# Patient Record
Sex: FEMALE | Race: WHITE | NOT HISPANIC OR LATINO | Employment: PART TIME | URBAN - METROPOLITAN AREA
[De-identification: names, ages, dates, MRNs, and addresses within clinical notes are randomized per-mention and may not be internally consistent; named-entity substitution may affect disease eponyms.]

---

## 2017-02-27 LAB
ALBUMIN SERPL BCP-MCNC: 4.4 G/DL (ref 3.5–5)
ALP SERPL-CCNC: 53 U/L (ref 39–117)
ALT SERPL W P-5'-P-CCNC: 21 U/L (ref 12–45)
AST SERPL-CCNC: 16 U/L (ref 0–37)
BILIRUB SERPL-MCNC: 0.4 MG/DL (ref 0–1)
BUN BLD-MCNC: 14 MG/DL (ref 6–19)
CALCIUM SERPL-MCNC: 9.6 MG/DL (ref 8.4–10.2)
CHLORIDE SERPL-SCNC: 104 MMOL/L (ref 96–108)
CHOLESTEROL, TOTAL: 240 MG/DL (ref 0–199)
CREAT SERPL-MCNC: 1 MG/DL (ref 0.4–1.1)
GLUCOSE SERPL-MCNC: 91 MG/DL (ref 70–105)
HCT VFR BLD AUTO: 46 % (ref 35–47)
HDLC SERPL-MCNC: 81 MG/DL (ref 40–100)
HGB BLD-MCNC: 13.6 G/DL (ref 11.7–16)
LDLC SERPL CALC-MCNC: 145 MG/DL
MCV RBC AUTO: 97 FL (ref 80–100)
PLATELET # BLD AUTO: 268 10^3/ML (ref 140–445)
POTASSIUM BLOOD: 4.2 MMOL/L (ref 3.5–5.1)
PROT SERPL-MCNC: 7.7 G/DL (ref 6.2–8.4)
RBC # BLD AUTO: 4.72 10^6/ΜL (ref 3.8–5.2)
TRIGL SERPL-MCNC: 71 MG/DL (ref 0–149)
TSH SERPL DL<=0.005 MIU/L-ACNC: 1.23 UIU/ML (ref 0.36–3.74)
WBC # BLD AUTO: 5.4 10^3/ML (ref 3.9–11)

## 2017-04-12 DIAGNOSIS — G89.29 CHRONIC LOW BACK PAIN: ICD-10-CM

## 2017-04-12 DIAGNOSIS — M54.50 CHRONIC LOW BACK PAIN: ICD-10-CM

## 2017-04-12 RX ORDER — IBUPROFEN 600 MG/1
TABLET ORAL
Qty: 90 TABLET | Refills: 0 | Status: SHIPPED | OUTPATIENT
Start: 2017-04-12 | End: 2017-09-06 | Stop reason: SDUPTHER

## 2017-07-20 ENCOUNTER — TELEPHONE (OUTPATIENT)
Dept: FAMILY MEDICINE | Facility: CLINIC | Age: 62
End: 2017-07-20

## 2017-07-20 DIAGNOSIS — Z12.31 ENCOUNTER FOR SCREENING MAMMOGRAM FOR BREAST CANCER: Primary | ICD-10-CM

## 2017-07-20 NOTE — TELEPHONE ENCOUNTER
----- Message from Rosemary Barr sent at 7/20/2017  9:02 AM CDT -----  Contact: Self   Patient need Mammogram orders. Please call at [phone].

## 2017-08-15 ENCOUNTER — HOSPITAL ENCOUNTER (OUTPATIENT)
Dept: RADIOLOGY | Facility: HOSPITAL | Age: 62
Discharge: HOME | End: 2017-08-15
Attending: INTERNAL MEDICINE
Payer: COMMERCIAL

## 2017-08-15 VITALS — HEIGHT: 64 IN | WEIGHT: 143 LBS | BODY MASS INDEX: 24.41 KG/M2

## 2017-08-15 DIAGNOSIS — Z12.31 ENCOUNTER FOR SCREENING MAMMOGRAM FOR BREAST CANCER: ICD-10-CM

## 2017-08-15 PROCEDURE — 77067 SCR MAMMO BI INCL CAD: CPT | Mod: TC

## 2017-08-15 PROCEDURE — 77067 SCR MAMMO BI INCL CAD: CPT | Mod: 26,,, | Performed by: RADIOLOGY

## 2017-08-15 PROCEDURE — 77063 BREAST TOMOSYNTHESIS BI: CPT | Mod: 26,,, | Performed by: RADIOLOGY

## 2017-09-06 ENCOUNTER — OFFICE VISIT (OUTPATIENT)
Dept: FAMILY MEDICINE | Facility: CLINIC | Age: 62
End: 2017-09-06
Payer: COMMERCIAL

## 2017-09-06 ENCOUNTER — LAB VISIT (OUTPATIENT)
Dept: LAB | Facility: HOSPITAL | Age: 62
End: 2017-09-06
Attending: INTERNAL MEDICINE
Payer: COMMERCIAL

## 2017-09-06 ENCOUNTER — PATIENT MESSAGE (OUTPATIENT)
Dept: FAMILY MEDICINE | Facility: CLINIC | Age: 62
End: 2017-09-06

## 2017-09-06 VITALS
HEART RATE: 62 BPM | WEIGHT: 149.13 LBS | HEIGHT: 64 IN | BODY MASS INDEX: 25.46 KG/M2 | DIASTOLIC BLOOD PRESSURE: 82 MMHG | SYSTOLIC BLOOD PRESSURE: 124 MMHG | TEMPERATURE: 99 F | OXYGEN SATURATION: 98 %

## 2017-09-06 DIAGNOSIS — N39.46 MIXED INCONTINENCE URGE AND STRESS (MALE)(FEMALE): ICD-10-CM

## 2017-09-06 DIAGNOSIS — M54.5 CHRONIC LOW BACK PAIN, UNSPECIFIED BACK PAIN LATERALITY, WITH SCIATICA PRESENCE UNSPECIFIED: ICD-10-CM

## 2017-09-06 DIAGNOSIS — Z23 NEED FOR SHINGLES VACCINE: ICD-10-CM

## 2017-09-06 DIAGNOSIS — R51.9 INTRACTABLE EPISODIC HEADACHE, UNSPECIFIED HEADACHE TYPE: ICD-10-CM

## 2017-09-06 DIAGNOSIS — Z23 FLU VACCINE NEED: ICD-10-CM

## 2017-09-06 DIAGNOSIS — K21.9 GASTROESOPHAGEAL REFLUX DISEASE WITHOUT ESOPHAGITIS: ICD-10-CM

## 2017-09-06 DIAGNOSIS — Z12.4 SCREENING FOR CERVICAL CANCER: ICD-10-CM

## 2017-09-06 DIAGNOSIS — G89.29 CHRONIC LOW BACK PAIN, UNSPECIFIED BACK PAIN LATERALITY, WITH SCIATICA PRESENCE UNSPECIFIED: ICD-10-CM

## 2017-09-06 DIAGNOSIS — E66.3 OVERWEIGHT (BMI 25.0-29.9): ICD-10-CM

## 2017-09-06 DIAGNOSIS — Z12.11 ENCOUNTER FOR FIT (FECAL IMMUNOCHEMICAL TEST) SCREENING: ICD-10-CM

## 2017-09-06 DIAGNOSIS — Z00.00 ROUTINE MEDICAL EXAM: Primary | ICD-10-CM

## 2017-09-06 DIAGNOSIS — N95.2 ATROPHIC VAGINITIS: ICD-10-CM

## 2017-09-06 DIAGNOSIS — Z00.00 ROUTINE MEDICAL EXAM: ICD-10-CM

## 2017-09-06 DIAGNOSIS — Z01.419 ENCOUNTER FOR GYNECOLOGICAL EXAMINATION WITHOUT ABNORMAL FINDING: ICD-10-CM

## 2017-09-06 LAB
BILIRUB UR QL STRIP: NEGATIVE
CLARITY UR REFRACT.AUTO: CLEAR
COLOR UR AUTO: YELLOW
GLUCOSE UR QL STRIP: NEGATIVE
HGB UR QL STRIP: NEGATIVE
KETONES UR QL STRIP: NEGATIVE
LEUKOCYTE ESTERASE UR QL STRIP: NEGATIVE
NITRITE UR QL STRIP: NEGATIVE
PH UR STRIP: 7 [PH] (ref 5–8)
PROT UR QL STRIP: NEGATIVE
SP GR UR STRIP: 1.01 (ref 1–1.03)
URN SPEC COLLECT METH UR: NORMAL
UROBILINOGEN UR STRIP-ACNC: NEGATIVE EU/DL

## 2017-09-06 PROCEDURE — 90471 IMMUNIZATION ADMIN: CPT | Mod: S$GLB,,, | Performed by: INTERNAL MEDICINE

## 2017-09-06 PROCEDURE — 81003 URINALYSIS AUTO W/O SCOPE: CPT

## 2017-09-06 PROCEDURE — 88175 CYTOPATH C/V AUTO FLUID REDO: CPT | Performed by: PATHOLOGY

## 2017-09-06 PROCEDURE — 88141 CYTOPATH C/V INTERPRET: CPT | Mod: ,,, | Performed by: PATHOLOGY

## 2017-09-06 PROCEDURE — 99396 PREV VISIT EST AGE 40-64: CPT | Mod: 25,S$GLB,, | Performed by: INTERNAL MEDICINE

## 2017-09-06 PROCEDURE — 87624 HPV HI-RISK TYP POOLED RSLT: CPT

## 2017-09-06 PROCEDURE — 90736 HZV VACCINE LIVE SUBQ: CPT | Mod: S$GLB,,, | Performed by: INTERNAL MEDICINE

## 2017-09-06 PROCEDURE — 99999 PR PBB SHADOW E&M-EST. PATIENT-LVL IV: CPT | Mod: PBBFAC,,, | Performed by: INTERNAL MEDICINE

## 2017-09-06 RX ORDER — OMEPRAZOLE 40 MG/1
40 CAPSULE, DELAYED RELEASE ORAL
Qty: 90 CAPSULE | Refills: 0 | Status: SHIPPED | OUTPATIENT
Start: 2017-09-06 | End: 2018-02-28

## 2017-09-06 RX ORDER — BUTALBITAL, ASPIRIN, AND CAFFEINE 325; 50; 40 MG/1; MG/1; MG/1
1 CAPSULE ORAL EVERY 4 HOURS PRN
Qty: 30 CAPSULE | Refills: 0 | Status: SHIPPED | OUTPATIENT
Start: 2017-09-06 | End: 2019-04-29 | Stop reason: SDUPTHER

## 2017-09-06 RX ORDER — IBUPROFEN 600 MG/1
TABLET ORAL
Qty: 90 TABLET | Refills: 0 | Status: SHIPPED | OUTPATIENT
Start: 2017-09-06 | End: 2017-12-13 | Stop reason: SDUPTHER

## 2017-09-06 NOTE — PROGRESS NOTES
Zoster vaccine administered, rahel well. Instructed to wait 15mins for observation, no reaction noted @ time of discharge.

## 2017-09-06 NOTE — PROGRESS NOTES
HISTORY OF PRESENT ILLNESS:  Sheree Lomeli is a 62 y.o. female who presents to the clinic today for a routine physical exam. Her last physical exam was approximately 1 years(s) ago.        PAST MEDICAL HISTORY:  Past Medical History:   Diagnosis Date    Arthritis     Back pain     Chronic pelvic pain in female     left-sided    De Quervain's tenosynovitis     left    Headache(784.0)     Reflux     Right carpal tunnel syndrome     Urinary incontinence     occ kira       PAST SURGICAL HISTORY:  Past Surgical History:   Procedure Laterality Date    OOPHORECTOMY      LSO--pain       SOCIAL HISTORY:  Social History     Social History    Marital status:      Spouse name: N/A    Number of children: 2    Years of education: N/A     Occupational History     [employer]     Social History Main Topics    Smoking status: Never Smoker    Smokeless tobacco: Never Used    Alcohol use No    Drug use: No    Sexual activity: Yes     Other Topics Concern    Not on file     Social History Narrative    No narrative on file       FAMILY HISTORY:  Family History   Problem Relation Age of Onset    Breast cancer Maternal Grandmother     Heart disease Father      s/p stenting    Coronary artery disease Father     Hypoglycemic Sister     No Known Problems Sister     Diabetes Maternal Uncle     Diabetes Cousin     Hypertension Neg Hx     Stroke Neg Hx     Colon cancer Neg Hx        ALLERGIES AND MEDICATIONS: updated and reviewed.  Review of patient's allergies indicates:   Allergen Reactions    Adhesive Rash    Iodine Rash    Shellfish containing products Rash     Patient allergic to softshell crabs specifically    Sulfa (sulfonamide antibiotics) Rash     Medication List with Changes/Refills   Changed and/or Refilled Medications    Modified Medication Previous Medication    BUTALBITAL-ASPIRIN-CAFFEINE -40 MG (FIORINAL) -40 MG CAP butalbital-aspirin-caffeine -40 mg  (FIORINAL) -40 mg Cap       Take 1 capsule by mouth every 4 (four) hours as needed.    Take 1 capsule by mouth every 4 (four) hours as needed.    IBUPROFEN (ADVIL,MOTRIN) 600 MG TABLET ibuprofen (ADVIL,MOTRIN) 600 MG tablet       TAKE 1 TABLET(600 MG) BY MOUTH EVERY 8 HOURS AS NEEDED    TAKE 1 TABLET(600 MG) BY MOUTH EVERY 8 HOURS AS NEEDED    OMEPRAZOLE (PRILOSEC) 40 MG CAPSULE omeprazole (PRILOSEC) 40 MG capsule       Take 1 capsule (40 mg total) by mouth as needed.    Take 1 capsule (40 mg total) by mouth as needed.             SCREENING HISTORY:  Health Maintenance       Date Due Completion Date    Colonoscopy 09/12/2016 9/12/2006 (Done)    Override on 9/12/2006: Done    Pap Smear with HPV Cotest 04/09/2017 4/9/2014    Override on 4/6/2011: Done    Zoster Vaccine 06/11/2017 ---    Influenza Vaccine 08/01/2017 10/27/2014 (Done)    Override on 10/27/2014: Done    Override on 10/9/2013: Done ([facility])    Mammogram 08/15/2018 8/15/2017    Lipid Panel 08/30/2021 8/30/2016    Override on 6/3/2015: Done (total 218, , , HDL 65)    Override on 10/11/2013: Done (total 222, hdl 70, ldl 129, tg 116)    Override on 8/1/2012: Done    Override on 7/31/2006: Done    TETANUS VACCINE 07/29/2025 7/29/2015            REVIEW OF SYSTEMS:   The patient reports good dietary habits.  The patient does exercise regularly.  General ROS: negative for - chills, fever, malaise or weight loss  Psychological ROS: negative for - anxiety, depression, sleep disturbances or suicidal ideation  Ophthalmic ROS: negative for - blurry vision or eye pain  ENT ROS: negative for - epistaxis, nasal congestion, oral lesions or sore throat; has rare headache  Allergy and Immunology ROS: negative for - hives  Hematological and Lymphatic ROS: negative for - swollen lymph nodes  Endocrine ROS: negative for - polydipsia/polyuria or temperature intolerance  Respiratory ROS: no cough, shortness of breath, or wheezing  Cardiovascular ROS: no  "chest pain or dyspnea on exertion  Gastrointestinal ROS: no abdominal pain, change in bowel habits, or black or bloody stools  Genito-Urinary ROS: no dysuria, trouble voiding, or hematuria  Breast ROS: negative for breast lumps  Musculoskeletal ROS: positive for - has back pain every 3 months or so when she deals with constipation  negative for - gait disturbance, joint swelling or muscle pain  Neurological ROS: no TIA or stroke symptoms  Dermatological ROS: negative for mole changes and rash    Physical Examination:   Vitals:    09/06/17 0812   BP: 124/82   Pulse: 62   Temp: 98.6 °F (37 °C)     Weight: 67.7 kg (149 lb 2.3 oz)   Height: 5' 4.02" (162.6 cm)   Body mass index is 25.59 kg/m².    General appearance - alert, well appearing, and in no distress and overweight  Mental status - alert, oriented to person, place, and time, normal mood, behavior, speech, dress, motor activity, and thought processes  Eyes - pupils equal and reactive, extraocular eye movements intact, sclera anicteric  Mouth - mucous membranes moist, pharynx normal without lesions  Neck - supple, no significant adenopathy, carotids upstroke normal bilaterally, no bruits, thyroid exam: thyroid is normal in size without nodules or tenderness  Lymphatics - no palpable lymphadenopathy  Chest - clear to auscultation, no wheezes, rales or rhonchi, symmetric air entry  Heart - normal rate and regular rhythm, no gallops noted  Abdomen - soft, nontender, nondistended, no masses or organomegaly  Breasts - breasts appear normal, no suspicious masses, no skin or nipple changes or axillary nodes  Pelvic - normal external genitalia, vulva, vagina, cervix, uterus and adnexa, VAGINA: atrophic  Back exam - full range of motion, no tenderness, palpable spasm or pain on motion  Neurological - alert, oriented, normal speech, no focal findings or movement disorder noted, cranial nerves II through XII intact  Musculoskeletal - no joint tenderness, deformity or " swelling, no muscular tenderness noted  Extremities - peripheral pulses normal, no pedal edema, no clubbing or cyanosis  Skin - normal coloration and turgor, no rashes, no suspicious skin lesions noted      ASSESSMENT AND PLAN:  1. Routine medical exam  Counseled on age appropriate medical preventative services including age appropriate cancer screenings, age appropriate eye and dental exams, over all nutritional health, need for a consistent exercise regimen, and an over all push towards maintaining a vigorous and active lifestyle.  Counseled on age appropriate vaccines and discussed upcoming health care needs based on age/gender. Discussed good sleep hygiene and stress management.   - Urinalysis; Future    2. Encounter for gynecological examination without abnormal finding  I did a PAP smear today. She is up to date on her mmg.    3. Gastroesophageal reflux disease without esophagitis  Symptoms controlled. Reflux precautions discussed (eliminate tobacco if a smoker; minimize caffeine, chocolate and red/white peppermint intake; avoid heavy and spicy meals; don't lay down within 2-3 hours after eating). Medication as needed. Patient asked to take medication breaks, if possible - discussed chronic use can limit calcium absorption, increases the risk for intestinal infections, and can cause kidney damage.    - omeprazole (PRILOSEC) 40 MG capsule; Take 1 capsule (40 mg total) by mouth as needed.  Dispense: 90 capsule; Refill: 0    4. Mixed incontinence urge and stress (male)(female)  Stable. Observe.     5. Atrophic vaginitis  Stable. Does report pain with intercourse. Will use otc replense more often. Discussed possibility of using topical hormone cream - will let me know if she wants to give it a try.    6. Chronic low back pain, unspecified back pain laterality, with sciatica presence unspecified  Stable. Medication as needed. She was given info on the healthy back program.  - ibuprofen (ADVIL,MOTRIN) 600 MG tablet;  TAKE 1 TABLET(600 MG) BY MOUTH EVERY 8 HOURS AS NEEDED  Dispense: 90 tablet; Refill: 0    7. Intractable episodic headache, unspecified headache type  Stable. Medication as needed.   - butalbital-aspirin-caffeine -40 mg (FIORINAL) -40 mg Cap; Take 1 capsule by mouth every 4 (four) hours as needed.  Dispense: 30 capsule; Refill: 0    8. Overweight (BMI 25.0-29.9)  The patient is asked to make an attempt to improve diet and exercise patterns to aid in medical management of this problem.     9. Screening for cervical cancer    - Liquid-based pap smear, screening  - HPV High Risk Genotypes, PCR    10. Flu vaccine need  Patient was advised to come back in about a month or go to the pharmacy since we do not have this available in the clinic today.     11. Need for shingles vaccine    - Zoster Vaccine - Live    12. Encounter for FIT (fecal immunochemical test) screening    - Fecal Immunochemical Test (iFOBT); Future          Return in about 1 year (around 9/6/2018), or if symptoms worsen or fail to improve, for annual exam. or sooner as needed.

## 2017-09-08 ENCOUNTER — TELEPHONE (OUTPATIENT)
Dept: ADMINISTRATIVE | Facility: HOSPITAL | Age: 62
End: 2017-09-08

## 2017-09-08 ENCOUNTER — LAB VISIT (OUTPATIENT)
Dept: LAB | Facility: HOSPITAL | Age: 62
End: 2017-09-08
Attending: INTERNAL MEDICINE
Payer: COMMERCIAL

## 2017-09-08 DIAGNOSIS — Z12.11 ENCOUNTER FOR FIT (FECAL IMMUNOCHEMICAL TEST) SCREENING: ICD-10-CM

## 2017-09-08 PROCEDURE — 82274 ASSAY TEST FOR BLOOD FECAL: CPT

## 2017-09-08 NOTE — TELEPHONE ENCOUNTER
Received lab results from [facility].  Updated health maintenance and sent to scanning.

## 2017-09-11 LAB
HEMOCCULT STL QL IA: NEGATIVE
HPV HR 12 DNA CVX QL NAA+PROBE: NEGATIVE
HPV16 DNA SPEC QL NAA+PROBE: NEGATIVE
HPV18 DNA SPEC QL NAA+PROBE: NEGATIVE

## 2017-09-19 ENCOUNTER — TELEPHONE (OUTPATIENT)
Dept: FAMILY MEDICINE | Facility: CLINIC | Age: 62
End: 2017-09-19

## 2017-09-19 DIAGNOSIS — M54.50 LOW BACK PAIN RADIATING TO BOTH LEGS: Primary | ICD-10-CM

## 2017-09-19 DIAGNOSIS — M79.605 LOW BACK PAIN RADIATING TO BOTH LEGS: Primary | ICD-10-CM

## 2017-09-19 DIAGNOSIS — M79.604 LOW BACK PAIN RADIATING TO BOTH LEGS: Primary | ICD-10-CM

## 2017-09-19 NOTE — TELEPHONE ENCOUNTER
----- Message from Linnea De Guzman sent at 9/15/2017  8:04 AM CDT -----  Contact: self  Patient called stating that she is having been in pain with back. Discussed it a little with  at [facility]. Please contact her [phone].    Thanks!

## 2017-09-19 NOTE — TELEPHONE ENCOUNTER
Spoke w/patient, requesting advice. States she continues to have severe lower back pain that radiates to her feet and upper back. States she thought it could have been from having a history of constipation but she is not constipated and still have the pain. States she does not think the healthier back program will help, states if  agrees, she think an MRI would reveal exactly what is going on. Please advise.

## 2017-09-20 NOTE — TELEPHONE ENCOUNTER
Insurance will not allow an MRI without first doing an X-ray and, if no significant findings, we may need to do PT prior to MRI. X-ray ordered - please schedule.

## 2017-09-21 ENCOUNTER — HOSPITAL ENCOUNTER (OUTPATIENT)
Dept: RADIOLOGY | Facility: HOSPITAL | Age: 62
Discharge: HOME | End: 2017-09-21
Attending: INTERNAL MEDICINE
Payer: COMMERCIAL

## 2017-09-21 ENCOUNTER — PATIENT MESSAGE (OUTPATIENT)
Dept: FAMILY MEDICINE | Facility: CLINIC | Age: 62
End: 2017-09-21

## 2017-09-21 DIAGNOSIS — M54.50 LOW BACK PAIN RADIATING TO BOTH LEGS: Primary | ICD-10-CM

## 2017-09-21 DIAGNOSIS — M79.604 LOW BACK PAIN RADIATING TO BOTH LEGS: ICD-10-CM

## 2017-09-21 DIAGNOSIS — M54.50 LOW BACK PAIN RADIATING TO BOTH LEGS: ICD-10-CM

## 2017-09-21 DIAGNOSIS — M79.604 LOW BACK PAIN RADIATING TO BOTH LEGS: Primary | ICD-10-CM

## 2017-09-21 DIAGNOSIS — M79.605 LOW BACK PAIN RADIATING TO BOTH LEGS: ICD-10-CM

## 2017-09-21 DIAGNOSIS — M79.605 LOW BACK PAIN RADIATING TO BOTH LEGS: Primary | ICD-10-CM

## 2017-09-21 PROCEDURE — 72100 X-RAY EXAM L-S SPINE 2/3 VWS: CPT | Mod: TC,PO

## 2017-09-21 PROCEDURE — 72100 X-RAY EXAM L-S SPINE 2/3 VWS: CPT | Mod: 26,,, | Performed by: RADIOLOGY

## 2017-09-29 ENCOUNTER — CLINICAL SUPPORT (OUTPATIENT)
Dept: REHABILITATION | Facility: HOSPITAL | Age: 62
End: 2017-09-29
Attending: INTERNAL MEDICINE
Payer: COMMERCIAL

## 2017-09-29 DIAGNOSIS — G89.29 CHRONIC LOW BACK PAIN, UNSPECIFIED BACK PAIN LATERALITY, WITH SCIATICA PRESENCE UNSPECIFIED: Primary | ICD-10-CM

## 2017-09-29 DIAGNOSIS — M62.81 WEAKNESS OF TRUNK MUSCULATURE: ICD-10-CM

## 2017-09-29 DIAGNOSIS — M62.89 MUSCLE TIGHTNESS: ICD-10-CM

## 2017-09-29 DIAGNOSIS — M54.5 CHRONIC LOW BACK PAIN, UNSPECIFIED BACK PAIN LATERALITY, WITH SCIATICA PRESENCE UNSPECIFIED: Primary | ICD-10-CM

## 2017-09-29 PROCEDURE — 97161 PT EVAL LOW COMPLEX 20 MIN: CPT | Mod: PN

## 2017-09-29 PROCEDURE — 97110 THERAPEUTIC EXERCISES: CPT | Mod: PN

## 2017-09-29 NOTE — PROGRESS NOTES
TIME RECORD    Date: 09/29/2017    Start Time:  0700  Stop Time:  0800      OUTPATIENT PHYSICAL THERAPY   PATIENT EVALUATION  Onset Date: 2009  Primary Diagnosis:   1. Chronic low back pain, unspecified back pain laterality, with sciatica presence unspecified     2. Weakness of trunk musculature     3. Muscle tightness       Treatment Diagnosis: decreased ROM lumbar spine, muscle tightness, core weakness  Past Medical History:   Diagnosis Date    Arthritis     Back pain     Chronic pelvic pain in female     left-sided    De Quervain's tenosynovitis     left    Headache(784.0)     Reflux     Right carpal tunnel syndrome     Urinary incontinence     occ kira     Precautions: as listed above  Prior Therapy: for lumbar  Medications: Sheree Lomeli has a current medication list which includes the following prescription(s): butalbital-aspirin-caffeine -40 mg, ibuprofen, and omeprazole.  Nutrition:  Normal  History of Present Illness: in 2009 pt was cleaning her bathroom, felt like she tore something in her back, was completely hunches over and couldn't move. Went to the MD, it was determined 2 years later that she had an rectocele occur, and her bowel went from something that never caused her a problem to if she didn't go every day she was in excruciating pain. Pt can go 6 months without any pain, then she will experience constipation, it will take about 6 weeks using stool softeners to make everything work again and decrease her back pain. This time the lower back never went away. Pt has been through PT prior for the same pain. Pt attempts to do 2 , 9 minute yoga sessions each morning to help stretch her back.   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 Pt reports she had increased pain last night, once she had a bowel movement this morning her pain was 50% better. Pt reports her back pain will wake her up at night, she is able to change position and return to sleep, drop foot, unexplained weight gain/loss, fever, chills, or night sweats. Pt reports she works through the pain.     Pain:  Location: L side lumbar spine -sitting right above the buttock and into the tailbone - shooting pain from the buttock to the knee   Description: Aching, Burning, Sharp and Shooting  Activities Which Increase Pain: Sitting, Night Time, Flexing and Lifting  Activities Which Decrease Pain: biofreeze, ice pack, yoga, ibuprofen (PRN)  Pain Scale: 0/10 at best 4/10 now  9/10 at worst    Objective     Objective   Observation: pt in standing with level pelvis, and BLE bony landmarks  AROM:    FB: fingertips 6" from floor without reversal of lumbar spine - 3 inches from floor without pain post FDN                  BB: WNL with increased pain in midline lumbar spine                        SBR: fingertips 5 inches above tibiofemoral joint line with significant increased pain L lumbar spine                          SBL: fingertips to 3 inches above tibiofemoral joint line with general stretching R side                     RotL: 25% limitation                          Rot R: 25% limitation  STR/myotome:  R L   hip flex   4+ 4+                          knee ext    5 5                         ankle DF   5 5                        great toe ext     5 5                   ankle PF        5 5                       knee flex  5 5  Sensation/Reflexes: grossly intact to light touch throughout BLE  Palpation: " relief of discomfort with palpation over laminar grooves, QL, and glut med/max  Gait: WNL without AD  Tests:                      SLR: neg   Crossed SLR: neg   Slump Test: neg   Prone Instability/lumbar joint play: 3+/6 P/A   SHABBIR: neg - muscle tightness limitation bilaterally  Flexibility:   Hamstrin% limitation LLE   - 10% limitation RLE      hip flexors : WNL bilaterally                rectus femoris: WNL bilaterally                                          Hip screen: R L   Flex 4+ 4+   Abd 4 4+*   Add 4 4   Ext 4 4*                                                                                                                       FOTO: 33% limitation     today's treatment x30 minutes:  education: educated in evaluation findings and POC.  Educated in expectations of treatment, provider's contact information (email and phone) given to pt for communication of any questions and concerns.  kinetic training: discussed importance of core stability and recruitment while performing yoga and household chores  HEP instruction and ther ex: instructed and performed following:   HL TrA 2x10 3 second holds   HL bridging 2x10   HL dynamic hamstring stretch x10 3 second holds   FADDIR piriformis stretch   Seated EOM hamstring stretch 10x 5 seconds  manual therapy:   Application of FDN: Pt educated on benefits and potential side effects of dry needling. Educated pt on benefits, precautions, side effects followign IDN. Educated pt to use heat following treatment sessions if pt is experiencing pain or soreness. Pt verbalized good understanding of education.  Pt signed written consent to dry needling Rx. Pt gave verbal consent for DN   Pre Post   Dynamic hamstring stretch DP FN   Lumbar RSB DP DN (improved by 2 inches)   Multi-segmental flexion DP DN (improved by 3 inches)     Pt received dry needling to the below listed muscles using 6 needles.  Bilateral lumbar multifidus L4 and L3 with stimulation  Bilateral glut  med          Assessment       Initial Assessment (Pertinent finding, problem list and factors affecting outcome): Ms. Sheree Lomeli is a 63 y/o female referred to outpatient PT for lower back pain with bilateral radiculopathy. Pt presenting with limitations in lumbar spine ROM with provocation of L sided pain, mild weakness in bilateral hips, slight hypermobility of lumbar spine facet joints, core weakness, and muscle tightness. PT signs and symptoms consistent with referring diagnosis, however, with only symptoms on the L side, likely secondary to muscle strain and core weakness. After developing POC, pt was instructed through therapeutic exercises and  Agreed to FDN. Pt with excellent response to FDN without adverse response and with improvements in lumbar spine mobility, as seen above. Pt would benefit from skilled PT to address above stated problems, as well as, achieve pt goals within a timely manner. Pt has set realistic goals and has verbalized good understanding and agreement with reported diagnosis, prognosis and treatment. Pt demonstrates no additional cultural, spiritual or educational need and currently has no barriers to learning.    History  Co-morbidities and personal factors that may impact the plan of care Examination  Body Structures and Functions, activity limitations and participation restrictions that may impact the plan of care    Clinical Presentation   Co-morbidities:   arthritis, chronic pelvic pain, rectocele         Personal Factors:   no deficits    Body Regions:   back  lower extremities    Body Systems:    gross symmetry  ROM  strength  gait            Participation Restrictions:   Unable to sit at work desk for long periods of time, unable to assist with household chores     Activity limitations:   Learning and applying knowledge  no deficits    General Tasks and Commands  no deficits    Communication  no deficits      Mobility  lifting and carrying objects    Self care  no  deficits    Domestic Life  shopping  cooking  doing house work (cleaning house, washing dishes, laundry)    Interactions/Relationships  no deficits    Life Areas  employment    Community and Social Life  recreation and leisure         stable and uncomplicated                      low   moderate  moderate Decision Making/ Complexity Score:  low             Rehab Potiential: fair    Short Term Goals (4 Weeks):   1. Pt will report 20% reduced pain within the lumbar spine for ease with driving to and from work  2. Pt will demonstrate 1/3 improvement MMT in BLE for ease with performing household chores  4. Pt will demonstrate 10% improvement in bilateral hamstring length for ease with dressing lower body  5. Pt will demonstrate improved lumbar ROM by 25% in all directions for ease with picking an object up from the floor  Long Term Goals (8 Weeks):   1.Pt will report <3/10 pain within the lumbar spine for ease with ADL's  2. Pt will demonstrate 50% improvement of hamstring and hip flexor length in BLE for ease with ambulation  3. Pt will be independent with HEP for maintenance of improvements gained in therapy sessions   4. Pt will demonstrate 4+/5 strength or greater in BLE for ease with running errands         Plan     Certification Period: 9/29/17 to 12/29/17  Recommended Treatment Plan: 2 times per week for 8 weeks: Electrical Stimulation PRN, Iontophoresis (with dexamethasone PRN), Manual Therapy, Moist Heat/ Ice, Neuromuscular Re-ed, Patient Education, Therapeutic Activites, Therapeutic Exercise and Other therapeutic taping, dry needling, aquatic therapy    Other Recommendations: possible referral/consult with pelvic floor PT      Therapist: Cass Montaño, PT    I CERTIFY THE NEED FOR THESE SERVICES FURNISHED UNDER THIS PLAN OF TREATMENT AND WHILE UNDER MY CARE    Physician's comments:  ________________________________________________________________________________________________________________________________________________      Physician's Name: ___________________________________

## 2017-09-29 NOTE — PLAN OF CARE
TIME RECORD    Date: 09/29/2017    Start Time:  0700  Stop Time:  0800      OUTPATIENT PHYSICAL THERAPY   PATIENT EVALUATION  Onset Date: 2009  Primary Diagnosis:   1. Chronic low back pain, unspecified back pain laterality, with sciatica presence unspecified     2. Weakness of trunk musculature     3. Muscle tightness       Treatment Diagnosis: decreased ROM lumbar spine, muscle tightness, core weakness  Past Medical History:   Diagnosis Date    Arthritis     Back pain     Chronic pelvic pain in female     left-sided    De Quervain's tenosynovitis     left    Headache(784.0)     Reflux     Right carpal tunnel syndrome     Urinary incontinence     occ kira     Precautions: as listed above  Prior Therapy: for lumbar  Medications: Sheree Lomeli has a current medication list which includes the following prescription(s): butalbital-aspirin-caffeine -40 mg, ibuprofen, and omeprazole.  Nutrition:  Normal  History of Present Illness: in 2009 pt was cleaning her bathroom, felt like she tore something in her back, was completely hunches over and couldn't move. Went to the MD, it was determined 2 years later that she had an rectocele occur, and her bowel went from something that never caused her a problem to if she didn't go every day she was in excruciating pain. Pt can go 6 months without any pain, then she will experience constipation, it will take about 6 weeks using stool softeners to make everything work again and decrease her back pain. This time the lower back never went away. Pt has been through PT prior for the same pain. Pt attempts to do 2 , 9 minute yoga sessions each morning to help stretch her back.   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 Pt reports she had increased pain last night, once she had a bowel movement this morning her pain was 50% better. Pt reports her back pain will wake her up at night, she is able to change position and return to sleep, drop foot, unexplained weight gain/loss, fever, chills, or night sweats. Pt reports she works through the pain.     Pain:  Location: L side lumbar spine -sitting right above the buttock and into the tailbone - shooting pain from the buttock to the knee   Description: Aching, Burning, Sharp and Shooting  Activities Which Increase Pain: Sitting, Night Time, Flexing and Lifting  Activities Which Decrease Pain: biofreeze, ice pack, yoga, ibuprofen (PRN)  Pain Scale: 0/10 at best 4/10 now  9/10 at worst    Objective     Objective   Observation: pt in standing with level pelvis, and BLE bony landmarks  AROM:    FB: fingertips 6" from floor without reversal of lumbar spine - 3 inches from floor without pain post FDN                  BB: WNL with increased pain in midline lumbar spine                        SBR: fingertips 5 inches above tibiofemoral joint line with significant increased pain L lumbar spine                          SBL: fingertips to 3 inches above tibiofemoral joint line with general stretching R side                     RotL: 25% limitation                          Rot R: 25% limitation  STR/myotome:  R L   hip flex   4+ 4+                          knee ext    5 5                         ankle DF   5 5                        great toe ext     5 5                   ankle PF        5 5                       knee flex  5 5  Sensation/Reflexes: grossly intact to light touch throughout BLE  Palpation: " relief of discomfort with palpation over laminar grooves, QL, and glut med/max  Gait: WNL without AD  Tests:                      SLR: neg   Crossed SLR: neg   Slump Test: neg   Prone Instability/lumbar joint play: 3+/6 P/A   SHABBIR: neg - muscle tightness limitation bilaterally  Flexibility:   Hamstrin% limitation LLE   - 10% limitation RLE      hip flexors : WNL bilaterally                rectus femoris: WNL bilaterally                                          Hip screen: R L   Flex 4+ 4+   Abd 4 4+*   Add 4 4   Ext 4 4*                                                                                                                       FOTO: 33% limitation     today's treatment x30 minutes:  education: educated in evaluation findings and POC.  Educated in expectations of treatment, provider's contact information (email and phone) given to pt for communication of any questions and concerns.  kinetic training: discussed importance of core stability and recruitment while performing yoga and household chores  HEP instruction and ther ex: instructed and performed following:   HL TrA 2x10 3 second holds   HL bridging 2x10   HL dynamic hamstring stretch x10 3 second holds   FADDIR piriformis stretch   Seated EOM hamstring stretch 10x 5 seconds  manual therapy:   Application of FDN: Pt educated on benefits and potential side effects of dry needling. Educated pt on benefits, precautions, side effects followign IDN. Educated pt to use heat following treatment sessions if pt is experiencing pain or soreness. Pt verbalized good understanding of education.  Pt signed written consent to dry needling Rx. Pt gave verbal consent for DN   Pre Post   Dynamic hamstring stretch DP FN   Lumbar RSB DP DN (improved by 2 inches)   Multi-segmental flexion DP DN (improved by 3 inches)     Pt received dry needling to the below listed muscles using 6 needles.  Bilateral lumbar multifidus L4 and L3 with stimulation  Bilateral glut  med          Assessment       Initial Assessment (Pertinent finding, problem list and factors affecting outcome): Ms. Sheree Lomeli is a 61 y/o female referred to outpatient PT for lower back pain with bilateral radiculopathy. Pt presenting with limitations in lumbar spine ROM with provocation of L sided pain, mild weakness in bilateral hips, slight hypermobility of lumbar spine facet joints, core weakness, and muscle tightness. PT signs and symptoms consistent with referring diagnosis, however, with only symptoms on the L side, likely secondary to muscle strain and core weakness. After developing POC, pt was instructed through therapeutic exercises and  Agreed to FDN. Pt with excellent response to FDN without adverse response and with improvements in lumbar spine mobility, as seen above. Pt would benefit from skilled PT to address above stated problems, as well as, achieve pt goals within a timely manner. Pt has set realistic goals and has verbalized good understanding and agreement with reported diagnosis, prognosis and treatment. Pt demonstrates no additional cultural, spiritual or educational need and currently has no barriers to learning.    History  Co-morbidities and personal factors that may impact the plan of care Examination  Body Structures and Functions, activity limitations and participation restrictions that may impact the plan of care    Clinical Presentation   Co-morbidities:   arthritis, chronic pelvic pain, rectocele         Personal Factors:   no deficits    Body Regions:   back  lower extremities    Body Systems:    gross symmetry  ROM  strength  gait            Participation Restrictions:   Unable to sit at work desk for long periods of time, unable to assist with household chores     Activity limitations:   Learning and applying knowledge  no deficits    General Tasks and Commands  no deficits    Communication  no deficits      Mobility  lifting and carrying objects    Self care  no  deficits    Domestic Life  shopping  cooking  doing house work (cleaning house, washing dishes, laundry)    Interactions/Relationships  no deficits    Life Areas  employment    Community and Social Life  recreation and leisure         stable and uncomplicated                      low   moderate  moderate Decision Making/ Complexity Score:  low             Rehab Potiential: fair    Short Term Goals (4 Weeks):   1. Pt will report 20% reduced pain within the lumbar spine for ease with driving to and from work  2. Pt will demonstrate 1/3 improvement MMT in BLE for ease with performing household chores  4. Pt will demonstrate 10% improvement in bilateral hamstring length for ease with dressing lower body  5. Pt will demonstrate improved lumbar ROM by 25% in all directions for ease with picking an object up from the floor  Long Term Goals (8 Weeks):   1.Pt will report <3/10 pain within the lumbar spine for ease with ADL's  2. Pt will demonstrate 50% improvement of hamstring and hip flexor length in BLE for ease with ambulation  3. Pt will be independent with HEP for maintenance of improvements gained in therapy sessions   4. Pt will demonstrate 4+/5 strength or greater in BLE for ease with running errands         Plan     Certification Period: 9/29/17 to 12/29/17  Recommended Treatment Plan: 2 times per week for 8 weeks: Electrical Stimulation PRN, Iontophoresis (with dexamethasone PRN), Manual Therapy, Moist Heat/ Ice, Neuromuscular Re-ed, Patient Education, Therapeutic Activites, Therapeutic Exercise and Other therapeutic taping, dry needling, aquatic therapy    Other Recommendations: possible referral/consult with pelvic floor PT      Therapist: Cass Montaño, PT    I CERTIFY THE NEED FOR THESE SERVICES FURNISHED UNDER THIS PLAN OF TREATMENT AND WHILE UNDER MY CARE    Physician's comments:  ________________________________________________________________________________________________________________________________________________      Physician's Name: ___________________________________

## 2017-10-04 ENCOUNTER — CLINICAL SUPPORT (OUTPATIENT)
Dept: REHABILITATION | Facility: HOSPITAL | Age: 62
End: 2017-10-04
Attending: INTERNAL MEDICINE
Payer: COMMERCIAL

## 2017-10-04 DIAGNOSIS — G89.29 CHRONIC LOW BACK PAIN, UNSPECIFIED BACK PAIN LATERALITY, WITH SCIATICA PRESENCE UNSPECIFIED: Primary | ICD-10-CM

## 2017-10-04 DIAGNOSIS — M62.89 MUSCLE TIGHTNESS: ICD-10-CM

## 2017-10-04 DIAGNOSIS — M62.81 WEAKNESS OF TRUNK MUSCULATURE: ICD-10-CM

## 2017-10-04 DIAGNOSIS — M54.5 CHRONIC LOW BACK PAIN, UNSPECIFIED BACK PAIN LATERALITY, WITH SCIATICA PRESENCE UNSPECIFIED: Primary | ICD-10-CM

## 2017-10-04 PROCEDURE — 97110 THERAPEUTIC EXERCISES: CPT | Mod: PN

## 2017-10-04 NOTE — PROGRESS NOTES
Name: Sheree Lomeli  Clinic Number: [id number]  Date of Treatment: 10/04/2017   Diagnosis:   Encounter Diagnoses   Name Primary?    Weakness of trunk musculature     Muscle tightness     Chronic low back pain, unspecified back pain laterality, with sciatica presence unspecified Yes       Time in: 1300  Time Out: 1355  Total Treatment Time: 55'      Subjective:    Sheree reports she felt wonderful after dry needling on Friday, she forgot to take her ibuprofen over the weekend. However, this resulted in increased pain the following day.  Patient reports their pain to be 6/10 on a 0-10 scale with 0 being no pain and 10 being the worst pain imaginable. Pt reports compliance with her HEP.     Objective    Patient received individual therapy to increase strength, endurance, ROM, flexibility, posture and core stabilization with activities as follows:     Sheree received therapeutic exercises to develop strength, endurance, ROM, flexibility, posture and core stabilization for 40 minutes including:   NuStep warm up x6 minutes  LTR over PB x2 minutes  DKTC over PB x2 minutes  HL TrA x3 minutes 3 second holds  SL hip abduction (x10 clam, x10 SL)  HL FADDIR stretch 2x30 seconds  HL hip adduction ball squeeze x3 minutes 3 second holds  HL BKFO with TrA 2x10 ea. YTB  Quad TrA with hip extension 2x10    Shuttle 2x10 1 cord    Dynamic hamstring stretch 2x10 3 second holds  Sitting EOS hamstring stretch 2x30 seconds    Sheree received the following manual therapy techniques: Soft tissue Mobilization were applied to the: lumbar spine for 15 minutes.   Application of FDN: Pt educated on benefits and potential side effects of dry needling. Educated pt on benefits, precautions, side effects followign IDN. Educated pt to use heat following treatment sessions if pt is experiencing pain or soreness. Pt verbalized good understanding of education.  Pt signed written consent to dry needling Rx. Pt gave verbal consent for DN  Pt received dry  needling to the below listed muscles using 4 (50-60 mm) needles.  L lumbar multifidus with stimulation  L erector spinae  L glut max/med      Written Home Exercises Provided: reviewed from IE  Pt demo good understanding of the education provided. Sheree demonstrated good return demonstration of activities.     Assessment:   Sheree tolerated treatment session well this afternoon. Pt with good recall of HEP and recruitment of transverse abdominus. Pt with good tolerance to FDN without adverse response and with excellent improvements in R hip ROM post treatment session.    Pt will continue to benefit from skilled PT intervention. Medical Necessity is demonstrated by:  Pain limits function of effected part for some activities, Unable to participate fully in daily activities, Requires skilled supervision to complete and progress HEP and Weakness.    Patient is making good progress towards established goals.    Short Term Goals (4 Weeks):   1. Pt will report 20% reduced pain within the lumbar spine for ease with driving to and from work  2. Pt will demonstrate 1/3 improvement MMT in BLE for ease with performing household chores  4. Pt will demonstrate 10% improvement in bilateral hamstring length for ease with dressing lower body  5. Pt will demonstrate improved lumbar ROM by 25% in all directions for ease with picking an object up from the floor  Long Term Goals (8 Weeks):   1.Pt will report <3/10 pain within the lumbar spine for ease with ADL's  2. Pt will demonstrate 50% improvement of hamstring and hip flexor length in BLE for ease with ambulation  3. Pt will be independent with HEP for maintenance of improvements gained in therapy sessions   4. Pt will demonstrate 4+/5 strength or greater in BLE for ease with running errands            Plan      Certification Period: 9/29/17 to 12/29/17  Recommended Treatment Plan: 2 times per week for 8 weeks: Electrical Stimulation PRN, Iontophoresis (with dexamethasone PRN),  Manual Therapy, Moist Heat/ Ice, Neuromuscular Re-ed, Patient Education, Therapeutic Activites, Therapeutic Exercise and Other therapeutic taping, dry needling, aquatic therapy     Continue with established Plan of Care towards PT goals.     Cass Montaño, PT

## 2017-10-10 ENCOUNTER — CLINICAL SUPPORT (OUTPATIENT)
Dept: REHABILITATION | Facility: HOSPITAL | Age: 62
End: 2017-10-10
Attending: INTERNAL MEDICINE
Payer: COMMERCIAL

## 2017-10-10 DIAGNOSIS — G89.29 CHRONIC LOW BACK PAIN, UNSPECIFIED BACK PAIN LATERALITY, WITH SCIATICA PRESENCE UNSPECIFIED: Primary | ICD-10-CM

## 2017-10-10 DIAGNOSIS — M62.81 WEAKNESS OF TRUNK MUSCULATURE: ICD-10-CM

## 2017-10-10 DIAGNOSIS — M62.89 MUSCLE TIGHTNESS: ICD-10-CM

## 2017-10-10 DIAGNOSIS — M54.5 CHRONIC LOW BACK PAIN, UNSPECIFIED BACK PAIN LATERALITY, WITH SCIATICA PRESENCE UNSPECIFIED: Primary | ICD-10-CM

## 2017-10-10 PROCEDURE — 97110 THERAPEUTIC EXERCISES: CPT | Mod: PN

## 2017-10-10 NOTE — PROGRESS NOTES
Name: Sheree Lomeli  Clinic Number: [id number]  Date of Treatment: 10/10/2017   Diagnosis:   Encounter Diagnoses   Name Primary?    Weakness of trunk musculature     Muscle tightness     Chronic low back pain, unspecified back pain laterality, with sciatica presence unspecified Yes       Time in: 0700  Time Out: 0755  Total Treatment Time: 55'      Subjective:    Sheree reports her pain has been much better since beginning therapy. Pt reports she only had minimal pain on Friday because she had to work for 8 hours straight.  Patient reports their pain to be 4/10 on a 0-10 scale with 0 being no pain and 10 being the worst pain imaginable. Pt reports compliance with her HEP.     Objective    Patient received individual therapy to increase strength, endurance, ROM, flexibility, posture and core stabilization with activities as follows:     Sheree received therapeutic exercises to develop strength, endurance, ROM, flexibility, posture and core stabilization for 40 minutes including:   NuStep warm up x6 minutes  LTR over PB x3 minutes  DKTC over PB x2 minutes  HL TrA x3 minutes 3 second holds  HL TrA with mini march 2x10 ea.   HL TrA BKFO with RTB 2x10  SL hip abduction 2x10  Ea. LE  HL FADDIR/SHABBIR stretch 2x30 seconds  HL hip adduction ball squeeze x3 minutes 3 second holds  Quad TrA with hip extension 2x10    Shuttle 2x10 1 cord    Dynamic hamstring stretch 2x10 3 second holds  Sitting EOS hamstring stretch 2x30 seconds    Sheree received the following manual therapy techniques: Soft tissue Mobilization were applied to the: lumbar spine for 15 minutes.   Application of FDN: Pt educated on benefits and potential side effects of dry needling. Educated pt on benefits, precautions, side effects followign IDN. Educated pt to use heat following treatment sessions if pt is experiencing pain or soreness. Pt verbalized good understanding of education.  Pt signed written consent to dry needling Rx. Pt gave verbal consent for  DN  Pt received dry needling to the below listed muscles using 4 (50-60 mm) needles.  B lumbar multifidus with stimulation  B glut max/med - twitch response elicited L  B glut min      Written Home Exercises Provided: reviewed from IE  Pt demo good understanding of the education provided. Sheree demonstrated good return demonstration of activities.     Assessment:   Sheree tolerated treatment session well this afternoon. Pt with good maintenance of TrA isolation with involvement of LE movement. Pt with twitch response elicited in the L glut med with FDN, and good improvements in tissue mobility presented post treatment session. Pt presents with training effect achieved easily in LLE posterior hip musculature compared to RLE.   Pt will continue to benefit from skilled PT intervention. Medical Necessity is demonstrated by:  Pain limits function of effected part for some activities, Unable to participate fully in daily activities, Requires skilled supervision to complete and progress HEP and Weakness.    Patient is making good progress towards established goals.    Short Term Goals (4 Weeks):   1. Pt will report 20% reduced pain within the lumbar spine for ease with driving to and from work  2. Pt will demonstrate 1/3 improvement MMT in BLE for ease with performing household chores  4. Pt will demonstrate 10% improvement in bilateral hamstring length for ease with dressing lower body  5. Pt will demonstrate improved lumbar ROM by 25% in all directions for ease with picking an object up from the floor  Long Term Goals (8 Weeks):   1.Pt will report <3/10 pain within the lumbar spine for ease with ADL's  2. Pt will demonstrate 50% improvement of hamstring and hip flexor length in BLE for ease with ambulation  3. Pt will be independent with HEP for maintenance of improvements gained in therapy sessions   4. Pt will demonstrate 4+/5 strength or greater in BLE for ease with running errands            Plan      Certification  Period: 9/29/17 to 12/29/17  Recommended Treatment Plan: 2 times per week for 8 weeks: Electrical Stimulation PRN, Iontophoresis (with dexamethasone PRN), Manual Therapy, Moist Heat/ Ice, Neuromuscular Re-ed, Patient Education, Therapeutic Activites, Therapeutic Exercise and Other therapeutic taping, dry needling, aquatic therapy     Continue with established Plan of Care towards PT goals.     Cass Montaño, PT

## 2017-10-13 ENCOUNTER — CLINICAL SUPPORT (OUTPATIENT)
Dept: REHABILITATION | Facility: HOSPITAL | Age: 62
End: 2017-10-13
Attending: INTERNAL MEDICINE
Payer: COMMERCIAL

## 2017-10-13 DIAGNOSIS — M62.89 MUSCLE TIGHTNESS: ICD-10-CM

## 2017-10-13 DIAGNOSIS — M62.81 WEAKNESS OF TRUNK MUSCULATURE: ICD-10-CM

## 2017-10-13 PROCEDURE — 97110 THERAPEUTIC EXERCISES: CPT | Mod: PN

## 2017-10-13 PROCEDURE — 97140 MANUAL THERAPY 1/> REGIONS: CPT | Mod: PN

## 2017-10-13 NOTE — PROGRESS NOTES
Name: Sheree Lomeli  Clinic Number: [id number]  Date of Treatment: 10/13/2017   Diagnosis:   Encounter Diagnoses   Name Primary?    Weakness of trunk musculature     Muscle tightness        Time in: 8:00  Time Out: 9:10  Total Treatment Time: 70 minutes      Subjective:    Sheree reports her back pain has improved with therapy. Pt had really bad pain at 3 and 4 AM last night, better since up and moving, minimal pain now. Pt reports that pressure from her rectocele gets worse when she is constipated which also exacerbates her back pain. Usually once she gets her constipation under control her back pain goes away but in July her back pain didn't go away in after resolving a bout of constipation.      Pt reports that she was a person that went to the bathroom every three days like clockwork, then in 2009 she was down on the floor cleaning and reaching behind something and felt a pop of pressure. She was told she pulled something in her back and was treated for that. Wasn't until some time later she noticed a bulge and was eventually diagnosed with a rectocele. That was the day when it became difficult to go to the bathroom. Uses stool softeners, fiber and drinks lots of water for management of constipation. But still occasionally it becomes hard to have a BM. Did two colon cleanses in this 8 year period and felt better afterwards. Pt reports that at times she has no urge to defecate but will have back pain and other times she has an urge but is unable to go which is when her stool becomes firm and she will have onset of constipation.    Objective    Patient received individual therapy to increase strength, endurance, ROM, flexibility, posture and core stabilization with activities as follows:     Sheree received therapeutic exercises to develop strength, endurance, ROM, flexibility, posture and core stabilization for 30 minutes including:     NuStep warm up x8  Minutes  Pt education regarding: role of PFPT in  management of constipation and prolapse as well as see below    Not performed:  LTR over PB x3 minutes  DKTC over PB x2 minutes  HL TrA x3 minutes 3 second holds  HL TrA with mini march 2x10 ea.   HL TrA BKFO with RTB 2x10  SL hip abduction 2x10  Ea. LE  HL FADDIR/SHABBIR stretch 2x30 seconds  HL hip adduction ball squeeze x3 minutes 3 second holds  Quad TrA with hip extension 2x10    Shuttle 2x10 1 cord    Dynamic hamstring stretch 2x10 3 second holds  Sitting EOS hamstring stretch 2x30 seconds    Pt received individual manual therapy for 30 minutes including: myofascial mobilization applied to abdomen; colon massage with instructions in self colon massage; pt instructed in diaphragmatic breathing    Sheree received the following manual therapy techniques: Soft tissue Mobilization were applied to the: lumbar spine for 0 minutes.   Application of FDN: Pt educated on benefits and potential side effects of dry needling. Educated pt on benefits, precautions, side effects followign IDN. Educated pt to use heat following treatment sessions if pt is experiencing pain or soreness. Pt verbalized good understanding of education.  Pt signed written consent to dry needling Rx. Pt gave verbal consent for DN  Pt received dry needling to the below listed muscles using 4 (50-60 mm) needles.  B lumbar multifidus with stimulation  B glut max/med - twitch response elicited L  B glut min      Written Home Exercises Provided: reviewed from IE; added diaphragmatic breathing and colon massage today with handout; pt was also instructed in constipation and rectocele management including mechanics of defecation and importance of avoiding Valsalva/large increase in intra-abdominal pressure; pt was education on proper lifting mechanics and instructed in use of breath while performing functional activities.   Pt demo good understanding of the education provided. Sheree demonstrated good return demonstration of activities.     Assessment:    Sheree tolerated treatment session well without visible adverse effects. Pt seen today per supervising PT's request due to onset of back pain associated with constipation and hx of rectocele. Based on today's discussion with pt and assessment, improving management of constipation and rectocele will contribute favorably to her tx plan for lumbar pain. Pt likely to have PFM weakness and would benefit from PFM assessment to address symptoms thoroughly. Discussed PFM anatomy/physiology today, relationship to bladder and bowel function, and role in central stabilization using pelvic model and role of PFPT in management; pt verbalized understanding. Will continue with tx for lumbar pain with Cass Montaño as pt is making good progress and will also continue to follow-up with pt on management of rectocele with PFM assessment if pt is so inclined/unable to effectively incorporate deep core activation into her therapeutic exercise routine. Pt also with abdominal myofascial restrictions and will benefit from MFR. Pt continues to present with decreased core strength and stability and will continue to benefit from skilled PT intervention in order to maximize functional independence.  Medical Necessity is demonstrated by:  Pain limits function of effected part for some activities, Unable to participate fully in daily activities, Requires skilled supervision to complete and progress HEP and Weakness.    Patient is making good progress towards established goals.    Short Term Goals (4 Weeks):   1. Pt will report 20% reduced pain within the lumbar spine for ease with driving to and from work  2. Pt will demonstrate 1/3 improvement MMT in BLE for ease with performing household chores  4. Pt will demonstrate 10% improvement in bilateral hamstring length for ease with dressing lower body  5. Pt will demonstrate improved lumbar ROM by 25% in all directions for ease with picking an object up from the floor  Long Term Goals (8 Weeks):    1.Pt will report <3/10 pain within the lumbar spine for ease with ADL's  2. Pt will demonstrate 50% improvement of hamstring and hip flexor length in BLE for ease with ambulation  3. Pt will be independent with HEP for maintenance of improvements gained in therapy sessions   4. Pt will demonstrate 4+/5 strength or greater in BLE for ease with running errands            Plan      Certification Period: 9/29/17 to 12/29/17  Recommended Treatment Plan: 2 times per week for 8 weeks: Electrical Stimulation PRN, Iontophoresis (with dexamethasone PRN), Manual Therapy, Moist Heat/ Ice, Neuromuscular Re-ed, Patient Education, Therapeutic Activites, Therapeutic Exercise and Other therapeutic taping, dry needling, aquatic therapy     Continue with established Plan of Care towards PT goals.     Lauren Shepley, PT

## 2017-10-16 ENCOUNTER — CLINICAL SUPPORT (OUTPATIENT)
Dept: REHABILITATION | Facility: HOSPITAL | Age: 62
End: 2017-10-16
Attending: INTERNAL MEDICINE
Payer: COMMERCIAL

## 2017-10-16 DIAGNOSIS — M62.89 MUSCLE TIGHTNESS: ICD-10-CM

## 2017-10-16 DIAGNOSIS — M62.81 WEAKNESS OF TRUNK MUSCULATURE: ICD-10-CM

## 2017-10-16 PROCEDURE — 97110 THERAPEUTIC EXERCISES: CPT | Mod: PN

## 2017-10-16 NOTE — PROGRESS NOTES
Name: Sheree Lomeli  Clinic Number: [id number]  Date of Treatment: 10/16/2017   Diagnosis:   Encounter Diagnoses   Name Primary?    Weakness of trunk musculature     Muscle tightness        Time in: 1500  Time Out: 1555  Total Treatment Time: 55 minutes      Subjective:    Sehree reports her back pain has improved with therapy. Pt reports she continues to have pain when she is sitting for long periods of time like while at work. Pt also stating she has been moving to a different bed at night due to her  snoring, and she think that is what causes the pain at 3AM. Pt inquiring about prognosis and possible discharge to [facility] in the near future. Pt reports   Objective    Patient received individual therapy to increase strength, endurance, ROM, flexibility, posture and core stabilization with activities as follows:     Sheree received therapeutic exercises to develop strength, endurance, ROM, flexibility, posture and core stabilization for 55 minutes including:     NuStep warm up x8  Minutes  Pt education regarding: role of PFPT in management of constipation and prolapse as well as see below  LTR resisted RTB 2x10 ea.   DKTC over PB x2 minutes  HL TrA x20 with exhalation and diaphragmatic breathing  Open books 2x10 ea.   HL TrA with mini march 2x10 ea.   HL TrA BKFO with RTB 2x10  SL hip abduction 2x10  Ea. LE  HL FADDIR/SHABBIR stretch 2x30 seconds  BLE 90/90 hip extension x10 ea.  - B hand under pelivs for assisted trunk stabilization  HL hip adduction ball squeeze x3 minutes 3 second holds  Prone hip extension 2x10 ea.   Quad multifidus 2x10 ea.    Prayer stretch for L side 2x30 seconds  Shuttle 2x10 1.5 cords    Seated on PB TrA mini marches x10 on ea.   Dynamic hamstring stretch 2x10 3 second holds  Sitting EOS hamstring stretch 2x30 seconds    Pt received individual manual therapy for 30 minutes including: myofascial mobilization applied to abdomen; colon massage with instructions in self colon massage; pt  instructed in diaphragmatic breathing    Sheree received the following manual therapy techniques: Soft tissue Mobilization were applied to the: lumbar spine for 0 minutes.     Written Home Exercises Provided: reviewed from IE; added diaphragmatic breathing and colon massage today with handout; pt was also instructed in constipation and rectocele management including mechanics of defecation and importance of avoiding Valsalva/large increase in intra-abdominal pressure; pt was education on proper lifting mechanics and instructed in use of breath while performing functional activities.   Pt demo good understanding of the education provided. Sheree demonstrated good return demonstration of activities.     Assessment:   Sheree tolerated treatment session well without visible adverse effects. Pt with excellent tolerance to progression of core strengthening this session without compensatory movement patterns demonstrated. Pt with mild difficulty performing trunk activation while seated on stability ball requiring minimal external support. Pt HEP was updated for new exercises. Will modify at next treatment session if needed.  Pt continues to present with decreased core strength and stability and will continue to benefit from skilled PT intervention in order to maximize functional independence.  Medical Necessity is demonstrated by:  Pain limits function of effected part for some activities, Unable to participate fully in daily activities, Requires skilled supervision to complete and progress HEP and Weakness.    Patient is making good progress towards established goals.    Short Term Goals (4 Weeks):   1. Pt will report 20% reduced pain within the lumbar spine for ease with driving to and from work  2. Pt will demonstrate 1/3 improvement MMT in BLE for ease with performing household chores  4. Pt will demonstrate 10% improvement in bilateral hamstring length for ease with dressing lower body  5. Pt will demonstrate improved  lumbar ROM by 25% in all directions for ease with picking an object up from the floor  Long Term Goals (8 Weeks):   1.Pt will report <3/10 pain within the lumbar spine for ease with ADL's  2. Pt will demonstrate 50% improvement of hamstring and hip flexor length in BLE for ease with ambulation  3. Pt will be independent with HEP for maintenance of improvements gained in therapy sessions   4. Pt will demonstrate 4+/5 strength or greater in BLE for ease with running errands            Plan      Certification Period: 9/29/17 to 12/29/17  Recommended Treatment Plan: 2 times per week for 8 weeks: Electrical Stimulation PRN, Iontophoresis (with dexamethasone PRN), Manual Therapy, Moist Heat/ Ice, Neuromuscular Re-ed, Patient Education, Therapeutic Activites, Therapeutic Exercise and Other therapeutic taping, dry needling, aquatic therapy     Continue with established Plan of Care towards PT goals.     Cass Montaño, PT

## 2017-10-20 ENCOUNTER — CLINICAL SUPPORT (OUTPATIENT)
Dept: REHABILITATION | Facility: HOSPITAL | Age: 62
End: 2017-10-20
Attending: INTERNAL MEDICINE
Payer: COMMERCIAL

## 2017-10-20 DIAGNOSIS — M62.89 MUSCLE TIGHTNESS: ICD-10-CM

## 2017-10-20 DIAGNOSIS — M54.5 CHRONIC LOW BACK PAIN, UNSPECIFIED BACK PAIN LATERALITY, WITH SCIATICA PRESENCE UNSPECIFIED: Primary | ICD-10-CM

## 2017-10-20 DIAGNOSIS — G89.29 CHRONIC LOW BACK PAIN, UNSPECIFIED BACK PAIN LATERALITY, WITH SCIATICA PRESENCE UNSPECIFIED: Primary | ICD-10-CM

## 2017-10-20 DIAGNOSIS — M62.81 WEAKNESS OF TRUNK MUSCULATURE: ICD-10-CM

## 2017-10-20 PROCEDURE — 97110 THERAPEUTIC EXERCISES: CPT | Mod: PN

## 2017-10-20 NOTE — PROGRESS NOTES
Name: Sheree Lomeli  Clinic Number: [id number]  Date of Treatment: 10/20/2017   Diagnosis:   Encounter Diagnoses   Name Primary?    Weakness of trunk musculature     Muscle tightness     Chronic low back pain, unspecified back pain laterality, with sciatica presence unspecified Yes       Time in: 0715  Time Out: 0800  Total Treatment Time: 45 minutes      Subjective:    Sheree reports she has been doing very well in terms of her pain recently. Pt reports she is very compliant with her HEP and performs it every morning.  Pt reports her pain to be 1/10 in her lower back, and says that she would be perfectly fine with living with this low of pain (compared to the high pain she was experiencing) for the rest of her life.    Objective    Patient received individual therapy to increase strength, endurance, ROM, flexibility, posture and core stabilization with activities as follows:     Sheree received therapeutic exercises to develop strength, endurance, ROM, flexibility, posture and core stabilization for 45 minutes including:     NuStep warm up x8  Minutes  LTR resisted RTB 2x10 ea.   HL TrA x20 with exhalation and diaphragmatic breathing  Open books 2x10 ea.   HL TrA with mini march 2x10 ea.   HL TrA BKFO with RTB 2x10  SL hip abduction 2x10  Ea. LE  HL FADDIR/SHABBIR stretch 2x30 seconds  BLE 90/90 hip extension x10 ea.  - B hand under pelivs for assisted trunk stabilization  HL hip adduction ball squeeze x3 minutes 3 second holds  Prone hip extension 2x10 ea.   Quad multifidus 2x10 ea.    Prayer stretch for L side 2x30 seconds  Shuttle 2x10 1.5 cords    Seated on PB TrA mini marches x10 on ea.   Dynamic hamstring stretch 2x10 3 second holds  Sitting EOS hamstring stretch 2x30 seconds      Sheree received the following manual therapy techniques: Soft tissue Mobilization were applied to the: lumbar spine for 0 minutes.     Written Home Exercises Provided: reviewed from IE  Pt demo good understanding of the education  provided. Sheree demonstrated good return demonstration of activities.     Assessment:   Sheree tolerated treatment session well without visible adverse effects. Pt with excellent tolerance to progression of core strengthening this session without compensatory movement patterns demonstrated. Pt achieves training effect easily with progression of core stabilization exercises, yet without the provocation of pain. Pt continues to present with decreased core strength and stability and will continue to benefit from skilled PT intervention in order to maximize functional independence.  Medical Necessity is demonstrated by:  Pain limits function of effected part for some activities, Unable to participate fully in daily activities, Requires skilled supervision to complete and progress HEP and Weakness.    Patient is making good progress towards established goals.    Short Term Goals (4 Weeks):   1. Pt will report 20% reduced pain within the lumbar spine for ease with driving to and from work  2. Pt will demonstrate 1/3 improvement MMT in BLE for ease with performing household chores  4. Pt will demonstrate 10% improvement in bilateral hamstring length for ease with dressing lower body  5. Pt will demonstrate improved lumbar ROM by 25% in all directions for ease with picking an object up from the floor  Long Term Goals (8 Weeks):   1.Pt will report <3/10 pain within the lumbar spine for ease with ADL's  2. Pt will demonstrate 50% improvement of hamstring and hip flexor length in BLE for ease with ambulation  3. Pt will be independent with HEP for maintenance of improvements gained in therapy sessions   4. Pt will demonstrate 4+/5 strength or greater in BLE for ease with running errands            Plan      Certification Period: 9/29/17 to 12/29/17  Recommended Treatment Plan: 2 times per week for 8 weeks: Electrical Stimulation PRN, Iontophoresis (with dexamethasone PRN), Manual Therapy, Moist Heat/ Ice, Neuromuscular Re-ed,  Patient Education, Therapeutic Activites, Therapeutic Exercise and Other therapeutic taping, dry needling, aquatic therapy     Continue with established Plan of Care towards PT goals.     Cass Montaño, PT

## 2017-10-25 ENCOUNTER — CLINICAL SUPPORT (OUTPATIENT)
Dept: REHABILITATION | Facility: HOSPITAL | Age: 62
End: 2017-10-25
Attending: INTERNAL MEDICINE
Payer: COMMERCIAL

## 2017-10-25 DIAGNOSIS — M62.89 MUSCLE TIGHTNESS: ICD-10-CM

## 2017-10-25 DIAGNOSIS — M62.81 WEAKNESS OF TRUNK MUSCULATURE: ICD-10-CM

## 2017-10-25 PROCEDURE — 97112 NEUROMUSCULAR REEDUCATION: CPT | Mod: PN

## 2017-10-25 NOTE — PROGRESS NOTES
"Name: Sheree Lomeli  [facility] Number: [id number]  Date of Treatment: 10/25/2017   Diagnosis:   Encounter Diagnoses   Name Primary?    Weakness of trunk musculature     Muscle tightness      Updated POC    Time in: 7:55  Time Out: 9:00  Total Treatment Time: 65 minutes      Subjective:    Sheree reports the weekend was terrible, Monday night she was in a lot of pain and had to lay on ice all evening. She is better today. Pt also reports extreme pressure in her vaginal area "like it's going to explode;" as well as pain in her sacral region ("firey tailbone"). Pt has also recently noticed that her last two toes are intermittently in a lot of pain on the right side.    4/10 pain currently, 10/10 on Monday and over the weekend     Objective    No pelvic deviations noted in supine; L malleolus higher than R  Pt denies abdominal tenderness; slightly tight iliacus bilat  L obturator tenderness - significant; palpated externally    Pt received individual neuromuscular reeducation for 60 minutes including:    - Diaphragmatic breathing with SEMG assist; pt displays slightly elevated baseline  - Kegals in supine with SEMG assist; elevated baseline, fair WR rise (likely due to activity at rest), good holding, slow and incomplete derecruitment  - TA activation with DB and SEMG assist; pt cued to take a deep breath in between contractions with improved ability to contract; good carry over to PFM  - Progressive muscle relaxation with SEMG assist in supine with knees supported; baseline decreased to normal following relaxation training    Pt received individual manual therapy for 00 minutes including: myofascial mobilization applied to abdomen; colon massage with instructions in self colon massage    Eductaion provided: pt instructed to continue with her exercises for her LBP and to continue with diaphragmatic breathing and colon massage; pt instructed to relax in Z lie every day; pt verbalized understanding. Pt educated on PFM " anatomy using pelvic model with discussion of objective findings today and indication for internal PFM assessment.     Assessment:   Sheree tolerated treatment session well without visible adverse effects. Pt noted to have significant obturator tenderness and elevated PFM resting baseline per SEMG; due to today's findings, and pt's hx including vaginal pressure, tailbone pain, and rectocele, pt will benefit from internal PFM assessment. PFM dysfunction expected to be a component of pt's LBP and will rule in/out PF involvement at her next visit. Pt is independent with her exercises for LBP, she will have one more session with Cass Montaño to review and update her HEP and will continue with pelvic floor assessment in order to optimize care. Pt verbalizes understanding of pelvic floor component and is agreeable to tx plan. Goals have been updated per today's findings, will update following PFM exam.    Patient is making good progress towards established goals.  No educational, cultural, or spiritual barriers to learning identified.    Short Term Goals (4 Weeks):   1. Pt will report 20% reduced pain within the lumbar spine for ease with driving to and from work  2. Pt will demonstrate 1/3 improvement MMT in BLE for ease with performing household chores  3. Pt will demonstrate 10% improvement in bilateral hamstring length for ease with dressing lower body  4. Pt will demonstrate improved lumbar ROM by 25% in all directions for ease with picking an object up from the floor  5. Pt will report decreased obturator pain in order to decrease flares and improve PFM function.  Long Term Goals (8 Weeks):   1.Pt will report <3/10 pain within the lumbar spine for ease with ADL's  2. Pt will demonstrate 50% improvement of hamstring and hip flexor length in BLE for ease with ambulation  3. Pt will be independent with HEP for maintenance of improvements gained in therapy sessions   4. Pt will demonstrate 4+/5 strength or greater in BLE  for ease with running errands   5. Pt will demonstrate improvement in central stabilization in order to improve integration of deep core and functional stability.        Plan      Certification Period: 9/29/17 to 12/29/17  Recommended Treatment Plan: 1 times per week for 8 weeks: Electrical Stimulation PRN, Iontophoresis (with dexamethasone PRN), Manual Therapy, Moist Heat/ Ice, Neuromuscular Re-ed, Patient Education, Therapeutic Activites, Therapeutic Exercise and Other therapeutic taping, dry needling, aquatic therapy     Continue with established Plan of Care towards PT goals.     Lauren Shepley, PT

## 2017-10-27 NOTE — PLAN OF CARE
"Name: Sheree Lomeli  [facility] Number: [id number]  Date of Treatment: 10/25/2017   Diagnosis:   Encounter Diagnoses   Name Primary?    Weakness of trunk musculature     Muscle tightness      Updated POC    Time in: 7:55  Time Out: 9:00  Total Treatment Time: 65 minutes      Subjective:    Sheree reports the weekend was terrible, Monday night she was in a lot of pain and had to lay on ice all evening. She is better today. Pt also reports extreme pressure in her vaginal area "like it's going to explode;" as well as pain in her sacral region ("firey tailbone"). Pt has also recently noticed that her last two toes are intermittently in a lot of pain on the right side.    4/10 pain currently, 10/10 on Monday and over the weekend     Objective    No pelvic deviations noted in supine; L malleolus higher than R  Pt denies abdominal tenderness; slightly tight iliacus bilat  L obturator tenderness - significant; palpated externally    Pt received individual neuromuscular reeducation for 60 minutes including:    - Diaphragmatic breathing with SEMG assist; pt displays slightly elevated baseline  - Kegals in supine with SEMG assist; elevated baseline, fair WR rise (likely due to activity at rest), good holding, slow and incomplete derecruitment  - TA activation with DB and SEMG assist; pt cued to take a deep breath in between contractions with improved ability to contract; good carry over to PFM  - Progressive muscle relaxation with SEMG assist in supine with knees supported; baseline decreased to normal following relaxation training    Pt received individual manual therapy for 00 minutes including: myofascial mobilization applied to abdomen; colon massage with instructions in self colon massage    Eductaion provided: pt instructed to continue with her exercises for her LBP and to continue with diaphragmatic breathing and colon massage; pt instructed to relax in Z lie every day; pt verbalized understanding. Pt educated on PFM " anatomy using pelvic model with discussion of objective findings today and indication for internal PFM assessment.     Assessment:   Sheree tolerated treatment session well without visible adverse effects. Pt noted to have significant obturator tenderness and elevated PFM resting baseline per SEMG; due to today's findings, and pt's hx including vaginal pressure, tailbone pain, and rectocele, pt will benefit from internal PFM assessment. PFM dysfunction expected to be a component of pt's LBP and will rule in/out PF involvement at her next visit. Pt is independent with her exercises for LBP, she will have one more session with Cass Montaño to review and update her HEP and will continue with pelvic floor assessment in order to optimize care. Pt verbalizes understanding of pelvic floor component and is agreeable to tx plan. Goals have been updated per today's findings, will update following PFM exam.    Patient is making good progress towards established goals.  No educational, cultural, or spiritual barriers to learning identified.    Short Term Goals (4 Weeks):   1. Pt will report 20% reduced pain within the lumbar spine for ease with driving to and from work  2. Pt will demonstrate 1/3 improvement MMT in BLE for ease with performing household chores  3. Pt will demonstrate 10% improvement in bilateral hamstring length for ease with dressing lower body  4. Pt will demonstrate improved lumbar ROM by 25% in all directions for ease with picking an object up from the floor  5. Pt will report decreased obturator pain in order to decrease flares and improve PFM function.  Long Term Goals (8 Weeks):   1.Pt will report <3/10 pain within the lumbar spine for ease with ADL's  2. Pt will demonstrate 50% improvement of hamstring and hip flexor length in BLE for ease with ambulation  3. Pt will be independent with HEP for maintenance of improvements gained in therapy sessions   4. Pt will demonstrate 4+/5 strength or greater in BLE  for ease with running errands   5. Pt will demonstrate improvement in central stabilization in order to improve integration of deep core and functional stability.        Plan      Certification Period: 9/29/17 to 12/29/17  Recommended Treatment Plan: 1 times per week for 8 weeks: Electrical Stimulation PRN, Iontophoresis (with dexamethasone PRN), Manual Therapy, Moist Heat/ Ice, Neuromuscular Re-ed, Patient Education, Therapeutic Activites, Therapeutic Exercise and Other therapeutic taping, dry needling, aquatic therapy     Continue with established Plan of Care towards PT goals.     Lauren Shepley, PT

## 2017-11-01 ENCOUNTER — TELEPHONE (OUTPATIENT)
Dept: FAMILY MEDICINE | Facility: CLINIC | Age: 62
End: 2017-11-01

## 2017-11-01 DIAGNOSIS — M54.5 LOW BACK PAIN, UNSPECIFIED BACK PAIN LATERALITY, UNSPECIFIED CHRONICITY, WITH SCIATICA PRESENCE UNSPECIFIED: Primary | ICD-10-CM

## 2017-11-01 NOTE — TELEPHONE ENCOUNTER
Spoke w/patient, informed of result and recommendation. Patient states she will look  up and find out if this is a doctor she saw once before. States if it is, she do not want to see him and will request to see someone else.

## 2017-11-01 NOTE — TELEPHONE ENCOUNTER
----- Message from Magali Rosales sent at 11/1/2017 11:48 AM CDT -----  Patient states that she has been doing physical therapy for a month and she is still having extreme pain when she wakes up in the morning. Patient would like to know if she needs to know if she would need to make an appointment or can she just do a CT scan. She can be reached at [phone]. Thank you!

## 2017-11-01 NOTE — TELEPHONE ENCOUNTER
Back X-ray was normal. If no improvement with PT I would recommend she see Dr. Harley for further evaluation. He can order CT or MRI, if indicated.

## 2017-11-02 ENCOUNTER — CLINICAL SUPPORT (OUTPATIENT)
Dept: REHABILITATION | Facility: HOSPITAL | Age: 62
End: 2017-11-02
Attending: INTERNAL MEDICINE
Payer: COMMERCIAL

## 2017-11-02 DIAGNOSIS — M62.81 WEAKNESS OF TRUNK MUSCULATURE: Primary | ICD-10-CM

## 2017-11-02 DIAGNOSIS — M54.5 CHRONIC LOW BACK PAIN, UNSPECIFIED BACK PAIN LATERALITY, WITH SCIATICA PRESENCE UNSPECIFIED: ICD-10-CM

## 2017-11-02 DIAGNOSIS — M62.89 MUSCLE TIGHTNESS: ICD-10-CM

## 2017-11-02 DIAGNOSIS — G89.29 CHRONIC LOW BACK PAIN, UNSPECIFIED BACK PAIN LATERALITY, WITH SCIATICA PRESENCE UNSPECIFIED: ICD-10-CM

## 2017-11-02 PROCEDURE — 97110 THERAPEUTIC EXERCISES: CPT | Mod: PN

## 2017-11-02 NOTE — PROGRESS NOTES
"Name: Sheree Lomeli  Clinic Number: [id number]  Date of Treatment: 11/02/2017   Diagnosis:   Encounter Diagnoses   Name Primary?    Weakness of trunk musculature Yes    Muscle tightness     Chronic low back pain, unspecified back pain laterality, with sciatica presence unspecified        Time in: 1025  Time Out: 1120  Total Treatment Time: 55 minutes      Subjective:    Sheree reports her grabbing back pain has completely resolved and she has been compliant with her HEP. Pt states that she is beginning to have an episode again with whole back pain that feels flu like and feels that it grabs into her stomach. This pain only comes on at night and is only resolved by ice x2 hours. PT reports she has an appointment to see Dr. Jennings for possible imaging and further study.   Pt reports her pain to be 4/10 in her back, and says that she would have rated her pain a 10/10 if asked yesterday.         Objective    AROM:               FB: fingertips 1" from floor without reversal of lumbar spine and stretching reported                             BB: WNL no pain                                SBL: fingertips 3 inches above tibiofemoral joint line with very very light pain in the L side                                    SBR: fingertips to tibiofemoral joint line with general stretching L side                                RotL: 25% limitation                                     Rot R: 25% limitation  Flexibility:              Hamstring:   10% limitation LLE   - 0% limitation RLE                 hip flexors : WNL bilaterally                           rectus femoris: WNL bilaterally                                          Hip screen:      R          L              Flex     4+        4+              Abd      4+          4+              Add      4+          4+              Ext       4 +         4+                                                                                                                      FOTO: 37% " limitation    Patient received individual therapy to increase strength, endurance, ROM, flexibility, posture and core stabilization with activities as follows:     Sheree received therapeutic exercises to develop strength, endurance, ROM, flexibility, posture and core stabilization for 45 minutes including:     NuStep warm up x8  Minutes  LTR resisted RTB 2x10 ea.   HL TrA x20 with exhalation and diaphragmatic breathing  Open books 2x10 ea.   HL TrA with mini march 2x10 ea.   HL TrA BKFO with RTB 2x10  SL hip abduction 2x10  Ea. LE  HL FADDIR/SHABBIR stretch 2x30 seconds  BLE 90/90 hip extension x10 ea.  - B hand under pelivs for assisted trunk stabilization  HL hip adduction ball squeeze x3 minutes 3 second holds  Prone hip extension 2x10 ea.   Quad multifidus 2x10 ea.    Prayer stretch for L side 2x30 seconds  Shuttle 3x10 1.5 cords    Standing modified L hip hikes (L heel raises) 2x10  Ambulation with 3# weight in R hand x3 laps around clinic    Seated on PB TrA mini marches x10 on ea.   Sitting EOS hamstring stretch 2x30 seconds      Sheree received the following manual therapy techniques: Soft tissue Mobilization were applied to the: lumbar spine for 0 minutes.     Written Home Exercises Provided: reviewed from IE  Pt demo good understanding of the education provided. Sheree demonstrated good return demonstration of activities.     Assessment:   Sheree tolerated treatment session well without visible adverse effects. Pt with excellent tolerance to all exercises prescribed today and with introduction of L sided core strengthening. Monthly assessment performed today. Pt presenting with excellent improvements in lumbar spine ROM and BLE strength. Pt continues to demonstrate improvements in core stability with all exercises yet fatigues easily in L sided musculature. Pt has achieved all short term goals and is progressing towards long term goals. Pt continues to present with decreased core strength and stability and  will continue to benefit from skilled PT intervention in order to maximize functional independence.  Medical Necessity is demonstrated by:  Pain limits function of effected part for some activities, Unable to participate fully in daily activities, Requires skilled supervision to complete and progress HEP and Weakness.    Patient is making good progress towards established goals.    Short Term Goals (4 Weeks):   1. Pt will report 20% reduced pain within the lumbar spine for ease with driving to and from work met  2. Pt will demonstrate 1/3 improvement MMT in BLE for ease with performing household chores met  4. Pt will demonstrate 10% improvement in bilateral hamstring length for ease with dressing lower body met  5. Pt will demonstrate improved lumbar ROM by 25% in all directions for ease with picking an object up from the floor met  Long Term Goals (8 Weeks):   1.Pt will report <3/10 pain within the lumbar spine for ease with ADL's  2. Pt will demonstrate 50% improvement of hamstring and hip flexor length in BLE for ease with ambulation  3. Pt will be independent with HEP for maintenance of improvements gained in therapy sessions   4. Pt will demonstrate 4+/5 strength or greater in BLE for ease with running errands            Plan      Certification Period: 9/29/17 to 12/29/17  Recommended Treatment Plan: 2 times per week for 8 weeks: Electrical Stimulation PRN, Iontophoresis (with dexamethasone PRN), Manual Therapy, Moist Heat/ Ice, Neuromuscular Re-ed, Patient Education, Therapeutic Activites, Therapeutic Exercise and Other therapeutic taping, dry needling, aquatic therapy     Continue with established Plan of Care towards PT goals.     Cass Montaño, PT

## 2017-11-03 ENCOUNTER — CLINICAL SUPPORT (OUTPATIENT)
Dept: REHABILITATION | Facility: HOSPITAL | Age: 62
End: 2017-11-03
Attending: INTERNAL MEDICINE
Payer: COMMERCIAL

## 2017-11-03 DIAGNOSIS — M62.81 WEAKNESS OF TRUNK MUSCULATURE: ICD-10-CM

## 2017-11-03 DIAGNOSIS — M62.89 MUSCLE TIGHTNESS: ICD-10-CM

## 2017-11-03 PROCEDURE — 97140 MANUAL THERAPY 1/> REGIONS: CPT | Mod: PN

## 2017-11-03 PROCEDURE — 97112 NEUROMUSCULAR REEDUCATION: CPT | Mod: PN

## 2017-11-03 NOTE — PROGRESS NOTES
Name: Sheree Lomeli  Clinic Number: [id number]  Date of Treatment: 11/03/2017   Diagnosis:   Encounter Diagnoses   Name Primary?    Weakness of trunk musculature     Muscle tightness      Daily Note    Time in: 9:05  Time Out: 10:05  Total Treatment Time: 60 minutes    Subjective:    Pt reports that she is feeling better today (her back is not as achy). Pt reports she uses replense every few days for vaginal dryness.    3/10 pain (tailbone)    Objective    VAGINAL PELVIC FLOOR EXAM  EXTERNAL ASSESSMENT  Skin Condition: WNL  Scarring: none  Sensation: WNL  Pain: none  Introitus: gaping   Voluntary Contraction: visible lift  Voluntary Relaxation: drop  Bearing down: present with visible bulge      INTERNAL ASSESSMENT  Pain: tender areas noted as follows: left obturator internus; R OI atrophy noted  Sensation: able to localize pressure appropriately, able to distinguish pressure from side to side, and able to feel the difference between lift and drop    Vaginal Vault: WNL  Muscle Bulk: WFL  Muscle Power: 3/5  Muscle Endurance: NA  # Reps To Fatigue: NA    Fast Contractions: NA    Quality of Contraction: WNL  Specificity: WNL   Coordination: WNL    Pt received individual neuromuscular reeducation for 15 minutes including:    - PFM activation with diaphragmatic breathing    Not performed:  - Diaphragmatic breathing with SEMG assist; pt displays slightly elevated baseline  - Kegals in supine with SEMG assist; elevated baseline, fair WR rise (likely due to activity at rest), good holding, slow and incomplete derecruitment  - TA activation with DB and SEMG assist; pt cued to take a deep breath in between contractions with improved ability to contract; good carry over to PFM  - Progressive muscle relaxation with SEMG assist in supine with knees supported; baseline decreased to normal following relaxation training    Pt received individual manual therapy for 45 minutes including: soft tissue mobilization applied to L  obturator internus intravaginally; strain counter strain to L OI with improvement in tenderness per pt report and improvement in soft tissue mobility    Eductaion provided: pt instructed to continue with her exercises for her LBP and to continue with diaphragmatic breathing and colon massage; pt instructed to relax in Z lie every day; pt verbalized understanding.     Assessment:   Sheree tolerated treatment session well without visible adverse effects. Pt with good response to manual care today with improvement in obturator mobility and resolution of tenderness found with internal palpation. Pt with good compliance and good carry over display of PFM activation/integration of her deep core. Pt presents with PFM weakness and prolapse with good tolerance for progression of therapeutic exercise and manual care. Pt will continue to benefit from skilled PT intervention in order to maximize pain free functional independence.     Patient is making good progress towards established goals.  No educational, cultural, or spiritual barriers to learning identified.    Short Term Goals (4 Weeks):   1. Pt will report 20% reduced pain within the lumbar spine for ease with driving to and from work  2. Pt will demonstrate 1/3 improvement MMT in BLE for ease with performing household chores  3. Pt will demonstrate 10% improvement in bilateral hamstring length for ease with dressing lower body  4. Pt will demonstrate improved lumbar ROM by 25% in all directions for ease with picking an object up from the floor  5. Pt will report decreased obturator pain in order to decrease flares and improve PFM function.  Long Term Goals (8 Weeks):   1.Pt will report <3/10 pain within the lumbar spine for ease with ADL's  2. Pt will demonstrate 50% improvement of hamstring and hip flexor length in BLE for ease with ambulation  3. Pt will be independent with [facility] for maintenance of improvements gained in therapy sessions   4. Pt will demonstrate 4+/5  strength or greater in BLE for ease with running errands   5. Pt will demonstrate improvement in central stabilization in order to improve integration of deep core and functional stability.        Plan      Certification Period: 9/29/17 to 12/29/17  Recommended Treatment Plan: 1 times per week for 8 weeks: Electrical Stimulation PRN, Iontophoresis (with dexamethasone PRN), Manual Therapy, Moist Heat/ Ice, Neuromuscular Re-ed, Patient Education, Therapeutic Activites, Therapeutic Exercise and Other therapeutic taping, dry needling, aquatic therapy     Continue with established Plan of Care towards PT goals.     Lauren Shepley, PT

## 2017-11-06 ENCOUNTER — TELEPHONE (OUTPATIENT)
Dept: FAMILY MEDICINE | Facility: CLINIC | Age: 62
End: 2017-11-06

## 2017-11-06 NOTE — TELEPHONE ENCOUNTER
----- Message from Troy Edge sent at 11/6/2017  4:05 PM CST -----  Contact: PT /[phone]  Calling to speak with nurse regarding an call from Dr Harley office. Pt states she really experiencing lot of pain in back ,and needs an appt soon

## 2017-11-09 ENCOUNTER — CLINICAL SUPPORT (OUTPATIENT)
Dept: REHABILITATION | Facility: HOSPITAL | Age: 62
End: 2017-11-09
Attending: INTERNAL MEDICINE
Payer: COMMERCIAL

## 2017-11-09 DIAGNOSIS — G89.29 CHRONIC LOW BACK PAIN, UNSPECIFIED BACK PAIN LATERALITY, WITH SCIATICA PRESENCE UNSPECIFIED: Primary | ICD-10-CM

## 2017-11-09 DIAGNOSIS — M62.81 WEAKNESS OF TRUNK MUSCULATURE: ICD-10-CM

## 2017-11-09 DIAGNOSIS — M54.5 CHRONIC LOW BACK PAIN, UNSPECIFIED BACK PAIN LATERALITY, WITH SCIATICA PRESENCE UNSPECIFIED: Primary | ICD-10-CM

## 2017-11-09 DIAGNOSIS — M62.89 MUSCLE TIGHTNESS: ICD-10-CM

## 2017-11-09 PROCEDURE — 97110 THERAPEUTIC EXERCISES: CPT | Mod: PN

## 2017-11-09 NOTE — PROGRESS NOTES
Name: Sheree Lomeli  Clinic Number: [id number]  Date of Treatment: 11/09/2017   Diagnosis:   Encounter Diagnoses   Name Primary?    Weakness of trunk musculature     Muscle tightness     Chronic low back pain, unspecified back pain laterality, with sciatica presence unspecified Yes       Time in: 0720  Time Out: 0812  Total Treatment Time: 52 minutes      Subjective:    Sheree reports her grabbing back pain has completely resolved and she has been compliant with her HEP.  Pt reports her pain to be 4/10 in her low back/tailbone region.         Objective    Patient received individual therapy to increase strength, endurance, ROM, flexibility, posture and core stabilization with activities as follows:     Sheree received therapeutic exercises to develop strength, endurance, ROM, flexibility, posture and core stabilization for 45 minutes including:     NuStep warm up x8  Minutes  LTR resisted RTB 2x10 ea.   HL TrA x20 with exhalation and diaphragmatic breathing  Open books RTB 2x10 ea.   HL TrA with mini march 2x10 ea.   HL TrA BKFO with RTB 2x10  SL hip abduction 3x10  Ea. LE  HL FADDIR/SHABBIR stretch 2x30 seconds  BLE 90/90 hip extension x10 ea.  - B hand under pelivs for assisted trunk stabilization   --> hip extension into abd x10 ea.   HL hip adduction ball squeeze x3 minutes 3 second holds  Prone hip extension 3x10 ea.   Quad multifidus 3x10 ea.    Prayer stretch for R and L side 2x30 seconds  Shuttle 3x10 1.5 cords    Planks off side of mat forward and ea. Side 2x30 seconds ea.    Standing modified L hip hikes  2x10  Ambulation with 5# weight in R hand x3 laps around clinic    Seated on PB TrA  deadbugs 2x10 on ea.  Walk outs on PB x10  Sitting EOS hamstring stretch 2x30 seconds      Sheree received the following manual therapy techniques: Soft tissue Mobilization were applied to the: lumbar spine for 0 minutes.     Written Home Exercises Provided: reviewed from IE  Pt demo good understanding of the education  provided. Sheree demonstrated good return demonstration of activities.     Assessment:   Sheree tolerated treatment session well without visible adverse effects. Pt with excellent tolerance to introduction and progression of core strengthening exercises. Pt able to complete full LLE lift with hip hike without provocation of pain, as compared to simply heel lift at last treatment session. Pt requiring mild cueing for correct form and core activation with planks with training effect achieved easily. Pt continues to present with decreased core strength and stability and will continue to benefit from skilled PT intervention in order to maximize functional independence.  Medical Necessity is demonstrated by:  Pain limits function of effected part for some activities, Unable to participate fully in daily activities, Requires skilled supervision to complete and progress HEP and Weakness.    Patient is making good progress towards established goals.    Short Term Goals (4 Weeks):   1. Pt will report 20% reduced pain within the lumbar spine for ease with driving to and from work met  2. Pt will demonstrate 1/3 improvement MMT in BLE for ease with performing household chores met  4. Pt will demonstrate 10% improvement in bilateral hamstring length for ease with dressing lower body met  5. Pt will demonstrate improved lumbar ROM by 25% in all directions for ease with picking an object up from the floor met  Long Term Goals (8 Weeks):   1.Pt will report <3/10 pain within the lumbar spine for ease with ADL's  2. Pt will demonstrate 50% improvement of hamstring and hip flexor length in BLE for ease with ambulation  3. Pt will be independent with HEP for maintenance of improvements gained in therapy sessions   4. Pt will demonstrate 4+/5 strength or greater in BLE for ease with running errands            Plan      Certification Period: 9/29/17 to 12/29/17  Recommended Treatment Plan: 2 times per week for 8 weeks: Electrical  Stimulation PRN, Iontophoresis (with dexamethasone PRN), Manual Therapy, Moist Heat/ Ice, Neuromuscular Re-ed, Patient Education, Therapeutic Activites, Therapeutic Exercise and Other therapeutic taping, dry needling, aquatic therapy     Continue with established Plan of Care towards PT goals.     Cass Montaño, PT

## 2017-11-13 ENCOUNTER — OFFICE VISIT (OUTPATIENT)
Dept: PAIN MEDICINE | Facility: CLINIC | Age: 62
End: 2017-11-13
Payer: COMMERCIAL

## 2017-11-13 VITALS
SYSTOLIC BLOOD PRESSURE: 123 MMHG | RESPIRATION RATE: 18 BRPM | BODY MASS INDEX: 26.08 KG/M2 | HEART RATE: 72 BPM | WEIGHT: 152 LBS | TEMPERATURE: 97 F | OXYGEN SATURATION: 100 % | DIASTOLIC BLOOD PRESSURE: 72 MMHG

## 2017-11-13 DIAGNOSIS — M53.3 COCCYDYNIA: Primary | ICD-10-CM

## 2017-11-13 PROCEDURE — 99244 OFF/OP CNSLTJ NEW/EST MOD 40: CPT | Mod: S$GLB,,, | Performed by: PAIN MEDICINE

## 2017-11-13 PROCEDURE — 99999 PR PBB SHADOW E&M-EST. PATIENT-LVL IV: CPT | Mod: PBBFAC,,, | Performed by: PAIN MEDICINE

## 2017-11-13 NOTE — PROGRESS NOTES
Ms. Bentley will be scheduled for a Ganglion Impar Injection on 11/29/2017.  Reviewed the pre-procedure instructions listed below with Ms. Bentley- copy also provided.  Instructed patient to notify clinic if she begins feeling ill, runs a fever, is prescribed antibiotics, and/or has any outpatient procedures within the two weeks leading up to this procedure.  Instructed patient to report to [facility], 2nd Floor Same Day Surgery.  All questions answered- patient verbalized understanding.     Day of Procedure  - Ensure you have obtained an arrival time from the Pain Management Staff  o Procedure Area will call 1-3 days in advance with your arrival time.  Please check any voicemails received.  o If you arrive past your scheduled procedure time, you may be asked to reschedule your procedure.  - Ensure you have a  with you to remain present throughout your procedure for your safety.  o If you arrive without a responsible adult to stay with you and drive you home, you may be asked to reschedule your procedure.  - Take all of your prescribed medications (exceptions noted below) with a small amount of water  - This is NOT a fasting procedure, you may have a light meal before coming.  - Wear comfortable clothing (loose fitting pants).  - You may wear glasses, dentures, contact lenses, and/or hearing aids. Please remove all jewelry and metal hairpins.  - Notify the nurse during the intake process if you are allergic to any medications, if you are diabetic, or if you are not feeling well  - Contact the Pain Management Clinic with the following:  o A fever greater than 100° (degrees)   o Feel ill, have any type of infection, or are taking antibiotics now or within the two (2) weeks leading up to this procedure  o Have any outpatient procedures within the two (2) weeks leading up to this procedure (colonoscopy, major dental work, etc.)    IF you are taking blood thinners: Only upon receiving clearance and  notification from the Pain Management Department  7 Days Prior to Your Procedure:  - Stop taking Plavix/Clopridogrel, Effient/Prasugel   5 Days Prior to Your Procedure:  - Stop taking Coumadin/Warfarin.  An INR may be drawn prior to your procedure.  If labs are required, you will need to arrive earlier than your scheduled arrival time.  - Stop taking Pradaxa/Dabigatra,  Brilinta/ Ticagrelor  3 Days Prior to Your Procedure:  - Stop taking Xarelto/Rivaroxaban, Eliquis/Apixaban, Aggrenox/Dipyridamole, Reopro/Abciximab

## 2017-11-13 NOTE — LETTER
November 13, 2017      Whitney Mera MD  [address] [facility]  [place] [address]           [facility]  [address]  [place] [address]  Phone: [phone]  Fax: [phone]          Patient: Sheree Lomeli   MR Number: [id number]   YOB: 1955   Date of Visit: 11/13/2017       Dear Dr. Whitney Mera:    Thank you for referring Sheree Lomeli to me for evaluation. Attached you will find relevant portions of my assessment and plan of care.    If you have questions, please do not hesitate to call me. I look forward to following Sheree Lomeli along with you.    Sincerely,    Jimmy Harley Jr., MD    Enclosure  CC:  No Recipients    If you would like to receive this communication electronically, please contact [email or web address] or [phone] to request more information on [facility] [employer] access.    For providers and/or their staff who would like to refer a patient to [facility], please contact us through our one-stop-shop provider referral line, [facility], at [phone].    If you feel you have received this communication in error or would no longer like to receive these types of communications, please e-mail [email or web address]

## 2017-11-13 NOTE — PROGRESS NOTES
"Subjective:     Patient ID: Sheree Lomeli is a 62 y.o. female    Chief Complaint: Back Pain (Lower Back)      Referred by: Whitney Mera MD      HPI:    Initial Encounter (11/13/17):  Sheree Lomeli is a 62 y.o. female who presents today with chronic coccygeal pain. Pain started about 8 years ago while cleaning her bathroom. The pain is located over the coccyx. It does not radiate. It has a "fiery" quality to it when severe. She denies any associated numbness, weakness or b/b dysfunction. She does have distal rectocele without obvious enterocele or perineocele. Her pain is alleviated with bowel movements and she does note frequent constipation. Pain exacerbated with sitting for prolonged periods. Does not have pain with palpation.    This pain is described in detail below.    Physical Therapy: Yes. Pelvic floor therapy. No significant relief noted    Non-pharmacologic Treatment: Heat and ice help         · TENS? No    Pain Medications:         · Currently taking: Advil; not sure if helps. Biofreeze.    · Has tried in the past:      · Has not tried: Opioids, Tylenol, Muscle relaxants, TCAs, SNRIs, anticonvulsants    Blood thinners: None    Interventional Therapies: None    Relevant Surgeries: None    Affecting sleep? Yes    Affecting daily activities? yes    Depressive symptoms? no          · SI/HI? No    Work status: Employed    Pain Scores:    Best:       1/10  Worst:     10/10  Usually:   5/10  Today:    3/10    Review of Systems   Constitutional: Negative for activity change, appetite change, chills, fatigue, fever and unexpected weight change.   HENT: Negative for hearing loss.    Eyes: Negative for visual disturbance.   Respiratory: Negative for chest tightness and shortness of breath.    Cardiovascular: Negative for chest pain.   Gastrointestinal: Positive for constipation and rectal pain. Negative for abdominal pain, diarrhea, nausea and vomiting.   Genitourinary: Negative for difficulty urinating. "   Musculoskeletal: Positive for back pain. Negative for gait problem and neck pain.   Skin: Negative for rash.   Neurological: Negative for dizziness, weakness, light-headedness, numbness and headaches.   Psychiatric/Behavioral: Positive for sleep disturbance. Negative for hallucinations and suicidal ideas. The patient is not nervous/anxious.        Past Medical History:   Diagnosis Date    Arthritis     Back pain     Chronic pelvic pain in female     left-sided    De Quervain's tenosynovitis     left    Headache(784.0)     Reflux     Right carpal tunnel syndrome     Urinary incontinence     occ kira       Past Surgical History:   Procedure Laterality Date    OOPHORECTOMY      LSO--pain       Social History     Social History    Marital status:      Spouse name: N/A    Number of children: 2    Years of education: N/A     Occupational History     [employer]     Social History Main Topics    Smoking status: Never Smoker    Smokeless tobacco: Never Used    Alcohol use No    Drug use: No    Sexual activity: Yes     Other Topics Concern    Not on file     Social History Narrative    No narrative on file       Review of patient's allergies indicates:   Allergen Reactions    Adhesive Rash    Iodine Rash    Shellfish containing products Rash     Patient allergic to softshell crabs specifically    Sulfa (sulfonamide antibiotics) Rash       Current Outpatient Prescriptions on File Prior to Visit   Medication Sig Dispense Refill    butalbital-aspirin-caffeine -40 mg (FIORINAL) -40 mg Cap Take 1 capsule by mouth every 4 (four) hours as needed. 30 capsule 0    ibuprofen (ADVIL,MOTRIN) 600 MG tablet TAKE 1 TABLET(600 MG) BY MOUTH EVERY 8 HOURS AS NEEDED 90 tablet 0    omeprazole (PRILOSEC) 40 MG capsule Take 1 capsule (40 mg total) by mouth as needed. 90 capsule 0     No current facility-administered medications on file prior to visit.        Objective:      /72  (BP Location: Left arm, Patient Position: Sitting)   Pulse 72   Temp 97.1 °F (36.2 °C) (Oral)   Resp 18   Wt 68.9 kg (152 lb)   LMP  (LMP Unknown)   SpO2 100%   BMI 26.08 kg/m²     Exam:  GEN:  Well developed, well nourished.  No acute distress.   HEENT:  No trauma.  Mucous membranes moist.  Nares patent bilaterally.  PSYCH: Normal affect. Thought content appropriate.  CHEST:  Breathing symmetric.  No audible wheezing.  ABD: Soft, non-tender, non-distended.  SKIN:  Warm, pink, dry.  No rash on exposed areas.    EXT:  No cyanosis, clubbing, or edema.  No color change or changes in nail or hair growth.  NEURO/MUSCULOSKELETAL:  Fully alert, oriented, and appropriate. Speech normal aleshia. No cranial nerve deficits.   Gait: Normal.  No focal motor deficits.       Imaging:  Narrative     [id number]  Accession # [id number]    Study:  XR LUMBAR SPINE AP AND LATERAL    Indication: Low back pain.    Comparison: Plain film from 05/06/2011.    Findings:   Lumbar spine, AP and lateral views.    Vertebral body heights, spinal alignment, and intervertebral disc spaces are satisfactorily maintained.   Normal SI joints. No evidence of fracture or dislocation.  Soft tissue structures are unremarkable.   Impression        No evidence of fracture.      Electronically signed by: GARRETT WALLIS MD  Date: 09/21/17  Time: 14:30         Narrative     DATE OF EXAM: May  6 2011      LAP   0093  -  SACRUM/COCCYX:   1234 HOURS    [id number]     CLINICAL HISTORY:   719.40 0 JOINT PAIN SITE NOS     PROCEDURE COMMENT:        ICD 9 CODE(S):   ()     CPT 4 CODE(S)/MODIFIER(S):   ()     RESULTS: SI JOINTS APPEAR UNREMARKABLE, WITH NO EVIDENCE OF ABNORMAL   SCLEROSIS, MARGINAL IRREGULARITY, OR ANKYLOSIS OBSERVED.  OSSEOUS   STRUCTURES DEMONSTRATE NO EVIDENCE OF RECENT FRACTURE, LYTIC DESTRUCTIVE   PROCESS, OR OTHER SIGNIFICANT FINDING.       IMPRESSION: AS ABOVE.         : BC   Transcribe Date/Time: May  6 2011  3:19P  Dictated by :  FALLON STEVEN MD  Read On: May  6 2011  1:11P  Fallon Steven M.D., [id number]  Images were reviewed, findings were verified and document was   electronically  SIGNED BY: FALLON STEVEN MD On: May  6 2011  4:42P            Assessment:       Encounter Diagnosis   Name Primary?    Coccydynia Yes         Plan:       Sheree was seen today for back pain.    Diagnoses and all orders for this visit:    Coccydynia  -     Case Request Operating Room: BLOCK IMPAR GANGLION        Sheree Lomeli is a 62 y.o. female with chronic coccydynia. Unclear etiology. No specific injury to coccyx, but may be related to rectocele and irritation of ganglion impar.    1. Schedule for ganglion impar injection.  2. RTC 2 weeks after injection. May consider gabapentin or Elavil depending on results of injection.

## 2017-11-15 ENCOUNTER — CLINICAL SUPPORT (OUTPATIENT)
Dept: REHABILITATION | Facility: HOSPITAL | Age: 62
End: 2017-11-15
Attending: INTERNAL MEDICINE
Payer: COMMERCIAL

## 2017-11-15 DIAGNOSIS — M54.5 CHRONIC LOW BACK PAIN, UNSPECIFIED BACK PAIN LATERALITY, WITH SCIATICA PRESENCE UNSPECIFIED: Primary | ICD-10-CM

## 2017-11-15 DIAGNOSIS — M62.89 MUSCLE TIGHTNESS: ICD-10-CM

## 2017-11-15 DIAGNOSIS — M62.81 WEAKNESS OF TRUNK MUSCULATURE: ICD-10-CM

## 2017-11-15 DIAGNOSIS — G89.29 CHRONIC LOW BACK PAIN, UNSPECIFIED BACK PAIN LATERALITY, WITH SCIATICA PRESENCE UNSPECIFIED: Primary | ICD-10-CM

## 2017-11-15 PROCEDURE — 97110 THERAPEUTIC EXERCISES: CPT | Mod: PN

## 2017-11-15 NOTE — PROGRESS NOTES
Name: Sheree Lomeli  [facility] Number: [id number]  Date of Treatment: 11/15/2017   Diagnosis:   Encounter Diagnoses   Name Primary?    Weakness of trunk musculature     Muscle tightness     Chronic low back pain, unspecified back pain laterality, with sciatica presence unspecified Yes       Time in: 720  Time Out: 810  Total Treatment Time: 50 minutes      Subjective:    Sheree reports her lower back pain has completely resolved, she only continues with her coccygeal pain.  Pt reports her pain to be 0/10 in her low back/tailbone region. Pt agrees that she would like today to be her last visit.         Objective    AROM:               FB: fingertips to floor without reversal of lumbar spine and stretching reported                             BB: WNL no pain                                SBL: fingertips 3 inches above tibiofemoral joint line with very very light pain in the L side                                    SBR: fingertips to tibiofemoral joint line with general stretching L side                                RotL: 25% limitation                                     Rot R: 25% limitation  Flexibility:              Hamstrin% limitation LLE   - 0% limitation RLE                 hip flexors : WNL bilaterally                           rectus femoris: WNL bilaterally                                          Hip screen:      R          L              Flex     5           5              Abd      5          5              Add      5          5              Ext       5          5                                                                                                                      FOTO: 23% limitation    Patient received individual therapy to increase strength, endurance, ROM, flexibility, posture and core stabilization with activities as follows:     Sheree received therapeutic exercises to develop strength, endurance, ROM, flexibility, posture and core stabilization for 45 minutes including:      NuStep warm up x8  Minutes  LTR resisted RTB 2x10 ea.   Open books RTB 2x10 ea.   HL TrA with mini march 2x10 ea.   HL TrA BKFO with RTB 2x10  SL hip abduction 3x10  Ea. LE  HL FADDIR/SHABBIR stretch 2x30 seconds  BLE 90/90 hip extension into abd x10 ea.   - B hand under pelivs for assisted trunk stabilization  Prone hip extension 3x10 ea.   Quad multifidus 3x10 ea.    Prayer stretch for R and L side 2x30 seconds  Anti Rotations RTB 2x10 ea.   Shuttle 3x10 2 cords    Planks off side of mat forward and ea. Side 2x30 seconds ea.    Standing modified L hip hikes  2x10  Ambulation with 5# weight in R hand x3 laps around clinic    Seated on PB TrA  deadbugs 2x10 on ea.  Walk outs on PB x10  Sitting EOS hamstring stretch 2x30 seconds      Sheree received the following manual therapy techniques: Soft tissue Mobilization were applied to the: lumbar spine for 0 minutes.     Written Home Exercises Provided: reviewed from IE  Pt demo good understanding of the education provided. Sheree demonstrated good return demonstration of activities.     Assessment:   Sheree tolerated treatment session well without visible adverse effects. Pt with training effect achieved and excellent recall of all exercises. Pt without requiring cueing for correct core activation with strengthening activities. Monthly assessment performed today. Pt presenting with full ROM and strength of the lumbar spine and BLE. Pt is very compliant with her HEP and has achieved all of her short and long term goals. Pt no longer requires skilled PT services at this time and is now discharged. Pt was educated on progression of HEP and was instructed to call for any questions or concerns.       Short Term Goals (4 Weeks):   1. Pt will report 20% reduced pain within the lumbar spine for ease with driving to and from work met  2. Pt will demonstrate 1/3 improvement MMT in BLE for ease with performing household chores met  4. Pt will demonstrate 10% improvement in  bilateral hamstring length for ease with dressing lower body met  5. Pt will demonstrate improved lumbar ROM by 25% in all directions for ease with picking an object up from the floor met  Long Term Goals (8 Weeks):   1.Pt will report <3/10 pain within the lumbar spine for ease with ADL's met  2. Pt will demonstrate 50% improvement of hamstring and hip flexor length in BLE for ease with ambulation met  3. Pt will be independent with HEP for maintenance of improvements gained in therapy sessions  met  4. Pt will demonstrate 4+/5 strength or greater in BLE for ease with running errands  met           Plan      Certification Period: 9/29/17 to 12/29/17  Recommended Treatment Plan: 2 times per week for 8 weeks: Electrical Stimulation PRN, Iontophoresis (with dexamethasone PRN), Manual Therapy, Moist Heat/ Ice, Neuromuscular Re-ed, Patient Education, Therapeutic Activites, Therapeutic Exercise and Other therapeutic taping, dry needling, aquatic therapy     Pt is discharged from skilled orthopedic PT services, and is to continue with pelvic floor PT.      Cass Montaño, PT

## 2017-11-20 ENCOUNTER — CLINICAL SUPPORT (OUTPATIENT)
Dept: REHABILITATION | Facility: HOSPITAL | Age: 62
End: 2017-11-20
Attending: INTERNAL MEDICINE
Payer: COMMERCIAL

## 2017-11-20 DIAGNOSIS — M62.89 MUSCLE TIGHTNESS: ICD-10-CM

## 2017-11-20 DIAGNOSIS — M62.81 WEAKNESS OF TRUNK MUSCULATURE: ICD-10-CM

## 2017-11-20 PROCEDURE — 97112 NEUROMUSCULAR REEDUCATION: CPT | Mod: PN

## 2017-11-20 PROCEDURE — 97140 MANUAL THERAPY 1/> REGIONS: CPT | Mod: PN

## 2017-11-20 NOTE — PROGRESS NOTES
"Name: Sheree Lomeli  Clinic Number: [id number]  Date of Treatment: 11/20/2017   Diagnosis:   Encounter Diagnoses   Name Primary?    Weakness of trunk musculature     Muscle tightness      Daily Note    Time in: 7:00  Time Out: 8:01  Total Treatment Time: 61 minutes  Visit #: 12/20  Referral expiration: 12/31/17  POC expiration: 12/29/17    Subjective:    Pt reports that she is actually doing pretty good. She will have an injection next Wednesday, doc said it's probably inflammation right in that area and will find out for sure with the shot.    Objective    Pt received individual neuromuscular reeducation for 25 minutes including:    - PFM activation with diaphragmatic breathing 10 x 10''  - Legs up the wall x 5 min  - Wall piriformis stretch 3 x 30'' ea  - Modified forearm plank x 15 seconds  - Modified side plank x 15 second ea    Not performed:  - Diaphragmatic breathing with SEMG assist; pt displays slightly elevated baseline  - Kegals in supine with SEMG assist; elevated baseline, fair WR rise (likely due to activity at rest), good holding, slow and incomplete derecruitment  - TA activation with DB and SEMG assist; pt cued to take a deep breath in between contractions with improved ability to contract; good carry over to PFM  - Progressive muscle relaxation with SEMG assist in supine with knees supported; baseline decreased to normal following relaxation training    Pt received individual manual therapy for 35 minutes including: soft tissue mobilization applied to bilateral levators and R OI today; pt reports tenderness and a "sting" of her R obturator with improvement following manual care    Eductaion provided: pt instructed to continue with her exercises for her LBP and to continue with diaphragmatic breathing, colon massage, Z lie; added wall piriformis stretch, legs up the wall, and modified plank/side plank today; pt demonstrated and verbalized understanding and was provided with a handout. "     Assessment:   Sheree tolerated treatment session well without visible adverse effects. Pt continues with good response to manual care with improvement in tenderness noted again today. Pt displays good return demonstration of PFM exercises. Pt continues to present with PFM weakness and prolapse and will continue to benefit from skilled PT intervention in order to maximize pain free functional independence. Pt has met goals set at initial eval for her LBP, will continue to progress towards updated goals.    Patient is making good progress towards established goals.  No educational, cultural, or spiritual barriers to learning identified.    Short Term Goals (4 Weeks):   1. Pt will report 20% reduced pain within the lumbar spine for ease with driving to and from work met  2. Pt will demonstrate 1/3 improvement MMT in BLE for ease with performing household chores met  4. Pt will demonstrate 10% improvement in bilateral hamstring length for ease with dressing lower body met  5. Pt will demonstrate improved lumbar ROM by 25% in all directions for ease with picking an object up from the floor met  Long Term Goals (8 Weeks):   1.Pt will report <3/10 pain within the lumbar spine for ease with ADL's met  2. Pt will demonstrate 50% improvement of hamstring and hip flexor length in BLE for ease with ambulation met  3. Pt will be independent with HEP for maintenance of improvements gained in therapy sessions  met  4. Pt will demonstrate 4+/5 strength or greater in BLE for ease with running errands  met  5. Pt will demonstrate improvement in central stabilization in order to improve integration of deep core and functional stability.  6. Pt will be independent with HEP and self management of POP and pelvic pain.     Plan      Certification Period: 9/29/17 to 12/29/17  Recommended Treatment Plan: 1 times per week for 8 weeks: Electrical Stimulation PRN, Iontophoresis (with dexamethasone PRN), Manual Therapy, Moist Heat/ Ice,  Neuromuscular Re-ed, Patient Education, Therapeutic Activites, Therapeutic Exercise and Other therapeutic taping, dry needling, aquatic therapy     Continue with established Plan of Care towards PT goals.     Lauren Shepley, PT

## 2017-11-28 ENCOUNTER — TELEPHONE (OUTPATIENT)
Dept: PAIN MEDICINE | Facility: CLINIC | Age: 62
End: 2017-11-28

## 2017-11-28 NOTE — TELEPHONE ENCOUNTER
The information below was discussed with Mrs. Bentley.     ----- Message from Jimmy Harley Jr., MD sent at 11/28/2017  8:11 AM CST -----  Regarding: RE: Additional Information on Pain  Tell her that I do not think her coccygeal pain is related to her foot numbness, but that there is a slight chance it is. I still think that we should proceed with the ganglion impar block, as this will provide good diagnostic information as well as possible therapeutic benefit. I will re-evaluate her upon follow up and we can discuss the foot numbness at that time.   ----- Message -----  From: Sophie Womack RN  Sent: 11/28/2017   8:01 AM  To: Jimmy Harley Jr., MD  Subject: Additional Information on Pain                   Mrs. Bentley called to report that when her pain is increased in her back, she also experiences pain/numbness in the third and fourth toes on the left foot.  She wasn't sure if this was pertinent or not, but wanted to let you know as she forgot to mention this during her visit.    Please advise.

## 2017-11-29 ENCOUNTER — SURGERY (OUTPATIENT)
Age: 62
End: 2017-11-29

## 2017-11-29 ENCOUNTER — HOSPITAL ENCOUNTER (OUTPATIENT)
Facility: HOSPITAL | Age: 62
Discharge: HOME | End: 2017-11-29
Attending: PAIN MEDICINE | Admitting: PAIN MEDICINE
Payer: COMMERCIAL

## 2017-11-29 VITALS
HEIGHT: 64 IN | RESPIRATION RATE: 18 BRPM | WEIGHT: 151 LBS | TEMPERATURE: 98 F | HEART RATE: 64 BPM | DIASTOLIC BLOOD PRESSURE: 78 MMHG | BODY MASS INDEX: 25.78 KG/M2 | OXYGEN SATURATION: 98 % | SYSTOLIC BLOOD PRESSURE: 114 MMHG

## 2017-11-29 DIAGNOSIS — M53.3 COCCYGODYNIA: Primary | ICD-10-CM

## 2017-11-29 PROCEDURE — 20550 NJX 1 TENDON SHEATH/LIGAMENT: CPT | Mod: 59,73,, | Performed by: PAIN MEDICINE

## 2017-11-29 PROCEDURE — 25500020 PHARM REV CODE 255: Performed by: PAIN MEDICINE

## 2017-11-29 PROCEDURE — 20550 NJX 1 TENDON SHEATH/LIGAMENT: CPT

## 2017-11-29 PROCEDURE — 64999 UNLISTED PX NERVOUS SYSTEM: CPT | Performed by: PAIN MEDICINE

## 2017-11-29 PROCEDURE — 25000003 PHARM REV CODE 250: Performed by: PAIN MEDICINE

## 2017-11-29 PROCEDURE — 71000015 HC POSTOP RECOV 1ST HR: Performed by: PAIN MEDICINE

## 2017-11-29 PROCEDURE — 63600175 PHARM REV CODE 636 W HCPCS: Performed by: PAIN MEDICINE

## 2017-11-29 PROCEDURE — 64520 N BLOCK LUMBAR/THORACIC: CPT | Mod: 59,73,, | Performed by: PAIN MEDICINE

## 2017-11-29 RX ORDER — BUPIVACAINE HYDROCHLORIDE 2.5 MG/ML
10 INJECTION, SOLUTION EPIDURAL; INFILTRATION; INTRACAUDAL ONCE
Status: COMPLETED | OUTPATIENT
Start: 2017-11-29 | End: 2017-11-29

## 2017-11-29 RX ORDER — TRIAMCINOLONE ACETONIDE 40 MG/ML
40 INJECTION, SUSPENSION INTRA-ARTICULAR; INTRAMUSCULAR ONCE
Status: COMPLETED | OUTPATIENT
Start: 2017-11-29 | End: 2017-11-29

## 2017-11-29 RX ORDER — ALPRAZOLAM 0.5 MG/1
1 TABLET, ORALLY DISINTEGRATING ORAL ONCE AS NEEDED
Status: COMPLETED | OUTPATIENT
Start: 2017-11-29 | End: 2017-11-29

## 2017-11-29 RX ORDER — LIDOCAINE HYDROCHLORIDE 20 MG/ML
10 INJECTION, SOLUTION INFILTRATION; PERINEURAL ONCE
Status: COMPLETED | OUTPATIENT
Start: 2017-11-29 | End: 2017-11-29

## 2017-11-29 RX ADMIN — LIDOCAINE HYDROCHLORIDE 10 ML: 20 INJECTION, SOLUTION INFILTRATION; PERINEURAL at 12:11

## 2017-11-29 RX ADMIN — IOHEXOL 5 ML: 300 INJECTION, SOLUTION INTRAVENOUS at 12:11

## 2017-11-29 RX ADMIN — TRIAMCINOLONE ACETONIDE 40 MG: 40 INJECTION, SUSPENSION INTRA-ARTICULAR; INTRAMUSCULAR at 12:11

## 2017-11-29 RX ADMIN — ALPRAZOLAM 1 MG: 0.5 TABLET, ORALLY DISINTEGRATING ORAL at 12:11

## 2017-11-29 RX ADMIN — BUPIVACAINE HYDROCHLORIDE 25 MG: 2.5 INJECTION, SOLUTION EPIDURAL; INFILTRATION; INTRACAUDAL; PERINEURAL at 12:11

## 2017-11-29 NOTE — OR NURSING
MD explained to patient that procedure had to be aborted due to vascular runoff at the planned injection site and limited retrorectal space due to rectocoele.

## 2017-11-29 NOTE — INTERVAL H&P NOTE
The patient has been examined and the H&P has been reviewed:    I concur with the findings and no changes have occurred since H&P was written.    Anesthesia/Surgery risks, benefits and alternative options discussed and understood by patient/family.          Active Hospital Problems    Diagnosis  POA    Coccygodynia [M53.3]  Yes      Resolved Hospital Problems    Diagnosis Date Resolved POA   No resolved problems to display.

## 2017-11-29 NOTE — DISCHARGE INSTRUCTIONS
Home Care Instructions Pain Management:    1. DIET:   You may resume your normal diet today.   2. BATHING:   You may shower with luke warm water.  3. DRESSING:   You may remove your bandage today.   4. ACTIVITY LEVEL:   You may resume your normal activities 24 hrs after your procedure.  5. MEDICATIONS:   You may resume your normal medications today.   6. SPECIAL INSTRUCTIONS:   No heat to the injection site for 24 hrs including, bath or shower, heating pad, moist heat, or hot tubs.    Use ice pack to injection site for any pain or discomfort.  Apply ice packs for 20 minute intervals as needed.   If you have received any sedatives by mouth today you may not drive for 12 hours.    If you have received any sedation through your IV, you may not drive for 24 hrs.     PLEASE CALL YOUR DOCTOR IF:  1. Redness or swelling around the injection site.  2. Fever of 101 degrees  3. Drainage (pus) from the injection site.  4. For any continuous bleeding (some dried blood over the incision is normal.)    FOR EMERGENCIES:   If any unusual problems or difficulties occur during clinic hours, call [phone] or [phone].       Fall Prevention  Millions of people fall every year and injure themselves. You may have had anesthesia or sedation which may increase your risk of falling. You may have health issues that put you at an increased risk of falling.     Here are ways to reduce your risk of falling.  ·   · Make your home safe by keeping walkways clear of objects you may trip over.  · Use non-slip pads under rugs. Do not use area rugs or small throw rugs.  · Use non-slip mats in bathtubs and showers.  · Install handrails and lights on staircases.  · Do not walk in poorly lit areas.  · Do not stand on chairs or wobbly ladders.  · Use caution when reaching overhead or looking upward. This position can cause a loss of balance.  · Be sure your shoes fit properly, have non-slip bottoms and are in good condition.   · Wear shoes both inside and  out. Avoid going barefoot or wearing slippers.  · Be cautious when going up and down stairs, curbs, and when walking on uneven sidewalks.  · If your balance is poor, consider using a cane or walker.  · If your fall was related to alcohol use, stop or limit alcohol intake.   · If your fall was related to use of sleeping medicines, talk to your doctor about this. You may need to reduce your dosage at bedtime if you awaken during the night to go to the bathroom.    · To reduce the need for nighttime bathroom trips:  ¨ Avoid drinking fluids for several hours before going to bed  ¨ Empty your bladder before going to bed  ¨ Men can keep a urinal at the bedside  · Stay as active as you can. Balance, flexibility, strength, and endurance all come from exercise. They all play a role in preventing falls. Ask your healthcare provider which types of activity are right for you.  · Get your vision checked on a regular basis.  · If you have pets, know where they are before you stand up or walk so you don't trip over them.  · Use night lights.

## 2017-11-29 NOTE — OP NOTE
Sacrococcygeal Ligament/Nucleus Impar Injection   Time-out taken to identify patient and procedure side prior to starting the procedure.   I attest that I have reviewed the patient's home medications prior to the procedure and no contraindication have been identified. I  re-evaluated the patient after the patient was positioned for the procedure in the procedure room immediately before the procedural time-out. The vital signs are current and represent the current state of the patient which has not significantly changed since the preprocedure assessment.              Date of Service: 11/29/2017    PCP: Whitney Mera MD    Referring Physician:                                                        PROCEDURE:    1. Ganglion impar injection under fluoroscopy (Aborted)  2. Sacrococcygeal ligament injection under fluoroscopy (Aborted)    REASON FOR PROCEDURE:  Coccydynia [M53.3]    PHYSICIAN: Jimmy Harley MD  ASSISTANTS:None    MEDICATIONS INJECTED:  None. Procedure aborted prior to medication administration.  LOCAL ANESTHETIC INJECTED:  Xylocaine 1%   SEDATION MEDICATIONS: None    ESTIMATED BLOOD LOSS:  None.  COMPLICATIONS:  None.    TECHNIQUE:   Laying in the prone position the area was prepped and draped in the usual sterile fashion using ChloraPrep and fenestrated drape. Using a 27 gauge needle, the area overlying the sacrococcygeal junction was anesthetized with 2& lidocaine.  A 22-gauge spinal needle was introduced into the desired position at the sacrococcygeal area.  It was introduced thru the ligament to reach the anterior edge of that space.   Omnipaque was injected to confirm appropriate placement. Contrast pattern did not follow the anterior aspect of the sacrum as desired. The needle was then advanced one millimeter. Contrast was again injected and showed vascular runoff. The procedure was then aborted as no safe location was found to inject medications.    PAIN BEFORE THE PROCEDURE:  4/10.    PAIN  AFTER THE PROCEDURE:  0/10.    The patient was monitored after the procedure.  Patient was given post procedure and discharge instructions to follow at home.  We will see the patient back in two weeks or the patient may call to inform of status. The patient was discharged in a stable condition

## 2017-11-29 NOTE — H&P (VIEW-ONLY)
"Subjective:     Patient ID: Sheree Lomeli is a 62 y.o. female    Chief Complaint: Back Pain (Lower Back)      Referred by: Whitney Mera MD      HPI:    Initial Encounter (11/13/17):  Sheree Lomeli is a 62 y.o. female who presents today with chronic coccygeal pain. Pain started about 8 years ago while cleaning her bathroom. The pain is located over the coccyx. It does not radiate. It has a "fiery" quality to it when severe. She denies any associated numbness, weakness or b/b dysfunction. She does have distal rectocele without obvious enterocele or perineocele. Her pain is alleviated with bowel movements and she does note frequent constipation. Pain exacerbated with sitting for prolonged periods. Does not have pain with palpation.    This pain is described in detail below.    Physical Therapy: Yes. Pelvic floor therapy. No significant relief noted    Non-pharmacologic Treatment: Heat and ice help         · TENS? No    Pain Medications:         · Currently taking: Advil; not sure if helps. Biofreeze.    · Has tried in the past:      · Has not tried: Opioids, Tylenol, Muscle relaxants, TCAs, SNRIs, anticonvulsants    Blood thinners: None    Interventional Therapies: None    Relevant Surgeries: None    Affecting sleep? Yes    Affecting daily activities? yes    Depressive symptoms? no          · SI/HI? No    Work status: Employed    Pain Scores:    Best:       1/10  Worst:     10/10  Usually:   5/10  Today:    3/10    Review of Systems   Constitutional: Negative for activity change, appetite change, chills, fatigue, fever and unexpected weight change.   HENT: Negative for hearing loss.    Eyes: Negative for visual disturbance.   Respiratory: Negative for chest tightness and shortness of breath.    Cardiovascular: Negative for chest pain.   Gastrointestinal: Positive for constipation and rectal pain. Negative for abdominal pain, diarrhea, nausea and vomiting.   Genitourinary: Negative for difficulty urinating. "   Musculoskeletal: Positive for back pain. Negative for gait problem and neck pain.   Skin: Negative for rash.   Neurological: Negative for dizziness, weakness, light-headedness, numbness and headaches.   Psychiatric/Behavioral: Positive for sleep disturbance. Negative for hallucinations and suicidal ideas. The patient is not nervous/anxious.        Past Medical History:   Diagnosis Date    Arthritis     Back pain     Chronic pelvic pain in female     left-sided    De Quervain's tenosynovitis     left    Headache(784.0)     Reflux     Right carpal tunnel syndrome     Urinary incontinence     occ kira       Past Surgical History:   Procedure Laterality Date    OOPHORECTOMY      LSO--pain       Social History     Social History    Marital status:      Spouse name: N/A    Number of children: 2    Years of education: N/A     Occupational History     [employer]     Social History Main Topics    Smoking status: Never Smoker    Smokeless tobacco: Never Used    Alcohol use No    Drug use: No    Sexual activity: Yes     Other Topics Concern    Not on file     Social History Narrative    No narrative on file       Review of patient's allergies indicates:   Allergen Reactions    Adhesive Rash    Iodine Rash    Shellfish containing products Rash     Patient allergic to softshell crabs specifically    Sulfa (sulfonamide antibiotics) Rash       Current Outpatient Prescriptions on File Prior to Visit   Medication Sig Dispense Refill    butalbital-aspirin-caffeine -40 mg (FIORINAL) -40 mg Cap Take 1 capsule by mouth every 4 (four) hours as needed. 30 capsule 0    ibuprofen (ADVIL,MOTRIN) 600 MG tablet TAKE 1 TABLET(600 MG) BY MOUTH EVERY 8 HOURS AS NEEDED 90 tablet 0    omeprazole (PRILOSEC) 40 MG capsule Take 1 capsule (40 mg total) by mouth as needed. 90 capsule 0     No current facility-administered medications on file prior to visit.        Objective:      /72  (BP Location: Left arm, Patient Position: Sitting)   Pulse 72   Temp 97.1 °F (36.2 °C) (Oral)   Resp 18   Wt 68.9 kg (152 lb)   LMP  (LMP Unknown)   SpO2 100%   BMI 26.08 kg/m²     Exam:  GEN:  Well developed, well nourished.  No acute distress.   HEENT:  No trauma.  Mucous membranes moist.  Nares patent bilaterally.  PSYCH: Normal affect. Thought content appropriate.  CHEST:  Breathing symmetric.  No audible wheezing.  ABD: Soft, non-tender, non-distended.  SKIN:  Warm, pink, dry.  No rash on exposed areas.    EXT:  No cyanosis, clubbing, or edema.  No color change or changes in nail or hair growth.  NEURO/MUSCULOSKELETAL:  Fully alert, oriented, and appropriate. Speech normal aleshia. No cranial nerve deficits.   Gait: Normal.  No focal motor deficits.       Imaging:  Narrative     [id number]  Accession # [id number]    Study:  XR LUMBAR SPINE AP AND LATERAL    Indication: Low back pain.    Comparison: Plain film from 05/06/2011.    Findings:   Lumbar spine, AP and lateral views.    Vertebral body heights, spinal alignment, and intervertebral disc spaces are satisfactorily maintained.   Normal SI joints. No evidence of fracture or dislocation.  Soft tissue structures are unremarkable.   Impression        No evidence of fracture.      Electronically signed by: GARRETT WALLIS MD  Date: 09/21/17  Time: 14:30         Narrative     DATE OF EXAM: May  6 2011      LAP   0093  -  SACRUM/COCCYX:   1234 HOURS    [id number]     CLINICAL HISTORY:   719.40 0 JOINT PAIN SITE NOS     PROCEDURE COMMENT:        ICD 9 CODE(S):   ()     CPT 4 CODE(S)/MODIFIER(S):   ()     RESULTS: SI JOINTS APPEAR UNREMARKABLE, WITH NO EVIDENCE OF ABNORMAL   SCLEROSIS, MARGINAL IRREGULARITY, OR ANKYLOSIS OBSERVED.  OSSEOUS   STRUCTURES DEMONSTRATE NO EVIDENCE OF RECENT FRACTURE, LYTIC DESTRUCTIVE   PROCESS, OR OTHER SIGNIFICANT FINDING.       IMPRESSION: AS ABOVE.         : BC   Transcribe Date/Time: May  6 2011  3:19P  Dictated by :  FALLON STEVEN MD  Read On: May  6 2011  1:11P  Fallon Steven M.D., [id number]  Images were reviewed, findings were verified and document was   electronically  SIGNED BY: FALLON STEVEN MD On: May  6 2011  4:42P            Assessment:       Encounter Diagnosis   Name Primary?    Coccydynia Yes         Plan:       Sheree was seen today for back pain.    Diagnoses and all orders for this visit:    Coccydynia  -     Case Request Operating Room: BLOCK IMPAR GANGLION        Sheree Lomeli is a 62 y.o. female with chronic coccydynia. Unclear etiology. No specific injury to coccyx, but may be related to rectocele and irritation of ganglion impar.    1. Schedule for ganglion impar injection.  2. RTC 2 weeks after injection. May consider gabapentin or Elavil depending on results of injection.

## 2017-11-30 ENCOUNTER — CLINICAL SUPPORT (OUTPATIENT)
Dept: REHABILITATION | Facility: HOSPITAL | Age: 62
End: 2017-11-30
Attending: INTERNAL MEDICINE
Payer: COMMERCIAL

## 2017-11-30 DIAGNOSIS — M62.81 WEAKNESS OF TRUNK MUSCULATURE: ICD-10-CM

## 2017-11-30 DIAGNOSIS — M62.89 MUSCLE TIGHTNESS: ICD-10-CM

## 2017-11-30 PROCEDURE — 97140 MANUAL THERAPY 1/> REGIONS: CPT | Mod: PN

## 2017-11-30 NOTE — PROGRESS NOTES
Name: Sheree Lomeli  Clinic Number: [id number]  Date of Treatment: 11/30/2017   Diagnosis:   Encounter Diagnoses   Name Primary?    Weakness of trunk musculature     Muscle tightness      Daily Note    Time in: 8:00  Time Out: 9:00  Total Treatment Time: 60 minutes  Visit #: 13/20  Referral expiration: 12/31/17  POC expiration: 12/29/17    Subjective:    Pt reports that she is not doing the best today. She was unable to get her injection yesterday possibly because of the rectocele (the doctor could not get the dye to go where it needed to go) so she is frustrated. The next step is to have another office visit to discuss the next step. Pt is interested in having a CT scan to see if there is something else wrong. Pt states she is irritated/painful today due to the failed procedure yesterday.    Pt reports 5-6/10 tailbone pain.    Objective    Pt received individual neuromuscular reeducation for 00 minutes including:    - PFM activation with diaphragmatic breathing 10 x 10''  - Legs up the wall x 5 min  - Wall piriformis stretch 3 x 30'' ea  - Modified forearm plank x 15 seconds  - Modified side plank x 15 second ea    - Diaphragmatic breathing with SEMG assist; pt displays slightly elevated baseline  - Kegals in supine with SEMG assist; elevated baseline, fair WR rise (likely due to activity at rest), good holding, slow and incomplete derecruitment  - TA activation with DB and SEMG assist; pt cued to take a deep breath in between contractions with improved ability to contract; good carry over to PFM  - Progressive muscle relaxation with SEMG assist in supine with knees supported; baseline decreased to normal following relaxation training    Pt received individual manual therapy for 50 minutes including: myofascial mobilization applied to lumbar and thoracic regions with skin rolling technique; soft tissue mobilization applied to bilateral thoracic and lumbar paraspinals and QL  NP - soft tissue mobilization applied  "to bilateral levators and R OI today; pt reports tenderness and a "sting" of her R obturator with improvement following manual care    Pt received MHP in z lie for 10 min before tx.    Eductaion provided: pt instructed to continue with her exercises for her LBP and to continue with diaphragmatic breathing, colon massage, Z lie, wall piriformis stretch, legs up the wall, and modified plank/side; pt demonstrated and verbalized understanding.    Assessment:   Sheree tolerated treatment session well without visible adverse effects. Pt painful today due to doctors visit yesterday, deferred direct PFM palpation/tx; we did discuss benefit of rectal exam to assess tailbone more thoroughly. Pt continues to demonstrate good compliance with HEP and is making good progress with PFM exercises. She continues to feel frustrated by unresolving tailbone pain.Pt presents with PFM weakness and prolapse and will continue to benefit from skilled PT intervention in order to maximize pain free functional independence.     Patient is making good progress towards established goals.  No educational, cultural, or spiritual barriers to learning identified.    Short Term Goals (4 Weeks):   1. Pt will report 20% reduced pain within the lumbar spine for ease with driving to and from work met  2. Pt will demonstrate 1/3 improvement MMT in BLE for ease with performing household chores met  4. Pt will demonstrate 10% improvement in bilateral hamstring length for ease with dressing lower body met  5. Pt will demonstrate improved lumbar ROM by 25% in all directions for ease with picking an object up from the floor met  Long Term Goals (8 Weeks):   1.Pt will report <3/10 pain within the lumbar spine for ease with ADL's met  2. Pt will demonstrate 50% improvement of hamstring and hip flexor length in BLE for ease with ambulation met  3. Pt will be independent with HEP for maintenance of improvements gained in therapy sessions  met  4. Pt will " demonstrate 4+/5 strength or greater in BLE for ease with running errands  met  5. Pt will demonstrate improvement in central stabilization in order to improve integration of deep core and functional stability.  6. Pt will be independent with HEP and self management of POP and pelvic pain.     Plan      Certification Period: 9/29/17 to 12/29/17  Recommended Treatment Plan: 1 times per week for 8 weeks: Electrical Stimulation PRN, Iontophoresis (with dexamethasone PRN), Manual Therapy, Moist Heat/ Ice, Neuromuscular Re-ed, Patient Education, Therapeutic Activites, Therapeutic Exercise and Other therapeutic taping, dry needling, aquatic therapy     Continue with established Plan of Care towards PT goals.     Lauren Shepley, PT

## 2017-12-05 ENCOUNTER — OFFICE VISIT (OUTPATIENT)
Dept: PAIN MEDICINE | Facility: CLINIC | Age: 62
End: 2017-12-05
Payer: COMMERCIAL

## 2017-12-05 VITALS
RESPIRATION RATE: 18 BRPM | DIASTOLIC BLOOD PRESSURE: 80 MMHG | HEIGHT: 64 IN | SYSTOLIC BLOOD PRESSURE: 134 MMHG | WEIGHT: 154.13 LBS | TEMPERATURE: 97 F | BODY MASS INDEX: 26.31 KG/M2

## 2017-12-05 DIAGNOSIS — K62.89 PROCTODYNIA: ICD-10-CM

## 2017-12-05 DIAGNOSIS — M53.3 COCCYGODYNIA: Primary | ICD-10-CM

## 2017-12-05 PROCEDURE — 99213 OFFICE O/P EST LOW 20 MIN: CPT | Mod: S$GLB,,, | Performed by: PAIN MEDICINE

## 2017-12-05 PROCEDURE — 99999 PR PBB SHADOW E&M-EST. PATIENT-LVL III: CPT | Mod: PBBFAC,,, | Performed by: PAIN MEDICINE

## 2017-12-05 RX ORDER — GABAPENTIN 300 MG/1
600 CAPSULE ORAL 3 TIMES DAILY
Qty: 180 CAPSULE | Refills: 11 | Status: SHIPPED | OUTPATIENT
Start: 2017-12-05 | End: 2018-02-22 | Stop reason: DRUGHIGH

## 2017-12-05 NOTE — PROGRESS NOTES
"Subjective:     Patient ID: Sheree Lomeli is a 62 y.o. female    Chief Complaint: No chief complaint on file.      Referred by: No ref. provider found      HPI:    Interval History (12/4/17):  She returns today for follow up after aborted ganglion impar injection. Her coccyx pain is unchanged in quality and location. She still reports relief with bowel movements. She does report some upper lumbar pain as well as pain/paresthesias in the lateral right foot and posterior left ankle. These are less severe than her coccyx pain.     Initial Encounter (11/13/17):  Sheree Lomeli is a 62 y.o. female who presents today with chronic coccygeal pain. Pain started about 8 years ago while cleaning her bathroom. The pain is located over the coccyx. It does not radiate. It has a "fiery" quality to it when severe. She denies any associated numbness, weakness or b/b dysfunction. She does have distal rectocele without obvious enterocele or perineocele. Her pain is alleviated with bowel movements and she does note frequent constipation. Pain exacerbated with sitting for prolonged periods. Does not have pain with palpation.    This pain is described in detail below.    Physical Therapy: Yes. Pelvic floor therapy. No significant relief noted    Non-pharmacologic Treatment: Heat and ice help         · TENS? No    Pain Medications:         · Currently taking: Advil; not sure if helps. Biofreeze.    · Has tried in the past:      · Has not tried: Opioids, Tylenol, Muscle relaxants, TCAs, SNRIs, anticonvulsants    Blood thinners: None    Interventional Therapies: None    Relevant Surgeries: None    Affecting sleep? Yes    Affecting daily activities? yes    Depressive symptoms? no          · SI/HI? No    Work status: Employed    Pain Scores:    Best:       2/10  Worst:     9/10  Usually:  6 /10  Today:   6 /10    Review of Systems   Constitutional: Negative for activity change, appetite change, chills, fatigue, fever and unexpected weight " change.   HENT: Negative for hearing loss.    Eyes: Negative for visual disturbance.   Respiratory: Negative for chest tightness and shortness of breath.    Cardiovascular: Negative for chest pain.   Gastrointestinal: Positive for constipation and rectal pain. Negative for abdominal pain, diarrhea, nausea and vomiting.   Genitourinary: Negative for difficulty urinating.   Musculoskeletal: Positive for back pain. Negative for gait problem and neck pain.   Skin: Negative for rash.   Neurological: Negative for dizziness, weakness, light-headedness, numbness and headaches.   Psychiatric/Behavioral: Positive for sleep disturbance. Negative for hallucinations and suicidal ideas. The patient is not nervous/anxious.        Past Medical History:   Diagnosis Date    Arthritis     Back pain     Chronic pelvic pain in female     left-sided    De Quervain's tenosynovitis     left    Headache(784.0)     Reflux     Right carpal tunnel syndrome     Urinary incontinence     occ kira       Past Surgical History:   Procedure Laterality Date    OOPHORECTOMY      LSO--pain       Social History     Social History    Marital status:      Spouse name: N/A    Number of children: 2    Years of education: N/A     Occupational History     [employer]     Social History Main Topics    Smoking status: Never Smoker    Smokeless tobacco: Never Used    Alcohol use No    Drug use: No    Sexual activity: Yes     Other Topics Concern    Not on file     Social History Narrative    No narrative on file       Review of patient's allergies indicates:   Allergen Reactions    Adhesive Rash    Iodine Rash    Shellfish containing products Rash     Patient allergic to softshell crabs specifically    Sulfa (sulfonamide antibiotics) Rash       Current Outpatient Prescriptions on File Prior to Visit   Medication Sig Dispense Refill    butalbital-aspirin-caffeine -40 mg (FIORINAL) -40 mg Cap Take 1  "capsule by mouth every 4 (four) hours as needed. 30 capsule 0    ibuprofen (ADVIL,MOTRIN) 600 MG tablet TAKE 1 TABLET(600 MG) BY MOUTH EVERY 8 HOURS AS NEEDED 90 tablet 0    omeprazole (PRILOSEC) 40 MG capsule Take 1 capsule (40 mg total) by mouth as needed. 90 capsule 0     No current facility-administered medications on file prior to visit.        Objective:      /80   Temp 96.8 °F (36 °C)   Resp 18   Ht 5' 4" (1.626 m)   Wt 69.9 kg (154 lb 1.6 oz)   LMP  (LMP Unknown)   BMI 26.45 kg/m²     Exam:  GEN:  Well developed, well nourished.  No acute distress.   HEENT:  No trauma.  Mucous membranes moist.  Nares patent bilaterally.  PSYCH: Normal affect. Thought content appropriate.  CHEST:  Breathing symmetric.  No audible wheezing.  ABD: Soft, non-tender, non-distended.  SKIN:  Warm, pink, dry.  No rash on exposed areas.    EXT:  No cyanosis, clubbing, or edema.  No color change or changes in nail or hair growth.  NEURO/MUSCULOSKELETAL:  Fully alert, oriented, and appropriate. Speech normal aleshia. No cranial nerve deficits.   Gait: Normal.  No focal motor deficits.       Imaging:  Narrative     [id number]  Accession # [id number]    Study:  XR LUMBAR SPINE AP AND LATERAL    Indication: Low back pain.    Comparison: Plain film from 05/06/2011.    Findings:   Lumbar spine, AP and lateral views.    Vertebral body heights, spinal alignment, and intervertebral disc spaces are satisfactorily maintained.   Normal SI joints. No evidence of fracture or dislocation.  Soft tissue structures are unremarkable.   Impression        No evidence of fracture.      Electronically signed by: GARRETT WALLIS MD  Date: 09/21/17  Time: 14:30         Narrative     DATE OF EXAM: May  6 2011      [facility]   0093  -  SACRUM/COCCYX:   1234 HOURS    [id number]     CLINICAL HISTORY:   719.40 0 JOINT PAIN SITE NOS     PROCEDURE COMMENT:        ICD 9 CODE(S):   ()     CPT 4 CODE(S)/MODIFIER(S):   ()     RESULTS: SI JOINTS APPEAR UNREMARKABLE, " WITH NO EVIDENCE OF ABNORMAL   SCLEROSIS, MARGINAL IRREGULARITY, OR ANKYLOSIS OBSERVED.  OSSEOUS   STRUCTURES DEMONSTRATE NO EVIDENCE OF RECENT FRACTURE, LYTIC DESTRUCTIVE   PROCESS, OR OTHER SIGNIFICANT FINDING.       IMPRESSION: AS ABOVE.         : BC   Transcribe Date/Time: May  6 2011  3:19P  Dictated by : FALLON STEVEN MD  Read On: May  6 2011  1:11P  Fallon Steven M.D., [id number]  Images were reviewed, findings were verified and document was   electronically  SIGNED BY: FALLON STEVEN MD On: May  6 2011  4:42P            Assessment:       Encounter Diagnoses   Name Primary?    Coccygodynia Yes    Proctodynia          Plan:       Diagnoses and all orders for this visit:    Coccygodynia  -     gabapentin (NEURONTIN) 300 MG capsule; Take 2 capsules (600 mg total) by mouth 3 (three) times daily.    Proctodynia  -     gabapentin (NEURONTIN) 300 MG capsule; Take 2 capsules (600 mg total) by mouth 3 (three) times daily.        Sheree Lomeli is a 62 y.o. female with chronic coccydynia. Unclear etiology. No specific injury to coccyx, but may be related to rectocele and irritation of ganglion impar.     1. Start Gabapentin 300mg TID. Gradually titrate to 600mg TID. Patient was given instructions on how to do this.  2. RTC on 12/15/17. Will increase dose further if appropriate. If not getting relief from Gabapentin, can try Elavil.

## 2017-12-12 ENCOUNTER — OFFICE VISIT (OUTPATIENT)
Dept: UROGYNECOLOGY | Facility: CLINIC | Age: 62
End: 2017-12-12
Payer: COMMERCIAL

## 2017-12-12 VITALS
HEIGHT: 64 IN | SYSTOLIC BLOOD PRESSURE: 120 MMHG | DIASTOLIC BLOOD PRESSURE: 70 MMHG | WEIGHT: 154.13 LBS | BODY MASS INDEX: 26.31 KG/M2

## 2017-12-12 DIAGNOSIS — N39.46 MIXED STRESS AND URGE URINARY INCONTINENCE: Primary | ICD-10-CM

## 2017-12-12 DIAGNOSIS — K59.09 CHRONIC CONSTIPATION: ICD-10-CM

## 2017-12-12 DIAGNOSIS — N95.2 VAGINAL ATROPHY: ICD-10-CM

## 2017-12-12 PROCEDURE — 99214 OFFICE O/P EST MOD 30 MIN: CPT | Mod: S$GLB,,, | Performed by: OBSTETRICS & GYNECOLOGY

## 2017-12-12 PROCEDURE — 99999 PR PBB SHADOW E&M-EST. PATIENT-LVL III: CPT | Mod: PBBFAC,,, | Performed by: OBSTETRICS & GYNECOLOGY

## 2017-12-12 NOTE — PATIENT INSTRUCTIONS
1. Perianal/rectal discomfort: Count daily fiber (goal b/w 15-30 g). Increase fiber to 6 tps/day. Increase hydration. If bulge (rectocele) starts to become more prominent or if you have more difficulty evacuating/increased pain, let me know to reconsider options. If no BM, take milk of magnesia 1-2 tablespoons. Would need to see neurosurgeon if back pain worsens.   --continue home back PT exercises  --finish pelvic floor PT   2. Mixed urinary incontinence:   --continue pelvic floor PT to strengthen muscles: refocus on strengthening at this point  --continue emptying every 3 hours. Empty well: wait a minute, lean forward on toilet. Let me know if worsens.   --consider pessary or sling (surgery if worsens)  3. Constipation:  --increase hydration: drink 3-4 bottles of water/day in addition to what you drink with meals  --watch caffeine intake; make sure to replace lost water after/before drinking caffeine  --get regular exercise  --continue daily fiber supplement  --start daily magnesium oxide 400 IU or mg a day  --use stool softener (ducosate sodium) 1-2 times/day if needed  4. Return to clinic 3 months.

## 2017-12-12 NOTE — PROGRESS NOTES
"Urogyn follow up    [facility] GYNECOLOGY  [address]  [facility] [id number]    Sheree Lomeli  [id number]  1955    Last visit 2012:  Sheree Lomeli is a 57 y.o. G3, P2-0-1-2 here for a urogyn follow up.    Mrs. Yani gill is a 57-year-old G3, P2-0-1-2 who reports a   suspected rectal injury after doing house work approximately three years   ago. She describes sensation of a rectal "tear" on excessive extension of   her back. Since that time, she has undergone evaluation per several   gastroenterology specialist. Colonoscopy 2006 is normal. A barium enema   2011 is suboptimal due to increased amount of stool in the bowel. She also  reports consultation with an orthopedist and MRI significant for   degenerative joint disease, but negative for neurologic injury. She   initially had discomfort in the left perianal area and sense of vaginal   bulge. Recently, she began to have issues with complete evacuation and   often splints perineally to help evacuate. She also notes use of MiraLax   can be helpful. Exam reveals a distal rectocele without obvious enterocele  or perineocele. There is tenderness to palpation perianally, especially at  the patient's left. There is also mild tenderness to palpation at the   internal levator ani complex, although no increased muscle tone. Rectal   exam reveals normal resting tone, but mildly decreased concentric squeeze.   Otherwise, there are no significant sphincter abnormalities. Perineal   sensation is grossly intact.     TODAY  In July, patient describes an episode of constipation lasting 6 days that caused increasing back pain. She used a colon cleanse to finally pass a BM. The constipation led to increasing back pain as described before. At this time she noticed 4 small brown spots about the size of a freckle on the left external labial skin when wiping. She noticed the bleeding originating from the brown spots. In August, noticed minimal bleeding on her pad again. "     Pt has continued taking 4 teaspoons fiber daily 2AM and 2PM to help with BM's and has been successful. BM's daily without the need to splint.  Pt has been using Replens when she feels dry but denies vaginal estrogen use. She still describes mild stress incontinence but not bothersome. Pt continues to do kegel exercises to help strengthen her pelvic floor.     Past Medical History:   Diagnosis Date    Arthritis     Back pain     Chronic pelvic pain in female     left-sided    De Quervain's tenosynovitis     left    Headache(784.0)     Reflux     Right carpal tunnel syndrome     Urinary incontinence     occ olvin     Past Surgical History:   Procedure Laterality Date    OOPHORECTOMY      LSO--pain     Issues include:  1. Perianal/rectal discomfort: The patient reports this is directly related to what sounds to be a musculoskeletal trauma. However, she has had significant workup per outside physicians with negative results. On exam, she does have some evidence of distal rectocele and often splints to help evacuate. Taking 25-30 g with fiber and fiber powder. Feels that pain is 70% better. Once has BM, pain is gone. Able to have BM most days. Is taking 4-5 tsps/day. No splinting anymore. No hematochezia. Does have some L-sided pressure before has BM, which is relieved with BM. Also gets back pain and foot pain before having BM, as well--improves with stool passage.   --saw pain MD--was hoping to for injection but couldn't find correct spot; has started neurontin with 90% improvement  --has completed back PT and pelvic floor with Lauren Shepley  2. Mixed urinary incontinence:   --previously:  Currently not bothersome. Continue to monitor. Basic behavioral modifications reviewed. Still occasionally with cough/sneeze. Is doing Kegel's.   --currently:  Started to notice more UI with moving/changing position over last 5 days.  Volumes are very small.  Uses small liner.  Does still have some OLVIN.  No U/F.   Frequency:  Q4-5 hours.  Nocturia: 0-1x/PM (better on neurontin). No dysuria, hematuria. +complete emptying.     3. Atrophic vaginitis: +/- using Replens twice a week.  4. Rectocele:  6/25/12 FL Defecogram: Video recording of defecation was performed revealing some increase in the anal rectal angle as expected with puborectalis relaxation. The anal canal opens with defecation. There is a small anterior rectocele, but there is no significant residual after evacuation . The patient performs a rocking motion with intermittent straining and relaxation to achieve emptying. IMPRESSION: Small anterior rectocele.  --currently: no major symptoms  5. Constipation, defecatory dysfunction:  Feels like she may be rocking a bit more to help empty.  Has bee taking fiber to keep stool consistency ideal.  Has started stool softener since starting gabapentin.      Medications:  Current Outpatient Prescriptions   Medication Sig Dispense Refill    butalbital-aspirin-caffeine -40 mg (FIORINAL) -40 mg Cap Take 1 capsule by mouth every 4 (four) hours as needed. 30 capsule 0    gabapentin (NEURONTIN) 300 MG capsule Take 2 capsules (600 mg total) by mouth 3 (three) times daily. 180 capsule 11    ibuprofen (ADVIL,MOTRIN) 600 MG tablet TAKE 1 TABLET(600 MG) BY MOUTH EVERY 8 HOURS AS NEEDED 90 tablet 0    omeprazole (PRILOSEC) 40 MG capsule Take 1 capsule (40 mg total) by mouth as needed. 90 capsule 0     No current facility-administered medications for this visit.      ROS: As per HPI.     Exam  There were no vitals taken for this visit.  General: alert and oriented, no acute distress  Pelvic:  NEFG  Vulva: normal  External: normal  POPQ:  Aa/Ba -2, Ap/Bp -1 (very distal rectocele), TVL 11-12, C -7, D -8.      POS COUGH STRESS TEST.      UA: neg    Impression  1. Mixed stress and urge urinary incontinence  Urine culture   2. Chronic constipation     3. Vaginal atrophy         Plan:   1. Perianal/rectal discomfort: Count daily  fiber (goal b/w 15-30 g). Increase fiber to 6 tps/day. Increase hydration. If bulge (rectocele) starts to become more prominent or if you have more difficulty evacuating/increased pain, let me know to reconsider options. If no BM, take milk of magnesia 1-2 tablespoons. Would need to see neurosurgeon if back pain worsens.   --continue home back PT exercises  --finish pelvic floor PT   2. Mixed urinary incontinence:   --continue pelvic floor PT to strengthen muscles: refocus on strengthening at this point  --continue emptying every 3 hours. Empty well: wait a minute, lean forward on toilet. Let me know if worsens.   --consider pessary or sling (surgery if worsens)  3. Constipation:  --increase hydration: drink 3-4 bottles of water/day in addition to what you drink with meals  --watch caffeine intake; make sure to replace lost water after/before drinking caffeine  --get regular exercise  --continue daily fiber supplement  --start daily magnesium oxide 400 IU or mg a day  --use stool softener (ducosate sodium) 1-2 times/day if needed  4. Return to clinic 3 months.     40 minutes were spent in face to face time with this patient  90 % of this time was spent in counseling and/or coordination of care  @  [facility]  Division of Female Pelvic Medicine and Reconstructive Surgery  Department of Obstetrics & Gynecology

## 2017-12-13 ENCOUNTER — TELEPHONE (OUTPATIENT)
Dept: PAIN MEDICINE | Facility: CLINIC | Age: 62
End: 2017-12-13

## 2017-12-13 DIAGNOSIS — G89.29 CHRONIC LOW BACK PAIN, UNSPECIFIED BACK PAIN LATERALITY, WITH SCIATICA PRESENCE UNSPECIFIED: ICD-10-CM

## 2017-12-13 DIAGNOSIS — M54.5 CHRONIC LOW BACK PAIN, UNSPECIFIED BACK PAIN LATERALITY, WITH SCIATICA PRESENCE UNSPECIFIED: ICD-10-CM

## 2017-12-13 RX ORDER — IBUPROFEN 600 MG/1
TABLET ORAL
Qty: 90 TABLET | Refills: 0 | Status: SHIPPED | OUTPATIENT
Start: 2017-12-13 | End: 2018-02-20 | Stop reason: SDUPTHER

## 2017-12-13 NOTE — TELEPHONE ENCOUNTER
Ms. Bentley called to report great relief with the medication (Gabapentin) Dr. Harley has prescribed.  She is currently taking 600 mg TID without any undesired SE.  She states her pain is about 10% of what it used to be.  Dr. Harley updated- okay to cancel F/U appointment scheduled for 12/15/17 unless she feels as though she would like to see him in clinic.  Patient would like to cancel and will follow up PRN.

## 2017-12-14 ENCOUNTER — CLINICAL SUPPORT (OUTPATIENT)
Dept: REHABILITATION | Facility: HOSPITAL | Age: 62
End: 2017-12-14
Attending: INTERNAL MEDICINE
Payer: COMMERCIAL

## 2017-12-14 DIAGNOSIS — M62.81 WEAKNESS OF TRUNK MUSCULATURE: ICD-10-CM

## 2017-12-14 DIAGNOSIS — M62.89 MUSCLE TIGHTNESS: ICD-10-CM

## 2017-12-14 PROCEDURE — 97110 THERAPEUTIC EXERCISES: CPT | Mod: PN

## 2017-12-14 NOTE — PROGRESS NOTES
Name: Sheree Lomeli  Clinic Number: [id number]  Date of Treatment: 12/14/2017   Diagnosis:   Encounter Diagnoses   Name Primary?    Weakness of trunk musculature     Muscle tightness      Daily Note    Time in: 8:10  Time Out: 9:05  Total Treatment Time: 55 minutes  Visit #: 14/20  Referral expiration: 12/31/17  POC expiration: 12/29/17    Subjective:    Pt reports that she has recently had a few episodes of urinary incontinence. She got in to see Dr. Mukherjee and was instructed to continue with PFPT and strengthening. Pt states her tailbone pain has improved a lot with the gabapentin.    Pt reports 2/10 tailbone pain.    Objective    Pt received individual therapeutic exercise for 55 minutes including:    - 10 x 10'' kegals in supine with SEMG assist, external perianal leads placed; pt displays normal resting baseline, good WR rise, good holding, complete derecruitment; pt was noted to have minimal use of overflow today  - SEMG feedback in standing; pt displays elevated baseline with poor WR rise, improvement in supported standing though still with increased activity  - 10 x 10'' kegals in sitting; normal resting baseline, good WR rise, complete derecruitment  - Cat/cow x 10 with verbal cueing  - instructions in finding a neutral spine from cat/cow followed by TA activation in quadruped  - Child's pose    Not performed:    - Legs up the wall x 5 min  - Wall piriformis stretch 3 x 30'' ea  - Modified forearm plank x 15 seconds  - Modified side plank x 15 second ea    Pt received individual neuromuscular reeducation for 00 minutes including:    - PFM activation with diaphragmatic breathing 10 x 10''  - Diaphragmatic breathing with SEMG assist; pt displays slightly elevated baseline  - Kegals in supine with SEMG assist; elevated baseline, fair WR rise (likely due to activity at rest), good holding, slow and incomplete derecruitment  - TA activation with DB and SEMG assist; pt cued to take a deep breath in between  "contractions with improved ability to contract; good carry over to PFM  - Progressive muscle relaxation with SEMG assist in supine with knees supported; baseline decreased to normal following relaxation training    Pt received individual manual therapy for 00 minutes including: myofascial mobilization applied to lumbar and thoracic regions with skin rolling technique; soft tissue mobilization applied to bilateral thoracic and lumbar paraspinals and QL  soft tissue mobilization applied to bilateral levators and R OI today; pt reports tenderness and a "sting" of her R obturator with improvement following manual care    Eductaion provided: pt instructed to continue with her exercises for her LBP and to continue with diaphragmatic breathing, colon massage, Z lie, wall piriformis stretch, legs up the wall, and modified plank/side; added cat/cow, TA activation in quadruped, and child's pose today; pt demonstrated and verbalized understanding.    Assessment:   Sheree tolerated treatment session well without visible adverse effects. Pt verbalizes improvement in pain, however, she has experienced a few episodes of urinary incontinence; will continue to progress PFM exercises in order to address onset of leakage. Pt continues to present with PFM weakness and prolapse and will continue to benefit from skilled PT intervention in order to maximize pain free functional independence.     Patient is making good progress towards established goals.  No educational, cultural, or spiritual barriers to learning identified.    Short Term Goals (4 Weeks):   1. Pt will report 20% reduced pain within the lumbar spine for ease with driving to and from work met  2. Pt will demonstrate 1/3 improvement MMT in BLE for ease with performing household chores met  4. Pt will demonstrate 10% improvement in bilateral hamstring length for ease with dressing lower body met  5. Pt will demonstrate improved lumbar ROM by 25% in all directions for ease " with picking an object up from the floor met  Long Term Goals (8 Weeks):   1.Pt will report <3/10 pain within the lumbar spine for ease with ADL's met  2. Pt will demonstrate 50% improvement of hamstring and hip flexor length in BLE for ease with ambulation met  3. Pt will be independent with HEP for maintenance of improvements gained in therapy sessions  met  4. Pt will demonstrate 4+/5 strength or greater in BLE for ease with running errands  met  5. Pt will demonstrate improvement in central stabilization in order to improve integration of deep core and functional stability.  6. Pt will be independent with HEP and self management of POP and pelvic pain.     Plan      Certification Period: 9/29/17 to 12/29/17  Recommended Treatment Plan: 1 times per week for 8 weeks: Electrical Stimulation PRN, Iontophoresis (with dexamethasone PRN), Manual Therapy, Moist Heat/ Ice, Neuromuscular Re-ed, Patient Education, Therapeutic Activites, Therapeutic Exercise and Other therapeutic taping, dry needling, aquatic therapy     Continue with established Plan of Care towards PT goals.     Lauren Shepley, PT

## 2017-12-18 ENCOUNTER — CLINICAL SUPPORT (OUTPATIENT)
Dept: REHABILITATION | Facility: HOSPITAL | Age: 62
End: 2017-12-18
Attending: INTERNAL MEDICINE
Payer: COMMERCIAL

## 2017-12-18 DIAGNOSIS — M62.81 WEAKNESS OF TRUNK MUSCULATURE: ICD-10-CM

## 2017-12-18 DIAGNOSIS — M62.89 MUSCLE TIGHTNESS: ICD-10-CM

## 2017-12-18 PROCEDURE — 97110 THERAPEUTIC EXERCISES: CPT | Mod: PN

## 2017-12-18 PROCEDURE — 97140 MANUAL THERAPY 1/> REGIONS: CPT | Mod: PN

## 2017-12-18 NOTE — PROGRESS NOTES
"Name: Sheree Lomeli  Clinic Number: [id number]  Date of Treatment: 12/18/2017   Diagnosis:   Encounter Diagnoses   Name Primary?    Weakness of trunk musculature     Muscle tightness      Daily Note    Time in: 3:35  Time Out: 4:37  Total Treatment Time: 62 minutes  Visit #: 15/20  Referral expiration: 12/31/17  POC expiration: 12/29/17    Subjective:    Pt reports that the last few days have been really good, the medication continues to help. Pt states "I think I may have had one episode of leakage," but thinks it might just be a sensation of leakage when it doesn't actaully happen. Thinks she can feel urine moving into her urethra.    Pt reports 2/10 tailbone pain.    Objective    Pt received individual therapeutic exercise for 22 minutes including:    - Kegals in standing; pt displays good WR rise and good holding (8 seconds), instructed to continue to work in standing  - Legs up the wall   - Wall squat 2 x 2''  - Wall piriformis stretch 3 x 30'' ea    Not performed:  - 10 x 10'' kegals in supine with SEMG assist, external perianal leads placed; pt displays normal resting baseline, good WR rise, good holding, complete derecruitment; pt was noted to have minimal use of overflow today  - SEMG feedback in standing; pt displays elevated baseline with poor WR rise, improvement in supported standing though still with increased activity  - 10 x 10'' kegals in sitting; normal resting baseline, good WR rise, complete derecruitment  - Cat/cow x 10 with verbal cueing  - instructions in finding a neutral spine from cat/cow followed by TA activation in quadruped  - Child's pose  - Modified forearm plank x 15 seconds  - Modified side plank x 15 second ea    Pt received individual neuromuscular reeducation for 00 minutes including:    - PFM activation with diaphragmatic breathing 10 x 10''  - Diaphragmatic breathing with SEMG assist; pt displays slightly elevated baseline  - Kegals in supine with SEMG assist; elevated " baseline, fair WR rise (likely due to activity at rest), good holding, slow and incomplete derecruitment  - TA activation with DB and SEMG assist; pt cued to take a deep breath in between contractions with improved ability to contract; good carry over to PFM  - Progressive muscle relaxation with SEMG assist in supine with knees supported; baseline decreased to normal following relaxation training    Pt received individual manual therapy for 40 minutes including: strain counter strain/positional release applied to bilateral levators    Eductaion provided: pt instructed  to continue with diaphragmatic breathing, colon massage, Z lie, wall piriformis stretch, legs up the wall, modified plank/side, cat/cow, TA activation in quadruped, and child's pose; added side lying hip abduction and wall squat; pt demonstrated and verbalized understanding and was provided with a handout today.    Assessment:   Sheree tolerated treatment session well without visible adverse effects. Pt will good response to manual care today with near resolution of obturator pain (denies R sided pain following strain counter strain, L side nearly resolved), will follow up at her next visit. Pt continues to present with PFM weakness (though she is making good improvement with progression to working in standing) and prolapse and will continue to benefit from skilled PT intervention in order to maximize pain free functional independence.     Patient is making good progress towards established goals.  No educational, cultural, or spiritual barriers to learning identified.    Short Term Goals (4 Weeks):   1. Pt will report 20% reduced pain within the lumbar spine for ease with driving to and from work met  2. Pt will demonstrate 1/3 improvement MMT in BLE for ease with performing household chores met  4. Pt will demonstrate 10% improvement in bilateral hamstring length for ease with dressing lower body met  5. Pt will demonstrate improved lumbar ROM by  25% in all directions for ease with picking an object up from the floor met  Long Term Goals (8 Weeks):   1.Pt will report <3/10 pain within the lumbar spine for ease with ADL's met  2. Pt will demonstrate 50% improvement of hamstring and hip flexor length in BLE for ease with ambulation met  3. Pt will be independent with HEP for maintenance of improvements gained in therapy sessions  met  4. Pt will demonstrate 4+/5 strength or greater in BLE for ease with running errands  met  5. Pt will demonstrate improvement in central stabilization in order to improve integration of deep core and functional stability.  6. Pt will be independent with HEP and self management of POP and pelvic pain.     Plan      Certification Period: 9/29/17 to 12/29/17  Recommended Treatment Plan: 1 times per week for 8 weeks: Electrical Stimulation PRN, Iontophoresis (with dexamethasone PRN), Manual Therapy, Moist Heat/ Ice, Neuromuscular Re-ed, Patient Education, Therapeutic Activites, Therapeutic Exercise and Other therapeutic taping, dry needling, aquatic therapy     Continue with established Plan of Care towards PT goals.     Lauren Shepley, PT

## 2017-12-21 ENCOUNTER — CLINICAL SUPPORT (OUTPATIENT)
Dept: REHABILITATION | Facility: HOSPITAL | Age: 62
End: 2017-12-21
Attending: INTERNAL MEDICINE
Payer: COMMERCIAL

## 2017-12-21 DIAGNOSIS — M62.89 MUSCLE TIGHTNESS: ICD-10-CM

## 2017-12-21 DIAGNOSIS — M62.81 WEAKNESS OF TRUNK MUSCULATURE: ICD-10-CM

## 2017-12-21 PROCEDURE — 97110 THERAPEUTIC EXERCISES: CPT | Mod: PN

## 2017-12-21 PROCEDURE — 97140 MANUAL THERAPY 1/> REGIONS: CPT | Mod: PN

## 2017-12-21 NOTE — PROGRESS NOTES
Name: Sheree Lomeli  Clinic Number: [id number]  Date of Treatment: 12/21/2017   Diagnosis:   Encounter Diagnoses   Name Primary?    Weakness of trunk musculature     Muscle tightness      Discharge Note    Time in: 8:05  Time Out: 9:10  Total Treatment Time: 65 minutes  Visit #: 16/20  Referral expiration: 12/31/17  POC expiration: 12/29/17    Subjective:    Pt reports that she is doing well. She still has moments (bouts) of pain but has been improving, feels independent with her exercises, and is ready for discharge today.    Pt reports 2/10 tailbone pain.    Objective    Pt received individual therapeutic exercise for 15 minutes including:    - Standing wide leg forward fold with verbal cues  - Diaphragmatic breathing on 1/2 foam roll and full roll    Not performed:  - Kegals in standing; pt displays good WR rise and good holding (8 seconds), instructed to continue to work in standing  - Legs up the wall   - Wall squat 2 x 2''  - Wall piriformis stretch 3 x 30'' ea  - 10 x 10'' kegals in supine with SEMG assist, external perianal leads placed; pt displays normal resting baseline, good WR rise, good holding, complete derecruitment; pt was noted to have minimal use of overflow today  - SEMG feedback in standing; pt displays elevated baseline with poor WR rise, improvement in supported standing though still with increased activity  - 10 x 10'' kegals in sitting; normal resting baseline, good WR rise, complete derecruitment  - Cat/cow x 10 with verbal cueing  - instructions in finding a neutral spine from cat/cow followed by TA activation in quadruped  - Child's pose  - Modified forearm plank x 15 seconds  - Modified side plank x 15 second ea    Pt received individual neuromuscular reeducation for 00 minutes including:    - PFM activation with diaphragmatic breathing 10 x 10''  - Diaphragmatic breathing with SEMG assist; pt displays slightly elevated baseline  - Kegals in supine with SEMG assist; elevated baseline,  fair WR rise (likely due to activity at rest), good holding, slow and incomplete derecruitment  - TA activation with DB and SEMG assist; pt cued to take a deep breath in between contractions with improved ability to contract; good carry over to PFM  - Progressive muscle relaxation with SEMG assist in supine with knees supported; baseline decreased to normal following relaxation training    Pt received individual manual therapy for 45 minutes including: strain counter strain/positional release applied to OI; pt verbalizes much improvement in tenderness    Eductaion provided: pt instructed  to continue with diaphragmatic breathing, colon massage, Z lie, wall piriformis stretch, legs up the wall, modified plank/side, cat/cow, TA activation in quadruped, child's pose, side lying hip abduction, wall squat with addition of standing wide leg forward fold and DB on half foam; pt demonstrated and verbalized understanding and was provided with a handout today.    Assessment:   Sheree tolerated treatment session well without visible adverse effects. Pt is independent with her exercises and self management and no longer requires skilled PT intervention at this time. Goals met as outlined below.     Short Term Goals (4 Weeks):   1. Pt will report 20% reduced pain within the lumbar spine for ease with driving to and from work met  2. Pt will demonstrate 1/3 improvement MMT in BLE for ease with performing household chores met  4. Pt will demonstrate 10% improvement in bilateral hamstring length for ease with dressing lower body met  5. Pt will demonstrate improved lumbar ROM by 25% in all directions for ease with picking an object up from the floor met  Long Term Goals (8 Weeks):   1.Pt will report <3/10 pain within the lumbar spine for ease with ADL's met  2. Pt will demonstrate 50% improvement of hamstring and hip flexor length in BLE for ease with ambulation met  3. Pt will be independent with HEP for maintenance of  improvements gained in therapy sessions  met  4. Pt will demonstrate 4+/5 strength or greater in BLE for ease with running errands  met  5. Pt will demonstrate improvement in central stabilization in order to improve integration of deep core and functional stability. met  6. Pt will be independent with HEP and self management of POP and pelvic pain. met     Plan      Discharge

## 2017-12-24 ENCOUNTER — OFFICE VISIT (OUTPATIENT)
Dept: URGENT CARE | Facility: CLINIC | Age: 62
End: 2017-12-24
Payer: COMMERCIAL

## 2017-12-24 VITALS
HEART RATE: 100 BPM | HEIGHT: 64 IN | WEIGHT: 154 LBS | SYSTOLIC BLOOD PRESSURE: 165 MMHG | DIASTOLIC BLOOD PRESSURE: 88 MMHG | TEMPERATURE: 101 F | BODY MASS INDEX: 26.29 KG/M2 | OXYGEN SATURATION: 100 %

## 2017-12-24 DIAGNOSIS — J40 BRONCHITIS: ICD-10-CM

## 2017-12-24 DIAGNOSIS — B34.9 VIRAL SYNDROME: ICD-10-CM

## 2017-12-24 DIAGNOSIS — R50.9 FEVER, UNSPECIFIED FEVER CAUSE: Primary | ICD-10-CM

## 2017-12-24 LAB
CTP QC/QA: YES
FLUAV AG NPH QL: NEGATIVE
FLUBV AG NPH QL: NEGATIVE

## 2017-12-24 PROCEDURE — 99214 OFFICE O/P EST MOD 30 MIN: CPT | Mod: 25,S$GLB,, | Performed by: SURGERY

## 2017-12-24 PROCEDURE — 96372 THER/PROPH/DIAG INJ SC/IM: CPT | Mod: S$GLB,,, | Performed by: SURGERY

## 2017-12-24 PROCEDURE — 87804 INFLUENZA ASSAY W/OPTIC: CPT | Mod: QW,S$GLB,, | Performed by: SURGERY

## 2017-12-24 RX ORDER — ALBUTEROL SULFATE 90 UG/1
2 AEROSOL, METERED RESPIRATORY (INHALATION) EVERY 6 HOURS PRN
Qty: 18 G | Refills: 0 | Status: SHIPPED | OUTPATIENT
Start: 2017-12-24 | End: 2018-02-28 | Stop reason: ALTCHOICE

## 2017-12-24 RX ORDER — BETAMETHASONE SODIUM PHOSPHATE AND BETAMETHASONE ACETATE 3; 3 MG/ML; MG/ML
6 INJECTION, SUSPENSION INTRA-ARTICULAR; INTRALESIONAL; INTRAMUSCULAR; SOFT TISSUE
Status: COMPLETED | OUTPATIENT
Start: 2017-12-24 | End: 2017-12-24

## 2017-12-24 RX ORDER — PROMETHAZINE HYDROCHLORIDE AND CODEINE PHOSPHATE 6.25; 1 MG/5ML; MG/5ML
5 SOLUTION ORAL EVERY 4 HOURS PRN
Qty: 120 ML | Refills: 0 | Status: SHIPPED | OUTPATIENT
Start: 2017-12-24 | End: 2017-12-29

## 2017-12-24 RX ADMIN — BETAMETHASONE SODIUM PHOSPHATE AND BETAMETHASONE ACETATE 6 MG: 3; 3 INJECTION, SUSPENSION INTRA-ARTICULAR; INTRALESIONAL; INTRAMUSCULAR; SOFT TISSUE at 01:12

## 2017-12-24 NOTE — PROGRESS NOTES
"Subjective:       Patient ID: Sheree Lomeli is a 62 y.o. female.    Vitals:  height is 5' 4" (1.626 m) and weight is 69.9 kg (154 lb). Her oral temperature is 100.6 °F (38.1 °C) (abnormal). Her blood pressure is 165/88 (abnormal) and her pulse is 100. Her oxygen saturation is 100%.     Chief Complaint: Cough and Fever    Cough   This is a new problem. The current episode started yesterday. The problem has been unchanged. The problem occurs constantly. The cough is non-productive. Associated symptoms include a fever and headaches. Pertinent negatives include no chest pain, chills, ear pain, eye redness, myalgias, sore throat, shortness of breath or wheezing.   Fever    This is a new problem. The current episode started today. The problem occurs constantly. Associated symptoms include coughing and headaches. Pertinent negatives include no abdominal pain, chest pain, congestion, ear pain, nausea, sore throat or wheezing.     Review of Systems   Constitution: Positive for fever. Negative for chills and malaise/fatigue.   HENT: Negative for congestion, ear pain, hoarse voice and sore throat.    Eyes: Negative for discharge and redness.   Cardiovascular: Negative for chest pain, dyspnea on exertion and leg swelling.   Respiratory: Positive for cough. Negative for shortness of breath, sputum production and wheezing.    Musculoskeletal: Negative for myalgias.   Gastrointestinal: Negative for abdominal pain and nausea.   Neurological: Positive for headaches.       Objective:      Physical Exam   Constitutional: She is oriented to person, place, and time. She appears well-developed and well-nourished. She is cooperative.  Non-toxic appearance. She does not appear ill. No distress.   HENT:   Head: Normocephalic and atraumatic.   Right Ear: Hearing, tympanic membrane, external ear and ear canal normal.   Left Ear: Hearing, tympanic membrane, external ear and ear canal normal.   Nose: Nose normal. No mucosal edema, rhinorrhea " or nasal deformity. No epistaxis. Right sinus exhibits no maxillary sinus tenderness and no frontal sinus tenderness. Left sinus exhibits no maxillary sinus tenderness and no frontal sinus tenderness.   Mouth/Throat: Uvula is midline, oropharynx is clear and moist and mucous membranes are normal. No trismus in the jaw. Normal dentition. No uvula swelling. No posterior oropharyngeal erythema.   Eyes: Conjunctivae and lids are normal. No scleral icterus.   Sclera clear bilat   Neck: Trachea normal, full passive range of motion without pain and phonation normal. Neck supple.   Cardiovascular: Normal rate, regular rhythm, normal heart sounds, intact distal pulses and normal pulses.    Pulmonary/Chest: Effort normal and breath sounds normal. No respiratory distress.   Abdominal: Soft. Normal appearance and bowel sounds are normal. She exhibits no distension. There is no tenderness.   Musculoskeletal: Normal range of motion. She exhibits no edema or deformity.   Neurological: She is alert and oriented to person, place, and time. She exhibits normal muscle tone. Coordination normal.   Skin: Skin is warm, dry and intact. She is not diaphoretic. No pallor.   Psychiatric: She has a normal mood and affect. Her speech is normal and behavior is normal. Judgment and thought content normal. Cognition and memory are normal.   Nursing note and vitals reviewed.      Assessment:       1. Fever, unspecified fever cause    2. Bronchitis    3. Viral syndrome        Plan:         Fever, unspecified fever cause  -     POCT Influenza A/B    Bronchitis  -     betamethasone acetate-betamethasone sodium phosphate injection 6 mg; Inject 1 mL (6 mg total) into the muscle one time.  -     promethazine-codeine 6.25-10 mg/5 ml (PHENERGAN WITH CODEINE) 6.25-10 mg/5 mL syrup; Take 5 mLs by mouth every 4 (four) hours as needed for Cough.  Dispense: 120 mL; Refill: 0  -     albuterol 90 mcg/actuation inhaler; Inhale 2 puffs into the lungs every 6 (six)  hours as needed for Wheezing. Rescue  Dispense: 18 g; Refill: 0    Viral syndrome

## 2017-12-24 NOTE — PATIENT INSTRUCTIONS
"  Viral Syndrome (Adult)  A viral illness may cause a number of symptoms. The symptoms depend on the part of the body that the virus affects. If it settles in your nose, throat, and lungs, it may cause cough, sore throat, congestion, and sometimes headache. If it settles in your stomach and intestinal tract, it may cause vomiting and diarrhea. Sometimes it causes vague symptoms like "aching all over," feeling tired, loss of appetite, or fever.  A viral illness usually lasts 1 to 2 weeks, but sometimes it lasts longer. In some cases, a more serious infection can look like a viral syndrome in the first few days of the illness. You may need another exam and additional tests to know the difference. Watch for the warning signs listed below.  Home care  Follow these guidelines for taking care of yourself at home:  · If symptoms are severe, rest at home for the first 2 to 3 days.  · Stay away from cigarette smoke - both your smoke and the smoke from others.  · You may use over-the-counter acetaminophen or ibuprofen for fever, muscle aching, and headache, unless another medicine was prescribed for this. If you have chronic liver or kidney disease or ever had a stomach ulcer or GI bleeding, talk with your doctor before using these medicines. No one who is younger than 18 and ill with a fever should take aspirin. It may cause severe disease or death.  · Your appetite may be poor, so a light diet is fine. Avoid dehydration by drinking 8 to 12 8-ounce glasses of fluids each day. This may include water; orange juice; lemonade; apple, grape, and cranberry juice; clear fruit drinks; electrolyte replacement and sports drinks; and decaffeinated teas and coffee. If you have been diagnosed with a kidney disease, ask your doctor how much and what types of fluids you should drink to prevent dehydration. If you have kidney disease, drinking too much fluid can cause it build up in the your body and be dangerous to your " health.  · Over-the-counter remedies won't shorten the length of the illness but may be helpful for cough, sore throat; and nasal and sinus congestion. Don't use decongestants if you have high blood pressure.  Follow-up care  Follow up with your healthcare provider if you do not improve over the next week.  Call 911  Get emergency medical care if any of the following occur:  · Convulsion  · Feeling weak, dizzy, or like you are going to faint  · Chest pain, shortness of breath, wheezing, or difficulty breathing  When to seek medical advice  Call your healthcare provider right away if any of these occur:  · Cough with lots of colored sputum (mucus) or blood in your sputum  · Chest pain, shortness of breath, wheezing, or difficulty breathing  · Severe headache; face, neck, or ear pain  · Severe, constant pain in the lower right side of your belly (abdominal)  · Continued vomiting (cant keep liquids down)  · Frequent diarrhea (more than 5 times a day); blood (red or black color) or mucus in diarrhea  · Feeling weak, dizzy, or like you are going to faint  · Extreme thirst  · Fever of 100.4°F (38°C) or higher, or as directed by your healthcare provider  Date Last Reviewed: 9/25/2015  © 4830-7941 [employer]. [address] [place] [address], [place] [address]. All rights reserved. This information is not intended as a substitute for professional medical care. Always follow your healthcare professional's instructions.

## 2018-01-19 ENCOUNTER — TELEPHONE (OUTPATIENT)
Dept: PAIN MEDICINE | Facility: CLINIC | Age: 63
End: 2018-01-19

## 2018-01-19 NOTE — TELEPHONE ENCOUNTER
Ms. Bentley states that her tailbone pain has increased over the past week.  She has been taking Gabapentin 600 mg three times daily, which she found to provide great relief.  She did state that on Monday she pulled a muscle in her upper back.  Since that time she has performed PT exercises daily at home and feels as though the pain in decreasing.  Dr. Harley updated.    Patient is to continue current Gabapentin regimen and PT exercises at home.  Encouraged her to update us on her status next week and if needed we can adjust her medication dose.  Patient verbalized understanding.     ----- Message from Coleman Sinha sent at 1/19/2018  8:35 AM CST -----  Contact: Self/[phone]  Patient states that she has questions about her medication:  gabapentin (NEURONTIN) 300 MG capsule. She did not leave a detailed message. Thank you.

## 2018-02-19 ENCOUNTER — TELEPHONE (OUTPATIENT)
Dept: PAIN MEDICINE | Facility: CLINIC | Age: 63
End: 2018-02-19

## 2018-02-19 NOTE — TELEPHONE ENCOUNTER
Patient inquired about possibility of changing medication from Gabapentin to Elavil as previously discussed with Dr. Harley.  Informed her that Dr. Harley was gone for the day, but I would contact her tomorrow after speaking with him.     ----- Message from Magali Rosales sent at 2/19/2018  3:29 PM CST -----  Contact: self  Patient requests to speak with staff regarding follow up appointment. She can be reached at [phone]. Thank you!

## 2018-02-20 ENCOUNTER — TELEPHONE (OUTPATIENT)
Dept: PAIN MEDICINE | Facility: CLINIC | Age: 63
End: 2018-02-20

## 2018-02-20 DIAGNOSIS — G89.29 CHRONIC LOW BACK PAIN, UNSPECIFIED BACK PAIN LATERALITY, WITH SCIATICA PRESENCE UNSPECIFIED: ICD-10-CM

## 2018-02-20 DIAGNOSIS — M54.5 CHRONIC LOW BACK PAIN, UNSPECIFIED BACK PAIN LATERALITY, WITH SCIATICA PRESENCE UNSPECIFIED: ICD-10-CM

## 2018-02-20 RX ORDER — IBUPROFEN 600 MG/1
TABLET ORAL
Qty: 90 TABLET | Refills: 0 | Status: SHIPPED | OUTPATIENT
Start: 2018-02-20 | End: 2018-05-22 | Stop reason: SDUPTHER

## 2018-02-20 NOTE — TELEPHONE ENCOUNTER
Ms. Bentley called clinic yesterday afternoon to report that Gabapentin is no longer providing her any pain relief.  Dr. Harley updated- patient scheduled to come to for a F/U 2/22/18 to discuss other options.

## 2018-02-21 ENCOUNTER — TELEPHONE (OUTPATIENT)
Dept: PAIN MEDICINE | Facility: CLINIC | Age: 63
End: 2018-02-21

## 2018-02-21 NOTE — TELEPHONE ENCOUNTER
Reminded patient of Pain Management appointment scheduled for tomorrow at 8.45a with Dr. Harley- verbal confirmation received.  Location information also provided.

## 2018-02-22 ENCOUNTER — OFFICE VISIT (OUTPATIENT)
Dept: PAIN MEDICINE | Facility: CLINIC | Age: 63
End: 2018-02-22
Payer: COMMERCIAL

## 2018-02-22 VITALS
WEIGHT: 143.88 LBS | OXYGEN SATURATION: 100 % | RESPIRATION RATE: 18 BRPM | BODY MASS INDEX: 24.56 KG/M2 | HEART RATE: 83 BPM | DIASTOLIC BLOOD PRESSURE: 86 MMHG | HEIGHT: 64 IN | SYSTOLIC BLOOD PRESSURE: 128 MMHG

## 2018-02-22 DIAGNOSIS — M53.3 COCCYGODYNIA: ICD-10-CM

## 2018-02-22 DIAGNOSIS — K62.89 PROCTODYNIA: Primary | ICD-10-CM

## 2018-02-22 PROCEDURE — 3008F BODY MASS INDEX DOCD: CPT | Mod: S$GLB,,, | Performed by: PAIN MEDICINE

## 2018-02-22 PROCEDURE — 99213 OFFICE O/P EST LOW 20 MIN: CPT | Mod: S$GLB,,, | Performed by: PAIN MEDICINE

## 2018-02-22 PROCEDURE — 99999 PR PBB SHADOW E&M-EST. PATIENT-LVL III: CPT | Mod: PBBFAC,,, | Performed by: PAIN MEDICINE

## 2018-02-22 RX ORDER — GABAPENTIN 600 MG/1
1200 TABLET ORAL 3 TIMES DAILY
Qty: 180 TABLET | Refills: 11 | Status: SHIPPED | OUTPATIENT
Start: 2018-02-22 | End: 2018-03-05

## 2018-02-22 NOTE — PROGRESS NOTES
"Subjective:     Patient ID: Sheree Lomeli is a 62 y.o. female    Chief Complaint: Follow-up (patient feels that the Gabapentin is no longer working or causing increased pain after taking the medication- patient experiences headaches, nausea and burning sensation in tailbone)      Referred by: No ref. provider found      HPI:    Interval History (2/22/18):  She returns today for follow up.  She reports that Gabapentin 600mg TID has been helpful for the coccyx/rectal pain. She denies any side effects from this medication. She states that this medication and dose greatly improved her pain, but she has been noticing diminished relief over the past month. She denies any changes in the quality or location of her pain and reports that her pain is still very much improved since starting the gabapentin.     Interval History (12/4/17):  She returns today for follow up after aborted ganglion impar injection. Her coccyx pain is unchanged in quality and location. She still reports relief with bowel movements. She does report some upper lumbar pain as well as pain/paresthesias in the lateral right foot and posterior left ankle. These are less severe than her coccyx pain.     Initial Encounter (11/13/17):  Sheree Lomeli is a 62 y.o. female who presents today with chronic coccygeal pain. Pain started about 8 years ago while cleaning her bathroom. The pain is located over the coccyx. It does not radiate. It has a "fiery" quality to it when severe. She denies any associated numbness, weakness or b/b dysfunction. She does have distal rectocele without obvious enterocele or perineocele. Her pain is alleviated with bowel movements and she does note frequent constipation. Pain exacerbated with sitting for prolonged periods. Does not have pain with palpation.    This pain is described in detail below.    Physical Therapy: Yes. Pelvic floor therapy. No significant relief noted    Non-pharmacologic Treatment: Heat and ice help       "   · TENS? No    Pain Medications:         · Currently taking: Advil; not sure if helps. Biofreeze.    · Has tried in the past:      · Has not tried: Opioids, Tylenol, Muscle relaxants, TCAs, SNRIs, anticonvulsants    Blood thinners: None    Interventional Therapies: None    Relevant Surgeries: None    Affecting sleep? Yes    Affecting daily activities? yes    Depressive symptoms? no          · SI/HI? No    Work status: Employed    Pain Scores:    Best:       2/10  Worst:     9/10  Usually:  6 /10  Today:   4 /10    Review of Systems   Constitutional: Negative for activity change, appetite change, chills, fatigue, fever and unexpected weight change.   HENT: Negative for hearing loss.    Eyes: Negative for visual disturbance.   Respiratory: Negative for chest tightness and shortness of breath.    Cardiovascular: Negative for chest pain.   Gastrointestinal: Positive for constipation and rectal pain. Negative for abdominal pain, diarrhea, nausea and vomiting.   Genitourinary: Negative for difficulty urinating.   Musculoskeletal: Positive for back pain. Negative for gait problem and neck pain.   Skin: Negative for rash.   Neurological: Negative for dizziness, weakness, light-headedness, numbness and headaches.   Psychiatric/Behavioral: Positive for sleep disturbance. Negative for hallucinations and suicidal ideas. The patient is not nervous/anxious.        Past Medical History:   Diagnosis Date    Arthritis     Back pain     Chronic pelvic pain in female     left-sided    De Quervain's tenosynovitis     left    Headache(784.0)     Reflux     Right carpal tunnel syndrome     Urinary incontinence     occ kira       Past Surgical History:   Procedure Laterality Date    OOPHORECTOMY      LSO--pain       Social History     Social History    Marital status:      Spouse name: N/A    Number of children: 2    Years of education: N/A     Occupational History     [employer]     Social History  "Main Topics    Smoking status: Never Smoker    Smokeless tobacco: Never Used    Alcohol use No    Drug use: No    Sexual activity: Yes     Partners: Male     Birth control/ protection: Condom     Other Topics Concern    Not on file     Social History Narrative    No narrative on file       Review of patient's allergies indicates:   Allergen Reactions    Adhesive Rash    Iodine Rash    Shellfish containing products Rash     Patient allergic to softshell crabs specifically    Sulfa (sulfonamide antibiotics) Rash       Current Outpatient Prescriptions on File Prior to Visit   Medication Sig Dispense Refill    albuterol 90 mcg/actuation inhaler Inhale 2 puffs into the lungs every 6 (six) hours as needed for Wheezing. Rescue 18 g 0    butalbital-aspirin-caffeine -40 mg (FIORINAL) -40 mg Cap Take 1 capsule by mouth every 4 (four) hours as needed. 30 capsule 0    gabapentin (NEURONTIN) 300 MG capsule Take 2 capsules (600 mg total) by mouth 3 (three) times daily. 180 capsule 11    ibuprofen (ADVIL,MOTRIN) 600 MG tablet TAKE 1 TABLET(600 MG) BY MOUTH EVERY 8 HOURS AS NEEDED 90 tablet 0    omeprazole (PRILOSEC) 40 MG capsule Take 1 capsule (40 mg total) by mouth as needed. 90 capsule 0     No current facility-administered medications on file prior to visit.        Objective:      /86   Pulse 83   Resp 18   Ht 5' 4" (1.626 m)   Wt 65.3 kg (143 lb 14.4 oz)   LMP  (LMP Unknown)   SpO2 100%   BMI 24.70 kg/m²     Exam:  GEN:  Well developed, well nourished.  No acute distress.   HEENT:  No trauma.  Mucous membranes moist.  Nares patent bilaterally.  PSYCH: Normal affect. Thought content appropriate.  CHEST:  Breathing symmetric.  No audible wheezing.  ABD: Soft, non-tender, non-distended.  SKIN:  Warm, pink, dry.  No rash on exposed areas.    EXT:  No cyanosis, clubbing, or edema.  No color change or changes in nail or hair growth.  NEURO/MUSCULOSKELETAL:  Fully alert, oriented, and " appropriate. Speech normal aleshia. No cranial nerve deficits.   Gait: Normal.  No focal motor deficits.       Imaging:  Narrative     [id number]  Accession # [id number]    Study:  XR LUMBAR SPINE AP AND LATERAL    Indication: Low back pain.    Comparison: Plain film from 05/06/2011.    Findings:   Lumbar spine, AP and lateral views.    Vertebral body heights, spinal alignment, and intervertebral disc spaces are satisfactorily maintained.   Normal SI joints. No evidence of fracture or dislocation.  Soft tissue structures are unremarkable.   Impression        No evidence of fracture.      Electronically signed by: GARRETT WALLIS MD  Date: 09/21/17  Time: 14:30         Narrative     DATE OF EXAM: May  6 2011      [facility]   [id number]  -  SACRUM/COCCYX:   1234 HOURS    [id number]     CLINICAL HISTORY:   719.40 0 JOINT PAIN SITE NOS     PROCEDURE COMMENT:        ICD 9 CODE(S):   ()     CPT 4 CODE(S)/MODIFIER(S):   ()     RESULTS: SI JOINTS APPEAR UNREMARKABLE, WITH NO EVIDENCE OF ABNORMAL   SCLEROSIS, MARGINAL IRREGULARITY, OR ANKYLOSIS OBSERVED.  OSSEOUS   STRUCTURES DEMONSTRATE NO EVIDENCE OF RECENT FRACTURE, LYTIC DESTRUCTIVE   PROCESS, OR OTHER SIGNIFICANT FINDING.       IMPRESSION: AS ABOVE.         : BC   Transcribe Date/Time: May  6 2011  3:19P  Dictated by : FALLON STEVEN MD  Read On: May  6 2011  1:11P  Fallon Steven M.D., [id number]  Images were reviewed, findings were verified and document was   electronically  SIGNED BY: FALLON STEVEN MD On: May  6 2011  4:42P            Assessment:       Encounter Diagnoses   Name Primary?    Proctodynia Yes    Coccygodynia          Plan:       Sheree was seen today for follow-up.    Diagnoses and all orders for this visit:    Proctodynia  -     gabapentin (NEURONTIN) 600 MG tablet; Take 2 tablets (1,200 mg total) by mouth 3 (three) times daily.    Coccygodynia  -     gabapentin (NEURONTIN) 600 MG tablet; Take 2 tablets (1,200 mg total) by mouth 3 (three) times daily.        Sheree  Yani is a 62 y.o. female with chronic coccydynia. Unclear etiology. No specific injury to coccyx, but may be related to rectocele and irritation of ganglion impar.     1. Continue to titrate Gabapentin. Change from 300mg to 600mg pills and gradually titrate to 3600mg daily as tolerated.  2. RTC PRN. If not getting adequate relief from Gabapentin, can try Elavil.

## 2018-02-28 ENCOUNTER — OFFICE VISIT (OUTPATIENT)
Dept: FAMILY MEDICINE | Facility: CLINIC | Age: 63
End: 2018-02-28
Payer: COMMERCIAL

## 2018-02-28 ENCOUNTER — LAB VISIT (OUTPATIENT)
Dept: LAB | Facility: HOSPITAL | Age: 63
End: 2018-02-28
Attending: INTERNAL MEDICINE
Payer: COMMERCIAL

## 2018-02-28 VITALS
DIASTOLIC BLOOD PRESSURE: 80 MMHG | BODY MASS INDEX: 26.42 KG/M2 | OXYGEN SATURATION: 97 % | HEART RATE: 81 BPM | SYSTOLIC BLOOD PRESSURE: 120 MMHG | HEIGHT: 64 IN | TEMPERATURE: 98 F | WEIGHT: 154.75 LBS

## 2018-02-28 DIAGNOSIS — M54.5 LOW BACK PAIN, UNSPECIFIED BACK PAIN LATERALITY, UNSPECIFIED CHRONICITY, WITH SCIATICA PRESENCE UNSPECIFIED: Primary | ICD-10-CM

## 2018-02-28 DIAGNOSIS — M54.9 UPPER BACK PAIN: ICD-10-CM

## 2018-02-28 DIAGNOSIS — M79.603 PAIN OF UPPER EXTREMITY, UNSPECIFIED LATERALITY: ICD-10-CM

## 2018-02-28 DIAGNOSIS — M79.605 PAIN IN BOTH LOWER EXTREMITIES: ICD-10-CM

## 2018-02-28 DIAGNOSIS — M53.3 COCCYX PAIN: ICD-10-CM

## 2018-02-28 DIAGNOSIS — Z23 FLU VACCINE NEED: ICD-10-CM

## 2018-02-28 DIAGNOSIS — M54.5 LOW BACK PAIN, UNSPECIFIED BACK PAIN LATERALITY, UNSPECIFIED CHRONICITY, WITH SCIATICA PRESENCE UNSPECIFIED: ICD-10-CM

## 2018-02-28 DIAGNOSIS — M79.604 PAIN IN BOTH LOWER EXTREMITIES: ICD-10-CM

## 2018-02-28 DIAGNOSIS — M79.10 MYALGIA: Primary | ICD-10-CM

## 2018-02-28 LAB
ALBUMIN SERPL BCP-MCNC: 4 G/DL
ALP SERPL-CCNC: 44 U/L
ALT SERPL W/O P-5'-P-CCNC: 31 U/L
ANION GAP SERPL CALC-SCNC: 4 MMOL/L
AST SERPL-CCNC: 27 U/L
BASOPHILS # BLD AUTO: 0.04 K/UL
BASOPHILS NFR BLD: 0.7 %
BILIRUB SERPL-MCNC: 0.3 MG/DL
BUN SERPL-MCNC: 16 MG/DL
CALCIUM SERPL-MCNC: 9.8 MG/DL
CHLORIDE SERPL-SCNC: 102 MMOL/L
CK SERPL-CCNC: 137 U/L
CO2 SERPL-SCNC: 32 MMOL/L
CREAT SERPL-MCNC: 0.8 MG/DL
CRP SERPL-MCNC: 0.7 MG/L
DIFFERENTIAL METHOD: ABNORMAL
EOSINOPHIL # BLD AUTO: 0.1 K/UL
EOSINOPHIL NFR BLD: 1.9 %
ERYTHROCYTE [DISTWIDTH] IN BLOOD BY AUTOMATED COUNT: 13.1 %
ERYTHROCYTE [SEDIMENTATION RATE] IN BLOOD BY WESTERGREN METHOD: 11 MM/HR
EST. GFR  (AFRICAN AMERICAN): >60 ML/MIN/1.73 M^2
EST. GFR  (NON AFRICAN AMERICAN): >60 ML/MIN/1.73 M^2
GLUCOSE SERPL-MCNC: 94 MG/DL
HCT VFR BLD AUTO: 40.1 %
HGB BLD-MCNC: 12.8 G/DL
IMM GRANULOCYTES # BLD AUTO: 0.02 K/UL
IMM GRANULOCYTES NFR BLD AUTO: 0.3 %
LYMPHOCYTES # BLD AUTO: 1.3 K/UL
LYMPHOCYTES NFR BLD: 22.5 %
MCH RBC QN AUTO: 29.6 PG
MCHC RBC AUTO-ENTMCNC: 31.9 G/DL
MCV RBC AUTO: 93 FL
MONOCYTES # BLD AUTO: 0.5 K/UL
MONOCYTES NFR BLD: 8.4 %
NEUTROPHILS # BLD AUTO: 3.8 K/UL
NEUTROPHILS NFR BLD: 66.2 %
NRBC BLD-RTO: 0 /100 WBC
PLATELET # BLD AUTO: 223 K/UL
PMV BLD AUTO: 12.2 FL
POTASSIUM SERPL-SCNC: 5 MMOL/L
PROT SERPL-MCNC: 7.5 G/DL
RBC # BLD AUTO: 4.33 M/UL
SODIUM SERPL-SCNC: 138 MMOL/L
WBC # BLD AUTO: 5.81 K/UL

## 2018-02-28 PROCEDURE — 85025 COMPLETE CBC W/AUTO DIFF WBC: CPT

## 2018-02-28 PROCEDURE — 82550 ASSAY OF CK (CPK): CPT

## 2018-02-28 PROCEDURE — 99999 PR PBB SHADOW E&M-EST. PATIENT-LVL III: CPT | Mod: PBBFAC,,, | Performed by: INTERNAL MEDICINE

## 2018-02-28 PROCEDURE — 90471 IMMUNIZATION ADMIN: CPT | Mod: S$GLB,,, | Performed by: INTERNAL MEDICINE

## 2018-02-28 PROCEDURE — 86140 C-REACTIVE PROTEIN: CPT

## 2018-02-28 PROCEDURE — 90686 IIV4 VACC NO PRSV 0.5 ML IM: CPT | Mod: S$GLB,,, | Performed by: INTERNAL MEDICINE

## 2018-02-28 PROCEDURE — 36415 COLL VENOUS BLD VENIPUNCTURE: CPT | Mod: PO

## 2018-02-28 PROCEDURE — 80053 COMPREHEN METABOLIC PANEL: CPT

## 2018-02-28 PROCEDURE — 3008F BODY MASS INDEX DOCD: CPT | Mod: S$GLB,,, | Performed by: INTERNAL MEDICINE

## 2018-02-28 PROCEDURE — 85651 RBC SED RATE NONAUTOMATED: CPT

## 2018-02-28 PROCEDURE — 99215 OFFICE O/P EST HI 40 MIN: CPT | Mod: 25,S$GLB,, | Performed by: INTERNAL MEDICINE

## 2018-02-28 RX ORDER — METHYLPREDNISOLONE 4 MG/1
TABLET ORAL
Qty: 1 PACKAGE | Refills: 0 | Status: SHIPPED | OUTPATIENT
Start: 2018-02-28 | End: 2018-03-27 | Stop reason: ALTCHOICE

## 2018-02-28 NOTE — PROGRESS NOTES
HISTORY OF PRESENT ILLNESS:  Sheree Lomeli is a 62 y.o. female who presents to the clinic today for Back Pain; Leg Pain; Arm Pain; and feet pain  .  The patient presents to clinic today for follow-up of back pain.  She originally had had some problems with constipation in June of last year.  She then developed some problems with tailbone pain.  It was felt that perhaps this was related to a rectocele that she has.  She states in July of last year she thought maybe she pulled a muscle in her back when she was taking care of her 10-year-old grandson.  Since then pain has persisted/worsen.  We have done x-rays and physical therapy with no real relief of her symptoms.  She has seen pain management who tried an injection and placed on Neurontin.  She recently saw the pain specialists and her Neurontin dose was increased as symptoms were not improving.  She rates her pain today as 6 out of 10.  She states she has pain in her legs and feet.  The only thing that wasn't hurting the other day were her fingers and toes.  She was concerned that perhaps the Neurontin was causing her pain.  A few days ago it was so bad that she could hardly walk.  She has since weaned herself off the Neurontin.  She does feel a little bit better today.  She reports some weakness.  She denies any fever or night sweats.  No unexplained changes in her weight.  No palpitations or chest pain.  No cough or shortness of breath.  No rash.      PAST MEDICAL HISTORY:  Past Medical History:   Diagnosis Date    Arthritis     Back pain     Chronic pelvic pain in female     left-sided    De Quervain's tenosynovitis     left    Headache(784.0)     Reflux     Right carpal tunnel syndrome     Urinary incontinence     occ kira       PAST SURGICAL HISTORY:  Past Surgical History:   Procedure Laterality Date    OOPHORECTOMY      LSO--pain       SOCIAL HISTORY:  Social History     Social History    Marital status:      Spouse name: N/A    Number of  children: 2    Years of education: N/A     Occupational History     [employer]     Social History Main Topics    Smoking status: Never Smoker    Smokeless tobacco: Never Used    Alcohol use No    Drug use: No    Sexual activity: Yes     Partners: Male     Birth control/ protection: Condom     Other Topics Concern    Not on file     Social History Narrative    No narrative on file       FAMILY HISTORY:  Family History   Problem Relation Age of Onset    Breast cancer Maternal Grandmother     Heart disease Father      s/p stenting    Coronary artery disease Father     Prostate cancer Father     Hypoglycemic Sister     No Known Problems Sister     Diabetes Maternal Uncle     Diabetes Cousin     Hypertension Neg Hx     Stroke Neg Hx     Colon cancer Neg Hx     Cervical cancer Neg Hx     Vaginal cancer Neg Hx     Endometrial cancer Neg Hx        ALLERGIES AND MEDICATIONS: updated and reviewed.  Review of patient's allergies indicates:   Allergen Reactions    Adhesive Rash    Iodine Rash    Shellfish containing products Rash     Patient allergic to softshell crabs specifically    Sulfa (sulfonamide antibiotics) Rash     Medication List with Changes/Refills   Current Medications    BUTALBITAL-ASPIRIN-CAFFEINE -40 MG (FIORINAL) -40 MG CAP    Take 1 capsule by mouth every 4 (four) hours as needed.    GABAPENTIN (NEURONTIN) 600 MG TABLET    Take 2 tablets (1,200 mg total) by mouth 3 (three) times daily.    IBUPROFEN (ADVIL,MOTRIN) 600 MG TABLET    TAKE 1 TABLET(600 MG) BY MOUTH EVERY 8 HOURS AS NEEDED   Discontinued Medications    ALBUTEROL 90 MCG/ACTUATION INHALER    Inhale 2 puffs into the lungs every 6 (six) hours as needed for Wheezing. Rescue    OMEPRAZOLE (PRILOSEC) 40 MG CAPSULE    Take 1 capsule (40 mg total) by mouth as needed.          CARE TEAM:  Patient Care Team:  Whitney Mera MD as PCP - General (Internal Medicine)         REVIEW OF SYSTEMS:  Review of  "Systems   Constitutional: Positive for malaise/fatigue. Negative for chills, fever and weight loss.   HENT: Negative for congestion and hearing loss.    Eyes: Negative for blurred vision and discharge.   Respiratory: Negative for cough and wheezing.    Cardiovascular: Negative for chest pain, palpitations and leg swelling.   Gastrointestinal: Negative for blood in stool, constipation, diarrhea and vomiting.   Genitourinary: Negative for dysuria and hematuria.   Musculoskeletal: Positive for back pain and myalgias. Negative for falls, joint pain and neck pain.   Skin: Negative for itching and rash.   Neurological: Positive for weakness and headaches. Negative for tremors, speech change, focal weakness and seizures.   Endo/Heme/Allergies: Negative for polydipsia.   Psychiatric/Behavioral: Negative for depression. The patient is not nervous/anxious and does not have insomnia.          PHYSICAL EXAM:   Vitals:    02/28/18 0817   BP: 120/80   Pulse: 81   Temp: 97.6 °F (36.4 °C)     Weight: 70.2 kg (154 lb 12.2 oz)   Height: 5' 4" (162.6 cm)   Body mass index is 26.57 kg/m².     General appearance - alert, well appearing, and in no distress and overweight  Mental status - alert, oriented to person, place, and time, normal mood, behavior, speech, dress, motor activity, and thought processes  Eyes - pupils equal and reactive, extraocular eye movements intact, sclera anicteric  Mouth - mucous membranes moist, pharynx normal without lesions  Neck - supple, no significant adenopathy, carotids upstroke normal bilaterally, no bruits  Lymphatics - no palpable lymphadenopathy  Chest - clear to auscultation, no wheezes, rales or rhonchi, symmetric air entry  Heart - normal rate and regular rhythm, no gallops noted  Back exam - full range of motion, no tenderness, palpable spasm or pain on motion  Neurological - alert, oriented, normal speech, no focal findings or movement disorder noted, cranial nerves II through XII " intact  Musculoskeletal - no joint tenderness, deformity or swelling, she had generalized muscle tenderness to palpation  Extremities - no pedal edema noted  Skin - normal coloration and turgor, no rashes, no suspicious skin lesions noted      ASSESSMENT AND PLAN:  1. Low back pain, unspecified back pain laterality, unspecified chronicity, with sciatica presence unspecified/2. Upper back pain/3. Coccyx pain/4. Pain in both lower extremities5. Pain of upper extremity, unspecified laterality  I'm wondering if she has developed polymyalgia rheumatica.  I will check blood work.  We may give her a trial of steroids.  She may need to see rheumatology for further evaluation.  Consider further imaging of her back if symptoms worsen or persist.      2. Flu shot  Completed today.           Follow-up in about 6 months (around 8/28/2018), or if symptoms worsen or fail to improve, for annual exam. or sooner as needed.  Answers for HPI/ROS submitted by the patient on 2/26/2018   activity change: No  unexpected weight change: No  rhinorrhea: No  trouble swallowing: No  visual disturbance: No  chest tightness: No  polyuria: No  difficulty urinating: No  menstrual problem: No  joint swelling: No  arthralgias: No  confusion: No  dysphoric mood: No

## 2018-02-28 NOTE — PROGRESS NOTES
Influenza vaccine administered, rahel well. Instructed to wait 15mins for observation, no reaction noted @ time of discharge.

## 2018-03-05 ENCOUNTER — PATIENT MESSAGE (OUTPATIENT)
Dept: FAMILY MEDICINE | Facility: CLINIC | Age: 63
End: 2018-03-05

## 2018-03-05 DIAGNOSIS — K21.9 GASTROESOPHAGEAL REFLUX DISEASE WITHOUT ESOPHAGITIS: ICD-10-CM

## 2018-03-05 RX ORDER — OMEPRAZOLE 40 MG/1
40 CAPSULE, DELAYED RELEASE ORAL
Qty: 90 CAPSULE | Refills: 0
Start: 2018-03-05 | End: 2018-09-10 | Stop reason: SDUPTHER

## 2018-03-05 NOTE — TELEPHONE ENCOUNTER
Ok to take tylenol, if needed.   Let's see how she does with the steroids this week - she can send a message at the end of the week how she is doing. If necessary, I can always call in steroids to where she will be next week.

## 2018-03-05 NOTE — TELEPHONE ENCOUNTER
Spoke w/patient, want to know if she should be taking additional steroids since she do not know when she will see Rheumatologist, and is it ok for you to take Tylenol instead of Ibuprofen while she is on the steroids. statesient states she is 50-60 better since Saturday, but still has the. strange stretching pain in the back of her knees and back pain. States she is leaving [place] in four days and don't want to be in pain. Please advise.

## 2018-03-27 ENCOUNTER — OFFICE VISIT (OUTPATIENT)
Dept: UROGYNECOLOGY | Facility: CLINIC | Age: 63
End: 2018-03-27
Payer: COMMERCIAL

## 2018-03-27 VITALS
DIASTOLIC BLOOD PRESSURE: 80 MMHG | BODY MASS INDEX: 25.85 KG/M2 | HEIGHT: 64 IN | SYSTOLIC BLOOD PRESSURE: 120 MMHG | WEIGHT: 151.44 LBS

## 2018-03-27 DIAGNOSIS — M62.81 WEAKNESS OF TRUNK MUSCULATURE: ICD-10-CM

## 2018-03-27 DIAGNOSIS — G89.29 CHRONIC LOW BACK PAIN, UNSPECIFIED BACK PAIN LATERALITY, WITH SCIATICA PRESENCE UNSPECIFIED: ICD-10-CM

## 2018-03-27 DIAGNOSIS — M53.3 COCCYX PAIN: Primary | ICD-10-CM

## 2018-03-27 DIAGNOSIS — M54.5 CHRONIC LOW BACK PAIN, UNSPECIFIED BACK PAIN LATERALITY, WITH SCIATICA PRESENCE UNSPECIFIED: ICD-10-CM

## 2018-03-27 DIAGNOSIS — K59.09 CHRONIC CONSTIPATION: ICD-10-CM

## 2018-03-27 DIAGNOSIS — M53.3 COCCYGODYNIA: ICD-10-CM

## 2018-03-27 DIAGNOSIS — N95.2 VAGINAL ATROPHY: ICD-10-CM

## 2018-03-27 DIAGNOSIS — N39.46 MIXED STRESS AND URGE URINARY INCONTINENCE: ICD-10-CM

## 2018-03-27 PROCEDURE — 99999 PR PBB SHADOW E&M-EST. PATIENT-LVL III: CPT | Mod: PBBFAC,,, | Performed by: OBSTETRICS & GYNECOLOGY

## 2018-03-27 PROCEDURE — 99214 OFFICE O/P EST MOD 30 MIN: CPT | Mod: S$GLB,,, | Performed by: OBSTETRICS & GYNECOLOGY

## 2018-03-27 NOTE — PROGRESS NOTES
"Urogyn follow up    [facility]  [address]  [facility] [address]    Sheree Lomeli  [id number]  1955    Last visit 2012:  Sheree Lomeli is a 57 y.o. G3, P2-0-1-2 here for a urogyn follow up.    Mrs. Yani gill is a 57-year-old G3, P2-0-1-2 who reports a   suspected rectal injury after doing house work approximately three years   ago. She describes sensation of a rectal "tear" on excessive extension of   her back. Since that time, she has undergone evaluation per several   gastroenterology specialist. Colonoscopy 2006 is normal. A barium enema   2011 is suboptimal due to increased amount of stool in the bowel. She also  reports consultation with an orthopedist and MRI significant for   degenerative joint disease, but negative for neurologic injury. She   initially had discomfort in the left perianal area and sense of vaginal   bulge. Recently, she began to have issues with complete evacuation and   often splints perineally to help evacuate. She also notes use of MiraLax   can be helpful. Exam reveals a distal rectocele without obvious enterocele  or perineocele. There is tenderness to palpation perianally, especially at  the patient's left. There is also mild tenderness to palpation at the   internal levator ani complex, although no increased muscle tone. Rectal   exam reveals normal resting tone, but mildly decreased concentric squeeze.   Otherwise, there are no significant sphincter abnormalities. Perineal   sensation is grossly intact.     TODAY  In July, patient describes an episode of constipation lasting 6 days that caused increasing back pain. She used a colon cleanse to finally pass a BM. The constipation led to increasing back pain as described before. At this time she noticed 4 small brown spots about the size of a freckle on the left external labial skin when wiping. She noticed the bleeding originating from the brown spots. In August, noticed minimal bleeding on her pad again. "     Pt has continued taking 4 teaspoons fiber daily 2AM and 2PM to help with BM's and has been successful. BM's daily without the need to splint.  Pt has been using Replens when she feels dry but denies vaginal estrogen use. She still describes mild stress incontinence but not bothersome. Pt continues to do kegel exercises to help strengthen her pelvic floor.     Past Medical History:   Diagnosis Date    Arthritis     Back pain     Chronic pelvic pain in female     left-sided    De Quervain's tenosynovitis     left    Headache(784.0)     Reflux     Right carpal tunnel syndrome     Urinary incontinence     occ kira     Past Surgical History:   Procedure Laterality Date    OOPHORECTOMY      LSO--pain     Issues include:  1. Perianal/rectal discomfort: The patient reports this is directly related to what sounds to be a musculoskeletal trauma. However, she has had significant workup per outside physicians with negative results. On exam, she does have some evidence of distal rectocele and often splints to help evacuate. Taking 25-30 g with fiber and fiber powder. Feels that pain is 70% better. Once has BM, pain is gone. Able to have BM most days. Is taking 4-5 tsps/day. No splinting anymore. No hematochezia. Does have some L-sided pressure before has BM, which is relieved with BM. Also gets back pain and foot pain before having BM, as well--improves with stool passage.   --pain started after she was reaching for something years ago, felt pop; better after passage of BM or flatus; worse with sitting--has a standing desk now, and this helps  --saw pain MD--was hoping to for injection but couldn't find correct spot; did start neurontin with 90% improvement x 1 month; then, pain started to return and had some leg/foot pain, which became severe with increasing dose--stopped once she stopped med; so, has stopped this neurontin now  --tailbone pain is 3-4/10 currently; can be as low as 1-2/10  --has completed back PT  "and pelvic floor with Lauren Shepley  2. Mixed urinary incontinence:   --previously:  Currently not bothersome. Continue to monitor. Basic behavioral modifications reviewed. Still occasionally with cough/sneeze. Is doing Kegel's.   --currently:  Started to notice more UI with moving/changing position over last 5 days.  Volumes are very small.  Uses small liner.  Does still have some OLVIN.  No U/F.  Frequency:  Q4-5 hours.  Nocturia: 0-1x/PM (better on neurontin). No dysuria, hematuria. +complete emptying.     3. Atrophic vaginitis: +/- using Replens twice a week.  4. Rectocele:  6/25/12 FL Defecogram: Video recording of defecation was performed revealing some increase in the anal rectal angle as expected with puborectalis relaxation. The anal canal opens with defecation. There is a small anterior rectocele, but there is no significant residual after evacuation . The patient performs a rocking motion with intermittent straining and relaxation to achieve emptying. IMPRESSION: Small anterior rectocele.  --currently: no major symptoms  5. Constipation, defecatory dysfunction:  Feels like she may be rocking a bit more to help empty.  Has bee taking fiber to keep stool consistency ideal.  Has started stool softener since starting gabapentin.      Medications:  Current Outpatient Prescriptions   Medication Sig Dispense Refill    butalbital-aspirin-caffeine -40 mg (FIORINAL) -40 mg Cap Take 1 capsule by mouth every 4 (four) hours as needed. 30 capsule 0    ibuprofen (ADVIL,MOTRIN) 600 MG tablet TAKE 1 TABLET(600 MG) BY MOUTH EVERY 8 HOURS AS NEEDED 90 tablet 0    omeprazole (PRILOSEC) 40 MG capsule Take 1 capsule (40 mg total) by mouth as needed (heartburn). Take only as needed. 90 capsule 0     No current facility-administered medications for this visit.      ROS: As per HPI.     Exam  /80 (BP Location: Right arm, Patient Position: Sitting)   Ht 5' 4" (1.626 m)   Wt 68.7 kg (151 lb 7.3 oz)   LMP  " (LMP Unknown)   BMI 26.00 kg/m²   GEN: WD, WN, NAD  Remainder of PE deferred    Impression  1. Coccyx pain  MRI Sacral Plexus W W/O Contrast    MRI Lumbar Spine W WO Contrast   2. Coccygodynia     3. Mixed stress and urge urinary incontinence     4. Vaginal atrophy     5. Chronic constipation     6. Weakness of trunk musculature     7. Chronic low back pain, unspecified back pain laterality, with sciatica presence unspecified         Plan:   1. Perianal/rectal discomfort: Count daily fiber (goal b/w 15-30 g). Increase fiber to 6 tps/day. Increase hydration. If bulge (rectocele) starts to become more prominent or if you have more difficulty evacuating/increased pain, let me know to reconsider options. If no BM, take milk of magnesia 1-2 tablespoons. Would need to see neurosurgeon if back pain worsens.   --continue home back PT exercises  --continue pelvic floor PT   --try SI joint stretches/exercises  --get MRI lumbar/sacrum as have not had previous sacral imaging  --consider ortho/pain management consult for diagnostic SI joint injection  2. Mixed urinary incontinence:   --continue pelvic floor PT to strengthen muscles: refocus on strengthening at this point  --continue emptying every 3 hours. Empty well: wait a minute, lean forward on toilet. Let me know if worsens.   --consider pessary or sling (surgery if worsens)  3. Constipation:  --increase hydration: drink 3-4 bottles of water/day in addition to what you drink with meals  --watch caffeine intake; make sure to replace lost water after/before drinking caffeine  --get regular exercise  --continue daily fiber supplement  --start daily magnesium oxide 400 IU or mg a day  --use stool softener (ducosate sodium) 1-2 times/day if needed  4. Will call you with MRI results. Determine follow up plan at that point.      40 minutes were spent in face to face time with this patient  90 % of this time was spent in counseling and/or coordination of care  @  [facility]  Division of Female Pelvic Medicine and Reconstructive Surgery  Department of Obstetrics & Gynecology

## 2018-03-27 NOTE — PATIENT INSTRUCTIONS
1. Perianal/rectal discomfort: Count daily fiber (goal b/w 15-30 g). Increase fiber to 6 tps/day. Increase hydration. If bulge (rectocele) starts to become more prominent or if you have more difficulty evacuating/increased pain, let me know to reconsider options. If no BM, take milk of magnesia 1-2 tablespoons. Would need to see neurosurgeon if back pain worsens.   --continue home back PT exercises  --continue pelvic floor PT   --try SI joint stretches/exercises  --get MRI lumbar/sacrum as have not had previous sacral imaging  --consider ortho/pain management consult for diagnostic SI joint injection  2. Mixed urinary incontinence:   --continue pelvic floor PT to strengthen muscles: refocus on strengthening at this point  --continue emptying every 3 hours. Empty well: wait a minute, lean forward on toilet. Let me know if worsens.   --consider pessary or sling (surgery if worsens)  3. Constipation:  --increase hydration: drink 3-4 bottles of water/day in addition to what you drink with meals  --watch caffeine intake; make sure to replace lost water after/before drinking caffeine  --get regular exercise  --continue daily fiber supplement  --start daily magnesium oxide 400 IU or mg a day  --use stool softener (ducosate sodium) 1-2 times/day if needed  4. Will call you with MRI results. Determine follow up plan at that point.

## 2018-04-04 ENCOUNTER — HOSPITAL ENCOUNTER (OUTPATIENT)
Dept: RADIOLOGY | Facility: HOSPITAL | Age: 63
Discharge: HOME | End: 2018-04-04
Attending: OBSTETRICS & GYNECOLOGY
Payer: COMMERCIAL

## 2018-04-04 DIAGNOSIS — M53.3 COCCYX PAIN: ICD-10-CM

## 2018-04-04 PROCEDURE — 72148 MRI LUMBAR SPINE W/O DYE: CPT | Mod: TC

## 2018-04-04 PROCEDURE — 72195 MRI PELVIS W/O DYE: CPT | Mod: 26,,, | Performed by: RADIOLOGY

## 2018-04-04 PROCEDURE — 72195 MRI PELVIS W/O DYE: CPT | Mod: TC

## 2018-04-04 PROCEDURE — 72148 MRI LUMBAR SPINE W/O DYE: CPT | Mod: 26,,, | Performed by: RADIOLOGY

## 2018-04-05 ENCOUNTER — TELEPHONE (OUTPATIENT)
Dept: UROGYNECOLOGY | Facility: CLINIC | Age: 63
End: 2018-04-05

## 2018-04-05 DIAGNOSIS — R93.7 ABNORMAL MRI, LUMBAR SPINE: Primary | ICD-10-CM

## 2018-04-05 NOTE — TELEPHONE ENCOUNTER
Spoke with patient re: MRI results.  No obvious abnormalities, except some annular fissure/bulge L4/5, L>R.  Has been doing PT/stretches w/o relief.  Will refer to Rianna Nina.    -----------------------------    Please call patient and help her make appt with Dr. Rianna Nina in back/spine center.  Order in EPIC.  Thanks!

## 2018-04-05 NOTE — TELEPHONE ENCOUNTER
Called pt and gave her information for an appointment with Dr.Lesley Nina's office back/spine at [phone], pt voiced understanding and call was ended.

## 2018-04-18 ENCOUNTER — OFFICE VISIT (OUTPATIENT)
Dept: PAIN MEDICINE | Facility: CLINIC | Age: 63
End: 2018-04-18
Attending: ANESTHESIOLOGY
Payer: COMMERCIAL

## 2018-04-18 VITALS
OXYGEN SATURATION: 100 % | TEMPERATURE: 98 F | SYSTOLIC BLOOD PRESSURE: 155 MMHG | WEIGHT: 152.13 LBS | BODY MASS INDEX: 25.97 KG/M2 | DIASTOLIC BLOOD PRESSURE: 99 MMHG | RESPIRATION RATE: 18 BRPM | HEART RATE: 83 BPM | HEIGHT: 64 IN

## 2018-04-18 DIAGNOSIS — M25.571 CHRONIC PAIN OF BOTH ANKLES: ICD-10-CM

## 2018-04-18 DIAGNOSIS — M79.18 MYOFASCIAL MUSCLE PAIN: ICD-10-CM

## 2018-04-18 DIAGNOSIS — M51.36 DDD (DEGENERATIVE DISC DISEASE), LUMBAR: ICD-10-CM

## 2018-04-18 DIAGNOSIS — M53.3 COCCYDYNIA: Primary | ICD-10-CM

## 2018-04-18 DIAGNOSIS — M25.572 CHRONIC PAIN OF BOTH ANKLES: ICD-10-CM

## 2018-04-18 DIAGNOSIS — G89.29 CHRONIC PAIN OF BOTH ANKLES: ICD-10-CM

## 2018-04-18 PROCEDURE — 99999 PR PBB SHADOW E&M-EST. PATIENT-LVL III: CPT | Mod: PBBFAC,,, | Performed by: ANESTHESIOLOGY

## 2018-04-18 PROCEDURE — 99215 OFFICE O/P EST HI 40 MIN: CPT | Mod: S$GLB,,, | Performed by: ANESTHESIOLOGY

## 2018-04-18 RX ORDER — CYCLOBENZAPRINE HCL 5 MG
5 TABLET ORAL NIGHTLY PRN
Qty: 30 TABLET | Refills: 5 | Status: SHIPPED | OUTPATIENT
Start: 2018-04-18 | End: 2018-09-11 | Stop reason: SINTOL

## 2018-04-18 NOTE — PROGRESS NOTES
"Subjective:     Patient ID: Sheree Lomeli is a 62 y.o. female.    Chief Complaint: Pain    Consulted by: Dr. Salina Mukherjee     Disclaimer: This note was generated using voice recognition software.  There may be a typographical errors that were missed during proofreading.    Patient has been seen by Dr. Harley in our service group, but she is a new patient to me today.  HPI:     Initial Encounter (11/13/17):  Sheree Lomeli is a 62 y.o. female who presents today with chronic coccygeal pain. Pain started about 8 years ago while cleaning her bathroom. The pain is located over the coccyx. It does not radiate. It has a "fiery" quality to it when severe. She denies any associated numbness, weakness or b/b dysfunction. She does have distal rectocele without obvious enterocele or perineocele. Her pain is alleviated with bowel movements and she does note frequent constipation. Pain exacerbated with sitting for prolonged periods. Does not have pain with palpation.    This pain is described in detail below.     Interval History (12/4/17):  She returns today for follow up after aborted ganglion impar injection. Her coccyx pain is unchanged in quality and location. She still reports relief with bowel movements. She does report some upper lumbar pain as well as pain/paresthesias in the lateral right foot and posterior left ankle. These are less severe than her coccyx pain.      Interval History (2/22/18):  She returns today for follow up.  She reports that Gabapentin 600mg TID has been helpful for the coccyx/rectal pain. She denies any side effects from this medication. She states that this medication and dose greatly improved her pain, but she has been noticing diminished relief over the past month. She denies any changes in the quality or location of her pain and reports that her pain is still very much improved since starting the gabapentin.      Interval History (4/18/2018):  She returns today for follow up.  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  She reports that her pain is worse with sitting, to the point where she works standing up now.  She has a coccyx pillow that she got for her car, but it was too tall to be practical.  She has not tried this at her office.  Massage to the area and bio-freeze help with the pain. She did have excellent results in the past with two short courses of flexeril, but only while she was taking it (5 mg QHS).      Currently, she is treating her pain with as needed massage and Biofreeze.  She is also having great results with using fiber to make sure she has a daily PM.  If she has a daily PM, her pain is reduced by about 70%.    She had seen Dr. Harley on the [facility] who had attempted a ganglion impar block for her pain.  During the procedure, the contrast spread was not ideal, so he aborted the procedure.    Of note, she also reports bilateral lateral ankle pain.  This pain has been worsening recently.  She attributes this to standing on her feet longer and she is unable to sit.  It feels like aching joint pain.  No numbness, tingling, muscle weakness.  No tenderness to palpation.  This pain most frequently occurs at the end of the day, but it can occur first thing in the morning.    Physical Therapy: Yes. Most recently 9-11/2017: Low back and pelvic floor therapy. PT for her back 9 years ago x 12 weeks with no relief.  No significant relief noted     Non-pharmacologic Treatment:          · Heat and ice help (Ice is better)  · TENS? No  · Massage: Not tried  · Chiropractor: May have seen years ago  · Acupuncture: Not tried  · Other: Tried a coccyx pillow in her car, but she was too tall.  She has not tried this at her desk.     Pain Medications:         · Currently taking: Advil; not sure if helps. Biofreeze.     · Has tried in the past: Flexeril (took for 3 weeks x 2 courses with  complete relief (no one ever offered her to take long term)), medrol dose pack (great relief x 2 days), gabapentin (took for one month with relief, then it made it worse)     · Has not tried: Opioids, Tylenol, Muscle relaxants, TCAs, SNRIs, anticonvulsants     Blood thinners: None     Interventional Therapies:  · 11/2017: Attempted ganglion impar  · Dry needling at PT in fall 2017  · Left DeQuervain's     Relevant Surgeries: None     Affecting sleep? Yes     Affecting daily activities? yes     Depressive symptoms? no          · SI/HI? No     Work status: Employed, , works 2 days a week for 4 hours    Prescription Monitoring Program database:  Reviewed and consistent with medication use as prescribed.    Pain Scales  Best: 1/10  Worst: 8/10  Usually: 5/10  Today: 4/10    Low-back Pain   This is a chronic problem. The current episode started more than 1 year ago. The problem occurs constantly. The problem has been waxing and waning since onset. The pain is present in the lumbar spine, sacro-iliac and gluteal. The pain radiates to the right thigh and left thigh. The quality of the pain is described as aching and burning (tight, tingling, numb, hot, constant and intermittent.). The pain is at a severity of 4/10. The symptoms are aggravated by sitting and bending (lifting). Associated symptoms include numbness and tingling. Pertinent negatives include no chest pain, fever, headaches or weight loss. She has tried injection treatment and NSAIDs for the symptoms. Physical therapy was not tried.      Last 3 PDI Scores 4/18/2018 12/5/2017 11/13/2017   Pain Disability Index (PDI) 31 [phone]   ]    Review of Systems   Constitution: Negative for chills, fever, malaise/fatigue, weight gain and weight loss.   HENT: Negative for ear pain and hoarse voice.    Eyes: Negative for blurred vision, pain and visual disturbance.   Cardiovascular: Negative for chest pain, dyspnea on exertion and irregular heartbeat.   Respiratory:  Negative for cough, shortness of breath and wheezing.    Endocrine: Negative for cold intolerance and heat intolerance.   Hematologic/Lymphatic: Negative for adenopathy and bleeding problem. Does not bruise/bleed easily.   Skin: Negative for color change, itching and rash.   Musculoskeletal: Positive for back pain and joint pain.   Gastrointestinal: Negative for change in bowel habit, diarrhea, hematemesis, hematochezia, melena and vomiting.   Genitourinary: Negative for flank pain, frequency, hematuria and urgency.   Neurological: Positive for numbness and tingling. Negative for difficulty with concentration, dizziness, headaches, loss of balance and seizures.   Psychiatric/Behavioral: Negative for altered mental status, depression and suicidal ideas. The patient is not nervous/anxious.    Allergic/Immunologic: Negative for HIV exposure.             Past Medical History:   Diagnosis Date    Arthritis     Back pain     Chronic pelvic pain in female     left-sided    De Quervain's tenosynovitis     left    Headache(784.0)     Reflux     Right carpal tunnel syndrome     Urinary incontinence     occ kira       Past Surgical History:   Procedure Laterality Date    OOPHORECTOMY      LSO--pain       Review of patient's allergies indicates:   Allergen Reactions    Adhesive Rash    Iodine Rash    Shellfish containing products Rash     Patient allergic to softshell crabs specifically    Sulfa (sulfonamide antibiotics) Rash       Current Outpatient Prescriptions   Medication Sig Dispense Refill    butalbital-aspirin-caffeine -40 mg (FIORINAL) -40 mg Cap Take 1 capsule by mouth every 4 (four) hours as needed. 30 capsule 0    ibuprofen (ADVIL,MOTRIN) 600 MG tablet TAKE 1 TABLET(600 MG) BY MOUTH EVERY 8 HOURS AS NEEDED 90 tablet 0    omeprazole (PRILOSEC) 40 MG capsule Take 1 capsule (40 mg total) by mouth as needed (heartburn). Take only as needed. 90 capsule 0    cyclobenzaprine (FLEXERIL) 5 MG  "tablet Take 1 tablet (5 mg total) by mouth nightly as needed (pain). 30 tablet 5     No current facility-administered medications for this visit.        Family History   Problem Relation Age of Onset    Breast cancer Maternal Grandmother     Heart disease Father      s/p stenting    Coronary artery disease Father     Prostate cancer Father     Hypoglycemic Sister     No Known Problems Sister     Diabetes Maternal Uncle     Diabetes Cousin     Hypertension Neg Hx     Stroke Neg Hx     Colon cancer Neg Hx     Cervical cancer Neg Hx     Vaginal cancer Neg Hx     Endometrial cancer Neg Hx        Social History     Social History    Marital status:      Spouse name: N/A    Number of children: 2    Years of education: N/A     Occupational History     [employer]     Social History Main Topics    Smoking status: Never Smoker    Smokeless tobacco: Never Used    Alcohol use No    Drug use: No    Sexual activity: Yes     Partners: Male     Birth control/ protection: Condom     Other Topics Concern    Not on file     Social History Narrative    No narrative on file       Objective:     Vitals:    04/18/18 1256   BP: (!) 155/99   Pulse: 83   Resp: 18   Temp: 97.8 °F (36.6 °C)   TempSrc: Oral   SpO2: 100%   Weight: 69 kg (152 lb 1.9 oz)   Height: 5' 4" (1.626 m)   PainSc:   3   PainLoc: Back       GEN:  Well developed, well nourished.  No acute distress. Sitting uncomfortably.  HEENT:  No trauma.  Mucous membranes moist.  Nares patent bilaterally.  PSYCH: Normal affect. Thought content appropriate.  CHEST:  Breathing symmetric.  No audible wheezing.  ABD: Soft, non-distended.  SKIN:  Warm, pink, dry.  No rash on exposed areas.    EXT:  No cyanosis, clubbing, or edema.  No color change or changes in nail or hair growth.  Bilateral ankles: No TTP, no pain with active or passive ROM.  Normal ROM  NEURO/MUSCULOSKELETAL:  Fully alert, oriented, and appropriate. Speech normal aleshia. " "No cranial nerve deficits.   Gait: Normal.  Present trendelenburg sign bilaterally.   Motor Strength: 5/5 motor strength throughout lower extremities.   Sensory: No sensory deficit in the lower extremities.   Reflexes:  2+ and symmetric throughout.  Downgoing Babinski's bilaterally.  No clonus or spasticity.  L-Spine:  Full ROM without sacral pain.  Extension produced lumbar spine pain that is different than her usual pain. Negative facet loading bilaterally.  Negative SLR bilaterally.    SI Joint/Hip: Positive SHABBIR on the left for anterior groin pain (not consistent with her usual pain).  Negative FADIR bilaterally.  TTP over coccyx.  No TTP over lumbar paraspinals, bilateral SI joints, hips, piriformis muscles, or GTB.      Imaging:        The imaging studies listed below were independently reviewed by me, and I agree with the findings as documented below.     A review of the fluoroscopy imaging of the procedure done by Dr. Harley shows that the contrast does follow the curve of the coccyx as demonstrated by the sacral MRI done on 4/4/2018.  I agree that this is not as readily apparent in his fluoro imaging, which is unable to clearly visualize the lower sacrum/coccyx    Narrative     EXAMINATION:  MRI LUMBAR SPINE WITHOUT    CLINICAL HISTORY:  Low back pain, >6wks conservative tx, persistent-progressive sx, surgical candidate;coccyx "taibone pain" s/p reaching for something years ago; felt pop; Sacrococcygeal disorders, not elsewhere classified    TECHNIQUE:  Multiplanar, multisequence MR images were acquired from the thoracolumbar junction to the sacrum without the administration of contrast.    COMPARISON:  Lumbar radiograph 09/21/2017; MRI lumbar spine 12/10/2009    FINDINGS:  Alignment: Mild retrolisthesis of L2 on L3.    Vertebrae: Normal marrow signal. No fracture.    Discs: Mild disc height loss and desiccation throughout the lumbar spine most significant L1-2 and L2-3.    Cord: Normal.  Conus " terminates at L1.  Cauda equina is within normal limits.    Degenerative findings:    T12-L1: No evidence of neural foraminal narrowing or spinal canal stenosis.    L1-L2: Mild broad-based posterior disc bulge and mild facet arthropathy without significant spinal canal or neural foraminal narrowing.    L2-L3: Mild broad-based posterior disc bulge, moderate facet arthropathy and ligamentum flavum thickening resulting in no greater than mild spinal canal stenosis.  No significant neural foraminal narrowing noting a right perineural sleeve cyst.    L3-L4: Broad-based posterior disc bulge, left-sided annular fissure and moderate facet arthropathy resulting in no greater mild spinal canal stenosis and mild left-sided neural foraminal narrowing.    L4-L5: Broad-based posterior disc bulge, left-sided annular fissure and protrusion abutting the exiting left L4 nerve root.  There is facet arthropathy and ligamentum flavum thickening contributes to mild right and moderate left-sided neural foraminal narrowing.    L5-S1: No evidence of neural foraminal narrowing or spinal canal stenosis.    Paraspinal muscles & soft tissues: Unremarkable.   Impression       Multilevel lumbar spondylosis most significant at L2-3 and L3-4 with no greater than mild spinal canal stenosis.    Prominent left L4-5 neural foraminal recess protrusion and annular fissure abutting the exiting left L4 root contributing to moderate neural foraminal narrowing.    Additional multilevel neural foraminal narrowing as detailed above.    Electronically signed by resident: Patric Lawton  Date: 04/04/2018  Time: 13:29    Electronically signed by: Jia Haque MD  Date: 04/04/2018  Time: 14:29     Narrative     EXAMINATION:  MRI SACRAL PLEXUS WITHOUT CONTRAST    CLINICAL HISTORY:  Back or sacroiliac sx, inflammatory, chronic, spondyloarthropathy suspected, first study;coccyx pain, chronic; started after reaching for something and felt pop;  Sacrococcygeal  disorders, not elsewhere classified    TECHNIQUE:  Multisequence, multiplanar MR imaging of the sacrum performed per sacral plexus protocol without contrast.    COMPARISON:  None    FINDINGS:  Sacrum demonstrates normal signal.  Neural foramina are widely patent.  Nerve roots are normal in signal.    No presacral or sacral mass present.    There is a 2.3 x 1.9 cm T2 hyperintense lesion within the cervix most likely complicated nabothian cyst .  Small right lower/vaginal vs small labia wall cyst present.   Impression       No sacral plexus abnormality.    2.3 cm cervical lesion likely complicated nabothian cyst.  Correlate clinically to exclude cervical malignancy.    Electronically signed by resident: Bharat Sylvester MD  Date: 04/04/2018  Time: 14:21    Electronically signed by: Jia Haque MD  Date: 04/04/2018  Time: 14:27         Assessment:     Encounter Diagnoses   Name Primary?    Coccydynia Yes    Myofascial muscle pain     DDD (degenerative disc disease), lumbar     Chronic pain of both ankles        Plan:     Sheree was seen today for back pain.    Diagnoses and all orders for this visit:    Coccydynia    Myofascial muscle pain  -     cyclobenzaprine (FLEXERIL) 5 MG tablet; Take 1 tablet (5 mg total) by mouth nightly as needed (pain).    DDD (degenerative disc disease), lumbar    Chronic pain of both ankles       Her pain is consistent with the above.  Given her history and physical exam, her primary pain generator does appear to be her coccyx.  I cannot completely exclude lumbar spine pain, but this is less likely.     It is more likely that her lumbar degenerative disc disease is an incidental finding.  A 2001 study by Eri et al shows the incidence of asymptomatic degenerative disc findings to be as high as 92%.    We discussed the assessment and recommendations.  All available images were reviewed. We discussed the disease process, prognosis, treatment plan, and risks and benefits. The  patient is aware of the risks and benefits of the medications being prescribed, common side effects, and proper usage. The following is the plan we agreed on:     1. Plan for repeat coccyx injection, understanding the variant anatomy seen on the sacral MRI  1. If limited benefit, plan for L4/5 interlaminar MAVIS  2. Restart Flexeril 5 mg at night as needed  3. Okay to continue Biofreeze.  Pennsaid samples given.  If this is more helpful, I will send in a prescription for this  4. Recommend using the coccyx pillow at home and at her office.  5. RTC in 3-6 weeks or sooner if needed.    Thank you for allowing me to participate in the care of this patient.   Please do not hesitate to call me at [phone] with any questions or concerns.    Greater than 60 minutes spent in total in todays visit with the patient, with more than half that time direct face to face counseling and education with the patient today. We discussed the disease process, prognosis, treatment plan, and risks and benefits.    The above plan and management options were discussed at length with patient. Patient is in agreement with the above and verbalized understanding. It will be communicated with the referring physician via electronic record, fax, or mail.

## 2018-04-18 NOTE — LETTER
April 18, 2018      Salina Mukherjee MD  [address]  [place] [address]           [facility]  [address]  [place] [address]  Phone: [phone]  Fax: [phone]          Patient: Sheree Lomeli   MR Number: [id number]   YOB: 1955   Date of Visit: 4/18/2018       Dear Dr. Salina Mukherjee:    Thank you for referring Sheree Lomeli to me for evaluation. Attached you will find relevant portions of my assessment and plan of care.    If you have questions, please do not hesitate to call me. I look forward to following Sheree Lomeli along with you.    Sincerely,    Rianna Nina MD    Enclosure  CC:  No Recipients    If you would like to receive this communication electronically, please contact [email or web address] or [phone] to request more information on [employer] access.    For providers and/or their staff who would like to refer a patient to [facility], please contact us through our one-stop-shop provider referral line, [facility], at [phone].    If you feel you have received this communication in error or would no longer like to receive these types of communications, please e-mail [email or web address]

## 2018-04-24 ENCOUNTER — PATIENT MESSAGE (OUTPATIENT)
Dept: PAIN MEDICINE | Facility: OTHER | Age: 63
End: 2018-04-24

## 2018-05-18 ENCOUNTER — HOSPITAL ENCOUNTER (OUTPATIENT)
Facility: OTHER | Age: 63
Discharge: HOME | End: 2018-05-18
Attending: ANESTHESIOLOGY | Admitting: ANESTHESIOLOGY
Payer: COMMERCIAL

## 2018-05-18 ENCOUNTER — SURGERY (OUTPATIENT)
Age: 63
End: 2018-05-18

## 2018-05-18 VITALS
HEIGHT: 64 IN | OXYGEN SATURATION: 95 % | TEMPERATURE: 98 F | WEIGHT: 148 LBS | SYSTOLIC BLOOD PRESSURE: 123 MMHG | DIASTOLIC BLOOD PRESSURE: 74 MMHG | RESPIRATION RATE: 18 BRPM | BODY MASS INDEX: 25.27 KG/M2 | HEART RATE: 61 BPM

## 2018-05-18 DIAGNOSIS — M53.3 COCCYGODYNIA: Primary | ICD-10-CM

## 2018-05-18 DIAGNOSIS — M53.3 COCCYDYNIA: ICD-10-CM

## 2018-05-18 PROCEDURE — 20610 DRAIN/INJ JOINT/BURSA W/O US: CPT | Performed by: ANESTHESIOLOGY

## 2018-05-18 PROCEDURE — 77002 NEEDLE LOCALIZATION BY XRAY: CPT | Performed by: ANESTHESIOLOGY

## 2018-05-18 PROCEDURE — 20605 DRAIN/INJ JOINT/BURSA W/O US: CPT | Performed by: ANESTHESIOLOGY

## 2018-05-18 PROCEDURE — 63600175 PHARM REV CODE 636 W HCPCS: Performed by: ANESTHESIOLOGY

## 2018-05-18 PROCEDURE — S0020 INJECTION, BUPIVICAINE HYDRO: HCPCS | Performed by: ANESTHESIOLOGY

## 2018-05-18 PROCEDURE — 25000003 PHARM REV CODE 250: Performed by: ANESTHESIOLOGY

## 2018-05-18 PROCEDURE — 64450 NJX AA&/STRD OTHER PN/BRANCH: CPT | Mod: ,,, | Performed by: ANESTHESIOLOGY

## 2018-05-18 PROCEDURE — 25500020 PHARM REV CODE 255: Performed by: ANESTHESIOLOGY

## 2018-05-18 RX ORDER — BUPIVACAINE HYDROCHLORIDE 2.5 MG/ML
INJECTION, SOLUTION EPIDURAL; INFILTRATION; INTRACAUDAL
Status: DISCONTINUED | OUTPATIENT
Start: 2018-05-18 | End: 2018-05-18 | Stop reason: HOSPADM

## 2018-05-18 RX ORDER — METHYLPREDNISOLONE ACETATE 40 MG/ML
INJECTION, SUSPENSION INTRA-ARTICULAR; INTRALESIONAL; INTRAMUSCULAR; SOFT TISSUE
Status: DISCONTINUED | OUTPATIENT
Start: 2018-05-18 | End: 2018-05-18 | Stop reason: HOSPADM

## 2018-05-18 RX ORDER — ALPRAZOLAM 0.5 MG/1
1 TABLET, ORALLY DISINTEGRATING ORAL
Status: DISCONTINUED | OUTPATIENT
Start: 2018-05-18 | End: 2018-05-18 | Stop reason: HOSPADM

## 2018-05-18 RX ORDER — BUPIVACAINE HYDROCHLORIDE 5 MG/ML
INJECTION, SOLUTION EPIDURAL; INTRACAUDAL
Status: DISCONTINUED | OUTPATIENT
Start: 2018-05-18 | End: 2018-05-18 | Stop reason: HOSPADM

## 2018-05-18 RX ORDER — LIDOCAINE HYDROCHLORIDE 10 MG/ML
INJECTION INFILTRATION; PERINEURAL
Status: DISCONTINUED | OUTPATIENT
Start: 2018-05-18 | End: 2018-05-18 | Stop reason: HOSPADM

## 2018-05-18 RX ADMIN — SODIUM BICARBONATE 2 ML: 0.2 INJECTION, SOLUTION INTRAVENOUS at 08:05

## 2018-05-18 RX ADMIN — LIDOCAINE HYDROCHLORIDE 10 ML: 10 INJECTION, SOLUTION INFILTRATION; PERINEURAL at 08:05

## 2018-05-18 RX ADMIN — IOHEXOL 3 ML: 300 INJECTION, SOLUTION INTRAVENOUS at 08:05

## 2018-05-18 RX ADMIN — ALPRAZOLAM 1 MG: 0.5 TABLET, ORALLY DISINTEGRATING ORAL at 08:05

## 2018-05-18 RX ADMIN — BUPIVACAINE HYDROCHLORIDE 10 ML: 5 INJECTION, SOLUTION EPIDURAL; INTRACAUDAL; PERINEURAL at 08:05

## 2018-05-18 RX ADMIN — METHYLPREDNISOLONE ACETATE 40 MG: 40 INJECTION, SUSPENSION INTRA-ARTICULAR; INTRALESIONAL; INTRAMUSCULAR; SOFT TISSUE at 08:05

## 2018-05-18 NOTE — DISCHARGE INSTRUCTIONS
Home Care Instructions Pain Management:    1. DIET:   You may resume your normal diet today.   2. BATHING:   You may shower with luke warm water.  3. DRESSING:   You may remove your bandage today.   4. ACTIVITY LEVEL:   You may resume your normal activities 24 hrs after your procedure.  5. MEDICATIONS:   You may resume your normal medications today.   6. SPECIAL INSTRUCTIONS:   No heat to the injection site for 24 hrs including, bath or shower, heating pad, moist heat, or hot tubs.    Use ice pack to injection site for any pain or discomfort.  Apply ice packs for 20 minute intervals as needed.   If you have received any sedatives by mouth today you may not drive for 12 hours.    If you have received any sedation through your IV, you may not drive for 24 hrs.     PLEASE CALL YOUR DOCTOR IF:  1. Redness or swelling around the injection site.  2. Fever of 101 degrees  3. Drainage (pus) from the injection site.  4. For any continuous bleeding (some dried blood over the incision is normal.)    FOR EMERGENCIES:   If any unusual problems or difficulties occur during clinic hours, call [phone] or [phone].     Thank you for allowing us to care for you today. You may receive a survey about the care we provided. Your feedback is valuable and helps us provide excellent care throughout the community.

## 2018-05-18 NOTE — OP NOTE
"Date of procedure: 05/18/2018    Procedure: Coccyx injection    Pre-op diagnosis: Coccydynia [M53.3]    Post-op diagnosis: Coccydynia [M53.3]     Physician: Dr. Rianna Nina     Assistant: Dr. Zamudio    Anesthestia: local/jacqueline    EBL: None    Specimens: None    All medications, allergies, and relevant histories were reviewed. No recent antibiotics or infections.  A time-out was taken to verify the correct patient, procedure, laterality, and appropriate medications/allergies.    Procedure: Coccyx injection    Coccyx injection    The procedure was discussed with the patient including complications of nerve damage, bleeding, infection, and failure of pain relief.     Patient was transferred to the procedure for the procedure. NIBP, O2 saturation, and EKG were monitored.  The patient was placed prone with blankets under the abdomen. Fluoroscopic view was obtained for localization of the sacrococcygeal ligament in the lateral view. The sacral area was prepped with chlorhexidine  x3, sterile drape placed over the site, and sterile precautions observed throughout the procedure. Local Xylocaine 1% was injected over the coccyx then a 25 gauge 1.5" needle was advanced to the anterior border of the coccyx. Position was confirmed with fluoroscopy.  After negative aspiration, 0.5cc of Omnipaque 300 contrast showed excellent spread. 2 cc of a mixture of 3.5cc of bupivacaine 0.25% with Depo-Medrol 40 mg was injected without complication. The needle was then withdrawn to the posterior border of the coccyx.  Position was confirmed with fluoroscopy.  After negative aspiration, 0.5cc of Omnipaque 300 contrast showed excellent spread. The remaining 2 cc of the mixture of 3.5cc of bupivacaine 0.25% with Depo-Medrol 40 mg was injected without complication. Band-Aid dressing applied.     Patient tolerated the procedure well without any complications. The patient was discharged with a responsible adult.        Future Management:   If " helpful, can repeat as needed.    Follow up with my clinic in 3 weeks or sooner if needed    I certify that I provided the above services.  I was present for the entire procedure, which was performed by myself with the assistance of the resident physician.  There were no parts of the procedure that were performed not by myself or without my direct supervision.

## 2018-05-21 ENCOUNTER — PATIENT MESSAGE (OUTPATIENT)
Dept: PAIN MEDICINE | Facility: CLINIC | Age: 63
End: 2018-05-21

## 2018-05-21 NOTE — TELEPHONE ENCOUNTER
Last office visit:4/18/18    1. Plan for repeat coccyx injection, understanding the variant anatomy seen on the sacral MRI  1. If limited benefit, plan for L4/5 interlaminar MAVIS  2. Restart Flexeril 5 mg at night as needed  3. Okay to continue Biofreeze.  Pennsaid samples given.  If this is more helpful, I will send in a prescription for this  4. Recommend using the coccyx pillow at home and at her office.  5. RTC in 3-6 weeks or sooner if needed.    Please review and advise.

## 2018-05-22 DIAGNOSIS — G89.29 CHRONIC LOW BACK PAIN, UNSPECIFIED BACK PAIN LATERALITY, WITH SCIATICA PRESENCE UNSPECIFIED: ICD-10-CM

## 2018-05-22 DIAGNOSIS — M54.5 CHRONIC LOW BACK PAIN, UNSPECIFIED BACK PAIN LATERALITY, WITH SCIATICA PRESENCE UNSPECIFIED: ICD-10-CM

## 2018-05-22 RX ORDER — IBUPROFEN 600 MG/1
TABLET ORAL
Qty: 90 TABLET | Refills: 0 | Status: SHIPPED | OUTPATIENT
Start: 2018-05-22 | End: 2018-09-11 | Stop reason: ALTCHOICE

## 2018-05-24 ENCOUNTER — TELEPHONE (OUTPATIENT)
Dept: PAIN MEDICINE | Facility: CLINIC | Age: 63
End: 2018-05-24

## 2018-05-24 NOTE — TELEPHONE ENCOUNTER
Staff contacted the patient to get her scheduled for an sooner appointement with  Dr. Nina as per the patient's request in an e-mail staff received.     Patient did not answer therefore, staff left the patient a voice message instructing her to contact our office at [phone] to be scheduled sooner.

## 2018-06-05 ENCOUNTER — OFFICE VISIT (OUTPATIENT)
Dept: PAIN MEDICINE | Facility: CLINIC | Age: 63
End: 2018-06-05
Attending: ANESTHESIOLOGY
Payer: COMMERCIAL

## 2018-06-05 VITALS
TEMPERATURE: 99 F | SYSTOLIC BLOOD PRESSURE: 147 MMHG | HEIGHT: 64 IN | DIASTOLIC BLOOD PRESSURE: 97 MMHG | WEIGHT: 149.94 LBS | HEART RATE: 77 BPM | BODY MASS INDEX: 25.6 KG/M2 | RESPIRATION RATE: 18 BRPM

## 2018-06-05 DIAGNOSIS — M47.816 LUMBAR SPONDYLOSIS: ICD-10-CM

## 2018-06-05 DIAGNOSIS — M79.18 MYOFASCIAL MUSCLE PAIN: ICD-10-CM

## 2018-06-05 DIAGNOSIS — M53.3 COCCYGODYNIA: Primary | ICD-10-CM

## 2018-06-05 DIAGNOSIS — M51.36 DDD (DEGENERATIVE DISC DISEASE), LUMBAR: ICD-10-CM

## 2018-06-05 PROCEDURE — 3008F BODY MASS INDEX DOCD: CPT | Mod: CPTII,S$GLB,, | Performed by: ANESTHESIOLOGY

## 2018-06-05 PROCEDURE — 99999 PR PBB SHADOW E&M-EST. PATIENT-LVL III: CPT | Mod: PBBFAC,,, | Performed by: ANESTHESIOLOGY

## 2018-06-05 PROCEDURE — 99214 OFFICE O/P EST MOD 30 MIN: CPT | Mod: S$GLB,,, | Performed by: ANESTHESIOLOGY

## 2018-06-05 NOTE — PROGRESS NOTES
"Subjective:     Patient ID: Sheree Lomeli is a 62 y.o. female.    Chief Complaint: Pain    Consulted by: Dr. Salina Mukherjee     Disclaimer: This note was generated using voice recognition software.  There may be a typographical errors that were missed during proofreading.    Patient has been seen by Dr. Harley in our service group, but she is a new patient to me today.  HPI:     Initial Encounter (11/13/17):  Sheree Lomeli is a 62 y.o. female who presents today with chronic coccygeal pain. Pain started about 8 years ago while cleaning her bathroom. The pain is located over the coccyx. It does not radiate. It has a "fiery" quality to it when severe. She denies any associated numbness, weakness or b/b dysfunction. She does have distal rectocele without obvious enterocele or perineocele. Her pain is alleviated with bowel movements and she does note frequent constipation. Pain exacerbated with sitting for prolonged periods. Does not have pain with palpation.    This pain is described in detail below.     Interval History (12/4/17):  She returns today for follow up after aborted ganglion impar injection. Her coccyx pain is unchanged in quality and location. She still reports relief with bowel movements. She does report some upper lumbar pain as well as pain/paresthesias in the lateral right foot and posterior left ankle. These are less severe than her coccyx pain.      Interval History (2/22/18):  She returns today for follow up.  She reports that Gabapentin 600mg TID has been helpful for the coccyx/rectal pain. She denies any side effects from this medication. She states that this medication and dose greatly improved her pain, but she has been noticing diminished relief over the past month. She denies any changes in the quality or location of her pain and reports that her pain is still very much improved since starting the gabapentin.      Interval History (4/18/2018):  She returns today for follow up.  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  She reports that her pain is worse with sitting, to the point where she works standing up now.  She has a coccyx pillow that she got for her car, but it was too tall to be practical.  She has not tried this at her office.  Massage to the area and bio-freeze help with the pain. She did have excellent results in the past with two short courses of flexeril, but only while she was taking it (5 mg QHS).      Currently, she is treating her pain with as needed massage and Biofreeze.  She is also having great results with using fiber to make sure she has a daily PM.  If she has a daily PM, her pain is reduced by about 70%.    She had seen Dr. Harley on the [facility] who had attempted a ganglion impar block for her pain.  During the procedure, the contrast spread was not ideal, so he aborted the procedure.    Of note, she also reports bilateral lateral ankle pain.  This pain has been worsening recently.  She attributes this to standing on her feet longer and she is unable to sit.  It feels like aching joint pain.  No numbness, tingling, muscle weakness.  No tenderness to palpation.  This pain most frequently occurs at the end of the day, but it can occur first thing in the morning.    Interval History (6/5/2018):  She returns today for follow up.  She reports that the coccyx injection did not provide lasting relief.  She does not know if it helped that day because [facility]  She did not have pain that day though. A regimen of Ibuprofen 600 mg (2 at a time) and Tylenol 1000 mg in between has been helpful for the pain.  If note, she also reports left sided low back pain that is unrelated to her coccyx pain.    She notes that her physical therapist, Lauren Shepley, told her she could do manual coccyx manipulation, but she chose not to at that  time.    Physical Therapy: Yes. Currently doing HEP (6/5/2018). 9-11/2017: Low back and pelvic floor therapy. PT for her back 9 years ago x 12 weeks with no relief.  No significant relief noted     Non-pharmacologic Treatment:          · Heat and ice help (Ice is better)  · TENS? No  · Massage: Not tried  · Chiropractor: May have seen years ago  · Acupuncture: Not tried  · Other: Tried a coccyx pillow in her car, but she was too tall.  She has not tried this at her desk.     Pain Medications:         · Currently taking: Ibuprofen 600 mg (2 at a time) and Tylenol 1000 mg in between. Biofreeze, stool softening regimen: fiber (4 tsp daily), magnesium, stool softener     · Has tried in the past: Flexeril (took for 3 weeks x 2 courses with complete relief (no one ever offered her to take long term)), medrol dose pack (great relief x 2 days), gabapentin (took for one month with relief, then it made it worse)     · Has not tried: Opioids, Tylenol, Muscle relaxants, TCAs, SNRIs, anticonvulsants     Blood thinners: None     Interventional Therapies:  · 11/2017: Attempted ganglion impar  · Dry needling at PT in fall 2017  · Left DeQuervain's  · Coccyx injection: No sustained benefit (unsure of relief day of procedure)     Relevant Surgeries: None     Affecting sleep? Yes     Affecting daily activities? yes     Depressive symptoms? no          · SI/HI? No     Work status: Employed, , works 2 days a week for 4 hours    Prescription Monitoring Program database:  Reviewed and consistent with medication use as prescribed.    Pain Scales  Best: 2/10  Worst: 8/10  Usually: 5/10  Today: 4/10    Low-back Pain   This is a chronic problem. The current episode started more than 1 year ago. The problem occurs constantly. The problem has been waxing and waning since onset. The pain is present in the lumbar spine, sacro-iliac and gluteal. The pain radiates to the right thigh and left thigh. The quality of the pain is described as  aching and burning (tight, tingling, numb, hot, constant and intermittent.). The pain is at a severity of 4/10. The symptoms are aggravated by sitting and bending (lifting). Associated symptoms include numbness and tingling. Pertinent negatives include no chest pain, fever, headaches or weight loss. She has tried injection treatment and NSAIDs for the symptoms. Physical therapy was not tried.      Last 3 PDI Scores 6/5/2018 4/18/2018 12/5/2017   Pain Disability Index (PDI) 14 31 42   ]    Review of Systems   Constitution: Negative for chills, fever, malaise/fatigue, weight gain and weight loss.   HENT: Negative for ear pain and hoarse voice.    Eyes: Negative for blurred vision, pain and visual disturbance.   Cardiovascular: Negative for chest pain, dyspnea on exertion and irregular heartbeat.   Respiratory: Negative for cough, shortness of breath and wheezing.    Endocrine: Negative for cold intolerance and heat intolerance.   Hematologic/Lymphatic: Negative for adenopathy and bleeding problem. Does not bruise/bleed easily.   Skin: Negative for color change, itching and rash.   Musculoskeletal: Positive for back pain and joint pain.   Gastrointestinal: Negative for change in bowel habit, diarrhea, hematemesis, hematochezia, melena and vomiting.   Genitourinary: Negative for flank pain, frequency, hematuria and urgency.   Neurological: Positive for numbness and tingling. Negative for difficulty with concentration, dizziness, headaches, loss of balance and seizures.   Psychiatric/Behavioral: Negative for altered mental status, depression and suicidal ideas. The patient is not nervous/anxious.    Allergic/Immunologic: Negative for HIV exposure.             Past Medical History:   Diagnosis Date    Arthritis     Back pain     Chronic pelvic pain in female     left-sided    De Quervain's tenosynovitis     left    Headache(784.0)     Reflux     Right carpal tunnel syndrome     Urinary incontinence     occ kira        Past Surgical History:   Procedure Laterality Date    OOPHORECTOMY      LSO--pain       Review of patient's allergies indicates:   Allergen Reactions    Adhesive Rash    Iodine Rash    Shellfish containing products Rash     Patient allergic to softshell crabs specifically    Sulfa (sulfonamide antibiotics) Rash       Current Outpatient Prescriptions   Medication Sig Dispense Refill    butalbital-aspirin-caffeine -40 mg (FIORINAL) -40 mg Cap Take 1 capsule by mouth every 4 (four) hours as needed. 30 capsule 0    cyclobenzaprine (FLEXERIL) 5 MG tablet Take 1 tablet (5 mg total) by mouth nightly as needed (pain). 30 tablet 5    ibuprofen (ADVIL,MOTRIN) 600 MG tablet TAKE 1 TABLET(600 MG) BY MOUTH EVERY 8 HOURS AS NEEDED 90 tablet 0    omeprazole (PRILOSEC) 40 MG capsule Take 1 capsule (40 mg total) by mouth as needed (heartburn). Take only as needed. 90 capsule 0     No current facility-administered medications for this visit.        Family History   Problem Relation Age of Onset    Breast cancer Maternal Grandmother     Heart disease Father         s/p stenting    Coronary artery disease Father     Prostate cancer Father     Hypoglycemic Sister     No Known Problems Sister     Diabetes Maternal Uncle     Diabetes Cousin     Hypertension Neg Hx     Stroke Neg Hx     Colon cancer Neg Hx     Cervical cancer Neg Hx     Vaginal cancer Neg Hx     Endometrial cancer Neg Hx        Social History     Social History    Marital status:      Spouse name: N/A    Number of children: 2    Years of education: N/A     Occupational History     [employer]     Social History Main Topics    Smoking status: Never Smoker    Smokeless tobacco: Never Used    Alcohol use No    Drug use: No    Sexual activity: Yes     Partners: Male     Birth control/ protection: Condom     Other Topics Concern    Not on file     Social History Narrative    No narrative on file  "      Objective:     Vitals:    06/05/18 0810   BP: (!) 147/97   Pulse: 77   Resp: 18   Temp: 98.8 °F (37.1 °C)   Weight: 68 kg (149 lb 14.6 oz)   Height: 5' 4" (1.626 m)       GEN:  Well developed, well nourished.  No acute distress. Sitting uncomfortably.  HEENT:  No trauma.  Mucous membranes moist.  Nares patent bilaterally.  PSYCH: Normal affect. Thought content appropriate.  CHEST:  Breathing symmetric.  No audible wheezing.  ABD: Soft, non-distended.  SKIN:  Warm, pink, dry.  No rash on exposed areas.    EXT:  No cyanosis, clubbing, or edema.  No color change or changes in nail or hair growth.  Bilateral ankles: No TTP, no pain with active or passive ROM.  Normal ROM  NEURO/MUSCULOSKELETAL:  Fully alert, oriented, and appropriate. Speech normal aleshia. No cranial nerve deficits.   Gait: Normal.  Present trendelenburg sign bilaterally.   Motor Strength: 5/5 motor strength throughout lower extremities.   Sensory: No sensory deficit in the lower extremities.   Reflexes:  2+ and symmetric throughout.  Downgoing Babinski's bilaterally.  No clonus or spasticity.  L-Spine:  Full ROM without sacral pain.  Extension produced lumbar spine pain that is different than her usual pain. Positive facet loading on left.  Negative SLR bilaterally.    SI Joint/Hip: Positive SHABBIR on the left for anterior groin pain (not consistent with her usual pain).  Negative FADIR bilaterally.  TTP over coccyx.  No TTP over lumbar paraspinals, bilateral SI joints, hips, piriformis muscles, or GTB.      Imaging:        The imaging studies listed below were independently reviewed by me, and I agree with the findings as documented below.     A review of the fluoroscopy imaging of the procedure done by Dr. Harley shows that the contrast does follow the curve of the coccyx as demonstrated by the sacral MRI done on 4/4/2018.  I agree that this is not as readily apparent in his fluoro imaging, which is unable to clearly visualize the lower " "sacrum/coccyx    Narrative     EXAMINATION:  MRI LUMBAR SPINE WITHOUT    CLINICAL HISTORY:  Low back pain, >6wks conservative tx, persistent-progressive sx, surgical candidate;coccyx "taibone pain" s/p reaching for something years ago; felt pop; Sacrococcygeal disorders, not elsewhere classified    TECHNIQUE:  Multiplanar, multisequence MR images were acquired from the thoracolumbar junction to the sacrum without the administration of contrast.    COMPARISON:  Lumbar radiograph 09/21/2017; MRI lumbar spine 12/10/2009    FINDINGS:  Alignment: Mild retrolisthesis of L2 on L3.    Vertebrae: Normal marrow signal. No fracture.    Discs: Mild disc height loss and desiccation throughout the lumbar spine most significant L1-2 and L2-3.    Cord: Normal.  Conus terminates at L1.  Cauda equina is within normal limits.    Degenerative findings:    T12-L1: No evidence of neural foraminal narrowing or spinal canal stenosis.    L1-L2: Mild broad-based posterior disc bulge and mild facet arthropathy without significant spinal canal or neural foraminal narrowing.    L2-L3: Mild broad-based posterior disc bulge, moderate facet arthropathy and ligamentum flavum thickening resulting in no greater than mild spinal canal stenosis.  No significant neural foraminal narrowing noting a right perineural sleeve cyst.    L3-L4: Broad-based posterior disc bulge, left-sided annular fissure and moderate facet arthropathy resulting in no greater mild spinal canal stenosis and mild left-sided neural foraminal narrowing.    L4-L5: Broad-based posterior disc bulge, left-sided annular fissure and protrusion abutting the exiting left L4 nerve root.  There is facet arthropathy and ligamentum flavum thickening contributes to mild right and moderate left-sided neural foraminal narrowing.    L5-S1: No evidence of neural foraminal narrowing or spinal canal stenosis.    Paraspinal muscles & soft tissues: Unremarkable.   Impression       Multilevel lumbar " spondylosis most significant at L2-3 and L3-4 with no greater than mild spinal canal stenosis.    Prominent left L4-5 neural foraminal recess protrusion and annular fissure abutting the exiting left L4 root contributing to moderate neural foraminal narrowing.    Additional multilevel neural foraminal narrowing as detailed above.    Electronically signed by resident: Patric Lawton  Date: 04/04/2018  Time: 13:29    Electronically signed by: Jia Haque MD  Date: 04/04/2018  Time: 14:29     Narrative     EXAMINATION:  MRI SACRAL PLEXUS WITHOUT CONTRAST    CLINICAL HISTORY:  Back or sacroiliac sx, inflammatory, chronic, spondyloarthropathy suspected, first study;coccyx pain, chronic; started after reaching for something and felt pop;  Sacrococcygeal disorders, not elsewhere classified    TECHNIQUE:  Multisequence, multiplanar MR imaging of the sacrum performed per sacral plexus protocol without contrast.    COMPARISON:  None    FINDINGS:  Sacrum demonstrates normal signal.  Neural foramina are widely patent.  Nerve roots are normal in signal.    No presacral or sacral mass present.    There is a 2.3 x 1.9 cm T2 hyperintense lesion within the cervix most likely complicated nabothian cyst .  Small right lower/vaginal vs small labia wall cyst present.   Impression       No sacral plexus abnormality.    2.3 cm cervical lesion likely complicated nabothian cyst.  Correlate clinically to exclude cervical malignancy.    Electronically signed by resident: Bharat Sylvester MD  Date: 04/04/2018  Time: 14:21    Electronically signed by: Jia Haque MD  Date: 04/04/2018  Time: 14:27         Assessment:     Encounter Diagnoses   Name Primary?    Coccygodynia Yes    Myofascial muscle pain     DDD (degenerative disc disease), lumbar     Lumbar spondylosis        Plan:     Sheree was seen today for back pain and follow-up.    Diagnoses and all orders for this visit:    Coccygodynia  -     Ambulatory consult to  Physical Therapy    Myofascial muscle pain    DDD (degenerative disc disease), lumbar    Lumbar spondylosis       Her pain is consistent with the above.  Given her history and physical exam, her primary pain generator does appear to be her coccyx.  I cannot completely exclude lumbar spine pain, but this is less likely. We extensively discussed this again today.    It is more likely that her lumbar degenerative disc disease is an incidental finding.  A 2001 study by Eri et al shows the incidence of asymptomatic degenerative disc findings to be as high as 92%.    We discussed the assessment and recommendations.  All available images were reviewed. We discussed the disease process, prognosis, treatment plan, and risks and benefits. The patient is aware of the risks and benefits of the medications being prescribed, common side effects, and proper usage. The following is the plan we agreed on:     1. Plan for coccygeal nerve block, understanding the variant anatomy seen on the sacral MRI  1. If limited benefit, plan for L4/5 interlaminar MAVIS for facet/disk pain  2. Referral to Lauren Shepley for PT/coccyx manipulation  3. Continue Flexeril 5 mg at night as needed  4. Okay to continue Biofreeze.    5. Recommend using the coccyx pillow at home and at her office.  6. RTC in 3-6 weeks or sooner if needed.    The above plan and management options were discussed at length with patient. Patient is in agreement with the above and verbalized understanding. It will be communicated with the referring physician via electronic record, fax, or mail.

## 2018-06-05 NOTE — Clinical Note
Hello!  Ms. Lomeli mentioned that you do manual manipulation of the coccyx.  I sent a referral to you to do that for her.  She continues to have coccyx pain that is somewhat refractory to our treatments.  Thanks!  LW

## 2018-06-12 ENCOUNTER — HOSPITAL ENCOUNTER (OUTPATIENT)
Facility: OTHER | Age: 63
Discharge: HOME | End: 2018-06-12
Attending: ANESTHESIOLOGY | Admitting: ANESTHESIOLOGY
Payer: COMMERCIAL

## 2018-06-12 ENCOUNTER — SURGERY (OUTPATIENT)
Age: 63
End: 2018-06-12

## 2018-06-12 VITALS
HEART RATE: 62 BPM | RESPIRATION RATE: 18 BRPM | TEMPERATURE: 98 F | OXYGEN SATURATION: 99 % | SYSTOLIC BLOOD PRESSURE: 146 MMHG | DIASTOLIC BLOOD PRESSURE: 75 MMHG

## 2018-06-12 DIAGNOSIS — M53.3 COCCYGODYNIA: ICD-10-CM

## 2018-06-12 DIAGNOSIS — G89.29 CHRONIC PAIN: ICD-10-CM

## 2018-06-12 DIAGNOSIS — M53.3 COCCYDYNIA: Primary | ICD-10-CM

## 2018-06-12 PROCEDURE — 25000003 PHARM REV CODE 250: Performed by: ANESTHESIOLOGY

## 2018-06-12 PROCEDURE — 64493 INJ PARAVERT F JNT L/S 1 LEV: CPT | Performed by: ANESTHESIOLOGY

## 2018-06-12 PROCEDURE — 64450 NJX AA&/STRD OTHER PN/BRANCH: CPT | Mod: ,,, | Performed by: ANESTHESIOLOGY

## 2018-06-12 PROCEDURE — S0020 INJECTION, BUPIVICAINE HYDRO: HCPCS | Performed by: ANESTHESIOLOGY

## 2018-06-12 RX ORDER — BUPIVACAINE HYDROCHLORIDE 5 MG/ML
INJECTION, SOLUTION EPIDURAL; INTRACAUDAL
Status: DISCONTINUED | OUTPATIENT
Start: 2018-06-12 | End: 2018-06-12 | Stop reason: HOSPADM

## 2018-06-12 RX ORDER — SODIUM CHLORIDE 9 MG/ML
500 INJECTION, SOLUTION INTRAVENOUS CONTINUOUS
Status: DISCONTINUED | OUTPATIENT
Start: 2018-06-12 | End: 2018-06-12 | Stop reason: HOSPADM

## 2018-06-12 RX ORDER — LIDOCAINE HYDROCHLORIDE 10 MG/ML
INJECTION INFILTRATION; PERINEURAL
Status: DISCONTINUED | OUTPATIENT
Start: 2018-06-12 | End: 2018-06-12 | Stop reason: HOSPADM

## 2018-06-12 RX ADMIN — LIDOCAINE HYDROCHLORIDE 10 ML: 10 INJECTION, SOLUTION INFILTRATION; PERINEURAL at 03:06

## 2018-06-12 RX ADMIN — SODIUM BICARBONATE 5 ML: 0.2 INJECTION, SOLUTION INTRAVENOUS at 03:06

## 2018-06-12 RX ADMIN — BUPIVACAINE HYDROCHLORIDE 10 ML: 5 INJECTION, SOLUTION EPIDURAL; INTRACAUDAL; PERINEURAL at 03:06

## 2018-06-12 NOTE — DISCHARGE INSTRUCTIONS
Thank you for allowing us to care for you today. You may receive a survey about the care we provided. Your feedback is valuable and helps us provide excellent care throughout the community.  Home Care Instructions Pain Management:    1. DIET:   You may resume your normal diet today.   2. BATHING:   You may shower with luke warm water.  3. DRESSING:   You may remove your bandage today.   4. ACTIVITY LEVEL:   You may resume your normal activities 24 hrs after your procedure.  5. MEDICATIONS:   You may resume your normal medications today.   6. SPECIAL INSTRUCTIONS:   No heat to the injection site for 24 hrs including, bath or shower, heating pad, moist heat, or hot tubs.    Use ice pack to injection site for any pain or discomfort.  Apply ice packs for 20 minute intervals as needed.   If you have received any sedatives by mouth today you may not drive for 12 hours.    If you have received any sedation through your IV, you may not drive for 24 hrs.     PLEASE CALL YOUR DOCTOR IF:  1. Redness or swelling around the injection site.  2. Fever of 101 degrees  3. Drainage (pus) from the injection site.  4. For any continuous bleeding (some dried blood over the incision is normal.)    FOR EMERGENCIES:   If any unusual problems or difficulties occur during clinic hours, call [phone] or [phone].   Adult Procedural Sedation Instructions

## 2018-06-12 NOTE — OP NOTE
"Date: 06/12/2018    Procedure: Coccygeal nerve block    Pre-op diagnosis: Coccydynia [M53.3]    Post-op diagnosis: Coccydynia [M53.3]     Physician: Dr. Rianna Nina     Assistant: Dr. Roldan    Anesthestia: local/oral niravam    All medications, allergies, and relevant histories were reviewed. No recent antibiotics or infections.  A time-out was taken to verify the correct patient, procedure, laterality, and appropriate medications/allergies.    Procedure: Coccyx injection    Coccyx injection    The procedure was discussed with the patient including complications of nerve damage, bleeding, infection, and failure of pain relief.     Patient was transferred to the procedure for the procedure. NIBP, O2 saturation, and EKG were monitored.  The patient was placed prone with blankets under the abdomen. Fluoroscopic view was obtained for localization of the sacrococcygeal ligament in the lateral view. The sacral area was prepped with chlorhexidine  x3, sterile drape placed over the site, and sterile precautions observed throughout the procedure. Local Xylocaine 1% was injected over the coccyx then a 25 gauge 1.5" needle was advanced to the coccyx. Next, the needle was walked off laterally on the left and advanced 1 mm.  Position was confirmed with fluoroscopy.  After negative aspiration,1 cc of bupivacaine 0.5% was injected without complication.  Then the needle was redirected to the coccyx at midline then walked off laterally on the right side and advanced 1 mm.  Position was confirmed with fluoroscopy.  After negative aspiration, 1 cc of bupivacaine 0.5% was injected without complication.  The needle was then removed and a Band-Aid dressing applied.     Patient tolerated the procedure well without any complications. The patient was discharged with a responsible adult.    Future Management:   If helpful, can repeat as needed.  Can also do RFA    I certify that I provided the above services.  I was present for the entire " procedure, which was performed by the fellow physician under my supervision.  There were no parts of the procedure that were performed not by myself or without my direct supervision.

## 2018-06-12 NOTE — PLAN OF CARE
Pt tolerated procedure well. Pt pain 1/10. Pt assisted to feet for the first time, steady on feet. Discharge instructions given. Pt verbalized understanding.

## 2018-06-13 ENCOUNTER — PATIENT MESSAGE (OUTPATIENT)
Dept: PAIN MEDICINE | Facility: CLINIC | Age: 63
End: 2018-06-13

## 2018-06-13 NOTE — TELEPHONE ENCOUNTER
Contacted patient regarding message.    Patient stated she had 0% relief from the procedure yesterday and had sent a message to Dr. Nina notifying she had no relief.    Patient was informed that as per Dr. Nina's last office visit note it states: If limited benefit, plan for L4/5 interlaminar MAVIS for facet/disk pain.    Staff informed patient the message will be relayed to the procedure schedulers and they will contact her to schedule the procedure.    Staff also informed the patient the message will be forwarded to Dr. Nina for review.    Patient acknowledged information given and expressed understanding.      Patient's pain diary:    1 hr post @ 4:25pm- 0% relief  prs-3   2 hrs post @ 5:25pm- 0% relief prs- 3  3 hrs post @ 6:25pm- 0% relief prs- 3  4 hrs post @ 7:25pm- 0% relief prs- 4  5 hrs post @ 8:25pm- 0% relief prs- 4  6 hrs post @ 9:25pm- 0% relief prs- 3  12 hsr post @ bedtime- 0% relief prs- 3  24 hsr post @ 3:25pm- 0%relief  prs- 2

## 2018-06-25 ENCOUNTER — TELEPHONE (OUTPATIENT)
Dept: PAIN MEDICINE | Facility: CLINIC | Age: 63
End: 2018-06-25

## 2018-06-25 NOTE — TELEPHONE ENCOUNTER
----- Message from Dinah eJnsen sent at 6/25/2018  3:16 PM CDT -----  Contact: pt            Name of Who is Calling: pt      What is the request in detail: pt wants to reschedule procedure to the end of august. Call pt      Can the clinic reply by [facility]: yes      What Number [facility] Back [facility]: [phone]

## 2018-07-01 ENCOUNTER — PATIENT MESSAGE (OUTPATIENT)
Dept: FAMILY MEDICINE | Facility: CLINIC | Age: 63
End: 2018-07-01

## 2018-07-01 DIAGNOSIS — M25.50 POLYARTHRALGIA: Primary | ICD-10-CM

## 2018-07-09 ENCOUNTER — PATIENT MESSAGE (OUTPATIENT)
Dept: FAMILY MEDICINE | Facility: CLINIC | Age: 63
End: 2018-07-09

## 2018-07-10 ENCOUNTER — TELEPHONE (OUTPATIENT)
Dept: FAMILY MEDICINE | Facility: CLINIC | Age: 63
End: 2018-07-10

## 2018-08-10 ENCOUNTER — TELEPHONE (OUTPATIENT)
Dept: FAMILY MEDICINE | Facility: CLINIC | Age: 63
End: 2018-08-10

## 2018-08-10 DIAGNOSIS — Z12.31 BREAST CANCER SCREENING BY MAMMOGRAM: Primary | ICD-10-CM

## 2018-08-10 NOTE — TELEPHONE ENCOUNTER
----- Message from Soto Ash sent at 8/10/2018  9:12 AM CDT -----  Contact: Self  Patient would like orders placed for her mammogram.    Thank you

## 2018-08-15 ENCOUNTER — TELEPHONE (OUTPATIENT)
Dept: PAIN MEDICINE | Facility: CLINIC | Age: 63
End: 2018-08-15

## 2018-08-15 NOTE — TELEPHONE ENCOUNTER
----- Message from Clarita Dugan sent at 8/15/2018 11:21 AM CDT -----  Pt. Rescheduled from 8/31/18 to 9/14/18 with Dr. Nina.

## 2018-08-15 NOTE — TELEPHONE ENCOUNTER
----- Message from Beatriz Bejarano sent at 8/15/2018  8:43 AM CDT -----  Contact: Pt  Name of Who is Calling:YECENIA ORTEGA [[id number]]    What is the request in detail: Patient would like to cancel injection schedule 08/31/18 Please contact to further discuss and advise     Can the clinic reply by [facility]: Yes    What [facility]: [phone]

## 2018-08-24 ENCOUNTER — TELEPHONE (OUTPATIENT)
Dept: PAIN MEDICINE | Facility: CLINIC | Age: 63
End: 2018-08-24

## 2018-08-24 DIAGNOSIS — M53.3 COCCYDYNIA: Primary | ICD-10-CM

## 2018-08-24 NOTE — TELEPHONE ENCOUNTER
----- Message from Magy Lawton sent at 8/24/2018 11:58 AM CDT -----  Name of Who is Calling:YECENIA ORTEGA [[id number]]    What is the request in detail: pt needs another referral for chema hayden (Physical therapy) because the original order was over 60 days.      Can the clinic reply by [facility]:  Yes       What Number to Call Back if not in [facility]: [phone]

## 2018-08-24 NOTE — TELEPHONE ENCOUNTER
Contacted and spoke with pt regarding PT orders. Pt explained her orders have  and she would like to have PT after her procedure that is scheduled for 18. Pt was informed a message will be sent to Dr. Nina and she will be notified upon receiving Dr. Nina response.    Pt verbalized understanding

## 2018-08-27 ENCOUNTER — OFFICE VISIT (OUTPATIENT)
Dept: SURGERY | Facility: CLINIC | Age: 63
End: 2018-08-27
Payer: COMMERCIAL

## 2018-08-27 ENCOUNTER — HOSPITAL ENCOUNTER (OUTPATIENT)
Dept: RADIOLOGY | Facility: HOSPITAL | Age: 63
Discharge: HOME | End: 2018-08-27
Attending: INTERNAL MEDICINE
Payer: COMMERCIAL

## 2018-08-27 VITALS
DIASTOLIC BLOOD PRESSURE: 69 MMHG | BODY MASS INDEX: 25.6 KG/M2 | HEIGHT: 64 IN | HEART RATE: 86 BPM | SYSTOLIC BLOOD PRESSURE: 129 MMHG | WEIGHT: 149.94 LBS

## 2018-08-27 DIAGNOSIS — M53.3 COCCYDYNIA: ICD-10-CM

## 2018-08-27 DIAGNOSIS — Z12.31 BREAST CANCER SCREENING BY MAMMOGRAM: ICD-10-CM

## 2018-08-27 DIAGNOSIS — R10.2 PELVIC PAIN IN FEMALE: Primary | ICD-10-CM

## 2018-08-27 PROCEDURE — 77067 SCR MAMMO BI INCL CAD: CPT | Mod: 26,,, | Performed by: RADIOLOGY

## 2018-08-27 PROCEDURE — 77067 SCR MAMMO BI INCL CAD: CPT | Mod: TC

## 2018-08-27 PROCEDURE — 99999 PR PBB SHADOW E&M-EST. PATIENT-LVL III: CPT | Mod: PBBFAC,,, | Performed by: COLON & RECTAL SURGERY

## 2018-08-27 PROCEDURE — 77063 BREAST TOMOSYNTHESIS BI: CPT | Mod: 26,,, | Performed by: RADIOLOGY

## 2018-08-27 PROCEDURE — 99203 OFFICE O/P NEW LOW 30 MIN: CPT | Mod: S$GLB,,, | Performed by: COLON & RECTAL SURGERY

## 2018-08-27 PROCEDURE — 3008F BODY MASS INDEX DOCD: CPT | Mod: CPTII,S$GLB,, | Performed by: COLON & RECTAL SURGERY

## 2018-08-27 NOTE — PROGRESS NOTES
Subjective:       Patient ID: Sheree Lomeli is a 63 y.o. female.    Chief Complaint: Tailbone Pain and Pelvic Pain    HPI New pt  9 year history of intermittent posterior pelvic pain. She at 1 point had a bowel movement every 3 days but now has to have a bowel movement every day because her pain had become so intense that is only relieved by bowel movement.  She also complains of tailbone pain with prolonged sitting.  All this started 1 day when she was cleaning and stretched and felt something tear.    She has been seen by Physical therapy for pelvic floor physical therapy and chronic pain management for injections and/or steroids with minimal to no benefit.  Dora g 2012 showed a small anterior rectocele.    Review of Systems   Constitutional: Negative for chills and fever.   Respiratory: Negative for cough and shortness of breath.    Cardiovascular: Negative for chest pain and palpitations.   Gastrointestinal: Negative for nausea and vomiting.   Genitourinary: Negative for dysuria and urgency.   Neurological: Negative for seizures and numbness.       Objective:      Physical Exam   Constitutional: She is oriented to person, place, and time. She appears well-developed and well-nourished.   Eyes: Conjunctivae and EOM are normal.   Pulmonary/Chest: Effort normal. No respiratory distress.   Abdominal: Soft. She exhibits no distension.   Musculoskeletal: Normal range of motion. She exhibits no edema.   Neurological: She is alert and oriented to person, place, and time.   Skin: Skin is warm and dry.   Psychiatric: She has a normal mood and affect. Her behavior is normal.       Assessment:       1. Pelvic pain in female    2. Coccydynia        Plan:        Bowel training regimen. Nightly enemas.   If no improvement  - consider dynamic MRI

## 2018-09-10 ENCOUNTER — OFFICE VISIT (OUTPATIENT)
Dept: OBSTETRICS AND GYNECOLOGY | Facility: CLINIC | Age: 63
End: 2018-09-10
Payer: COMMERCIAL

## 2018-09-10 VITALS
HEIGHT: 64 IN | BODY MASS INDEX: 25.11 KG/M2 | WEIGHT: 147.06 LBS | DIASTOLIC BLOOD PRESSURE: 80 MMHG | SYSTOLIC BLOOD PRESSURE: 132 MMHG

## 2018-09-10 DIAGNOSIS — K21.9 GASTROESOPHAGEAL REFLUX DISEASE WITHOUT ESOPHAGITIS: ICD-10-CM

## 2018-09-10 DIAGNOSIS — Z01.419 ENCOUNTER FOR ANNUAL ROUTINE GYNECOLOGICAL EXAMINATION: Primary | ICD-10-CM

## 2018-09-10 DIAGNOSIS — Z12.4 PAP SMEAR FOR CERVICAL CANCER SCREENING: ICD-10-CM

## 2018-09-10 DIAGNOSIS — Z80.3 FAMILY HISTORY OF BREAST CANCER: ICD-10-CM

## 2018-09-10 DIAGNOSIS — N95.2 VAGINAL ATROPHY: ICD-10-CM

## 2018-09-10 PROCEDURE — 99396 PREV VISIT EST AGE 40-64: CPT | Mod: S$GLB,,, | Performed by: NURSE PRACTITIONER

## 2018-09-10 PROCEDURE — 88175 CYTOPATH C/V AUTO FLUID REDO: CPT

## 2018-09-10 PROCEDURE — 99999 PR PBB SHADOW E&M-EST. PATIENT-LVL III: CPT | Mod: PBBFAC,,, | Performed by: NURSE PRACTITIONER

## 2018-09-10 PROCEDURE — 87624 HPV HI-RISK TYP POOLED RSLT: CPT

## 2018-09-10 RX ORDER — OMEPRAZOLE 40 MG/1
40 CAPSULE, DELAYED RELEASE ORAL
Qty: 90 CAPSULE | Refills: 0
Start: 2018-09-10 | End: 2018-09-17 | Stop reason: SDUPTHER

## 2018-09-10 NOTE — PROGRESS NOTES
"CC: Annual  HPI: Pt is a 63 y.o.  female who presents for routine annual exam. She is a new patient to me. Referred to GYN today by Dr. Mukherjee. She usually gets her GYN care and pap smears via her PCP. Last pap in 2017 was unsatisfactory. She had a MRI done in March 2018 for back pain, reports "seeing a spot on my cervix". She is postmenopausal. No longer using Replens weekly, only when she plans to have intercourse. Daughter diagnosed with breast cancer last week. She is 35 and lives in [place]. She has not met with her oncologist yet so they do not know any staging information. Denies other family hx of breast cancer.     Last MMG 8/2018 WNL  Last pap unsatisfactory and HPV negative  MRI Sacral Plexus without contrast results from 4/4/2018: No sacral plexus abnormality. 2.3 cm cervical lesion likely complicated nabothian cyst.  Correlate clinically to exclude cervical malignancy.    ROS:  GENERAL: Feeling well overall. Denies fever or chills.   SKIN: Denies rash or lesions.   HEAD: Denies head injury or headache.   NODES: Denies enlarged lymph nodes.   CHEST: Denies chest pain or shortness of breath.   CARDIOVASCULAR: Denies palpitations or left sided chest pain.   ABDOMEN: No abdominal pain, constipation, diarrhea, nausea, vomiting or rectal bleeding.   URINARY: No dysuria, hematuria, or burning on urination.  REPRODUCTIVE: See HPI.   BREASTS: Denies pain, lumps, or nipple discharge.   HEMATOLOGIC: No easy bruisability or excessive bleeding.   MUSCULOSKELETAL: Denies joint pain or swelling.   NEUROLOGIC: Denies syncope or weakness.   PSYCHIATRIC: Denies depression, anxiety or mood swings.    PE:   APPEARANCE: Well nourished, well developed, White female in no acute distress.  NODES: no cervical, supraclavicular, or inguinal lymphadenopathy  BREASTS: Symmetrical, no skin changes or visible lesions. No palpable masses, nipple discharge or adenopathy bilaterally.  ABDOMEN: Soft. No tenderness or masses. No " distention. No hernias palpated. No CVA tenderness.  VULVA: No lesions. Normal external female genitalia.  URETHRAL MEATUS: Normal size and location, no lesions, no prolapse.  URETHRA: No masses, tenderness, or prolapse.  VAGINA: atrophic No lesions or lacerations noted. No abnormal discharge present. No odor present.   CERVIX: No lesions or discharge. No cervical motion tenderness. Cervix posterior, difficulty getting good view of cervix, os stenotic  UTERUS: Normal size, regular shape, mobile, non-tender.  ADNEXA: No tenderness. No fullness or masses palpated in the adnexal regions.   ANUS PERINEUM: Normal.      Diagnosis:  1. Encounter for annual routine gynecological examination    2. Pap smear for cervical cancer screening    3. Vaginal atrophy        Plan:   MMG current  Pap updated-d/w pt probability of unsatisfactory pap smear 2/2 atrophy. Briefly discussed possibility of vaginal estrogen use a few weeks prior to repeat pap if necessary.    Patient was counseled today on the new ACS guidelines for cervical cytology screening as well as the current recommendations for breast cancer screening. She was counseled to follow up with her PCP for other routine health maintenance. Counseling session lasted approximately 10 minutes, and all her questions were answered.    Follow-up with me in 1 year for routine exam; pap in 3 years.

## 2018-09-10 NOTE — TELEPHONE ENCOUNTER
----- Message from Coleman Sinha sent at 9/10/2018  7:28 AM CDT -----  Contact: Self/[phone]  Refill:  omeprazole (PRILOSEC) 40 MG capsule (Pt requested a 90 day supply.)    .  [facility] [address] [place] [address] JOHNATHON PATEL [address] AT [address]  [place] [address]  Phone: [phone] Fax: [phone]

## 2018-09-11 ENCOUNTER — OFFICE VISIT (OUTPATIENT)
Dept: RHEUMATOLOGY | Facility: CLINIC | Age: 63
End: 2018-09-11
Payer: COMMERCIAL

## 2018-09-11 VITALS
WEIGHT: 147 LBS | HEART RATE: 68 BPM | HEIGHT: 64 IN | SYSTOLIC BLOOD PRESSURE: 133 MMHG | DIASTOLIC BLOOD PRESSURE: 78 MMHG | BODY MASS INDEX: 25.1 KG/M2

## 2018-09-11 DIAGNOSIS — M19.90 OSTEOARTHRITIS, UNSPECIFIED OSTEOARTHRITIS TYPE, UNSPECIFIED SITE: ICD-10-CM

## 2018-09-11 DIAGNOSIS — M79.18 MYOFACIAL MUSCLE PAIN: Primary | ICD-10-CM

## 2018-09-11 PROCEDURE — 99204 OFFICE O/P NEW MOD 45 MIN: CPT | Mod: S$GLB,,, | Performed by: STUDENT IN AN ORGANIZED HEALTH CARE EDUCATION/TRAINING PROGRAM

## 2018-09-11 PROCEDURE — 99999 PR PBB SHADOW E&M-EST. PATIENT-LVL III: CPT | Mod: PBBFAC,,, | Performed by: STUDENT IN AN ORGANIZED HEALTH CARE EDUCATION/TRAINING PROGRAM

## 2018-09-11 PROCEDURE — 3008F BODY MASS INDEX DOCD: CPT | Mod: CPTII,S$GLB,, | Performed by: STUDENT IN AN ORGANIZED HEALTH CARE EDUCATION/TRAINING PROGRAM

## 2018-09-11 RX ORDER — MELOXICAM 15 MG/1
15 TABLET ORAL DAILY
Qty: 30 TABLET | Refills: 5 | Status: SHIPPED | OUTPATIENT
Start: 2018-09-11 | End: 2019-03-07 | Stop reason: SDUPTHER

## 2018-09-11 RX ORDER — TIZANIDINE 4 MG/1
4 TABLET ORAL NIGHTLY
Qty: 30 TABLET | Refills: 4 | Status: SHIPPED | OUTPATIENT
Start: 2018-09-11 | End: 2019-02-04 | Stop reason: SDUPTHER

## 2018-09-11 NOTE — PROGRESS NOTES
I have personally taken the history and examined the patient and concur with the resident's note as above.  She has had evaluations for low back pain is least as far back as 2003.  In 2009 she was reaching and felt something pull in her coccyx.  She has been treated for coccydynia since then.  The pain has been intermittent.  She was evaluated by Rheumatology in 2011 but had no evidence of inflammatory arthritis.  Approximately 11 months ago she developed pain in the lower back which is now spreading up the spine.  She does have underlying osteoarthritis.  The pain appears to be muscular.  I changed her anti-inflammatory medicine to meloxicam.  I placed her on Zanaflex at bedtime.

## 2018-09-13 LAB
HPV HR 12 DNA CVX QL NAA+PROBE: NEGATIVE
HPV16 AG SPEC QL: NEGATIVE
HPV18 DNA SPEC QL NAA+PROBE: NEGATIVE

## 2018-09-17 DIAGNOSIS — K21.9 GASTROESOPHAGEAL REFLUX DISEASE WITHOUT ESOPHAGITIS: ICD-10-CM

## 2018-09-17 RX ORDER — OMEPRAZOLE 40 MG/1
40 CAPSULE, DELAYED RELEASE ORAL
Qty: 90 CAPSULE | Refills: 0
Start: 2018-09-17 | End: 2018-12-06 | Stop reason: SDUPTHER

## 2018-09-17 NOTE — TELEPHONE ENCOUNTER
----- Message from Rosemary Barr sent at 9/17/2018  8:10 AM CDT -----  Contact: Self   Patient says she need a refill on her medication until her appointment on 10/3. Please call patient at [phone]      omeprazole (PRILOSEC) 40 MG capsule      [facility] [employer] [id number] [place] [address] - [address] [facility] [address] AT [facility]

## 2018-09-18 ENCOUNTER — TELEPHONE (OUTPATIENT)
Dept: FAMILY MEDICINE | Facility: CLINIC | Age: 63
End: 2018-09-18

## 2018-09-18 DIAGNOSIS — K21.9 GASTROESOPHAGEAL REFLUX DISEASE WITHOUT ESOPHAGITIS: ICD-10-CM

## 2018-09-18 RX ORDER — OMEPRAZOLE 40 MG/1
40 CAPSULE, DELAYED RELEASE ORAL
Qty: 90 CAPSULE | Refills: 0 | Status: CANCELLED
Start: 2018-09-18

## 2018-09-18 NOTE — TELEPHONE ENCOUNTER
----- Message from Emma Chaidez sent at 9/18/2018  3:54 PM CDT -----  Contact: Self   Patient is asking for clarification on the med refill. Please call at [phone].  Pt is leaving the city Friday and wants to make sure she has her meds before she leaves. Please call at [phone].

## 2018-10-17 ENCOUNTER — PATIENT MESSAGE (OUTPATIENT)
Dept: RHEUMATOLOGY | Facility: CLINIC | Age: 63
End: 2018-10-17

## 2018-10-17 ENCOUNTER — PATIENT MESSAGE (OUTPATIENT)
Dept: SURGERY | Facility: CLINIC | Age: 63
End: 2018-10-17

## 2018-10-17 ENCOUNTER — PATIENT MESSAGE (OUTPATIENT)
Dept: PAIN MEDICINE | Facility: OTHER | Age: 63
End: 2018-10-17

## 2018-11-13 ENCOUNTER — CLINICAL SUPPORT (OUTPATIENT)
Dept: FAMILY MEDICINE | Facility: CLINIC | Age: 63
End: 2018-11-13
Payer: COMMERCIAL

## 2018-11-13 DIAGNOSIS — Z23 NEED FOR PROPHYLACTIC VACCINATION AND INOCULATION AGAINST INFLUENZA: Primary | ICD-10-CM

## 2018-11-13 PROCEDURE — 90686 IIV4 VACC NO PRSV 0.5 ML IM: CPT | Mod: S$GLB,,, | Performed by: INTERNAL MEDICINE

## 2018-11-13 PROCEDURE — 90471 IMMUNIZATION ADMIN: CPT | Mod: S$GLB,,, | Performed by: INTERNAL MEDICINE

## 2018-11-13 PROCEDURE — 99499 UNLISTED E&M SERVICE: CPT | Mod: S$GLB,,, | Performed by: INTERNAL MEDICINE

## 2018-12-06 DIAGNOSIS — K21.9 GASTROESOPHAGEAL REFLUX DISEASE WITHOUT ESOPHAGITIS: ICD-10-CM

## 2018-12-06 RX ORDER — OMEPRAZOLE 40 MG/1
40 CAPSULE, DELAYED RELEASE ORAL
Qty: 90 CAPSULE | Refills: 0
Start: 2018-12-06 | End: 2018-12-10 | Stop reason: SDUPTHER

## 2018-12-06 NOTE — TELEPHONE ENCOUNTER
----- Message from Xena García sent at 12/6/2018  8:59 AM CST -----  Contact: self [phone]  requesting a refill on omeprazole (PRILOSEC) 40 MG capsule  .  [facility] [address] [place] [address] [place] [address] AT [address]  [place] [address]  Phone: [phone] Fax: [phone]

## 2018-12-06 NOTE — TELEPHONE ENCOUNTER
Please have patient complete fitkit this calendar year.  Schedule next available ov.   Sent losartan 50mg 1 tab daily instead of amlodipine  Then schedule appt for bp recheck 2-3 weeks  Monitor blood pressure

## 2018-12-10 DIAGNOSIS — K21.9 GASTROESOPHAGEAL REFLUX DISEASE WITHOUT ESOPHAGITIS: ICD-10-CM

## 2018-12-10 RX ORDER — MELOXICAM 15 MG/1
TABLET ORAL
COMMUNITY
Start: 2018-12-08 | End: 2019-04-29

## 2018-12-10 RX ORDER — OMEPRAZOLE 40 MG/1
40 CAPSULE, DELAYED RELEASE ORAL DAILY PRN
Qty: 90 CAPSULE | Refills: 0 | Status: SHIPPED | OUTPATIENT
Start: 2018-12-10 | End: 2020-10-07 | Stop reason: SDUPTHER

## 2018-12-10 RX ORDER — TIZANIDINE 4 MG/1
TABLET ORAL
COMMUNITY
Start: 2018-12-08 | End: 2019-04-29 | Stop reason: HOSPADM

## 2019-02-04 RX ORDER — TIZANIDINE 4 MG/1
TABLET ORAL
Qty: 30 TABLET | Refills: 0 | Status: SHIPPED | OUTPATIENT
Start: 2019-02-04 | End: 2019-03-06 | Stop reason: SDUPTHER

## 2019-03-06 RX ORDER — TIZANIDINE 4 MG/1
TABLET ORAL
Qty: 30 TABLET | Refills: 0 | Status: SHIPPED | OUTPATIENT
Start: 2019-03-06 | End: 2019-04-29

## 2019-03-07 RX ORDER — MELOXICAM 15 MG/1
TABLET ORAL
Qty: 30 TABLET | Refills: 0 | Status: SHIPPED | OUTPATIENT
Start: 2019-03-07 | End: 2019-04-29

## 2019-04-15 ENCOUNTER — PATIENT OUTREACH (OUTPATIENT)
Dept: ADMINISTRATIVE | Facility: HOSPITAL | Age: 64
End: 2019-04-15

## 2019-04-15 DIAGNOSIS — Z12.11 SPECIAL SCREENING FOR MALIGNANT NEOPLASM OF COLON: Primary | ICD-10-CM

## 2019-04-17 ENCOUNTER — CLINICAL SUPPORT (OUTPATIENT)
Dept: FAMILY MEDICINE | Facility: CLINIC | Age: 64
End: 2019-04-17
Payer: COMMERCIAL

## 2019-04-17 DIAGNOSIS — Z12.11 SCREENING FOR COLON CANCER: Primary | ICD-10-CM

## 2019-04-17 PROCEDURE — 99499 UNLISTED E&M SERVICE: CPT | Mod: S$GLB,,, | Performed by: INTERNAL MEDICINE

## 2019-04-17 PROCEDURE — 99499 NO LOS: ICD-10-PCS | Mod: S$GLB,,, | Performed by: INTERNAL MEDICINE

## 2019-04-17 RX ORDER — MELOXICAM 15 MG/1
TABLET ORAL
Qty: 30 TABLET | Refills: 0 | Status: SHIPPED | OUTPATIENT
Start: 2019-04-17 | End: 2019-04-29

## 2019-04-17 RX ORDER — TIZANIDINE 4 MG/1
TABLET ORAL
Qty: 30 TABLET | Refills: 0 | Status: SHIPPED | OUTPATIENT
Start: 2019-04-17 | End: 2019-04-29

## 2019-04-18 ENCOUNTER — PATIENT MESSAGE (OUTPATIENT)
Dept: RHEUMATOLOGY | Facility: CLINIC | Age: 64
End: 2019-04-18

## 2019-04-22 ENCOUNTER — LAB VISIT (OUTPATIENT)
Dept: LAB | Facility: HOSPITAL | Age: 64
End: 2019-04-22
Attending: INTERNAL MEDICINE
Payer: COMMERCIAL

## 2019-04-22 DIAGNOSIS — Z12.11 SPECIAL SCREENING FOR MALIGNANT NEOPLASM OF COLON: ICD-10-CM

## 2019-04-22 LAB — HEMOCCULT STL QL IA: NEGATIVE

## 2019-04-22 PROCEDURE — 82274 ASSAY TEST FOR BLOOD FECAL: CPT

## 2019-04-29 ENCOUNTER — OFFICE VISIT (OUTPATIENT)
Dept: FAMILY MEDICINE | Facility: CLINIC | Age: 64
End: 2019-04-29
Payer: COMMERCIAL

## 2019-04-29 ENCOUNTER — TELEPHONE (OUTPATIENT)
Dept: FAMILY MEDICINE | Facility: CLINIC | Age: 64
End: 2019-04-29

## 2019-04-29 VITALS
WEIGHT: 141.31 LBS | OXYGEN SATURATION: 98 % | DIASTOLIC BLOOD PRESSURE: 76 MMHG | TEMPERATURE: 98 F | BODY MASS INDEX: 24.13 KG/M2 | HEIGHT: 64 IN | SYSTOLIC BLOOD PRESSURE: 118 MMHG | HEART RATE: 80 BPM

## 2019-04-29 DIAGNOSIS — Z00.00 ROUTINE MEDICAL EXAM: Primary | ICD-10-CM

## 2019-04-29 DIAGNOSIS — R51.9 INTRACTABLE EPISODIC HEADACHE, UNSPECIFIED HEADACHE TYPE: ICD-10-CM

## 2019-04-29 DIAGNOSIS — M54.42 CHRONIC MIDLINE LOW BACK PAIN WITH BILATERAL SCIATICA: Primary | ICD-10-CM

## 2019-04-29 DIAGNOSIS — G89.29 CHRONIC MIDLINE LOW BACK PAIN WITH BILATERAL SCIATICA: Primary | ICD-10-CM

## 2019-04-29 DIAGNOSIS — G89.29 CHRONIC LOW BACK PAIN, UNSPECIFIED BACK PAIN LATERALITY, WITH SCIATICA PRESENCE UNSPECIFIED: ICD-10-CM

## 2019-04-29 DIAGNOSIS — B00.1 FEVER BLISTER: ICD-10-CM

## 2019-04-29 DIAGNOSIS — M54.5 CHRONIC LOW BACK PAIN, UNSPECIFIED BACK PAIN LATERALITY, WITH SCIATICA PRESENCE UNSPECIFIED: ICD-10-CM

## 2019-04-29 DIAGNOSIS — K21.9 GASTROESOPHAGEAL REFLUX DISEASE WITHOUT ESOPHAGITIS: ICD-10-CM

## 2019-04-29 DIAGNOSIS — M54.41 CHRONIC MIDLINE LOW BACK PAIN WITH BILATERAL SCIATICA: Primary | ICD-10-CM

## 2019-04-29 DIAGNOSIS — M53.3 COCCYGODYNIA: ICD-10-CM

## 2019-04-29 DIAGNOSIS — Z71.89 ADVANCED DIRECTIVES, COUNSELING/DISCUSSION: ICD-10-CM

## 2019-04-29 PROBLEM — M62.89 MUSCLE TIGHTNESS: Status: RESOLVED | Noted: 2017-09-29 | Resolved: 2019-04-29

## 2019-04-29 PROBLEM — M62.81 WEAKNESS OF TRUNK MUSCULATURE: Status: RESOLVED | Noted: 2017-09-29 | Resolved: 2019-04-29

## 2019-04-29 PROCEDURE — 99999 PR PBB SHADOW E&M-EST. PATIENT-LVL III: ICD-10-PCS | Mod: PBBFAC,,, | Performed by: INTERNAL MEDICINE

## 2019-04-29 PROCEDURE — 99999 PR PBB SHADOW E&M-EST. PATIENT-LVL III: CPT | Mod: PBBFAC,,, | Performed by: INTERNAL MEDICINE

## 2019-04-29 PROCEDURE — 99396 PR PREVENTIVE VISIT,EST,40-64: ICD-10-PCS | Mod: S$GLB,,, | Performed by: INTERNAL MEDICINE

## 2019-04-29 PROCEDURE — 99396 PREV VISIT EST AGE 40-64: CPT | Mod: S$GLB,,, | Performed by: INTERNAL MEDICINE

## 2019-04-29 RX ORDER — IBUPROFEN 600 MG/1
600 TABLET ORAL EVERY 4 HOURS PRN
Qty: 30 TABLET | Refills: 2 | Status: SHIPPED | OUTPATIENT
Start: 2019-04-29 | End: 2019-09-25 | Stop reason: SDUPTHER

## 2019-04-29 RX ORDER — BUTALBITAL, ASPIRIN, AND CAFFEINE 325; 50; 40 MG/1; MG/1; MG/1
1 CAPSULE ORAL EVERY 4 HOURS PRN
Qty: 30 CAPSULE | Refills: 0 | Status: SHIPPED | OUTPATIENT
Start: 2019-04-29 | End: 2022-08-26 | Stop reason: SDUPTHER

## 2019-04-29 RX ORDER — PENCICLOVIR 10 MG/G
CREAM TOPICAL
Qty: 5 G | Refills: 3 | Status: SHIPPED | OUTPATIENT
Start: 2019-04-29 | End: 2019-05-01

## 2019-04-29 NOTE — TELEPHONE ENCOUNTER
----- Message from Soto Ash sent at 4/29/2019  2:12 PM CDT -----  Contact: Self/[phone]  Type: Patient Call Back    Who called:Patient    What is the request in detail:The patient would like to speak to the staff regarding her visit today.    Would the patient rather a call back or a response via [facility]? Call back    Best call back number:[phone]    Thank you

## 2019-04-29 NOTE — PROGRESS NOTES
HISTORY OF PRESENT ILLNESS:  Sheree Lomeli is a 63 y.o. female who presents to the clinic today for a routine medical physical exam. Her last physical exam was approximately 1 years(s) ago.        PAST MEDICAL HISTORY:  Past Medical History:   Diagnosis Date    Arthritis     Back pain     Chronic pelvic pain in female     left-sided    De Quervain's tenosynovitis     left    Headache(784.0)     Reflux     Right carpal tunnel syndrome     Urinary incontinence     occ kira       PAST SURGICAL HISTORY:  Past Surgical History:   Procedure Laterality Date    BLOCK IMPAR GANGLION N/A 11/29/2017    [facility]    INJECTION-STEROID-EPIDURAL N/A 6/12/2018    [facility]    INJECTION-STEROID-EPIDURAL N/A 5/18/2018    [facility]    OOPHORECTOMY      LSO--pain       SOCIAL HISTORY:  Social History     Socioeconomic History    Marital status:      Spouse name: Not on file    Number of children: 2    Years of education: Not on file    Highest education level: Not on file   Occupational History    Occupation:      Employer: [employer]   Social Needs    Financial resource strain: Not on file    Food insecurity:     Worry: Not on file     Inability: Not on file    Transportation needs:     Medical: Not on file     Non-medical: Not on file   Tobacco Use    Smoking status: Never Smoker    Smokeless tobacco: Never Used   Substance and Sexual Activity    Alcohol use: No    Drug use: No    Sexual activity: Yes     Partners: Male   Lifestyle    Physical activity:     Days per week: Not on file     Minutes per session: Not on file    Stress: Not on file   Relationships    Social connections:     Talks on phone: Not on file     Gets together: Not on file     Attends Zoroastrianism service: Not on file     Active member of club or organization: Not on file     Attends meetings  of clubs or organizations: Not on file     Relationship status: Not on file   Other Topics Concern    Not on file   Social History Narrative    Not on file       FAMILY HISTORY:  Family History   Problem Relation Age of Onset    Heart disease Father         s/p stenting    Coronary artery disease Father     Prostate cancer Father     Hypoglycemic Sister     Breast cancer Daughter 35        dx in September 2018, lives in [place]    No Known Problems Sister     Diabetes Maternal Uncle     Diabetes Cousin     Hypertension Neg Hx     Stroke Neg Hx     Colon cancer Neg Hx     Cervical cancer Neg Hx     Vaginal cancer Neg Hx     Endometrial cancer Neg Hx     Ovarian cancer Neg Hx        ALLERGIES AND MEDICATIONS: updated and reviewed.  Review of patient's allergies indicates:   Allergen Reactions    Adhesive Rash    Iodine Rash    Shellfish containing products Rash     Patient allergic to softshell crabs specifically    Sulfa (sulfonamide antibiotics) Rash     Medication List with Changes/Refills   New Medications    PENCICLOVIR (DENAVIR) 1 % CREAM    Apply topically every 2 (two) hours.   Current Medications    OMEPRAZOLE (PRILOSEC) 40 MG CAPSULE    Take 1 capsule (40 mg total) by mouth daily as needed (heartburn). Take only as needed.   Changed and/or Refilled Medications    Modified Medication Previous Medication    BUTALBITAL-ASPIRIN-CAFFEINE -40 MG (FIORINAL) -40 MG CAP butalbital-aspirin-caffeine -40 mg (FIORINAL) -40 mg Cap       Take 1 capsule by mouth every 4 (four) hours as needed.    Take 1 capsule by mouth every 4 (four) hours as needed.    IBUPROFEN (ADVIL,MOTRIN) 600 MG TABLET ibuprofen (ADVIL,MOTRIN) 600 MG tablet       Take 1 tablet (600 mg total) by mouth every 4 (four) hours as needed.    Take 1 tablet (600 mg total) by mouth every 4 (four) hours as needed.   Discontinued Medications    MELOXICAM (MOBIC) 15 MG TABLET        MELOXICAM (MOBIC) 15 MG TABLET     TAKE 1 TABLET(15 MG) BY MOUTH EVERY DAY    MELOXICAM (MOBIC) 15 MG TABLET    TAKE 1 TABLET(15 MG) BY MOUTH EVERY DAY    TIZANIDINE (ZANAFLEX) 4 MG TABLET        TIZANIDINE (ZANAFLEX) 4 MG TABLET    TAKE 1 TABLET(4 MG) BY MOUTH EVERY NIGHT    TIZANIDINE (ZANAFLEX) 4 MG TABLET    TAKE 1 TABLET(4 MG) BY MOUTH EVERY NIGHT         CARE TEAM:  Patient Care Team:  Wihtney Mera MD as PCP - General (Internal Medicine)  Dorothy Lantigua LPN as Licensed Practical Nurse           SCREENING HISTORY:  Health Maintenance       Date Due Completion Date    Mammogram 08/27/2019 8/27/2018    Fecal Occult Blood Test (FOBT)/FitKit 04/22/2020 4/22/2019    Pap Smear with HPV Cotest 09/10/2021 9/10/2018    Override on 4/6/2011: Done    Lipid Panel 02/25/2022 2/25/2017    TETANUS VACCINE 07/29/2025 7/29/2015            REVIEW OF SYSTEMS:   The patient reports: good dietary habits.  The patient reports : that they exercise regularly.  Review of Systems   Constitutional: Negative for chills, fatigue, fever and unexpected weight change.   HENT: Negative for congestion and postnasal drip.    Eyes: Negative for pain and visual disturbance.   Respiratory: Negative for cough, shortness of breath and wheezing.    Cardiovascular: Negative for chest pain, palpitations and leg swelling.   Gastrointestinal: Negative for abdominal pain, constipation, diarrhea, nausea and vomiting.   Genitourinary: Negative for dysuria.   Musculoskeletal: Positive for back pain (- she does not find any relief with Mobic and tizanidine.  She would like to go back to ibuprofen as needed.  She does her own physical therapy on the floor daily.). Negative for arthralgias.   Skin: Negative for rash.        She has had problems with cold sore since she was young.  In the last 6 months they have been very frequent.  She in the past has taken oral lysine with good control, but it has not been working for her recently.  She has not tried over-the-counter Abreva.  She  "states in the past the topical medications that she has used have made it worse.  Currently she is applying alcohol as needed to dry them up.   Neurological: Negative for weakness and headaches.   Psychiatric/Behavioral: Negative for dysphoric mood and sleep disturbance. The patient is not nervous/anxious.      Breast ROS: negative for breast lumps             Physical Examination:   Vitals:    04/29/19 0749   BP: 118/76   Pulse: 80   Temp: 98 °F (36.7 °C)     Weight: 64.1 kg (141 lb 5 oz)   Height: 5' 4" (162.6 cm)   Body mass index is 24.26 kg/m².      Patient did not require to have a chaperone present during the exam today.  General appearance - alert, well appearing, and in no distress and normal appearing weight  Mental status - alert, oriented to person, place, and time, normal mood, behavior, speech, dress, motor activity, and thought processes  Eyes - pupils equal and reactive, extraocular eye movements intact, sclera anicteric  Mouth - mucous membranes moist, pharynx normal without lesions  Neck - supple, no significant adenopathy, carotids upstroke normal bilaterally, no bruits, thyroid exam: thyroid is normal in size without nodules or tenderness  Lymphatics - no palpable lymphadenopathy  Chest - clear to auscultation, no wheezes, rales or rhonchi, symmetric air entry  Heart - normal rate, regular rhythm, normal S1, S2, no murmurs, rubs, clicks or gallops  Abdomen - soft, nontender, nondistended, no masses or organomegaly  Back exam - not examined  Neurological - alert, oriented, normal speech, no focal findings or movement disorder noted, cranial nerves II through XII intact  Musculoskeletal - no joint tenderness, deformity or swelling, no muscular tenderness noted  Extremities - peripheral pulses normal, no pedal edema, no clubbing or cyanosis  Skin - normal coloration and turgor, no rashes, no suspicious skin lesions noted      ASSESSMENT AND PLAN:  1. Routine medical exam  Counseled on age " appropriate medical preventative services including age appropriate cancer screenings, age appropriate eye and dental exams, over all nutritional health, need for a consistent exercise regimen, and an over all push towards maintaining a vigorous and active lifestyle.  Counseled on age appropriate vaccines and discussed upcoming health care needs based on age/gender. Discussed good sleep hygiene and stress management.     2. Fever blister  We will try some topical fever blister medication.  She will use it for prophylaxis for short time as well as treatment.  Consider oral Valtrex if symptoms worsen or persist.  - penciclovir (DENAVIR) 1 % cream; Apply topically every 2 (two) hours.  Dispense: 5 g; Refill: 3    3. Intractable episodic headache, unspecified headache type  Stable. Medication as needed.   - butalbital-aspirin-caffeine -40 mg (FIORINAL) -40 mg Cap; Take 1 capsule by mouth every 4 (four) hours as needed.  Dispense: 30 capsule; Refill: 0    4. Chronic low back pain, unspecified back pain laterality, with sciatica presence unspecified  Stable. Medication as needed.   - ibuprofen (ADVIL,MOTRIN) 600 MG tablet; Take 1 tablet (600 mg total) by mouth every 4 (four) hours as needed.  Dispense: 30 tablet; Refill: 2    5. Gastroesophageal reflux disease without esophagitis  Symptoms controlled: yes. Reflux precautions discussed (eliminate tobacco if a smoker; minimize caffeine, chocolate and red/white peppermint intake; avoid heavy and spicy meals; don't lay down within 2-3 hours after eating; minimize the intake of NSAIDs). Medication as needed. Patient asked to take medication breaks, if possible - discussed chronic use can limit calcium absorption (which can lead to osteopenia/osteoporosis), increases the risk for intestinal infections, and can cause kidney damage. There are also some newer studies that show possible increased risk of mortality.     6. Advanced directives, counseling/discussion  I  initiated the process and explained the importance of advance care planning today with the patient.  Advanced directives were discussed due to patient's age and/or chronic medical conditions. Prognosis based on current condition: excellent.   Paperwork was given to complete living will (At this point in time, the patient does have full decision-making capacity.  We discussed different extreme health states that she could experience, and reviewed what kind of medical care she would want in those situations) and medical POA (The patient received detailed information about the importance of designating a Health Care Power of . She was also instructed to communicate with this person about their wishes for future healthcare, should she become sick and lose decision-making capacity).   [place] was not discussed.   Approximately 3 minute(s) were spent on counseling/discussion regarding end of life decision making.               Follow up in about 1 year (around 4/29/2020), or if symptoms worsen or fail to improve, for annual exam. or sooner as needed.

## 2019-04-29 NOTE — TELEPHONE ENCOUNTER
Spoke with the pt and she said her insurance will cover health back program.  She call the program and was told she will need a referral to P.T.  Patient verbalized understandings.

## 2019-04-30 DIAGNOSIS — M79.18 MYOFASCIAL MUSCLE PAIN: ICD-10-CM

## 2019-04-30 NOTE — TELEPHONE ENCOUNTER
"Please confirm that she would like this filled.  She reported "dry sinuses" with this before.  It was discontinued by Dr. Harrison    Thanks!  LW    "

## 2019-05-01 ENCOUNTER — TELEPHONE (OUTPATIENT)
Dept: FAMILY MEDICINE | Facility: CLINIC | Age: 64
End: 2019-05-01

## 2019-05-01 RX ORDER — CYCLOBENZAPRINE HCL 5 MG
TABLET ORAL
Qty: 30 TABLET | Refills: 5 | Status: SHIPPED | OUTPATIENT
Start: 2019-05-01 | End: 2019-10-28

## 2019-05-01 NOTE — TELEPHONE ENCOUNTER
----- Message from Lilly Reardon MA sent at 5/1/2019  5:09 PM CDT -----  Contact: pt  [phone]  [facility]  ----- Message -----  From: Jeronimo Johnston  Sent: 5/1/2019   4:05 PM  To: [facility]A Staff    .Type: Patient Call Back    Who called:pt     What is the request in detail:Pt states that she was not having the penciclovir (DENAVIR) 1 % cream filled due to the $400 price tag.    Can the clinic reply by [facility]CH[facility]?no    Would the patient rather a call back or a response via [facility]? Call back    Best call back number:[phone]    Additional Information:

## 2019-09-09 ENCOUNTER — PATIENT OUTREACH (OUTPATIENT)
Dept: ADMINISTRATIVE | Facility: OTHER | Age: 64
End: 2019-09-09

## 2019-09-09 DIAGNOSIS — Z12.31 ENCOUNTER FOR SCREENING MAMMOGRAM FOR MALIGNANT NEOPLASM OF BREAST: Primary | ICD-10-CM

## 2019-09-11 ENCOUNTER — OFFICE VISIT (OUTPATIENT)
Dept: OBSTETRICS AND GYNECOLOGY | Facility: CLINIC | Age: 64
End: 2019-09-11
Payer: COMMERCIAL

## 2019-09-11 ENCOUNTER — HOSPITAL ENCOUNTER (OUTPATIENT)
Dept: RADIOLOGY | Facility: HOSPITAL | Age: 64
Discharge: HOME | End: 2019-09-11
Attending: INTERNAL MEDICINE
Payer: COMMERCIAL

## 2019-09-11 VITALS
DIASTOLIC BLOOD PRESSURE: 76 MMHG | WEIGHT: 144.38 LBS | HEIGHT: 64 IN | SYSTOLIC BLOOD PRESSURE: 118 MMHG | BODY MASS INDEX: 24.65 KG/M2

## 2019-09-11 DIAGNOSIS — N95.2 ATROPHIC VAGINITIS: Primary | ICD-10-CM

## 2019-09-11 DIAGNOSIS — Z12.31 VISIT FOR SCREENING MAMMOGRAM: ICD-10-CM

## 2019-09-11 DIAGNOSIS — Z01.419 ENCOUNTER FOR ANNUAL ROUTINE GYNECOLOGICAL EXAMINATION: ICD-10-CM

## 2019-09-11 DIAGNOSIS — Z12.31 ENCOUNTER FOR SCREENING MAMMOGRAM FOR MALIGNANT NEOPLASM OF BREAST: ICD-10-CM

## 2019-09-11 PROCEDURE — 77067 MAMMO DIGITAL SCREENING BILAT WITH TOMOSYNTHESIS_CAD: ICD-10-PCS | Mod: 26,,, | Performed by: RADIOLOGY

## 2019-09-11 PROCEDURE — 77067 SCR MAMMO BI INCL CAD: CPT | Mod: TC

## 2019-09-11 PROCEDURE — 99396 PR PREVENTIVE VISIT,EST,40-64: ICD-10-PCS | Mod: S$GLB,,, | Performed by: NURSE PRACTITIONER

## 2019-09-11 PROCEDURE — 99999 PR PBB SHADOW E&M-EST. PATIENT-LVL III: CPT | Mod: PBBFAC,,, | Performed by: NURSE PRACTITIONER

## 2019-09-11 PROCEDURE — 77063 MAMMO DIGITAL SCREENING BILAT WITH TOMOSYNTHESIS_CAD: ICD-10-PCS | Mod: 26,,, | Performed by: RADIOLOGY

## 2019-09-11 PROCEDURE — 99999 PR PBB SHADOW E&M-EST. PATIENT-LVL III: ICD-10-PCS | Mod: PBBFAC,,, | Performed by: NURSE PRACTITIONER

## 2019-09-11 PROCEDURE — 77067 SCR MAMMO BI INCL CAD: CPT | Mod: 26,,, | Performed by: RADIOLOGY

## 2019-09-11 PROCEDURE — 99396 PREV VISIT EST AGE 40-64: CPT | Mod: S$GLB,,, | Performed by: NURSE PRACTITIONER

## 2019-09-11 PROCEDURE — 77063 BREAST TOMOSYNTHESIS BI: CPT | Mod: 26,,, | Performed by: RADIOLOGY

## 2019-09-11 RX ORDER — ESTRADIOL 0.1 MG/G
CREAM VAGINAL
Qty: 42.5 G | Refills: 11 | Status: SHIPPED | OUTPATIENT
Start: 2019-09-11 | End: 2020-09-11

## 2019-09-11 NOTE — PROGRESS NOTES
CC: Annual  HPI: Pt is a 64 y.o.  female who presents for routine annual exam. She is . She has been using Replens for vaginal dryness but it is no longer helping. At one point Dr. Mukherjee had given her a rx for Estrace but she never used it. She is ready to try it again. When she is SA it is uncomfortable d/t the dryness. Daughter is doing better, s/p chemo and double mastectomy. BRCA testing was negative. She has her MMG later today.     Last pap/HPV in 2018 negative    ROS:  GENERAL: Feeling well overall. Denies fever or chills.   SKIN: Denies rash or lesions.   HEAD: Denies head injury or headache.   NODES: Denies enlarged lymph nodes.   CHEST: Denies chest pain or shortness of breath.   CARDIOVASCULAR: Denies palpitations or left sided chest pain.   ABDOMEN: No abdominal pain, constipation, diarrhea, nausea, vomiting or rectal bleeding.   URINARY: No dysuria, hematuria, or burning on urination.  REPRODUCTIVE: See HPI.   BREASTS: Denies pain, lumps, or nipple discharge.   HEMATOLOGIC: No easy bruisability or excessive bleeding.   MUSCULOSKELETAL: Denies joint pain or swelling.   NEUROLOGIC: Denies syncope or weakness.   PSYCHIATRIC: Denies depression, anxiety or mood swings.    PE:   APPEARANCE: Well nourished, well developed, White female in no acute distress.  NODES: no cervical, supraclavicular, or inguinal lymphadenopathy  BREASTS: Symmetrical, no skin changes or visible lesions. No palpable masses, nipple discharge or adenopathy bilaterally.  ABDOMEN: Soft. No tenderness or masses. No distention. No hernias palpated. No CVA tenderness.  VULVA: No lesions. Normal external female genitalia.  URETHRAL MEATUS: Normal size and location, no lesions, no prolapse.  URETHRA: No masses, tenderness, or prolapse.  VAGINA: atrophy No lesions or lacerations noted. No abnormal discharge present. No odor present.   CERVIX: No lesions or discharge. No cervical motion tenderness.   UTERUS: Normal size, regular shape,  mobile, non-tender.  ADNEXA: No tenderness. No fullness or masses palpated in the adnexal regions.   ANUS PERINEUM: Normal.      Diagnosis:  1. Atrophic vaginitis    2. Encounter for annual routine gynecological examination    3. Visit for screening mammogram        Plan:   1. Pap current  2. MMG today  3. Rx Estrace    Patient was counseled today on the new ACS guidelines for cervical cytology screening as well as the current recommendations for breast cancer screening. She was counseled to follow up with her PCP for other routine health maintenance. Counseling session lasted approximately 10 minutes, and all her questions were answered.    Follow-up with me in 1 year for routine exam; pap in 2 years.

## 2019-09-25 DIAGNOSIS — G89.29 CHRONIC LOW BACK PAIN, UNSPECIFIED BACK PAIN LATERALITY, WITH SCIATICA PRESENCE UNSPECIFIED: ICD-10-CM

## 2019-09-25 DIAGNOSIS — M54.5 CHRONIC LOW BACK PAIN, UNSPECIFIED BACK PAIN LATERALITY, WITH SCIATICA PRESENCE UNSPECIFIED: ICD-10-CM

## 2019-09-25 RX ORDER — IBUPROFEN 600 MG/1
TABLET ORAL
Qty: 30 TABLET | Refills: 0 | Status: SHIPPED | OUTPATIENT
Start: 2019-09-25 | End: 2019-10-16 | Stop reason: SDUPTHER

## 2019-09-27 ENCOUNTER — PATIENT MESSAGE (OUTPATIENT)
Dept: OBSTETRICS AND GYNECOLOGY | Facility: CLINIC | Age: 64
End: 2019-09-27

## 2019-10-16 ENCOUNTER — OFFICE VISIT (OUTPATIENT)
Dept: FAMILY MEDICINE | Facility: CLINIC | Age: 64
End: 2019-10-16
Payer: COMMERCIAL

## 2019-10-16 VITALS
HEART RATE: 80 BPM | WEIGHT: 143.63 LBS | HEIGHT: 64 IN | DIASTOLIC BLOOD PRESSURE: 78 MMHG | SYSTOLIC BLOOD PRESSURE: 128 MMHG | BODY MASS INDEX: 24.52 KG/M2 | TEMPERATURE: 98 F | OXYGEN SATURATION: 98 %

## 2019-10-16 DIAGNOSIS — Z23 NEED FOR SHINGLES VACCINE: ICD-10-CM

## 2019-10-16 DIAGNOSIS — G89.29 CHRONIC LOW BACK PAIN, UNSPECIFIED BACK PAIN LATERALITY, UNSPECIFIED WHETHER SCIATICA PRESENT: ICD-10-CM

## 2019-10-16 DIAGNOSIS — M54.50 CHRONIC LOW BACK PAIN, UNSPECIFIED BACK PAIN LATERALITY, UNSPECIFIED WHETHER SCIATICA PRESENT: ICD-10-CM

## 2019-10-16 DIAGNOSIS — K62.89 PROCTODYNIA: ICD-10-CM

## 2019-10-16 DIAGNOSIS — R10.9 ABDOMINAL PAIN, UNSPECIFIED ABDOMINAL LOCATION: Primary | ICD-10-CM

## 2019-10-16 DIAGNOSIS — K59.09 CHRONIC CONSTIPATION: ICD-10-CM

## 2019-10-16 DIAGNOSIS — M53.3 COCCYGODYNIA: ICD-10-CM

## 2019-10-16 DIAGNOSIS — Z23 FLU VACCINE NEED: ICD-10-CM

## 2019-10-16 PROCEDURE — 99999 PR PBB SHADOW E&M-EST. PATIENT-LVL IV: ICD-10-PCS | Mod: PBBFAC,,, | Performed by: INTERNAL MEDICINE

## 2019-10-16 PROCEDURE — 3008F PR BODY MASS INDEX (BMI) DOCUMENTED: ICD-10-PCS | Mod: CPTII,S$GLB,, | Performed by: INTERNAL MEDICINE

## 2019-10-16 PROCEDURE — 3008F BODY MASS INDEX DOCD: CPT | Mod: CPTII,S$GLB,, | Performed by: INTERNAL MEDICINE

## 2019-10-16 PROCEDURE — 99214 OFFICE O/P EST MOD 30 MIN: CPT | Mod: S$GLB,,, | Performed by: INTERNAL MEDICINE

## 2019-10-16 PROCEDURE — 99214 PR OFFICE/OUTPT VISIT, EST, LEVL IV, 30-39 MIN: ICD-10-PCS | Mod: S$GLB,,, | Performed by: INTERNAL MEDICINE

## 2019-10-16 PROCEDURE — 99999 PR PBB SHADOW E&M-EST. PATIENT-LVL IV: CPT | Mod: PBBFAC,,, | Performed by: INTERNAL MEDICINE

## 2019-10-16 RX ORDER — IBUPROFEN 600 MG/1
TABLET ORAL
Qty: 30 TABLET | Refills: 0 | Status: SHIPPED | OUTPATIENT
Start: 2019-10-16 | End: 2020-01-06

## 2019-10-16 NOTE — PROGRESS NOTES
HISTORY OF PRESENT ILLNESS:  Sheree Lomeli is a 64 y.o. female who presents to the clinic today for Abdominal Pain (times 6 weeks off and on); Back Pain; and Medication Refill  .   The patient presents to clinic today with complaint of some nonspecific mostly epigastric abdominal pain for the last fiber 6 weeks.  Pain comes and goes.  It sometimes awakens her from sleeping.  She describes it as a hunger pain. In further talking with the patient she remembers that she started a supplement around that time that her massage therapist had recommended to help dissolve scar tissue.  The patient also wishes to talk today about her chronic coccyx pain.  She also has chronic low back pain slightly above that.  She has done physical therapy, she is doing massage therapy, and she has seen pain management and has had back injections without relief.  She wants to make sure there is nothing more worrisome going on.  She has had 3 friends who had some nonspecific pains and then suddenly were diagnosed with cancer and .  Patient denies any unexplained weight loss.  She does feel like her pain is getting more frequent and severe.  It is affecting her quality of life.  She is not quite sure where to go from here.  She states that when she does a colon cleanse she does feel better.  She feels her coccyx pain is very much related to constipation.  She denies blood in her stool.      PAST MEDICAL HISTORY:  Past Medical History:   Diagnosis Date    Arthritis     Back pain     Chronic pelvic pain in female     left-sided    De Quervain's tenosynovitis     left    Headache(784.0)     Reflux     Right carpal tunnel syndrome     Urinary incontinence     occ kira       PAST SURGICAL HISTORY:  Past Surgical History:   Procedure Laterality Date    EPIDURAL STEROID INJECTION N/A 2018    Procedure: INJECTION-STEROID-EPIDURAL;  Surgeon: Rianna Nina MD;  Location: [facility] PAIN T;  Service: Pain Management;  Laterality: N/A;   Coccygeal Nerve Block  32726  (0.5mg Niravam only)    *Pt wants to know if she has NO SEDATION at all, can pt drive herself home*    OOPHORECTOMY      LSO--pain       SOCIAL HISTORY:  Social History     Socioeconomic History    Marital status:      Spouse name: Not on file    Number of children: 2    Years of education: Not on file    Highest education level: Not on file   Occupational History    Occupation:      Employer: [employer]   Social Needs    Financial resource strain: Not hard at all    Food insecurity:     Worry: Never true     Inability: Never true    Transportation needs:     Medical: No     Non-medical: No   Tobacco Use    Smoking status: Never Smoker    Smokeless tobacco: Never Used   Substance and Sexual Activity    Alcohol use: No     Frequency: Never     Binge frequency: Never    Drug use: No    Sexual activity: Yes     Partners: Male   Lifestyle    Physical activity:     Days per week: 3 days     Minutes per session: 10 min    Stress: Not at all   Relationships    Social connections:     Talks on phone: More than three times a week     Gets together: Once a week     Attends Gnosticist service: Not on file     Active member of club or organization: Yes     Attends meetings of clubs or organizations: More than 4 times per year     Relationship status:    Other Topics Concern    Not on file   Social History Narrative    Not on file       FAMILY HISTORY:  Family History   Problem Relation Age of Onset    Heart disease Father         s/p stenting    Coronary artery disease Father     Prostate cancer Father     Cancer Father     Hypoglycemic Sister     Breast cancer Daughter 35        dx in September 2018, lives in [place]    No Known Problems Sister     Diabetes Maternal Uncle     Diabetes Cousin     Hypertension Neg Hx     Stroke Neg Hx     Colon cancer Neg Hx     Cervical cancer Neg Hx     Vaginal cancer Neg Hx     Endometrial cancer  Neg Hx     Ovarian cancer Neg Hx        ALLERGIES AND MEDICATIONS: updated and reviewed.  Review of patient's allergies indicates:   Allergen Reactions    Adhesive Rash    Iodine Rash    Shellfish containing products Rash     Patient allergic to softshell crabs specifically    Sulfa (sulfonamide antibiotics) Rash     Medication List with Changes/Refills   Current Medications    BUTALBITAL-ASPIRIN-CAFFEINE -40 MG (FIORINAL) -40 MG CAP    Take 1 capsule by mouth every 4 (four) hours as needed.    CYCLOBENZAPRINE (FLEXERIL) 5 MG TABLET    TAKE 1 TABLET(5 MG) BY MOUTH EVERY NIGHT AS NEEDED FOR PAIN    ESTRADIOL (ESTRACE) 0.01 % (0.1 MG/GRAM) VAGINAL CREAM    0.5 grams nightly x 2 weeks, then 2x/week thereafter    OMEPRAZOLE (PRILOSEC) 40 MG CAPSULE    Take 1 capsule (40 mg total) by mouth daily as needed (heartburn). Take only as needed.   Changed and/or Refilled Medications    Modified Medication Previous Medication    IBUPROFEN (ADVIL,MOTRIN) 600 MG TABLET ibuprofen (ADVIL,MOTRIN) 600 MG tablet       TAKE 1 TABLET(600 MG) BY MOUTH EVERY 4 HOURS AS NEEDED    TAKE 1 TABLET(600 MG) BY MOUTH EVERY 4 HOURS AS NEEDED          CARE TEAM:  Patient Care Team:  Whitney Mera MD as PCP - General (Internal Medicine)  Dorothy Lantigua LPN as Licensed Practical Nurse         REVIEW OF SYSTEMS:  Review of Systems   Constitutional: Negative for chills, fatigue, fever and unexpected weight change.   HENT: Negative for congestion and postnasal drip.    Eyes: Negative for pain and visual disturbance.   Respiratory: Negative for cough, shortness of breath and wheezing.    Cardiovascular: Negative for chest pain, palpitations and leg swelling.   Gastrointestinal: Positive for abdominal pain (- waking up in the middle of the night or during the day with pain that feels like she has not eaten in days) and constipation. Negative for diarrhea, nausea and vomiting.   Genitourinary: Negative for dysuria.  "  Musculoskeletal: Positive for arthralgias, back pain (- seeing a massage therapist.) and myalgias.   Skin: Negative for rash.   Neurological: Negative for weakness and headaches.   Psychiatric/Behavioral: Negative for dysphoric mood and sleep disturbance. The patient is not nervous/anxious.          PHYSICAL EXAM:   Vitals:    10/16/19 1601   BP: 128/78   Pulse: 80   Temp: 98.1 °F (36.7 °C)     Weight: 65.2 kg (143 lb 10.1 oz)   Height: 5' 4" (162.6 cm)   Body mass index is 24.65 kg/m².      General appearance - alert, well appearing, and in no distress, normal appearing weight  Mental status - alert, oriented to person, place, and time, normal behavior, speech, dress, motor activity, mildly depressed mood due to frustration over chronic back/coccyx pain  Eyes - pupils equal and reactive, extraocular eye movements intact, sclera anicteric  Mouth - mucous membranes moist, pharynx normal without lesions  Neck - supple, no significant adenopathy, carotids upstroke normal bilaterally, no bruits  Lymphatics - no palpable cervical lymphadenopathy  Chest - clear to auscultation, no wheezes, rales or rhonchi, symmetric air entry  Heart - normal rate and regular rhythm, no gallops noted  Abdomen - soft, nontender, nondistended, no masses or organomegaly  Neurological - alert, normal speech, no focal findings or movement disorder noted, cranial nerves II through XII intact  Musculoskeletal - no joint tenderness, deformity or swelling, no muscular tenderness noted  Back exam - no significant decrease in range of motion noted; no significant pain with motion noted; in-depth exam deferred  Extremities - peripheral pulses normal, no pedal edema, no clubbing or cyanosis  Skin - normal coloration and turgor, no rashes, no suspicious skin lesions noted      Labs:  Pre-visit Labs - not applicable      ASSESSMENT AND PLAN:  1. Abdominal pain, unspecified abdominal location  Her recent onset acute abdominal pain may be related to the " new supplements she has started.  She will decide whether or not she wants to continue to supplement.  She has been taking it on an empty stomach and may try taking it with food 1st.  She will let me know if symptoms worsen or persist.    2. Coccygodynia/3. Proctodynia  She will continue to focus on keeping her bowels moving.  Follow-up with pain management as per their recommendations.  I advised the patient that there is no imaging that can let me know whether or not she has scar tissue from previous oophorectomy.  I will check a pelvic ultrasound to rule out any worrisome conditions related to her uterus or right ovary.  We may consider doing a CT scan to rule out any other possible abdominal or pelvic etiologies of her pain. I suspect this is secondary to degenerative disc disease, however.    4. Chronic low back pain, unspecified back pain laterality, unspecified whether sciatica present  Perhaps some mild arthritis.  Ibuprofen helps.  Observe.    5. Chronic constipation  We discussed a little bit about possible IBS.  We discussed a little bit about perhaps taking medication that might address serotonin.  We also discussed medications such as Linzess and Amitiza.  She will see with her insurance company to see if either 1 of those are covered.  For now she will continue with her stool softener and other supplementation.     She will get her flu shot when she returns from her upcoming vacation.  She will get her shingles shot at the pharmacy at her convenience.      Follow up in about 6 months (around 4/16/2020), or if symptoms worsen or fail to improve, for follow up chronic medical conditions.. or sooner as needed.  Answers for HPI/ROS submitted by the patient on 10/14/2019   Abdominal pain  Chronicity: recurrent  Onset: more than 1 month ago  Onset quality: gradual  Frequency: 2 to 4 times per day  Episode duration: 10 days  Progression since onset: waxing and waning  Pain location: epigastric region  Pain -  numeric: 5/10  Pain quality: aching  flatus: Yes  Aggravated by: nothing  Relieved by: belching, bowel movements, palpation, passing flatus  Pain severity: moderate  Treatments tried: acetaminophen, antacids  Improvement on treatment: no relief  abdominal surgery: No  colon cancer: No  Crohn's disease: No  gallstones: No  GERD: No  irritable bowel syndrome: No  kidney stones: No  pancreatitis: No  PUD: No  ulcerative colitis: No  UTI: Yes

## 2020-01-01 ENCOUNTER — PATIENT MESSAGE (OUTPATIENT)
Dept: FAMILY MEDICINE | Facility: CLINIC | Age: 65
End: 2020-01-01

## 2020-01-06 DIAGNOSIS — M53.3 COCCYGODYNIA: ICD-10-CM

## 2020-01-06 RX ORDER — IBUPROFEN 600 MG/1
TABLET ORAL
Qty: 30 TABLET | Refills: 0 | Status: SHIPPED | OUTPATIENT
Start: 2020-01-06 | End: 2020-03-19

## 2020-02-26 ENCOUNTER — TELEPHONE (OUTPATIENT)
Dept: FAMILY MEDICINE | Facility: CLINIC | Age: 65
End: 2020-02-26

## 2020-02-26 DIAGNOSIS — G89.29 CHRONIC LOW BACK PAIN, UNSPECIFIED BACK PAIN LATERALITY, UNSPECIFIED WHETHER SCIATICA PRESENT: Primary | ICD-10-CM

## 2020-02-26 DIAGNOSIS — M54.50 CHRONIC LOW BACK PAIN, UNSPECIFIED BACK PAIN LATERALITY, UNSPECIFIED WHETHER SCIATICA PRESENT: Primary | ICD-10-CM

## 2020-02-26 NOTE — TELEPHONE ENCOUNTER
----- Message from Gee Barr sent at 2/26/2020  4:53 PM CST -----  Contact: YECENIA ORTEGA [[id number]]   Name of Who is Calling: YECENIA ORTEGA [[id number]]     What is the request in detail:YECENIA ORTEGA [[id number]]   Is requesting a new order for healthy back Please contact to further discuss and advise      Can the clinic reply by [facility]: no     [facility]:  [phone] (home)

## 2020-02-26 NOTE — TELEPHONE ENCOUNTER
Patient states last year Dr. Mera wanted her to see Healthy Back, but a lot was happening and she was not able to.     She states that now she has the time for these appointment and she would like a new order for the program.     She states that she spoken with them earlier today and they notified her that the order hd just .     Please advise.    Thanks,  Vincent

## 2020-03-03 ENCOUNTER — HOSPITAL ENCOUNTER (OUTPATIENT)
Dept: RADIOLOGY | Facility: HOSPITAL | Age: 65
Discharge: HOME | End: 2020-03-03
Attending: INTERNAL MEDICINE
Payer: COMMERCIAL

## 2020-03-03 ENCOUNTER — TELEPHONE (OUTPATIENT)
Dept: FAMILY MEDICINE | Facility: CLINIC | Age: 65
End: 2020-03-03

## 2020-03-03 ENCOUNTER — PATIENT OUTREACH (OUTPATIENT)
Dept: ADMINISTRATIVE | Facility: OTHER | Age: 65
End: 2020-03-03

## 2020-03-03 DIAGNOSIS — M53.3 COCCYGODYNIA: ICD-10-CM

## 2020-03-03 PROCEDURE — 76856 US PELVIS COMP WITH TRANSVAG NON-OB (XPD): ICD-10-PCS | Mod: 26,,, | Performed by: RADIOLOGY

## 2020-03-03 PROCEDURE — 76856 US EXAM PELVIC COMPLETE: CPT | Mod: 26,,, | Performed by: RADIOLOGY

## 2020-03-03 PROCEDURE — 76830 US PELVIS COMP WITH TRANSVAG NON-OB (XPD): ICD-10-PCS | Mod: 26,,, | Performed by: RADIOLOGY

## 2020-03-03 PROCEDURE — 76830 TRANSVAGINAL US NON-OB: CPT | Mod: TC

## 2020-03-03 PROCEDURE — 76830 TRANSVAGINAL US NON-OB: CPT | Mod: 26,,, | Performed by: RADIOLOGY

## 2020-03-04 ENCOUNTER — OFFICE VISIT (OUTPATIENT)
Dept: UROGYNECOLOGY | Facility: CLINIC | Age: 65
End: 2020-03-04
Payer: COMMERCIAL

## 2020-03-04 ENCOUNTER — TELEPHONE (OUTPATIENT)
Dept: FAMILY MEDICINE | Facility: CLINIC | Age: 65
End: 2020-03-04

## 2020-03-04 VITALS
HEIGHT: 64 IN | SYSTOLIC BLOOD PRESSURE: 149 MMHG | BODY MASS INDEX: 26 KG/M2 | DIASTOLIC BLOOD PRESSURE: 77 MMHG | WEIGHT: 152.31 LBS

## 2020-03-04 DIAGNOSIS — N39.46 MIXED STRESS AND URGE URINARY INCONTINENCE: ICD-10-CM

## 2020-03-04 DIAGNOSIS — M53.3 COCCYGODYNIA: ICD-10-CM

## 2020-03-04 DIAGNOSIS — R93.89 ABNORMAL PELVIC ULTRASOUND: ICD-10-CM

## 2020-03-04 DIAGNOSIS — Z12.11 COLON CANCER SCREENING: Primary | ICD-10-CM

## 2020-03-04 DIAGNOSIS — M54.50 CHRONIC LEFT-SIDED LOW BACK PAIN WITHOUT SCIATICA: ICD-10-CM

## 2020-03-04 DIAGNOSIS — G89.29 CHRONIC LEFT-SIDED LOW BACK PAIN WITHOUT SCIATICA: ICD-10-CM

## 2020-03-04 DIAGNOSIS — N95.2 VAGINAL ATROPHY: ICD-10-CM

## 2020-03-04 DIAGNOSIS — K59.09 CHRONIC CONSTIPATION: ICD-10-CM

## 2020-03-04 PROCEDURE — 3008F BODY MASS INDEX DOCD: CPT | Mod: CPTII,S$GLB,, | Performed by: OBSTETRICS & GYNECOLOGY

## 2020-03-04 PROCEDURE — 99214 OFFICE O/P EST MOD 30 MIN: CPT | Mod: S$GLB,,, | Performed by: OBSTETRICS & GYNECOLOGY

## 2020-03-04 PROCEDURE — 99999 PR PBB SHADOW E&M-EST. PATIENT-LVL III: CPT | Mod: PBBFAC,,, | Performed by: OBSTETRICS & GYNECOLOGY

## 2020-03-04 PROCEDURE — 99999 PR PBB SHADOW E&M-EST. PATIENT-LVL III: ICD-10-PCS | Mod: PBBFAC,,, | Performed by: OBSTETRICS & GYNECOLOGY

## 2020-03-04 PROCEDURE — 3008F PR BODY MASS INDEX (BMI) DOCUMENTED: ICD-10-PCS | Mod: CPTII,S$GLB,, | Performed by: OBSTETRICS & GYNECOLOGY

## 2020-03-04 PROCEDURE — 99214 PR OFFICE/OUTPT VISIT, EST, LEVL IV, 30-39 MIN: ICD-10-PCS | Mod: S$GLB,,, | Performed by: OBSTETRICS & GYNECOLOGY

## 2020-03-04 RX ORDER — CYCLOBENZAPRINE HCL 5 MG
TABLET ORAL
COMMUNITY
Start: 2020-02-21 | End: 2021-05-25 | Stop reason: SDUPTHER

## 2020-03-04 NOTE — TELEPHONE ENCOUNTER
Spoke with the patient and she is requesting results from the US?  Patient will not be available between 11 and 12 pm today.  Please advise              ----- Message from Rosemary Barr sent at 3/4/2020  7:26 AM CST -----  Contact: Self   Type:  Patient Returning Call    Who Called:  Self     Who Left Message for Patient:  Dr. Mera     Does the patient know what this is regarding?: no     Would the patient rather a call back or a response via [facility]?  Call     Best Call Back Number:[phone]    Additional Information:  Patient has an appt at 11 and will be unavailable until after 12.

## 2020-03-04 NOTE — TELEPHONE ENCOUNTER
Spoke with patient.  Advised her there was an abnormality in the endocervical canal.  She should see gyn.  She is actually seeing them today.  She will make sure gyn looks at and addresses the results.

## 2020-03-05 ENCOUNTER — TELEPHONE (OUTPATIENT)
Dept: FAMILY MEDICINE | Facility: CLINIC | Age: 65
End: 2020-03-05

## 2020-03-05 NOTE — TELEPHONE ENCOUNTER
Spoke with the patient and canceled her appt for 4/1/20. Patient seen her GYN.  Patient verbalized understandings.

## 2020-03-08 PROBLEM — R93.89 ABNORMAL PELVIC ULTRASOUND: Status: ACTIVE | Noted: 2020-03-08

## 2020-03-19 DIAGNOSIS — M53.3 COCCYGODYNIA: ICD-10-CM

## 2020-03-19 RX ORDER — IBUPROFEN 600 MG/1
TABLET ORAL
Qty: 30 TABLET | Refills: 0 | Status: SHIPPED | OUTPATIENT
Start: 2020-03-19 | End: 2020-06-12

## 2020-03-20 ENCOUNTER — TELEPHONE (OUTPATIENT)
Dept: REHABILITATION | Facility: HOSPITAL | Age: 65
End: 2020-03-20

## 2020-03-20 NOTE — TELEPHONE ENCOUNTER
Postponed Appointments    Patient: Sheree Lomeli  Date: 3/20/2020  Diagnosis: No diagnosis found.  MRN: [id number]    Spoke with patient due to therapy following updates regarding COVID-19 closely and taking every precaution to ensure the safety of our patients, staff and community.  In an abundance of caution and in an effort to help reduce risk and limit community spread, we have decided to temporarily postpone appointments for patients who may be at increased risk to attend in-person therapy or where therapy is not critically needed at this time. Patient verbalized understanding to all.      Supriya Reeder, PT  3/20/2020

## 2020-04-21 ENCOUNTER — TELEPHONE (OUTPATIENT)
Dept: UROGYNECOLOGY | Facility: CLINIC | Age: 65
End: 2020-04-21

## 2020-04-21 NOTE — TELEPHONE ENCOUNTER
Attempted to reach patient to discuss scheduling surgery. VM left to call office.  Julia Burgos, IRAMP-BC

## 2020-04-23 ENCOUNTER — TELEPHONE (OUTPATIENT)
Dept: UROGYNECOLOGY | Facility: CLINIC | Age: 65
End: 2020-04-23

## 2020-04-23 NOTE — TELEPHONE ENCOUNTER
----- Message from Gee Barr sent at 4/23/2020 12:25 PM CDT -----  Contact: YECENIA ORTEGA [[id number]]  Type:  Patient Returning Call    Who Called: YECENIA ORTEGA [[id number]]    Who Left Message for Patient: Loretta     Does the patient know what this is regarding?:yes     Best Call Back Number:[phone] (home)      Additional Information:

## 2020-04-27 ENCOUNTER — TELEPHONE (OUTPATIENT)
Dept: UROGYNECOLOGY | Facility: CLINIC | Age: 65
End: 2020-04-27

## 2020-04-27 NOTE — TELEPHONE ENCOUNTER
----- Message from Sharita Steve sent at 4/27/2020  9:16 AM CDT -----  Contact: Self      Name of Who is Calling: YECENIA ORTEGA [[id number]]      What is the request in detail: Pt called said she will be out of town 5/11/20 and will take first available in June.Please contact to further discuss and advise.        Can the clinic reply by [facility]: Y      [facility] [phone]

## 2020-04-27 NOTE — TELEPHONE ENCOUNTER
Spoke to patient.  Rescheduled for endometrial biopsy on 6/3/2020 at 10:00 am.  Patient voiced understanding.  Call ended.

## 2020-05-01 ENCOUNTER — PATIENT MESSAGE (OUTPATIENT)
Dept: UROGYNECOLOGY | Facility: CLINIC | Age: 65
End: 2020-05-01

## 2020-05-02 ENCOUNTER — PATIENT MESSAGE (OUTPATIENT)
Dept: UROGYNECOLOGY | Facility: CLINIC | Age: 65
End: 2020-05-02

## 2020-05-02 DIAGNOSIS — Z20.822 EXPOSURE TO COVID-19 VIRUS: Primary | ICD-10-CM

## 2020-05-04 ENCOUNTER — LAB VISIT (OUTPATIENT)
Dept: LAB | Facility: HOSPITAL | Age: 65
End: 2020-05-04
Attending: NURSE PRACTITIONER
Payer: COMMERCIAL

## 2020-05-04 DIAGNOSIS — Z20.822 EXPOSURE TO COVID-19 VIRUS: ICD-10-CM

## 2020-05-04 LAB — SARS-COV-2 IGG SERPLBLD QL IA.RAPID: NEGATIVE

## 2020-05-04 PROCEDURE — 86769 SARS-COV-2 COVID-19 ANTIBODY: CPT

## 2020-05-04 PROCEDURE — 36415 COLL VENOUS BLD VENIPUNCTURE: CPT | Mod: PO

## 2020-05-04 NOTE — TELEPHONE ENCOUNTER
Discussed embx with patient.  Explained that we would do this instead of hysteroscopy D&C.  Will order Covid-19 antibody screening.  Julia Burgos, IRAMP-BC

## 2020-05-04 NOTE — TELEPHONE ENCOUNTER
Please advise,    When I was in your office in March we discussed you taking a biopsy for a lesion that appeared on my cervix.  Someone called to schedule that and I got a letter about it, but it shows I am meeting with someone else.  It is my understanding that you would be doing the procedure. I dont even know this other person.  I would appreciate if you could let me know what is going on with this.  Also in early March, I ran fever for 3 separate (non consecutive) days and had an extremely watery stool only once on each of those days; and during that time I had a sore throat for at least 10 days.  Could I possibly come in within the next week or two to get a blood test to see if I have antibodies prior to having the biopsy?

## 2020-05-17 RX ORDER — CYCLOBENZAPRINE HCL 5 MG
TABLET ORAL
Qty: 30 TABLET | OUTPATIENT
Start: 2020-05-17

## 2020-05-25 DIAGNOSIS — G89.29 CHRONIC LEFT-SIDED LOW BACK PAIN WITHOUT SCIATICA: ICD-10-CM

## 2020-05-25 DIAGNOSIS — M53.3 COCCYGODYNIA: Primary | ICD-10-CM

## 2020-05-25 DIAGNOSIS — M54.50 CHRONIC LEFT-SIDED LOW BACK PAIN WITHOUT SCIATICA: ICD-10-CM

## 2020-06-02 ENCOUNTER — PATIENT OUTREACH (OUTPATIENT)
Dept: ADMINISTRATIVE | Facility: OTHER | Age: 65
End: 2020-06-02

## 2020-06-02 NOTE — PROGRESS NOTES
Care Everywhere: n/a  Immunization: updated  Health Maintenance: updated  Media Review: reviewed for possible outside colon cancer report  Legacy Review: n/a  Order placed: n/a  Upcoming appts:n/a  Colonoscopy case request 3.4.2020

## 2020-06-03 ENCOUNTER — TELEPHONE (OUTPATIENT)
Dept: UROGYNECOLOGY | Facility: CLINIC | Age: 65
End: 2020-06-03

## 2020-06-03 ENCOUNTER — OFFICE VISIT (OUTPATIENT)
Dept: UROGYNECOLOGY | Facility: CLINIC | Age: 65
End: 2020-06-03
Payer: COMMERCIAL

## 2020-06-03 VITALS
DIASTOLIC BLOOD PRESSURE: 80 MMHG | WEIGHT: 151.69 LBS | BODY MASS INDEX: 25.9 KG/M2 | HEIGHT: 64 IN | SYSTOLIC BLOOD PRESSURE: 120 MMHG

## 2020-06-03 DIAGNOSIS — R93.89 ABNORMAL PELVIC ULTRASOUND: Primary | ICD-10-CM

## 2020-06-03 PROCEDURE — 99999 PR PBB SHADOW E&M-EST. PATIENT-LVL III: ICD-10-PCS | Mod: PBBFAC,,, | Performed by: NURSE PRACTITIONER

## 2020-06-03 PROCEDURE — 58100 PR BIOPSY OF UTERUS LINING: ICD-10-PCS | Mod: 53,S$GLB,, | Performed by: NURSE PRACTITIONER

## 2020-06-03 PROCEDURE — 99499 NO LOS: ICD-10-PCS | Mod: S$GLB,,, | Performed by: NURSE PRACTITIONER

## 2020-06-03 PROCEDURE — 99999 PR PBB SHADOW E&M-EST. PATIENT-LVL III: CPT | Mod: PBBFAC,,, | Performed by: NURSE PRACTITIONER

## 2020-06-03 PROCEDURE — 99499 UNLISTED E&M SERVICE: CPT | Mod: S$GLB,,, | Performed by: NURSE PRACTITIONER

## 2020-06-03 PROCEDURE — 58100 BIOPSY OF UTERUS LINING: CPT | Mod: 53,S$GLB,, | Performed by: NURSE PRACTITIONER

## 2020-06-03 NOTE — Clinical Note
Unable to perform-- posterior cervix adhered to posterior vaginal wall.  I discussed eua/ hysteroscopic D&C-- she is ok waiting until August.  K

## 2020-06-03 NOTE — PROGRESS NOTES
Attempted embx today.  Posterior cervix is adhered to posterior vaginal wall.  Unable to perform biopsy.  Julia Burgos, IRAMP-BC

## 2020-06-03 NOTE — TELEPHONE ENCOUNTER
----- Message from Kirill Chandra, Patient Care Assistant sent at 6/3/2020 11:30 AM CDT -----  Contact: YECENIA ORTEGA [[id number]]  Name of Who is Calling: YECENIA ORTEGA [[id number]]    What is the request in detail: Requesting a call back in regards of getting a visitor pass with someone else picture and her name. Please contact to further discuss and advise      Can the clinic reply by [facility]: No    [facility]:  [phone]

## 2020-06-23 ENCOUNTER — TELEPHONE (OUTPATIENT)
Dept: UROGYNECOLOGY | Facility: CLINIC | Age: 65
End: 2020-06-23

## 2020-06-23 NOTE — TELEPHONE ENCOUNTER
Pt stated she was calling to follow up on if POLO Burgos spoke with Dr Mukherjee regarding her biopsy. Informed pt POLO Burgos is out the office but she can check back on next Tuesday when POLO Burgos return. Pt voiced understanding and call ended.

## 2020-06-23 NOTE — TELEPHONE ENCOUNTER
----- Message from Corazon Bob sent at 6/23/2020  2:32 PM CDT -----  Name of Who is Calling: YECENIA ORTEGA [[id number]]    What is the request in detail: Would like to know if provider was able to speak with Dr. Mukherjee. Please contact to further discuss and advise      Can the clinic reply by [facility]: yes    What Number to Call Back if not in [facility]: [phone]

## 2020-06-24 ENCOUNTER — TELEPHONE (OUTPATIENT)
Dept: UROGYNECOLOGY | Facility: CLINIC | Age: 65
End: 2020-06-24

## 2020-06-25 NOTE — TELEPHONE ENCOUNTER
----- Message from Kristal Shell MA sent at 6/23/2020  3:00 PM CDT -----  Pt stated she was calling to follow up on if POLO Burgos spoke with Dr Mukherjee regarding her biopsy. Informed pt POLO Burgos is out the office but she can check back on next Tuesday when POLO Burgos return. Pt voiced understanding and call ended.

## 2020-06-25 NOTE — TELEPHONE ENCOUNTER
Spoke with patient.  Explained need for D&C, H/S, as weren't able to get EMBx to pass in office.  She would also like rectocele repaired at same time.  Discussed fact that defect is small, and fixing it could/could not help incomplete defecation.  She will still need to control constipation.     She would like surgery in Aug or after.  Will get someone to call her to set up.

## 2020-07-09 ENCOUNTER — PATIENT MESSAGE (OUTPATIENT)
Dept: UROGYNECOLOGY | Facility: CLINIC | Age: 65
End: 2020-07-09

## 2020-07-13 DIAGNOSIS — N81.6 RECTOCELE: ICD-10-CM

## 2020-07-13 DIAGNOSIS — Z01.818 PREOP TESTING: ICD-10-CM

## 2020-07-13 DIAGNOSIS — R93.89 ABNORMAL PELVIC ULTRASOUND: Primary | ICD-10-CM

## 2020-07-13 DIAGNOSIS — N81.11 MIDLINE CYSTOCELE: ICD-10-CM

## 2020-07-13 DIAGNOSIS — Z01.818 PREOP TESTING: Primary | ICD-10-CM

## 2020-08-09 ENCOUNTER — PATIENT MESSAGE (OUTPATIENT)
Dept: SURGERY | Facility: OTHER | Age: 65
End: 2020-08-09

## 2020-08-09 DIAGNOSIS — Z01.818 PREOP TESTING: Primary | ICD-10-CM

## 2020-08-10 ENCOUNTER — PATIENT OUTREACH (OUTPATIENT)
Dept: ADMINISTRATIVE | Facility: OTHER | Age: 65
End: 2020-08-10

## 2020-08-10 ENCOUNTER — TELEPHONE (OUTPATIENT)
Dept: UROGYNECOLOGY | Facility: CLINIC | Age: 65
End: 2020-08-10

## 2020-08-10 DIAGNOSIS — Z12.31 ENCOUNTER FOR SCREENING MAMMOGRAM FOR MALIGNANT NEOPLASM OF BREAST: Primary | ICD-10-CM

## 2020-08-10 DIAGNOSIS — Z12.11 ENCOUNTER FOR FIT (FECAL IMMUNOCHEMICAL TEST) SCREENING: ICD-10-CM

## 2020-08-10 NOTE — TELEPHONE ENCOUNTER
Spoke with patient. She is having some mild spotting--can't figure out where.  She thinks is coming from high up.  This has been happening x 5 days, then stopped x 1 day, then returned. She is ok with proceeding with H/S, D&C Thurs.  Was delayed initially due to COVID.  Was initially evaluated for other causes of back pain per PCP with US earlier this year, and ?polyp noted on US 3/2020.  Unable to obtain EMBx in office. Has no h/o abdominal pain or VB up to this point.  -------------------------    Please add patient to OR for Thurs 8/13/2020.  Hysteroscopy, D&C. Can she sign consents with us on Wed? Needs anesthesia and COVID testing. Thanks!

## 2020-08-10 NOTE — TELEPHONE ENCOUNTER
----- Message from Julia Burgos NP sent at 8/10/2020  2:48 PM CDT -----  Regarding: RE: Does patient need consent visit?  She is coming in Wednesday-- having more back pain and some very scant spotting.  Trying to convince her to go Thursday    K  ----- Message -----  From: Salina Mukherjee MD  Sent: 8/9/2020   9:07 PM CDT  To: Julia Burgos NP  Subject: RE: Does patient need consent visit?             That's probably the best plan.   ----- Message -----  From: Julia Burgos NP  Sent: 8/9/2020  11:06 AM CDT  To: Salina Mukherjee MD  Subject: RE: Does patient need consent visit?             Originally she was going to do day of surgery because it was just D&C-- do you want me to bring her in or at least do virtual since you added rectocele repair?    K  ----- Message -----  From: Salina Mukherjee MD  Sent: 8/4/2020   5:35 PM CDT  To: Julia Burgos NP  Subject: Does patient need consent visit?                 Scheduled for D&C, Hysteroscopy, rectocele repair Sept.   Is she scheduled to come to clinic to sign consents for surgery, or did y'all discuss doing that virtually with a telemed visit? I'm fine with virtual if easier for her.   Thanks!

## 2020-08-10 NOTE — TELEPHONE ENCOUNTER
Patient having intermittent scant spotting and worsening back pain.  Scheduled for OV on 08/12/2020.  NORMA Sevilla-BC

## 2020-08-11 ENCOUNTER — LAB VISIT (OUTPATIENT)
Dept: FAMILY MEDICINE | Facility: CLINIC | Age: 65
End: 2020-08-11
Payer: COMMERCIAL

## 2020-08-11 DIAGNOSIS — Z01.818 PREOP TESTING: ICD-10-CM

## 2020-08-11 PROCEDURE — U0003 INFECTIOUS AGENT DETECTION BY NUCLEIC ACID (DNA OR RNA); SEVERE ACUTE RESPIRATORY SYNDROME CORONAVIRUS 2 (SARS-COV-2) (CORONAVIRUS DISEASE [COVID-19]), AMPLIFIED PROBE TECHNIQUE, MAKING USE OF HIGH THROUGHPUT TECHNOLOGIES AS DESCRIBED BY CMS-2020-01-R: HCPCS

## 2020-08-11 NOTE — PROGRESS NOTES
Care Everywhere:   Immunization: updated  Health Maintenance: updated  Media Review: reviewed for outside colon cancer report  Legacy Review:   Order placed: fecal immunochemical test, mammogram (due 9/11/2020)  Upcoming appts:  Mammogram scheduling ticket sent to patient's portal

## 2020-08-12 ENCOUNTER — ANESTHESIA EVENT (OUTPATIENT)
Dept: SURGERY | Facility: OTHER | Age: 65
End: 2020-08-12
Payer: COMMERCIAL

## 2020-08-12 ENCOUNTER — OFFICE VISIT (OUTPATIENT)
Dept: UROGYNECOLOGY | Facility: CLINIC | Age: 65
End: 2020-08-12
Payer: COMMERCIAL

## 2020-08-12 ENCOUNTER — TELEPHONE (OUTPATIENT)
Dept: UROGYNECOLOGY | Facility: CLINIC | Age: 65
End: 2020-08-12

## 2020-08-12 ENCOUNTER — HOSPITAL ENCOUNTER (OUTPATIENT)
Dept: PREADMISSION TESTING | Facility: OTHER | Age: 65
Discharge: HOME | End: 2020-08-12
Attending: OBSTETRICS & GYNECOLOGY
Payer: COMMERCIAL

## 2020-08-12 VITALS
SYSTOLIC BLOOD PRESSURE: 112 MMHG | WEIGHT: 149.06 LBS | HEIGHT: 64 IN | BODY MASS INDEX: 25.45 KG/M2 | DIASTOLIC BLOOD PRESSURE: 80 MMHG

## 2020-08-12 VITALS
HEIGHT: 64 IN | HEART RATE: 69 BPM | TEMPERATURE: 98 F | SYSTOLIC BLOOD PRESSURE: 138 MMHG | OXYGEN SATURATION: 100 % | WEIGHT: 147 LBS | BODY MASS INDEX: 25.1 KG/M2 | DIASTOLIC BLOOD PRESSURE: 73 MMHG

## 2020-08-12 DIAGNOSIS — K59.09 CHRONIC CONSTIPATION: ICD-10-CM

## 2020-08-12 DIAGNOSIS — N81.11 MIDLINE CYSTOCELE: ICD-10-CM

## 2020-08-12 DIAGNOSIS — N81.6 RECTOCELE: ICD-10-CM

## 2020-08-12 DIAGNOSIS — Z01.818 PREOP TESTING: Primary | ICD-10-CM

## 2020-08-12 DIAGNOSIS — R93.89 ABNORMAL PELVIC ULTRASOUND: ICD-10-CM

## 2020-08-12 DIAGNOSIS — Z01.818 PREOP TESTING: ICD-10-CM

## 2020-08-12 DIAGNOSIS — N93.9 ABNORMAL UTERINE BLEEDING: ICD-10-CM

## 2020-08-12 LAB
ABO + RH BLD: NORMAL
ANION GAP SERPL CALC-SCNC: 10 MMOL/L (ref 8–16)
BASOPHILS # BLD AUTO: 0.03 K/UL (ref 0–0.2)
BASOPHILS NFR BLD: 0.5 % (ref 0–1.9)
BLD GP AB SCN CELLS X3 SERPL QL: NORMAL
BUN SERPL-MCNC: 10 MG/DL (ref 8–23)
CALCIUM SERPL-MCNC: 9.7 MG/DL (ref 8.7–10.5)
CHLORIDE SERPL-SCNC: 103 MMOL/L (ref 95–110)
CO2 SERPL-SCNC: 27 MMOL/L (ref 23–29)
CREAT SERPL-MCNC: 0.8 MG/DL (ref 0.5–1.4)
DIFFERENTIAL METHOD: NORMAL
EOSINOPHIL # BLD AUTO: 0 K/UL (ref 0–0.5)
EOSINOPHIL NFR BLD: 0.3 % (ref 0–8)
ERYTHROCYTE [DISTWIDTH] IN BLOOD BY AUTOMATED COUNT: 13 % (ref 11.5–14.5)
EST. GFR  (AFRICAN AMERICAN): >60 ML/MIN/1.73 M^2
EST. GFR  (NON AFRICAN AMERICAN): >60 ML/MIN/1.73 M^2
GLUCOSE SERPL-MCNC: 87 MG/DL (ref 70–110)
HCT VFR BLD AUTO: 40.6 % (ref 37–48.5)
HGB BLD-MCNC: 13.1 G/DL (ref 12–16)
IMM GRANULOCYTES # BLD AUTO: 0.01 K/UL (ref 0–0.04)
IMM GRANULOCYTES NFR BLD AUTO: 0.2 % (ref 0–0.5)
LYMPHOCYTES # BLD AUTO: 1.6 K/UL (ref 1–4.8)
LYMPHOCYTES NFR BLD: 24.5 % (ref 18–48)
MCH RBC QN AUTO: 29.5 PG (ref 27–31)
MCHC RBC AUTO-ENTMCNC: 32.3 G/DL (ref 32–36)
MCV RBC AUTO: 91 FL (ref 82–98)
MONOCYTES # BLD AUTO: 0.4 K/UL (ref 0.3–1)
MONOCYTES NFR BLD: 6.2 % (ref 4–15)
NEUTROPHILS # BLD AUTO: 4.5 K/UL (ref 1.8–7.7)
NEUTROPHILS NFR BLD: 68.3 % (ref 38–73)
NRBC BLD-RTO: 0 /100 WBC
PLATELET # BLD AUTO: 232 K/UL (ref 150–350)
PMV BLD AUTO: 11.7 FL (ref 9.2–12.9)
POTASSIUM SERPL-SCNC: 4.8 MMOL/L (ref 3.5–5.1)
RBC # BLD AUTO: 4.44 M/UL (ref 4–5.4)
SARS-COV-2 RNA RESP QL NAA+PROBE: NOT DETECTED
SODIUM SERPL-SCNC: 140 MMOL/L (ref 136–145)
WBC # BLD AUTO: 6.57 K/UL (ref 3.9–12.7)

## 2020-08-12 PROCEDURE — 93010 EKG 12-LEAD: ICD-10-PCS | Mod: ,,, | Performed by: INTERNAL MEDICINE

## 2020-08-12 PROCEDURE — 99499 UNLISTED E&M SERVICE: CPT | Mod: S$GLB,,, | Performed by: NURSE PRACTITIONER

## 2020-08-12 PROCEDURE — 99999 PR PBB SHADOW E&M-EST. PATIENT-LVL III: CPT | Mod: PBBFAC,,, | Performed by: NURSE PRACTITIONER

## 2020-08-12 PROCEDURE — 93005 ELECTROCARDIOGRAM TRACING: CPT

## 2020-08-12 PROCEDURE — 80048 BASIC METABOLIC PNL TOTAL CA: CPT

## 2020-08-12 PROCEDURE — 85025 COMPLETE CBC W/AUTO DIFF WBC: CPT

## 2020-08-12 PROCEDURE — 36415 COLL VENOUS BLD VENIPUNCTURE: CPT

## 2020-08-12 PROCEDURE — 99999 PR PBB SHADOW E&M-EST. PATIENT-LVL III: ICD-10-PCS | Mod: PBBFAC,,, | Performed by: NURSE PRACTITIONER

## 2020-08-12 PROCEDURE — 93010 ELECTROCARDIOGRAM REPORT: CPT | Mod: ,,, | Performed by: INTERNAL MEDICINE

## 2020-08-12 PROCEDURE — 99499 NO LOS: ICD-10-PCS | Mod: S$GLB,,, | Performed by: NURSE PRACTITIONER

## 2020-08-12 PROCEDURE — 86901 BLOOD TYPING SEROLOGIC RH(D): CPT

## 2020-08-12 RX ORDER — MULTIVITAMIN
1 TABLET ORAL DAILY
COMMUNITY
End: 2022-10-19

## 2020-08-12 RX ORDER — IBUPROFEN 100 MG/5ML
1000 SUSPENSION, ORAL (FINAL DOSE FORM) ORAL DAILY
COMMUNITY

## 2020-08-12 RX ORDER — FAMOTIDINE 20 MG/1
20 TABLET, FILM COATED ORAL
Status: CANCELLED | OUTPATIENT
Start: 2020-08-12 | End: 2020-08-12

## 2020-08-12 RX ORDER — SODIUM CHLORIDE, SODIUM LACTATE, POTASSIUM CHLORIDE, CALCIUM CHLORIDE 600; 310; 30; 20 MG/100ML; MG/100ML; MG/100ML; MG/100ML
INJECTION, SOLUTION INTRAVENOUS CONTINUOUS
Status: CANCELLED | OUTPATIENT
Start: 2020-08-12

## 2020-08-12 RX ORDER — CHOLECALCIFEROL (VITAMIN D3) 25 MCG
1000 TABLET ORAL DAILY
COMMUNITY

## 2020-08-12 RX ORDER — ACETAMINOPHEN 500 MG
1000 TABLET ORAL
Status: CANCELLED | OUTPATIENT
Start: 2020-08-12 | End: 2020-08-12

## 2020-08-12 RX ORDER — CELECOXIB 200 MG/1
400 CAPSULE ORAL
Status: CANCELLED | OUTPATIENT
Start: 2020-08-12 | End: 2020-08-12

## 2020-08-12 RX ORDER — AMOXICILLIN 500 MG
CAPSULE ORAL DAILY
COMMUNITY
End: 2021-05-11

## 2020-08-12 RX ORDER — SCOLOPAMINE TRANSDERMAL SYSTEM 1 MG/1
1 PATCH, EXTENDED RELEASE TRANSDERMAL
Status: CANCELLED | OUTPATIENT
Start: 2020-08-12

## 2020-08-12 RX ORDER — LIDOCAINE HYDROCHLORIDE 10 MG/ML
0.5 INJECTION, SOLUTION EPIDURAL; INFILTRATION; INTRACAUDAL; PERINEURAL ONCE
Status: CANCELLED | OUTPATIENT
Start: 2020-08-12 | End: 2020-08-12

## 2020-08-12 NOTE — TELEPHONE ENCOUNTER
----- Message from Atiya Evans sent at 8/12/2020 12:30 PM CDT -----  Regarding: cramping  Name of Who is Calling: YECENIA ORTEGA [[id number]]      What is the request in detail: Patient is requesting a call back she states she is having mild cramping on her left side and she wanted to let the doctor know       Can the clinic reply by [facility]:no       What Number to Call Back if not in [facility]: [phone]

## 2020-08-12 NOTE — PROGRESS NOTES
"  Urogyn follow up  08/12/2020  .  [facility] UROGYNECOLOGY-[id number]   [address] [id number]    Sheree Lomeli  [id number]  1955      Sheree Lomeli is a 65 y.o.  here for a urogyn preop visit.       Initial visit 2012:  Sheree Lomeli is a 57 y.o. G3, P2-0-1-2 here for a urogyn follow up.     Mrs. Yani gill is a 57-year-old G3, P2-0-1-2 who reports a   suspected rectal injury after doing house work approximately three years   ago. She describes sensation of a rectal "tear" on excessive extension of   her back. Since that time, she has undergone evaluation per several   gastroenterology specialist. Colonoscopy 2006 is normal. A barium enema   2011 is suboptimal due to increased amount of stool in the bowel. She also  reports consultation with an orthopedist and MRI significant for   degenerative joint disease, but negative for neurologic injury. She   initially had discomfort in the left perianal area and sense of vaginal   bulge. Recently, she began to have issues with complete evacuation and   often splints perineally to help evacuate. She also notes use of MiraLax   can be helpful. Exam reveals a distal rectocele without obvious enterocele  or perineocele. There is tenderness to palpation perianally, especially at  the patient's left. There is also mild tenderness to palpation at the   internal levator ani complex, although no increased muscle tone. Rectal   exam reveals normal resting tone, but mildly decreased concentric squeeze.   Otherwise, there are no significant sphincter abnormalities. Perineal   sensation is grossly intact.      03/27/2018  In July, patient describes an episode of constipation lasting 6 days that caused increasing back pain. She used a colon cleanse to finally pass a BM. The constipation led to increasing back pain as described before. At this time she noticed 4 small brown spots about the size of a freckle on the left external labial skin when " wiping. She noticed the bleeding originating from the brown spots. In August, noticed minimal bleeding on her pad again.      Pt has continued taking 4 teaspoons fiber daily 2AM and 2PM to help with BM's and has been successful. BM's daily without the need to splint.  Pt has been using Replens when she feels dry but denies vaginal estrogen use. She still describes mild stress incontinence but not bothersome. Pt continues to do kegel exercises to help strengthen her pelvic floor.    Last HPI from 03/04/2020  --still has some issues with low back/left tailbone & buttock pain--mostly when she reaches forward, feels like she can only go so far before something will be very painful  --takes flexeril PRN--makes her sleepy enough so doesn't feel pain, not sure it really helps pain, though  --the only thing that alleviates pain is if she has BM or does colon cleanse/completely evacuates colon (can go 3 days without pain then)  --wakes in AM, has some pain; takes fiber, has BM easily QAM and pain greatly lessens; if doesn't have AM BM, can have worsening of pain; if has BM, pain starts to increase again as day goes on, can improve if has another BM later in day  --did have PF PT in past--didn't help but starting healthy back program in March  --not needing to splint anymore  2. Mixed urinary incontinence:   --previously:  Currently not bothersome. Continue to monitor. Basic behavioral modifications reviewed. Still occasionally with cough/sneeze. Is doing Kegel's.   --currently:  Mild OLVIN (mostly with sneeze/cough/blowing nose).  No UUI. Volumes are very small.  Uses small liner. Frequency:  Q4-5 hours.  Nocturia: 0-1x/PM (better on neurontin). No dysuria, hematuria. +complete emptying.     3. Atrophic vaginitis: was using Replens; was switched to estradiol cream 2x/week  4. Rectocele:  6/25/12 FL Defecogram: Video recording of defecation was performed revealing some increase in the anal rectal angle as expected with  puborectalis relaxation. The anal canal opens with defecation. There is a small anterior rectocele, but there is no significant residual after evacuation . The patient performs a rocking motion with intermittent straining and relaxation to achieve emptying. IMPRESSION: Small anterior rectocele.  --currently: feels some pressure on L side, can see when has mirror down there  5. Constipation, defecatory dysfunction:    --taking fiber 4 tsps/day has BM every AM, which is easy to pass  6. Abnormal pelvic US 3/3/2020:  --PCP got due to continued pelvic/back pain    POPQ today   Aa -1; Ba -1; C -7 (tethered); Ap -1; Bp -1; D -8.  Genital hiatus 3, perineal body 2 total vaginal length 10-11.      03/03/2020  Uterus: Size: 5.8 x 2.8 x 4.2 cm    Appearance: Well-defined echogenic mass within the endocervical canal measuring 0.8 x 0.6 x 0.6 cm with significant vascular flow.  Complex fluid within the cervical canal.  Endometrium: Normal in this post menopausal patient, measuring 1 mm.  Right ovary: Not visualized.    Left ovary: Surgically absent.   Free Fluid: None.   Impression: Echogenic mass within the endocervical canal measuring up to 0.8 cm, likely representing an endocervical polyp.  Complex fluid throughout the endocervical canal.  Recommend direct visualization and histologic sampling.      Past Medical History:   Diagnosis Date    Arthritis     Back pain     Chronic pelvic pain in female     left-sided    De Quervain's tenosynovitis     left    Headache(784.0)     Migraine maybe 1-2 times per year    PONV (postoperative nausea and vomiting)     Right carpal tunnel syndrome     Urinary incontinence     occ kira       Past Surgical History:   Procedure Laterality Date    EPIDURAL STEROID INJECTION N/A 6/12/2018    Procedure: INJECTION-STEROID-EPIDURAL;  Surgeon: Rianna Nina MD;  Location: [facility], [place], operated by [facility];  Service: Pain Management;  Laterality: N/A;  Coccygeal Nerve Block  23788  (0.5mg Niravam  only)    *Pt wants to know if she has NO SEDATION at all, can pt drive herself home*    OOPHORECTOMY      Left ovary   LSC LSO    Family History   Problem Relation Age of Onset    Heart disease Father         s/p stenting    Coronary artery disease Father     Prostate cancer Father     Cancer Father     Hypoglycemic Sister     Breast cancer Daughter 35        dx in September 2018, lives in [place]    No Known Problems Sister     Diabetes Maternal Uncle     Diabetes Cousin     Hypertension Neg Hx     Stroke Neg Hx     Colon cancer Neg Hx     Cervical cancer Neg Hx     Vaginal cancer Neg Hx     Endometrial cancer Neg Hx     Ovarian cancer Neg Hx        Social History     Socioeconomic History    Marital status:      Spouse name: Not on file    Number of children: 2    Years of education: Not on file    Highest education level: Not on file   Occupational History    Occupation:      Employer: [employer]   Social Needs    Financial resource strain: Not hard at all    Food insecurity     Worry: Never true     Inability: Never true    Transportation needs     Medical: No     Non-medical: No   Tobacco Use    Smoking status: Never Smoker    Smokeless tobacco: Never Used   Substance and Sexual Activity    Alcohol use: No     Frequency: Never     Binge frequency: Never    Drug use: No    Sexual activity: Yes     Partners: Male   Lifestyle    Physical activity     Days per week: 3 days     Minutes per session: 10 min    Stress: Not at all   Relationships    Social connections     Talks on phone: More than three times a week     Gets together: Once a week     Attends Zoroastrianism service: Not on file     Active member of club or organization: Yes     Attends meetings of clubs or organizations: More than 4 times per year     Relationship status:    Other Topics Concern    Not on file   Social History Narrative    Not on file       Current Outpatient Medications  "  Medication Sig Dispense Refill    cyclobenzaprine (FLEXERIL) 5 MG tablet       estradiol (ESTRACE) 0.01 % (0.1 mg/gram) vaginal cream 0.5 grams nightly x 2 weeks, then 2x/week thereafter 42.5 g 11    ibuprofen (ADVIL,MOTRIN) 600 MG tablet TAKE 1 TABLET(600 MG) BY MOUTH EVERY 4 HOURS AS NEEDED 30 tablet 0    ascorbic acid, vitamin C, (VITAMIN C) 1000 MG tablet Take 1,000 mg by mouth once daily.      butalbital-aspirin-caffeine -40 mg (FIORINAL) -40 mg Cap Take 1 capsule by mouth every 4 (four) hours as needed. 30 capsule 0    dextrin (FIBER POWDER ORAL) Take by mouth.      MAGNESIUM ORAL Take by mouth.      multivitamin (ONE DAILY MULTIVITAMIN) per tablet Take 1 tablet by mouth once daily.      omega-3 fatty acids/fish oil (FISH OIL-OMEGA-3 FATTY ACIDS) 300-1,000 mg capsule Take by mouth once daily.      omeprazole (PRILOSEC) 40 MG capsule Take 1 capsule (40 mg total) by mouth daily as needed (heartburn). Take only as needed. 90 capsule 0    vitamin D (VITAMIN D3) 1000 units Tab Take 1,000 Units by mouth once daily.       No current facility-administered medications for this visit.        Review of patient's allergies indicates:   Allergen Reactions    Adhesive Rash    Iodine Rash    Shellfish containing products Rash     Patient allergic to softshell crabs specifically    Sulfa (sulfonamide antibiotics) Rash       Well woman:  --pap: 9/2018, normal; HPV neg  --mammogram: 2019, normal  --colonoscopy: 2006, normal.   --DEXA: 2011, normal    ROS:  As per HPI.      Exam  /80 (BP Location: Right arm, Patient Position: Sitting)   Ht 5' 4" (1.626 m)   Wt 67.6 kg (149 lb 0.5 oz)   LMP  (LMP Unknown)   BMI 25.58 kg/m²   General: alert and oriented, no acute distress  Respiratory: normal respiratory effort  Abd: soft, non-tender, non-distended    Pelvic  Ext. Genitalia: normal external genitalia. Normal bartholin's and skenes glands  Vagina: + atrophy. Normal vaginal mucosa without " lesions. No discharge noted.   Non-tender bladder base without palpable mass.  Cervix: high in vagina with some tethering of vaginal tissue around area, making visualization of cervix with speculum difficult  Uterus:  uterus is normal size, shape, consistency and nontender   Urethra: no masses or tenderness  Urethral meatus: no lesions, caruncle or prolapse.    POP-Q:  Aa -1; Ba -1; C -7 (tethered); Ap -1; Bp -1; D -8.  Genital hiatus 3, perineal body 2 total vaginal length 10-11.    Dr. Mukherjee present during exam    Impression  1. Preop testing     2. Abnormal pelvic ultrasound     3. Midline cystocele     4. Rectocele     5. Chronic constipation     6. Abnormal uterine bleeding       We reviewed the above issues and discussed options for short-term versus long-term management of her problems.   Plan:   1. Patient consented with Dr. Mukherjee for dilation and curettage/polypectomy, possible laparoscopic hysterectomy, possible removal of tube and ovaries (ok with removing remaining R ovary), possible uterosacral suspension, possible anterior/posterior repair with perineorrhaphy, possible laparotomy, and cystourethroscopy.   R/B/A reviewed. Specific risks reviewed include:  infection, bleeding, need for blood transfusion, damage to surrounding structures, anesthesia risks, death, heart attack, stroke, mesh erosion/extrusion, pain, dyspareunia, urinary retention, voiding dysfunction, urinary incontinence, exacerbation of urinary urge incontinence, and need for further surgeries.    2. T&S, urine HCG on DOS  3. Preoperative appointment with PCP or cardiology: No  4. VTE Prophylaxis:  heparin 5000 u SQ TID (1st dose 2hrs preop) + SCDs  5. Patient instructed on Magnesium citrate and chlorahexadine/dial soap prep to perform day before & AM of surgery.   6. Proceed to OR for above-mentioned procedure.    30 minutes were spent in face to face time with this patient  90 % of this time was spent in counseling and/or coordination  of care      Julia Burgos, FNP-BC [facility]  Division of Female Pelvic Medicine and Reconstructive Surgery  Department of Obstetrics & Gynecology

## 2020-08-12 NOTE — ANESTHESIA PREPROCEDURE EVALUATION
08/12/2020  Sheree Lomeli is a 65 y.o., female.    Anesthesia Evaluation    I have reviewed the Patient Summary Reports.    I have reviewed the Nursing Notes.    I have reviewed the Medications.     Review of Systems  Anesthesia Hx:  Hx of Anesthetic complications PONV   Social:  Non-Smoker, No Alcohol Use    Hematology/Oncology:  Hematology Normal   Oncology Normal     EENT/Dental:EENT/Dental Normal   Cardiovascular:  Cardiovascular Normal Exercise tolerance: good     Renal/:   OLVIN   Hepatic/GI:   GERD, well controlled    Musculoskeletal:   Arthritis   Spine Disorders: lumbar Chronic Pain    Neurological:   Neuromuscular Disease, Headaches    Endocrine:  Endocrine Normal    Dermatological:  Skin Normal    Psych:  Psychiatric Normal           Physical Exam  General:  Well nourished    Airway/Jaw/Neck:  Airway Findings: Mouth Opening: Normal Tongue: Normal  General Airway Assessment: Adult, Good  Mallampati: I  TM Distance: Normal, at least 6 cm  Jaw/Neck Findings:      Dental:  Dental Findings: In tact, Molar caps        Mental Status:  Mental Status Findings:  Cooperative, Alert and Oriented         Anesthesia Plan  Type of Anesthesia, risks & benefits discussed:  Anesthesia Type:  general  Patient's Preference:   Intra-op Monitoring Plan: standard ASA monitors  Intra-op Monitoring Plan Comments:   Post Op Pain Control Plan: per primary service following discharge from PACU and multimodal analgesia  Post Op Pain Control Plan Comments:   Induction:    Beta Blocker:         Informed Consent: Patient understands risks and agrees with Anesthesia plan.  Questions answered. Anesthesia consent signed with patient.  ASA Score: 2     Day of Surgery Review of History & Physical:    H&P update referred to the surgeon.     Anesthesia Plan Notes: Labs pending.        Ready For Surgery From Anesthesia Perspective.

## 2020-08-12 NOTE — H&P (VIEW-ONLY)
"  Urogyn follow up  08/12/2020  .  [facility] UROGYNECOLOGY-[id number]   [address] [address]    Sheree Lomeli  [id number]  1955      Sheree Lomeli is a 65 y.o.  here for a urogyn preop visit.       Initial visit 2012:  Sheree Lomeli is a 57 y.o. G3, P2-0-1-2 here for a urogyn follow up.     Mrs. Yani gill is a 57-year-old G3, P2-0-1-2 who reports a   suspected rectal injury after doing house work approximately three years   ago. She describes sensation of a rectal "tear" on excessive extension of   her back. Since that time, she has undergone evaluation per several   gastroenterology specialist. Colonoscopy 2006 is normal. A barium enema   2011 is suboptimal due to increased amount of stool in the bowel. She also  reports consultation with an orthopedist and MRI significant for   degenerative joint disease, but negative for neurologic injury. She   initially had discomfort in the left perianal area and sense of vaginal   bulge. Recently, she began to have issues with complete evacuation and   often splints perineally to help evacuate. She also notes use of MiraLax   can be helpful. Exam reveals a distal rectocele without obvious enterocele  or perineocele. There is tenderness to palpation perianally, especially at  the patient's left. There is also mild tenderness to palpation at the   internal levator ani complex, although no increased muscle tone. Rectal   exam reveals normal resting tone, but mildly decreased concentric squeeze.   Otherwise, there are no significant sphincter abnormalities. Perineal   sensation is grossly intact.      03/27/2018  In July, patient describes an episode of constipation lasting 6 days that caused increasing back pain. She used a colon cleanse to finally pass a BM. The constipation led to increasing back pain as described before. At this time she noticed 4 small brown spots about the size of a freckle on the left external labial skin when " wiping. She noticed the bleeding originating from the brown spots. In August, noticed minimal bleeding on her pad again.      Pt has continued taking 4 teaspoons fiber daily 2AM and 2PM to help with BM's and has been successful. BM's daily without the need to splint.  Pt has been using Replens when she feels dry but denies vaginal estrogen use. She still describes mild stress incontinence but not bothersome. Pt continues to do kegel exercises to help strengthen her pelvic floor.    Last HPI from 03/04/2020  --still has some issues with low back/left tailbone & buttock pain--mostly when she reaches forward, feels like she can only go so far before something will be very painful  --takes flexeril PRN--makes her sleepy enough so doesn't feel pain, not sure it really helps pain, though  --the only thing that alleviates pain is if she has BM or does colon cleanse/completely evacuates colon (can go 3 days without pain then)  --wakes in AM, has some pain; takes fiber, has BM easily QAM and pain greatly lessens; if doesn't have AM BM, can have worsening of pain; if has BM, pain starts to increase again as day goes on, can improve if has another BM later in day  --did have PF PT in past--didn't help but starting healthy back program in March  --not needing to splint anymore  2. Mixed urinary incontinence:   --previously:  Currently not bothersome. Continue to monitor. Basic behavioral modifications reviewed. Still occasionally with cough/sneeze. Is doing Kegel's.   --currently:  Mild OLVIN (mostly with sneeze/cough/blowing nose).  No UUI. Volumes are very small.  Uses small liner. Frequency:  Q4-5 hours.  Nocturia: 0-1x/PM (better on neurontin). No dysuria, hematuria. +complete emptying.     3. Atrophic vaginitis: was using Replens; was switched to estradiol cream 2x/week  4. Rectocele:  6/25/12 FL Defecogram: Video recording of defecation was performed revealing some increase in the anal rectal angle as expected with  puborectalis relaxation. The anal canal opens with defecation. There is a small anterior rectocele, but there is no significant residual after evacuation . The patient performs a rocking motion with intermittent straining and relaxation to achieve emptying. IMPRESSION: Small anterior rectocele.  --currently: feels some pressure on L side, can see when has mirror down there  5. Constipation, defecatory dysfunction:    --taking fiber 4 tsps/day has BM every AM, which is easy to pass  6. Abnormal pelvic US 3/3/2020:  --PCP got due to continued pelvic/back pain    POPQ today   Aa -1; Ba -1; C -7 (tethered); Ap -1; Bp -1; D -8.  Genital hiatus 3, perineal body 2 total vaginal length 10-11.      03/03/2020  Uterus: Size: 5.8 x 2.8 x 4.2 cm    Appearance: Well-defined echogenic mass within the endocervical canal measuring 0.8 x 0.6 x 0.6 cm with significant vascular flow.  Complex fluid within the cervical canal.  Endometrium: Normal in this post menopausal patient, measuring 1 mm.  Right ovary: Not visualized.    Left ovary: Surgically absent.   Free Fluid: None.   Impression: Echogenic mass within the endocervical canal measuring up to 0.8 cm, likely representing an endocervical polyp.  Complex fluid throughout the endocervical canal.  Recommend direct visualization and histologic sampling.      Past Medical History:   Diagnosis Date    Arthritis     Back pain     Chronic pelvic pain in female     left-sided    De Quervain's tenosynovitis     left    Headache(784.0)     Migraine maybe 1-2 times per year    PONV (postoperative nausea and vomiting)     Right carpal tunnel syndrome     Urinary incontinence     occ kira       Past Surgical History:   Procedure Laterality Date    EPIDURAL STEROID INJECTION N/A 6/12/2018    Procedure: INJECTION-STEROID-EPIDURAL;  Surgeon: Rianna Nina MD;  Location: [facility] PAIN T;  Service: Pain Management;  Laterality: N/A;  Coccygeal Nerve Block  98639  (0.5mg Niravam  only)    *Pt wants to know if she has NO SEDATION at all, can pt drive herself home*    OOPHORECTOMY      Left ovary   LSC LSO    Family History   Problem Relation Age of Onset    Heart disease Father         s/p stenting    Coronary artery disease Father     Prostate cancer Father     Cancer Father     Hypoglycemic Sister     Breast cancer Daughter 35        dx in September 2018, lives in [place]    No Known Problems Sister     Diabetes Maternal Uncle     Diabetes Cousin     Hypertension Neg Hx     Stroke Neg Hx     Colon cancer Neg Hx     Cervical cancer Neg Hx     Vaginal cancer Neg Hx     Endometrial cancer Neg Hx     Ovarian cancer Neg Hx        Social History     Socioeconomic History    Marital status:      Spouse name: Not on file    Number of children: 2    Years of education: Not on file    Highest education level: Not on file   Occupational History    Occupation:      Employer: [employer]   Social Needs    Financial resource strain: Not hard at all    Food insecurity     Worry: Never true     Inability: Never true    Transportation needs     Medical: No     Non-medical: No   Tobacco Use    Smoking status: Never Smoker    Smokeless tobacco: Never Used   Substance and Sexual Activity    Alcohol use: No     Frequency: Never     Binge frequency: Never    Drug use: No    Sexual activity: Yes     Partners: Male   Lifestyle    Physical activity     Days per week: 3 days     Minutes per session: 10 min    Stress: Not at all   Relationships    Social connections     Talks on phone: More than three times a week     Gets together: Once a week     Attends Jew service: Not on file     Active member of club or organization: Yes     Attends meetings of clubs or organizations: More than 4 times per year     Relationship status:    Other Topics Concern    Not on file   Social History Narrative    Not on file       Current Outpatient Medications  "  Medication Sig Dispense Refill    cyclobenzaprine (FLEXERIL) 5 MG tablet       estradiol (ESTRACE) 0.01 % (0.1 mg/gram) vaginal cream 0.5 grams nightly x 2 weeks, then 2x/week thereafter 42.5 g 11    ibuprofen (ADVIL,MOTRIN) 600 MG tablet TAKE 1 TABLET(600 MG) BY MOUTH EVERY 4 HOURS AS NEEDED 30 tablet 0    ascorbic acid, vitamin C, (VITAMIN C) 1000 MG tablet Take 1,000 mg by mouth once daily.      butalbital-aspirin-caffeine -40 mg (FIORINAL) -40 mg Cap Take 1 capsule by mouth every 4 (four) hours as needed. 30 capsule 0    dextrin (FIBER POWDER ORAL) Take by mouth.      MAGNESIUM ORAL Take by mouth.      multivitamin (ONE DAILY MULTIVITAMIN) per tablet Take 1 tablet by mouth once daily.      omega-3 fatty acids/fish oil (FISH OIL-OMEGA-3 FATTY ACIDS) 300-1,000 mg capsule Take by mouth once daily.      omeprazole (PRILOSEC) 40 MG capsule Take 1 capsule (40 mg total) by mouth daily as needed (heartburn). Take only as needed. 90 capsule 0    vitamin D (VITAMIN D3) 1000 units Tab Take 1,000 Units by mouth once daily.       No current facility-administered medications for this visit.        Review of patient's allergies indicates:   Allergen Reactions    Adhesive Rash    Iodine Rash    Shellfish containing products Rash     Patient allergic to softshell crabs specifically    Sulfa (sulfonamide antibiotics) Rash       Well woman:  --pap: 9/2018, normal; HPV neg  --mammogram: 2019, normal  --colonoscopy: 2006, normal.   --DEXA: 2011, normal    ROS:  As per HPI.      Exam  /80 (BP Location: Right arm, Patient Position: Sitting)   Ht 5' 4" (1.626 m)   Wt 67.6 kg (149 lb 0.5 oz)   LMP  (LMP Unknown)   BMI 25.58 kg/m²   General: alert and oriented, no acute distress  Respiratory: normal respiratory effort  Abd: soft, non-tender, non-distended    Pelvic  Ext. Genitalia: normal external genitalia. Normal bartholin's and skenes glands  Vagina: + atrophy. Normal vaginal mucosa without " lesions. No discharge noted.   Non-tender bladder base without palpable mass.  Cervix: high in vagina with some tethering of vaginal tissue around area, making visualization of cervix with speculum difficult  Uterus:  uterus is normal size, shape, consistency and nontender   Urethra: no masses or tenderness  Urethral meatus: no lesions, caruncle or prolapse.    POP-Q:  Aa -1; Ba -1; C -7 (tethered); Ap -1; Bp -1; D -8.  Genital hiatus 3, perineal body 2 total vaginal length 10-11.    Dr. Mukherjee present during exam    Impression  1. Preop testing     2. Abnormal pelvic ultrasound     3. Midline cystocele     4. Rectocele     5. Chronic constipation     6. Abnormal uterine bleeding       We reviewed the above issues and discussed options for short-term versus long-term management of her problems.   Plan:   1. Patient consented with Dr. Mukherjee for dilation and curettage/polypectomy, possible laparoscopic hysterectomy, possible removal of tube and ovaries (ok with removing remaining R ovary), possible uterosacral suspension, possible anterior/posterior repair with perineorrhaphy, possible laparotomy, and cystourethroscopy.   R/B/A reviewed. Specific risks reviewed include:  infection, bleeding, need for blood transfusion, damage to surrounding structures, anesthesia risks, death, heart attack, stroke, mesh erosion/extrusion, pain, dyspareunia, urinary retention, voiding dysfunction, urinary incontinence, exacerbation of urinary urge incontinence, and need for further surgeries.    2. T&S, urine HCG on DOS  3. Preoperative appointment with PCP or cardiology: No  4. VTE Prophylaxis:  heparin 5000 u SQ TID (1st dose 2hrs preop) + SCDs  5. Patient instructed on Magnesium citrate and chlorahexadine/dial soap prep to perform day before & AM of surgery.   6. Proceed to OR for above-mentioned procedure.    30 minutes were spent in face to face time with this patient  90 % of this time was spent in counseling and/or coordination  of care      Julia Burgos, FNP-BC [facility]  Division of Female Pelvic Medicine and Reconstructive Surgery  Department of Obstetrics & Gynecology

## 2020-08-12 NOTE — DISCHARGE INSTRUCTIONS
Information to Prepare you for your Surgery    PRE-ADMIT TESTING -  [phone]    [address]  [place]          Your surgery has been scheduled at [facility]. We are pleased to have the opportunity to serve you. For Further Information please call [phone].    On the day of surgery please report to the Information Desk on the 1st floor.    · CONTACT YOUR PHYSICIAN'S OFFICE THE DAY PRIOR TO YOUR SURGERY TO OBTAIN YOUR ARRIVAL TIME.     · The evening before surgery do not eat anything after 9 p.m. ( this includes hard candy, chewing gum and mints).  You may only have GATORADE, POWERADE AND WATER  from 9 p.m. until you leave your home.   DO NOT DRINK ANY LIQUIDS ON THE WAY TO THE HOSPITAL.      SPECIAL MEDICATION INSTRUCTIONS: TAKE medications checked off by the Anesthesiologist on your Medication List.    Angiogram Patients: Take medications as instructed by your physician, including aspirin.     Surgery Patients:    If you take ASPIRIN - Your PHYSICIAN/SURGEON will need to inform you IF/OR when you need to stop taking aspirin prior to your surgery.     Do Not take any medications containing IBUPROFEN.  Do Not Wear any make-up or dark nail polish   (especially eye make-up) to surgery. If you come to surgery with makeup on you will be required to remove the makeup or nail polish.    Do not shave your surgical area at least 5 days prior to your surgery. The surgical prep will be performed at the hospital according to Infection Control regulations.    Leave all valuables at home.   Do Not wear any jewelry or watches, including any metal in body piercings. Jewelry must be removed prior to coming to the hospital.  There is a possibility that rings that are unable to be removed may be cut off if they are on the surgical extremity.    Contact Lens must be removed before surgery. Either do not wear the contact lens or bring a case and solution for  storage.  Please bring a container for eyeglasses or dentures as required.  Bring any paperwork your physician has provided, such as consent forms,  history and physicals, doctor's orders, etc.   Bring comfortable clothes that are loose fitting to wear upon discharge. Take into consideration the type of surgery being performed.  Maintain your diet as advised per your physician the day prior to surgery.      Adequate rest the night before surgery is advised.   Park in the Parking lot behind the hospital or in the [place] [facility] across the street from the parking lot. Parking is complimentary.  If you will be discharged the same day as your procedure, please arrange for a responsible adult to drive you home or to accompany you if traveling by taxi.   YOU WILL NOT BE PERMITTED TO DRIVE OR TO LEAVE THE HOSPITAL ALONE AFTER SURGERY.   If you are being discharged the same day, it is strongly recommended that you arrange for someone to remain with you for the first 24 hrs following your surgery.    The Surgeon will speak to your family/visitor after your surgery regarding the outcome of your surgery and post op care.  The Surgeon may speak to you after your surgery, but there is a possibility you may not remember the details.  Please check with your family members regarding the conversation with the Surgeon.    We strongly recommend whoever is bringing you home be present for discharge instructions.  This will ensure a thorough understanding for your post op home care.    ALL CHILDREN MUST ALWAYS BE ACCOMPANIED BY AN ADULT.    Visitors-Refer to current Visitor policy handouts.    Thank you for your cooperation.  The Staff of [facility].                Bathing Instructions with Hibiclens     Shower the evening before and morning of your procedure with Hibiclens:   Wash your face with water and your regular face wash/soap   Apply Hibiclens directly on your skin or on a wet washcloth and wash  gently. When showering: Move away from the shower stream when applying Hibiclens to avoid rinsing off too soon.   Rinse thoroughly with warm water   Do not dilute Hibiclens         Dry off as usual, do not use any deodorant, powder, body lotions, perfume, after shave or cologne.

## 2020-08-12 NOTE — TELEPHONE ENCOUNTER
Spoke to pt, she states after she left clinic this morning she experienced pain in her left side, but have subsided.    Pt was reminded to arrive for her procedure tomorrow at 8:30a. Pt verbalized understanding.

## 2020-08-13 ENCOUNTER — ANESTHESIA (OUTPATIENT)
Dept: SURGERY | Facility: OTHER | Age: 65
End: 2020-08-13
Payer: COMMERCIAL

## 2020-08-13 ENCOUNTER — HOSPITAL ENCOUNTER (OUTPATIENT)
Facility: OTHER | Age: 65
Discharge: HOME | End: 2020-08-13
Attending: OBSTETRICS & GYNECOLOGY | Admitting: OBSTETRICS & GYNECOLOGY
Payer: COMMERCIAL

## 2020-08-13 VITALS
WEIGHT: 147 LBS | BODY MASS INDEX: 25.23 KG/M2 | HEART RATE: 68 BPM | DIASTOLIC BLOOD PRESSURE: 74 MMHG | SYSTOLIC BLOOD PRESSURE: 130 MMHG | TEMPERATURE: 98 F | RESPIRATION RATE: 18 BRPM | OXYGEN SATURATION: 100 %

## 2020-08-13 DIAGNOSIS — Z98.890 STATUS POST HYSTEROSCOPY: Primary | ICD-10-CM

## 2020-08-13 DIAGNOSIS — R93.89 ABNORMAL PELVIC ULTRASOUND: ICD-10-CM

## 2020-08-13 LAB — POCT GLUCOSE: 85 MG/DL (ref 70–110)

## 2020-08-13 PROCEDURE — 87205 SMEAR GRAM STAIN: CPT

## 2020-08-13 PROCEDURE — C1782 MORCELLATOR: HCPCS | Performed by: OBSTETRICS & GYNECOLOGY

## 2020-08-13 PROCEDURE — 25000003 PHARM REV CODE 250: Performed by: SPECIALIST

## 2020-08-13 PROCEDURE — 63600175 PHARM REV CODE 636 W HCPCS: Performed by: NURSE ANESTHETIST, CERTIFIED REGISTERED

## 2020-08-13 PROCEDURE — 57250 REPAIR RECTUM & VAGINA: CPT | Mod: ,,, | Performed by: OBSTETRICS & GYNECOLOGY

## 2020-08-13 PROCEDURE — 71000016 HC POSTOP RECOV ADDL HR: Performed by: OBSTETRICS & GYNECOLOGY

## 2020-08-13 PROCEDURE — 36000706: Performed by: OBSTETRICS & GYNECOLOGY

## 2020-08-13 PROCEDURE — 71000039 HC RECOVERY, EACH ADD'L HOUR: Performed by: OBSTETRICS & GYNECOLOGY

## 2020-08-13 PROCEDURE — 57250 PR POST COLPORRHAPHY,RECTUM/VAGINA: ICD-10-PCS | Mod: AS,,, | Performed by: PHYSICIAN ASSISTANT

## 2020-08-13 PROCEDURE — 25000003 PHARM REV CODE 250: Performed by: NURSE ANESTHETIST, CERTIFIED REGISTERED

## 2020-08-13 PROCEDURE — 87102 FUNGUS ISOLATION CULTURE: CPT

## 2020-08-13 PROCEDURE — C2628 CATHETER, OCCLUSION: HCPCS | Performed by: OBSTETRICS & GYNECOLOGY

## 2020-08-13 PROCEDURE — 82962 GLUCOSE BLOOD TEST: CPT | Performed by: OBSTETRICS & GYNECOLOGY

## 2020-08-13 PROCEDURE — 27201423 OPTIME MED/SURG SUP & DEVICES STERILE SUPPLY: Performed by: OBSTETRICS & GYNECOLOGY

## 2020-08-13 PROCEDURE — 87075 CULTR BACTERIA EXCEPT BLOOD: CPT

## 2020-08-13 PROCEDURE — 58558 PR HYSTEROSCOPY,W/ENDO BX: ICD-10-PCS | Mod: 51,,, | Performed by: OBSTETRICS & GYNECOLOGY

## 2020-08-13 PROCEDURE — 63600175 PHARM REV CODE 636 W HCPCS: Performed by: ANESTHESIOLOGY

## 2020-08-13 PROCEDURE — 37000009 HC ANESTHESIA EA ADD 15 MINS: Performed by: OBSTETRICS & GYNECOLOGY

## 2020-08-13 PROCEDURE — 88331 PR  PATH CONSULT IN SURG,W FRZ SEC: ICD-10-PCS | Mod: 26,,, | Performed by: PATHOLOGY

## 2020-08-13 PROCEDURE — 88305 TISSUE EXAM BY PATHOLOGIST: ICD-10-PCS | Mod: 26,,, | Performed by: PATHOLOGY

## 2020-08-13 PROCEDURE — 63600175 PHARM REV CODE 636 W HCPCS: Performed by: SPECIALIST

## 2020-08-13 PROCEDURE — 57250 PR POST COLPORRHAPHY,RECTUM/VAGINA: ICD-10-PCS | Mod: ,,, | Performed by: OBSTETRICS & GYNECOLOGY

## 2020-08-13 PROCEDURE — 63600175 PHARM REV CODE 636 W HCPCS: Performed by: OBSTETRICS & GYNECOLOGY

## 2020-08-13 PROCEDURE — 87206 SMEAR FLUORESCENT/ACID STAI: CPT

## 2020-08-13 PROCEDURE — 88331 PATH CONSLTJ SURG 1 BLK 1SPC: CPT | Mod: 26,,, | Performed by: PATHOLOGY

## 2020-08-13 PROCEDURE — 37000008 HC ANESTHESIA 1ST 15 MINUTES: Performed by: OBSTETRICS & GYNECOLOGY

## 2020-08-13 PROCEDURE — 87015 SPECIMEN INFECT AGNT CONCNTJ: CPT

## 2020-08-13 PROCEDURE — 87176 TISSUE HOMOGENIZATION CULTR: CPT

## 2020-08-13 PROCEDURE — 58558 HYSTEROSCOPY BIOPSY: CPT | Mod: 51,,, | Performed by: OBSTETRICS & GYNECOLOGY

## 2020-08-13 PROCEDURE — 87116 MYCOBACTERIA CULTURE: CPT

## 2020-08-13 PROCEDURE — 71000015 HC POSTOP RECOV 1ST HR: Performed by: OBSTETRICS & GYNECOLOGY

## 2020-08-13 PROCEDURE — 87070 CULTURE OTHR SPECIMN AEROBIC: CPT

## 2020-08-13 PROCEDURE — 88305 TISSUE EXAM BY PATHOLOGIST: CPT | Mod: 26,,, | Performed by: PATHOLOGY

## 2020-08-13 PROCEDURE — 25000003 PHARM REV CODE 250: Performed by: OBSTETRICS & GYNECOLOGY

## 2020-08-13 PROCEDURE — 57250 REPAIR RECTUM & VAGINA: CPT | Mod: AS,,, | Performed by: PHYSICIAN ASSISTANT

## 2020-08-13 PROCEDURE — 71000033 HC RECOVERY, INTIAL HOUR: Performed by: OBSTETRICS & GYNECOLOGY

## 2020-08-13 PROCEDURE — 36000707: Performed by: OBSTETRICS & GYNECOLOGY

## 2020-08-13 PROCEDURE — 88331 PATH CONSLTJ SURG 1 BLK 1SPC: CPT | Performed by: PATHOLOGY

## 2020-08-13 PROCEDURE — 88305 TISSUE EXAM BY PATHOLOGIST: CPT | Performed by: PATHOLOGY

## 2020-08-13 RX ORDER — HYDROMORPHONE HYDROCHLORIDE 2 MG/ML
0.4 INJECTION, SOLUTION INTRAMUSCULAR; INTRAVENOUS; SUBCUTANEOUS EVERY 5 MIN PRN
Status: DISCONTINUED | OUTPATIENT
Start: 2020-08-13 | End: 2020-08-13 | Stop reason: HOSPADM

## 2020-08-13 RX ORDER — OXYCODONE AND ACETAMINOPHEN 10; 325 MG/1; MG/1
1 TABLET ORAL EVERY 4 HOURS PRN
Status: CANCELLED | OUTPATIENT
Start: 2020-08-13

## 2020-08-13 RX ORDER — IBUPROFEN 600 MG/1
600 TABLET ORAL EVERY 6 HOURS PRN
Status: CANCELLED | OUTPATIENT
Start: 2020-08-13

## 2020-08-13 RX ORDER — DEXAMETHASONE SODIUM PHOSPHATE 4 MG/ML
INJECTION, SOLUTION INTRA-ARTICULAR; INTRALESIONAL; INTRAMUSCULAR; INTRAVENOUS; SOFT TISSUE
Status: DISCONTINUED | OUTPATIENT
Start: 2020-08-13 | End: 2020-08-13

## 2020-08-13 RX ORDER — OXYCODONE HYDROCHLORIDE 5 MG/1
5 TABLET ORAL
Status: DISCONTINUED | OUTPATIENT
Start: 2020-08-13 | End: 2020-08-13 | Stop reason: HOSPADM

## 2020-08-13 RX ORDER — SODIUM CHLORIDE, SODIUM LACTATE, POTASSIUM CHLORIDE, CALCIUM CHLORIDE 600; 310; 30; 20 MG/100ML; MG/100ML; MG/100ML; MG/100ML
INJECTION, SOLUTION INTRAVENOUS CONTINUOUS
Status: DISCONTINUED | OUTPATIENT
Start: 2020-08-13 | End: 2020-08-13 | Stop reason: HOSPADM

## 2020-08-13 RX ORDER — HYDROCODONE BITARTRATE AND ACETAMINOPHEN 5; 325 MG/1; MG/1
1 TABLET ORAL EVERY 4 HOURS PRN
Qty: 10 TABLET | Refills: 0 | Status: ON HOLD | OUTPATIENT
Start: 2020-08-13 | End: 2020-11-03 | Stop reason: HOSPADM

## 2020-08-13 RX ORDER — HYDROCODONE BITARTRATE AND ACETAMINOPHEN 5; 325 MG/1; MG/1
1 TABLET ORAL EVERY 4 HOURS PRN
Status: CANCELLED | OUTPATIENT
Start: 2020-08-13

## 2020-08-13 RX ORDER — CELECOXIB 200 MG/1
400 CAPSULE ORAL
Status: COMPLETED | OUTPATIENT
Start: 2020-08-13 | End: 2020-08-13

## 2020-08-13 RX ORDER — CEFAZOLIN SODIUM 1 G/50ML
2 SOLUTION INTRAVENOUS
Status: DISCONTINUED | OUTPATIENT
Start: 2020-08-13 | End: 2020-08-13 | Stop reason: HOSPADM

## 2020-08-13 RX ORDER — SCOLOPAMINE TRANSDERMAL SYSTEM 1 MG/1
1 PATCH, EXTENDED RELEASE TRANSDERMAL
Status: DISCONTINUED | OUTPATIENT
Start: 2020-08-13 | End: 2020-08-13 | Stop reason: HOSPADM

## 2020-08-13 RX ORDER — ROCURONIUM BROMIDE 10 MG/ML
INJECTION, SOLUTION INTRAVENOUS
Status: DISCONTINUED | OUTPATIENT
Start: 2020-08-13 | End: 2020-08-13

## 2020-08-13 RX ORDER — ONDANSETRON 8 MG/1
8 TABLET, ORALLY DISINTEGRATING ORAL EVERY 8 HOURS PRN
Status: DISCONTINUED | OUTPATIENT
Start: 2020-08-13 | End: 2020-08-13 | Stop reason: HOSPADM

## 2020-08-13 RX ORDER — DIPHENHYDRAMINE HYDROCHLORIDE 50 MG/ML
25 INJECTION INTRAMUSCULAR; INTRAVENOUS EVERY 4 HOURS PRN
Status: CANCELLED | OUTPATIENT
Start: 2020-08-13

## 2020-08-13 RX ORDER — FAMOTIDINE 20 MG/1
20 TABLET, FILM COATED ORAL
Status: COMPLETED | OUTPATIENT
Start: 2020-08-13 | End: 2020-08-13

## 2020-08-13 RX ORDER — MIDAZOLAM HYDROCHLORIDE 1 MG/ML
INJECTION INTRAMUSCULAR; INTRAVENOUS
Status: DISCONTINUED | OUTPATIENT
Start: 2020-08-13 | End: 2020-08-13

## 2020-08-13 RX ORDER — GLYCOPYRROLATE 0.2 MG/ML
INJECTION INTRAMUSCULAR; INTRAVENOUS
Status: DISCONTINUED | OUTPATIENT
Start: 2020-08-13 | End: 2020-08-13

## 2020-08-13 RX ORDER — DIPHENHYDRAMINE HCL 25 MG
25 CAPSULE ORAL EVERY 4 HOURS PRN
Status: CANCELLED | OUTPATIENT
Start: 2020-08-13

## 2020-08-13 RX ORDER — FENTANYL CITRATE 50 UG/ML
INJECTION, SOLUTION INTRAMUSCULAR; INTRAVENOUS
Status: DISCONTINUED | OUTPATIENT
Start: 2020-08-13 | End: 2020-08-13

## 2020-08-13 RX ORDER — ONDANSETRON 2 MG/ML
4 INJECTION INTRAMUSCULAR; INTRAVENOUS DAILY PRN
Status: DISCONTINUED | OUTPATIENT
Start: 2020-08-13 | End: 2020-08-13 | Stop reason: HOSPADM

## 2020-08-13 RX ORDER — MEPERIDINE HYDROCHLORIDE 25 MG/ML
12.5 INJECTION INTRAMUSCULAR; INTRAVENOUS; SUBCUTANEOUS ONCE AS NEEDED
Status: DISCONTINUED | OUTPATIENT
Start: 2020-08-13 | End: 2020-08-13 | Stop reason: HOSPADM

## 2020-08-13 RX ORDER — LIDOCAINE HCL/PF 100 MG/5ML
SYRINGE (ML) INTRAVENOUS
Status: DISCONTINUED | OUTPATIENT
Start: 2020-08-13 | End: 2020-08-13

## 2020-08-13 RX ORDER — PROPOFOL 10 MG/ML
VIAL (ML) INTRAVENOUS
Status: DISCONTINUED | OUTPATIENT
Start: 2020-08-13 | End: 2020-08-13

## 2020-08-13 RX ORDER — MUPIROCIN 20 MG/G
OINTMENT TOPICAL
Status: DISCONTINUED | OUTPATIENT
Start: 2020-08-13 | End: 2020-08-13 | Stop reason: HOSPADM

## 2020-08-13 RX ORDER — LIDOCAINE HYDROCHLORIDE 10 MG/ML
0.5 INJECTION, SOLUTION EPIDURAL; INFILTRATION; INTRACAUDAL; PERINEURAL ONCE
Status: DISCONTINUED | OUTPATIENT
Start: 2020-08-13 | End: 2020-08-13 | Stop reason: HOSPADM

## 2020-08-13 RX ORDER — KETOROLAC TROMETHAMINE 30 MG/ML
INJECTION, SOLUTION INTRAMUSCULAR; INTRAVENOUS
Status: DISCONTINUED | OUTPATIENT
Start: 2020-08-13 | End: 2020-08-13

## 2020-08-13 RX ORDER — IBUPROFEN 800 MG/1
800 TABLET ORAL EVERY 8 HOURS PRN
Qty: 30 TABLET | Refills: 0 | Status: SHIPPED | OUTPATIENT
Start: 2020-08-13 | End: 2020-12-16

## 2020-08-13 RX ORDER — NEOSTIGMINE METHYLSULFATE 1 MG/ML
INJECTION, SOLUTION INTRAVENOUS
Status: DISCONTINUED | OUTPATIENT
Start: 2020-08-13 | End: 2020-08-13

## 2020-08-13 RX ORDER — ACETAMINOPHEN 500 MG
1000 TABLET ORAL
Status: COMPLETED | OUTPATIENT
Start: 2020-08-13 | End: 2020-08-13

## 2020-08-13 RX ORDER — SODIUM CHLORIDE 0.9 % (FLUSH) 0.9 %
3 SYRINGE (ML) INJECTION
Status: DISCONTINUED | OUTPATIENT
Start: 2020-08-13 | End: 2020-08-13 | Stop reason: HOSPADM

## 2020-08-13 RX ORDER — HYDROMORPHONE HYDROCHLORIDE 1 MG/ML
1 INJECTION, SOLUTION INTRAMUSCULAR; INTRAVENOUS; SUBCUTANEOUS EVERY 6 HOURS PRN
Status: CANCELLED | OUTPATIENT
Start: 2020-08-13

## 2020-08-13 RX ADMIN — FENTANYL CITRATE 100 MCG: 50 INJECTION, SOLUTION INTRAMUSCULAR; INTRAVENOUS at 12:08

## 2020-08-13 RX ADMIN — KETOROLAC TROMETHAMINE 30 MG: 30 INJECTION, SOLUTION INTRAMUSCULAR; INTRAVENOUS at 02:08

## 2020-08-13 RX ADMIN — PROPOFOL 100 MG: 10 INJECTION, EMULSION INTRAVENOUS at 12:08

## 2020-08-13 RX ADMIN — PROPOFOL 50 MG: 10 INJECTION, EMULSION INTRAVENOUS at 12:08

## 2020-08-13 RX ADMIN — ACETAMINOPHEN 1000 MG: 500 TABLET, FILM COATED ORAL at 09:08

## 2020-08-13 RX ADMIN — MIDAZOLAM HYDROCHLORIDE 2 MG: 1 INJECTION, SOLUTION INTRAMUSCULAR; INTRAVENOUS at 12:08

## 2020-08-13 RX ADMIN — GLYCOPYRROLATE 0.4 MG: 0.2 INJECTION, SOLUTION INTRAMUSCULAR; INTRAVENOUS at 02:08

## 2020-08-13 RX ADMIN — ONDANSETRON HYDROCHLORIDE 4 MG: 2 INJECTION INTRAMUSCULAR; INTRAVENOUS at 12:08

## 2020-08-13 RX ADMIN — LIDOCAINE HYDROCHLORIDE 100 MG: 20 INJECTION, SOLUTION INTRAVENOUS at 12:08

## 2020-08-13 RX ADMIN — CEFAZOLIN SODIUM 2 G: 1 SOLUTION INTRAVENOUS at 12:08

## 2020-08-13 RX ADMIN — GLYCOPYRROLATE 0.2 MG: 0.2 INJECTION, SOLUTION INTRAMUSCULAR; INTRAVENOUS at 01:08

## 2020-08-13 RX ADMIN — CELECOXIB 400 MG: 200 CAPSULE ORAL at 09:08

## 2020-08-13 RX ADMIN — FAMOTIDINE 20 MG: 20 TABLET, FILM COATED ORAL at 09:08

## 2020-08-13 RX ADMIN — ROCURONIUM BROMIDE 5 MG: 10 INJECTION, SOLUTION INTRAVENOUS at 12:08

## 2020-08-13 RX ADMIN — SODIUM CHLORIDE, SODIUM LACTATE, POTASSIUM CHLORIDE, AND CALCIUM CHLORIDE: 600; 310; 30; 20 INJECTION, SOLUTION INTRAVENOUS at 02:08

## 2020-08-13 RX ADMIN — MUPIROCIN: 20 OINTMENT TOPICAL at 09:08

## 2020-08-13 RX ADMIN — ROCURONIUM BROMIDE 45 MG: 10 INJECTION, SOLUTION INTRAVENOUS at 12:08

## 2020-08-13 RX ADMIN — DEXAMETHASONE SODIUM PHOSPHATE 8 MG: 4 INJECTION, SOLUTION INTRAMUSCULAR; INTRAVENOUS at 12:08

## 2020-08-13 RX ADMIN — SODIUM CHLORIDE, SODIUM LACTATE, POTASSIUM CHLORIDE, AND CALCIUM CHLORIDE: 600; 310; 30; 20 INJECTION, SOLUTION INTRAVENOUS at 11:08

## 2020-08-13 RX ADMIN — NEOSTIGMINE METHYLSULFATE 3 MG: 1 INJECTION INTRAVENOUS at 02:08

## 2020-08-13 RX ADMIN — CARBOXYMETHYLCELLULOSE SODIUM 2 DROP: 2.5 SOLUTION/ DROPS OPHTHALMIC at 12:08

## 2020-08-13 RX ADMIN — SCOPALAMINE 1 PATCH: 1 PATCH, EXTENDED RELEASE TRANSDERMAL at 09:08

## 2020-08-13 NOTE — OR NURSING
Assisted up to bathroom, voided 250 ccs clear yellow urine, assisted back to bed, assisted to dress for discharge, tolerated well, iv discontinued

## 2020-08-13 NOTE — OP NOTE
"Title of Operation:  1)  Exam under anesthesia  2)  Hysteroscopy with polypectomy  3)  Posterior repair with perineorrhaphy    Indications for Surgery:  Mrs. Yani gill is a 57-year-old G3, P2-0-1-2 who reports a   suspected rectal injury after doing house work approximately three years   ago. She describes sensation of a rectal "tear" on excessive extension of   her back. Since that time, she has undergone evaluation per several   gastroenterology specialist. Colonoscopy 2006 is normal. A barium enema   2011 is suboptimal due to increased amount of stool in the bowel. She also  reports consultation with an orthopedist and MRI significant for   degenerative joint disease, but negative for neurologic injury. She   initially had discomfort in the left perianal area and sense of vaginal   bulge. Recently, she began to have issues with complete evacuation and   often splints perineally to help evacuate. She also notes use of MiraLax   can be helpful. Exam reveals a distal rectocele without obvious enterocele  or perineocele. There is tenderness to palpation perianally, especially at  the patient's left. There is also mild tenderness to palpation at the   internal levator ani complex, although no increased muscle tone. Rectal   exam reveals normal resting tone, but mildly decreased concentric squeeze.   Otherwise, there are no significant sphincter abnormalities. Perineal   sensation is grossly intact.      03/27/2018  In July, patient describes an episode of constipation lasting 6 days that caused increasing back pain. She used a colon cleanse to finally pass a BM. The constipation led to increasing back pain as described before. At this time she noticed 4 small brown spots about the size of a freckle on the left external labial skin when wiping. She noticed the bleeding originating from the brown spots. In August, noticed minimal bleeding on her pad again.      Pt has continued taking 4 teaspoons fiber daily 2AM and " 2PM to help with BM's and has been successful. BM's daily without the need to splint.  Pt has been using Replens when she feels dry but denies vaginal estrogen use. She still describes mild stress incontinence but not bothersome. Pt continues to do kegel exercises to help strengthen her pelvic floor.     Last HPI from 03/04/2020  --still has some issues with low back/left tailbone & buttock pain--mostly when she reaches forward, feels like she can only go so far before something will be very painful  --takes flexeril PRN--makes her sleepy enough so doesn't feel pain, not sure it really helps pain, though  --the only thing that alleviates pain is if she has BM or does colon cleanse/completely evacuates colon (can go 3 days without pain then)  --wakes in AM, has some pain; takes fiber, has BM easily QAM and pain greatly lessens; if doesn't have AM BM, can have worsening of pain; if has BM, pain starts to increase again as day goes on, can improve if has another BM later in day  --did have PF PT in past--didn't help but starting healthy back program in March  --not needing to splint anymore  2. Mixed urinary incontinence:   --previously:  Currently not bothersome. Continue to monitor. Basic behavioral modifications reviewed. Still occasionally with cough/sneeze. Is doing Kegel's.   --currently:  Mild OLVIN (mostly with sneeze/cough/blowing nose).  No UUI. Volumes are very small.  Uses small liner. Frequency:  Q4-5 hours.  Nocturia: 0-1x/PM (better on neurontin). No dysuria, hematuria. +complete emptying.     3. Atrophic vaginitis: was using Replens; was switched to estradiol cream 2x/week  4. Rectocele:  6/25/12 FL Defecogram: Video recording of defecation was performed revealing some increase in the anal rectal angle as expected with puborectalis relaxation. The anal canal opens with defecation. There is a small anterior rectocele, but there is no significant residual after evacuation . The patient performs a rocking  motion with intermittent straining and relaxation to achieve emptying. IMPRESSION: Small anterior rectocele.  --currently: feels some pressure on L side, can see when has mirror down there  5. Constipation, defecatory dysfunction:    --taking fiber 4 tsps/day has BM every AM, which is easy to pass  6. Abnormal pelvic US 3/3/2020:  --PCP got due to continued pelvic/back pain     POPQ today   Aa -1; Ba -1; C -7 (tethered); Ap -1; Bp -1; D -8.  Genital hiatus 3, perineal body 2 total vaginal length 10-11.  Exam:   Cervix: high in vagina with some tethering of vaginal tissue around area, making visualization of cervix with speculum difficult     03/03/2020  Uterus: Size: 5.8 x 2.8 x 4.2 cm    Appearance: Well-defined echogenic mass within the endocervical canal measuring 0.8 x 0.6 x 0.6 cm with significant vascular flow.  Complex fluid within the cervical canal.  Endometrium: Normal in this post menopausal patient, measuring 1 mm.  Right ovary: Not visualized.    Left ovary: Surgically absent.   Free Fluid: None.   Impression: Echogenic mass within the endocervical canal measuring up to 0.8 cm, likely representing an endocervical polyp.  Complex fluid throughout the endocervical canal.  Recommend direct visualization and histologic sampling.    Preoperative Diagnosis:  1)  Abnormal pelvic ultrasound  2)  Cystocele  3)  Rectocele  4)  Chronic constipation  5)  Abnormal uterine bleeding  6)  Vaginal atrophy  7)  Vaginal stenosis, partial    Postoperative Diagnosis:  1)  Abnormal pelvic ultrasound  2)  Cystocele  3)  Rectocele  4)  Chronic constipation  5)  Abnormal uterine bleeding  6)  Vaginal atrophy  7)  Vaginal stenosis, partial, relieved  8)  Cervical stenosis, relieved  9)  Endometrial polyp (benign on frozen pathology)    Anesthesia:  General endotracheal anesthesia.  Additionally, a dilute solution of 1% lidocaine with epinephrine was injected vaginally for local anesthesia.    Specimen (Bacteriological,  Pathological or other):  Endometrial discharge  Endometrial polyp (benign on frozen path)    Prosthetic Device/Implant:  none    Surgeons Narrative:    Surgeon: Salina Mukherjee MD    Assistants:  Bishnu Jesus MD ([id number]).  SALAS Gordon (insufficient resident assistance).      Intravenous Fluids:  1100 mL    Hysteroscopic fluids: 1200 mL fluid deficit - 500 mL on floor = 700 mL deficit (of note, much of the fluid spilled back through the cervix into the vagina due to poor cervical seal with device)     Estimated Blood Loss:  10 mL     Urine Output:  400 mL     Counts:  Sponge, lap, needle counts correct x 2.     Drains: Hernandez catheter.     Disposition:  The patient was sent to the PACU in stable condition.     Findings:     1.  On exam under anesthesia,  normal external female genitalia. There was prolapse as previously noted in clinic, mostly distal rectocele.  Anterior vaginal was well-supported.  Uterus and cervix: normal size, well-supported.  The apical vagina was tethered around the cervix, and this was gently released with blunt dissection to allow visualization of the entire cervical face, which was flush with the vagina.  An area suspicious for the os was noted (area of bluish discoloration with a thin membrane at the central cervix).  Location of cervix was confirmed with rectal exam noting area concerning for os was in a dense area of tissue, concerning for the cervix, superior to the rectum. This was opened superficially with gentle pressure from a small Hegar dilator, until the membrane was opened.  At this point, a moderate amount of dark red mucoid material eminated from from the os, suspicious for old uterine content in the setting of cervical stenosis. This was collected on a telfa and send for pathologic evaluation.    Adnexa: non palpable.     2.  On hysteroscopy: Uterine cavity scarred (?Asherman's) with prominent trabeculations throughout.  Small, benign-appearing polyp at posterior fundus, just  left of midline.  This was removed in it's entirety with the Myosure device. Left ostia patent and visible.  Unable to visualize right ostia due to scarring of uterine cavity.  Otherwise, endometrium appeared atrophic. Cervical canal atrophic and without lesion/mass.     3.  On cystoscopy, the bladder mucosa was Normal.  After hysteroscopy/polpectomy, there was no injury to the bladder mucosa.  The ureteral orifices were visualized bilaterally with (+) noted good efflux x 2. On a systematic survey of the bladder dome to the base of the urethrovesical junction, there were not other abnormalities noted. The urethra was normal on retraction of the scope.    4.  Rectal exam:  At the close of the case, rectal exam was performed.  There was no suture, mesh, or other injury noted on exam.  No obvious masses were palpated.     Description of the procedure:    The patient was identified in the preoperative area where informed consent was confirmed, and she was taken to the operating room where an adequate level of general anesthesia was obtained.  The patient was positioned in lithotomy position with legs in Jovan stirrups. Care was taken to avoid joint hyperflexion or hyperextension, and all extremity surfaces were carefully padded so as to minimize risk of neurologic injury. Intravenous antibiotics were administered preoperatively. Sequential compression devices were applied to the patient's lower extremities preoperatively and heparin was administered subcutaneously for VTE prophylaxis.  Surgical time-out was performed, where the patient was identified and procedures confirmed.  An examination under anesthesia was performed with findings described as above.  The patient's abdomen, perineum, and vagina were sterilely prepped and draped. A ramirez catheter was placed in the bladder for drainage.      The procedure commenced with exam under anesthesia.  A weighted speculum was placed in the vagina, and the apical vagina was  noted to be tethered around the cervix.  This was gently released with blunt dissection to allow visualization of the entire cervical face, which was flush with the vagina.  An area suspicious for the os was noted (area of bluish discoloration with a thin membrane at the central cervix).  Location of cervix was confirmed with rectal exam noting area concerning for os was in a dense area of tissue, concerning for the cervix, superior to the rectum. A tenaculum was placed on the anterior lip of the cervix, and the area concerning for the os was opened superficially with gentle pressure from a small Hegar dilator, until the membrane was opened.  At this point, a moderate amount of dark red mucoid material eminated from from the os, suspicious for old uterine content in the setting of cervical stenosis. This was collected on a telfa and send for pathologic evaluation.      Next, we performed hysteroscopy.  The cervical os was serially dilated with Hegar dilators to accommodate a 6F hysteroscope.  The scope was advanced into the uterine cavity with above noted findings. Uterine cavity scarred (?Asherman's) with prominent trabeculations throughout.  Small, benign-appearing polyp at posterior fundus, just left of midline.  This was removed in it's entirety with the Myosure device. Left ostia patent and visible.  Unable to visualize right ostia due to scarring of uterine cavity.  Otherwise, endometrium appeared atrophic. Cervical canal atrophic and without lesion/mass.  The endometrial polyp was removed with myosure device, and the fragments were sent to pathology for initial frozen evaluation, which was benign. The scope was then removed, and the tenaculum was removed from the cervix.  The tenaculum site was rendered hemostatic with pressure from a sponge on a ring forceps.  We then performed cystourethroscopy and rectal exam with normal findings as above noted.     Finally, we performed the posterior colporrhaphy and  perineorrhaphy.  Allis clamps were placed on the left and right hymenal remnants and at the proximal limit of the rectovaginal septal defect along the vaginal mucosa. The perineal skin to be resected for the perineorrhaphy was grasped with several Allis clamps.  The perineum and vaginal mucosa were injected with 1% lidocaine with epinephrine, which was distributed beneath the perineal skin and vaginal mucosa both in the midline and in the direction of the fornices. The perineal skin within the outlined area was dissected off the underlying perineal body with Metzenbaum scissors.  The posterior vaginal mucosa was then incised in the midline with Metzenbaum scissors.  The left and right margins of the vaginal mucosa were grasped with Allis clamps and the vaginal mucosa was dissected sharply off of the underlying rectovaginal fibromuscular connective tissue.  Individual areas of bleeding were made hemostatic with the electrocautery.  The rectovaginal fibromuscular tissue was exposed bilaterally to the margins of the levator ani muscles and plicated in the midline with a series of vertical mattress stitches of 0-vicryl.  As we approached the introitus, we brought together the perineal body in the midline between the hymenal remnants.  A series of vertical mattress stitches were used to plicate the perineal body beneath the perineal skin thereby plicating the superficial and deep transverse perineal muscles and bulbocavernosus muscles.  Care was taken to make sure that vaginal caliber/ introitus allowed 2 - 3 fingerbreadths to be placed without difficulty. There was no significant posterior vaginal wall contracture/ ridging at the completion of the plication. The vaginal mucosal margins of the vertical incision were trimmed with Metzenbaum scissors to remove less than 1/2 centimeter of reduntant mucosa on each side and reapproximated with a running, locking stitch of 2-0 Vicryl.  Excellent hemostasis was observed along  the suture line. Vaginal exam confirmed good reinforcement of the rectocele site without ridging or significant contracture of the vault.  Rectovaginal examination was performed with findings as described above.    At the close of the case, all counts were correct x2.  The vagina was irrigated, and all irrigants were removed.  The vagina was packed with a role of Kerlix, coated with Premarin cream for assurance of immediate postop hemostasis.  The Hernandez was in place and draining well.  The patient was awakened from general endotracheal anesthesia and was taken to the Recovery Room in stable condition.  She tolerated the procedure well.    I was present and scrubbed for the entire procedure.

## 2020-08-13 NOTE — ANESTHESIA POSTPROCEDURE EVALUATION
Anesthesia Post Evaluation    Patient: Sheree Lomeli    Procedure(s) Performed: Procedure(s) (LRB):  HYSTEROSCOPY, WITH DILATION AND CURETTAGE OF UTERUS (N/A)  POLYPECTOMY (N/A)  COLPORRHAPHY, ANTERIOR (N/A)  CYSTOSCOPY (N/A)    Final Anesthesia Type: general    Patient location during evaluation: PACU  Patient participation: Yes- Able to Participate  Level of consciousness: awake and alert  Post-procedure vital signs: reviewed and stable  Pain management: adequate  Airway patency: patent    PONV status at discharge: No PONV  Anesthetic complications: no      Cardiovascular status: blood pressure returned to baseline  Respiratory status: unassisted, spontaneous ventilation and room air  Hydration status: euvolemic  Follow-up not needed.          Vitals Value Taken Time   /72 08/13/20 1545   Temp 36.4 °C (97.5 °F) 08/13/20 1545   Pulse 61 08/13/20 1554   Resp 18 08/13/20 1545   SpO2 96 % 08/13/20 1554   Vitals shown include unvalidated device data.      Event Time   Out of Recovery 15:57:03         Pain/Yves Score: Yves Score: 10 (8/13/2020  4:00 PM)

## 2020-08-13 NOTE — TRANSFER OF CARE
Anesthesia Transfer of Care Note    Patient: Sheree Lomeli    Procedure(s) Performed: Procedure(s) (LRB):  HYSTEROSCOPY, WITH DILATION AND CURETTAGE OF UTERUS (N/A)  POLYPECTOMY (N/A)  COLPORRHAPHY, ANTERIOR (N/A)  CYSTOSCOPY (N/A)    Patient location: PACU    Anesthesia Type: general    Transport from OR: Transported from OR on 2-3 L/min O2 by NC with adequate spontaneous ventilation    Post pain: adequate analgesia    Post assessment: no apparent anesthetic complications    Post vital signs: stable    Level of consciousness: awake, alert and oriented    Nausea/Vomiting: no nausea/vomiting    Complications: none    Transfer of care protocol was followed      Last vitals:   Visit Vitals  BP (!) 155/80 (BP Location: Right arm, Patient Position: Sitting)   Pulse 74   Temp 37.2 °C (98.9 °F) (Tympanic)   Resp 18   Wt 66.7 kg (147 lb)   LMP  (LMP Unknown)   SpO2 97%   Breastfeeding No   BMI 25.23 kg/m²

## 2020-08-13 NOTE — OR NURSING
Resting comfortably, states not quite ready to attempt to void, drinking juice and iv continues in progress, discharge instructions reviewed with pt and spouse.

## 2020-08-13 NOTE — OPERATIVE NOTE ADDENDUM
Certification of Assistant at Surgery       Surgery Date: 8/13/2020     Participating Surgeons:  Surgeon(s) and Role:     * Salina Mukherjee MD - Primary     * Bishnu Jesus MD - Resident - Assisting    Procedures:  Procedure(s) (LRB):  HYSTEROSCOPY, WITH DILATION AND CURETTAGE OF UTERUS (N/A)  POLYPECTOMY (N/A)  COLPORRHAPHY, ANTERIOR (N/A)  CYSTOSCOPY (N/A)    Assistant Surgeon's Certification of Necessity:  I understand that section 1842 (b) (6) (d) of the Social Security Act generally prohibits Medicare Part B reasonable charge payment for the services of assistants at surgery in teaching hospitals when qualified residents are available to furnish such services. I certify that the services for which payment is claimed were medically necessary, and that no qualified resident was available to perform the services. I further understand that these services are subject to post-payment review by the Medicare carrier.      Adam Rivera PA-C    08/13/2020  2:52 PM

## 2020-08-13 NOTE — INTERVAL H&P NOTE
The patient has been examined and the H&P has been reviewed:    I concur with the findings and no changes have occurred since H&P was written.    Anesthesia/Surgery risks, benefits and alternative options discussed and understood by patient/family.          Active Hospital Problems    Diagnosis  POA    Abnormal pelvic ultrasound [R93.89]  Yes      Resolved Hospital Problems   No resolved problems to display.

## 2020-08-13 NOTE — PLAN OF CARE
Sheree KAM Newellon has met all discharge criteria from Phase II. Vital Signs are stable, ambulating  without difficulty. Discharge instructions given, patient verbalized understanding. Discharged from facility via wheelchair in stable condition.

## 2020-08-14 LAB
GRAM STN SPEC: NORMAL
GRAM STN SPEC: NORMAL

## 2020-08-14 NOTE — DISCHARGE SUMMARY
[facility]  Discharge Note  Short Stay    Procedure(s) (LRB):  HYSTEROSCOPY, WITH DILATION AND CURETTAGE OF UTERUS (N/A)  POLYPECTOMY (N/A)  COLPORRHAPHY, ANTERIOR (N/A)  CYSTOSCOPY (N/A)    OUTCOME: Patient tolerated treatment/procedure well without complication and is now ready for discharge.    DISPOSITION: Home or Self Care    FINAL DIAGNOSIS:    1)  Abnormal pelvic ultrasound  2)  Cystocele  3)  Rectocele  4)  Chronic constipation  5)  Abnormal uterine bleeding  6)  Vaginal atrophy  7)  Vaginal stenosis, partial, relieved  8)  Cervical stenosis, relieved  9)  Endometrial polyp (benign on frozen pathology)    FOLLOWUP: In clinic    DISCHARGE INSTRUCTIONS:    Discharge Procedure Orders   Diet general     Pelvic Rest   Order Comments: Nothing in the vagina for 2 weeks.     Call MD for:   Order Comments: Heavy vaginal bleeding greater than spotting.     Call MD for:  extreme fatigue     Call MD for:  persistent dizziness or light-headedness     Call MD for:  hives     Call MD for:  redness, tenderness, or signs of infection (pain, swelling, redness, odor or green/yellow discharge around incision site)     Call MD for:  difficulty breathing, headache or visual disturbances     Call MD for:  severe uncontrolled pain     Call MD for:  persistent nausea and vomiting     Call MD for:  temperature >100.4     Activity as tolerated

## 2020-08-17 LAB — BACTERIA SPEC AEROBE CULT: NO GROWTH

## 2020-08-18 LAB
FINAL PATHOLOGIC DIAGNOSIS: NORMAL
FROZEN SECTION DIAGNOSIS: NORMAL
FROZEN SECTION FOOTNOTE: NORMAL
GROSS: NORMAL

## 2020-08-21 LAB — BACTERIA SPEC ANAEROBE CULT: NORMAL

## 2020-09-02 ENCOUNTER — OFFICE VISIT (OUTPATIENT)
Dept: UROGYNECOLOGY | Facility: CLINIC | Age: 65
End: 2020-09-02
Payer: COMMERCIAL

## 2020-09-02 ENCOUNTER — TELEPHONE (OUTPATIENT)
Dept: UROGYNECOLOGY | Facility: CLINIC | Age: 65
End: 2020-09-02

## 2020-09-02 VITALS
BODY MASS INDEX: 24.65 KG/M2 | DIASTOLIC BLOOD PRESSURE: 80 MMHG | SYSTOLIC BLOOD PRESSURE: 130 MMHG | HEIGHT: 65 IN | WEIGHT: 147.94 LBS

## 2020-09-02 DIAGNOSIS — N95.2 VAGINAL ATROPHY: ICD-10-CM

## 2020-09-02 DIAGNOSIS — K59.09 CHRONIC CONSTIPATION: ICD-10-CM

## 2020-09-02 DIAGNOSIS — Z98.890 POST-OPERATIVE STATE: Primary | ICD-10-CM

## 2020-09-02 DIAGNOSIS — N81.89 PELVIC FLOOR WEAKNESS: ICD-10-CM

## 2020-09-02 PROCEDURE — 99024 POSTOP FOLLOW-UP VISIT: CPT | Mod: S$GLB,,, | Performed by: NURSE PRACTITIONER

## 2020-09-02 PROCEDURE — 99024 PR POST-OP FOLLOW-UP VISIT: ICD-10-PCS | Mod: S$GLB,,, | Performed by: NURSE PRACTITIONER

## 2020-09-02 PROCEDURE — 99999 PR PBB SHADOW E&M-EST. PATIENT-LVL IV: CPT | Mod: PBBFAC,,, | Performed by: NURSE PRACTITIONER

## 2020-09-02 PROCEDURE — 99999 PR PBB SHADOW E&M-EST. PATIENT-LVL IV: ICD-10-PCS | Mod: PBBFAC,,, | Performed by: NURSE PRACTITIONER

## 2020-09-02 NOTE — TELEPHONE ENCOUNTER
"Spoke with patient. Answered question: "Should I make colonoscopy appointment now or should I wait a month?"  Discussed with Julia Burgos NP  Who stated that patient should wait until after follow-up urogyn appointment. Then colonoscopy appointment can be made.   Patient was thankful for call back.   "

## 2020-09-02 NOTE — PROGRESS NOTES
"  [facility] UROGYNECOLOGY-[id number]   [address] [address]  September 2, 2020     Sheree Lomeli  [id number]  1955    UROGYN POST-OP  9/2/2020     Sheree Lomeli is a 65 y.o.  here for a post operative visit.    OPERATIVE NOTE  08/13/2020  Title of Operation:  1)  Exam under anesthesia  2)  Hysteroscopy with polypectomy  3)  Posterior repair with perineorrhaphy     Indications for Surgery:  Mrs. Yani gill is a 57-year-old G3, P2-0-1-2 who reports a   suspected rectal injury after doing house work approximately three years   ago. She describes sensation of a rectal "tear" on excessive extension of   her back. Since that time, she has undergone evaluation per several   gastroenterology specialist. Colonoscopy 2006 is normal. A barium enema   2011 is suboptimal due to increased amount of stool in the bowel. She also  reports consultation with an orthopedist and MRI significant for   degenerative joint disease, but negative for neurologic injury. She   initially had discomfort in the left perianal area and sense of vaginal   bulge. Recently, she began to have issues with complete evacuation and   often splints perineally to help evacuate. She also notes use of MiraLax   can be helpful. Exam reveals a distal rectocele without obvious enterocele  or perineocele. There is tenderness to palpation perianally, especially at  the patient's left. There is also mild tenderness to palpation at the   internal levator ani complex, although no increased muscle tone. Rectal   exam reveals normal resting tone, but mildly decreased concentric squeeze.   Otherwise, there are no significant sphincter abnormalities. Perineal   sensation is grossly intact.      03/27/2018  In July, patient describes an episode of constipation lasting 6 days that caused increasing back pain. She used a colon cleanse to finally pass a BM. The constipation led to increasing back pain as described before. At this time " she noticed 4 small brown spots about the size of a freckle on the left external labial skin when wiping. She noticed the bleeding originating from the brown spots. In August, noticed minimal bleeding on her pad again.      Pt has continued taking 4 teaspoons fiber daily 2AM and 2PM to help with BM's and has been successful. BM's daily without the need to splint.  Pt has been using Replens when she feels dry but denies vaginal estrogen use. She still describes mild stress incontinence but not bothersome. Pt continues to do kegel exercises to help strengthen her pelvic floor.     Last HPI from 03/04/2020  --still has some issues with low back/left tailbone & buttock pain--mostly when she reaches forward, feels like she can only go so far before something will be very painful  --takes flexeril PRN--makes her sleepy enough so doesn't feel pain, not sure it really helps pain, though  --the only thing that alleviates pain is if she has BM or does colon cleanse/completely evacuates colon (can go 3 days without pain then)  --wakes in AM, has some pain; takes fiber, has BM easily QAM and pain greatly lessens; if doesn't have AM BM, can have worsening of pain; if has BM, pain starts to increase again as day goes on, can improve if has another BM later in day  --did have PF PT in past--didn't help but starting healthy back program in March  --not needing to splint anymore  2. Mixed urinary incontinence:   --previously:  Currently not bothersome. Continue to monitor. Basic behavioral modifications reviewed. Still occasionally with cough/sneeze. Is doing Kegel's.   --currently:  Mild OLVIN (mostly with sneeze/cough/blowing nose).  No UUI. Volumes are very small.  Uses small liner. Frequency:  Q4-5 hours.  Nocturia: 0-1x/PM (better on neurontin). No dysuria, hematuria. +complete emptying.     3. Atrophic vaginitis: was using Replens; was switched to estradiol cream 2x/week  4. Rectocele:  6/25/12 FL Defecogram: Video recording of  defecation was performed revealing some increase in the anal rectal angle as expected with puborectalis relaxation. The anal canal opens with defecation. There is a small anterior rectocele, but there is no significant residual after evacuation . The patient performs a rocking motion with intermittent straining and relaxation to achieve emptying. IMPRESSION: Small anterior rectocele.  --currently: feels some pressure on L side, can see when has mirror down there  5. Constipation, defecatory dysfunction:    --taking fiber 4 tsps/day has BM every AM, which is easy to pass  6. Abnormal pelvic US 3/3/2020:  --PCP got due to continued pelvic/back pain     POPQ today   Aa -1; Ba -1; C -7 (tethered); Ap -1; Bp -1; D -8.  Genital hiatus 3, perineal body 2 total vaginal length 10-11.  Exam:   Cervix: high in vagina with some tethering of vaginal tissue around area, making visualization of cervix with speculum difficult     03/03/2020  Uterus: Size: 5.8 x 2.8 x 4.2 cm    Appearance: Well-defined echogenic mass within the endocervical canal measuring 0.8 x 0.6 x 0.6 cm with significant vascular flow.  Complex fluid within the cervical canal.  Endometrium: Normal in this post menopausal patient, measuring 1 mm.  Right ovary: Not visualized.    Left ovary: Surgically absent.   Free Fluid: None.   Impression: Echogenic mass within the endocervical canal measuring up to 0.8 cm, likely representing an endocervical polyp.  Complex fluid throughout the endocervical canal.  Recommend direct visualization and histologic sampling.     Preoperative Diagnosis:  1)  Abnormal pelvic ultrasound  2)  Cystocele  3)  Rectocele  4)  Chronic constipation  5)  Abnormal uterine bleeding  6)  Vaginal atrophy  7)  Vaginal stenosis, partial     Postoperative Diagnosis:  1)  Abnormal pelvic ultrasound  2)  Cystocele  3)  Rectocele  4)  Chronic constipation  5)  Abnormal uterine bleeding  6)  Vaginal atrophy  7)  Vaginal stenosis, partial, relieved  8)   Cervical stenosis, relieved  9)  Endometrial polyp (benign on frozen pathology)       HISTORY SINCE LAST VISIT:  Denies pain, bleeding, or discharge.  Bladder issues: Scant OLVIN. Voiding every 3-4 hours. No  Bowel issues: Denies constipation or straining. Taking fiber and magnesium daily    Past Medical History:   Diagnosis Date    Arthritis     Back pain     Chronic pelvic pain in female     left-sided    De Quervain's tenosynovitis     left    Headache(784.0)     Migraine maybe 1-2 times per year    PONV (postoperative nausea and vomiting)     Right carpal tunnel syndrome     Urinary incontinence     occ olvin       Past Surgical History:   Procedure Laterality Date    ANTERIOR VAGINAL REPAIR N/A 8/13/2020    Procedure: COLPORRHAPHY, ANTERIOR;  Surgeon: Salina Mukherjee MD;  Location: [facility] OR;  Service: OB/GYN;  Laterality: N/A;    CYSTOSCOPY N/A 8/13/2020    Procedure: CYSTOSCOPY;  Surgeon: Salina Mukherjee MD;  Location: [facility] OR;  Service: OB/GYN;  Laterality: N/A;    EPIDURAL STEROID INJECTION N/A 6/12/2018    Procedure: INJECTION-STEROID-EPIDURAL;  Surgeon: Rianna Nina MD;  Location: [facility] PAIN MGT;  Service: Pain Management;  Laterality: N/A;  Coccygeal Nerve Block  46860  (0.5mg Niravam only)    *Pt wants to know if she has NO SEDATION at all, can pt drive herself home*    HYSTEROSCOPY WITH DILATION AND CURETTAGE OF UTERUS N/A 8/13/2020    Procedure: HYSTEROSCOPY, WITH DILATION AND CURETTAGE OF UTERUS;  Surgeon: Salina Mukherjee MD;  Location: [facility] OR;  Service: OB/GYN;  Laterality: N/A;    OOPHORECTOMY      Left ovary       Family History   Problem Relation Age of Onset    Heart disease Father         s/p stenting    Coronary artery disease Father     Prostate cancer Father     Cancer Father     Hypoglycemic Sister     Breast cancer Daughter 35        dx in September 2018, lives in [place]    No Known Problems Sister     Diabetes Maternal Uncle     Diabetes Cousin     Hypertension  Neg Hx     Stroke Neg Hx     Colon cancer Neg Hx     Cervical cancer Neg Hx     Vaginal cancer Neg Hx     Endometrial cancer Neg Hx     Ovarian cancer Neg Hx        Social History     Socioeconomic History    Marital status:      Spouse name: Not on file    Number of children: 2    Years of education: Not on file    Highest education level: Not on file   Occupational History    Occupation:      Employer: [employer]   Social Needs    Financial resource strain: Not hard at all    Food insecurity     Worry: Never true     Inability: Never true    Transportation needs     Medical: No     Non-medical: No   Tobacco Use    Smoking status: Never Smoker    Smokeless tobacco: Never Used   Substance and Sexual Activity    Alcohol use: No     Frequency: Never     Binge frequency: Never    Drug use: No    Sexual activity: Yes     Partners: Male   Lifestyle    Physical activity     Days per week: 3 days     Minutes per session: 10 min    Stress: Not at all   Relationships    Social connections     Talks on phone: More than three times a week     Gets together: Once a week     Attends Mandaen service: Not on file     Active member of club or organization: Yes     Attends meetings of clubs or organizations: More than 4 times per year     Relationship status:    Other Topics Concern    Not on file   Social History Narrative    Not on file       Current Outpatient Medications   Medication Sig Dispense Refill    ascorbic acid, vitamin C, (VITAMIN C) 1000 MG tablet Take 1,000 mg by mouth once daily.      butalbital-aspirin-caffeine -40 mg (FIORINAL) -40 mg Cap Take 1 capsule by mouth every 4 (four) hours as needed. 30 capsule 0    dextrin (FIBER POWDER ORAL) Take by mouth.      ibuprofen (ADVIL,MOTRIN) 600 MG tablet TAKE 1 TABLET(600 MG) BY MOUTH EVERY 4 HOURS AS NEEDED 30 tablet 0    MAGNESIUM ORAL Take by mouth.      multivitamin (ONE DAILY MULTIVITAMIN) per  "tablet Take 1 tablet by mouth once daily.      omega-3 fatty acids/fish oil (FISH OIL-OMEGA-3 FATTY ACIDS) 300-1,000 mg capsule Take by mouth once daily.      vitamin D (VITAMIN D3) 1000 units Tab Take 1,000 Units by mouth once daily.      cyclobenzaprine (FLEXERIL) 5 MG tablet       estradiol (ESTRACE) 0.01 % (0.1 mg/gram) vaginal cream 0.5 grams nightly x 2 weeks, then 2x/week thereafter (Patient not taking: Reported on 9/2/2020) 42.5 g 11    HYDROcodone-acetaminophen (NORCO) 5-325 mg per tablet Take 1 tablet by mouth every 4 (four) hours as needed for Pain. (Patient not taking: Reported on 9/2/2020) 10 tablet 0    ibuprofen (ADVIL,MOTRIN) 800 MG tablet Take 1 tablet (800 mg total) by mouth every 8 (eight) hours as needed for Pain. (Patient not taking: Reported on 9/2/2020) 30 tablet 0    omeprazole (PRILOSEC) 40 MG capsule Take 1 capsule (40 mg total) by mouth daily as needed (heartburn). Take only as needed. (Patient not taking: Reported on 9/2/2020) 90 capsule 0     No current facility-administered medications for this visit.        Review of patient's allergies indicates:   Allergen Reactions    Adhesive Rash    Iodine Rash    Shellfish containing products Rash     Patient allergic to softshell crabs specifically    Sulfa (sulfonamide antibiotics) Rash        ROS  Per HPI    Well Woman  --pap: 9/2018, normal; HPV neg  --mammogram: 2019, normal  --colonoscopy: 2006, normal.   --DEXA: 2011, normal    Physical Exam  /80 (BP Location: Right arm)   Ht 5' 5" (1.651 m)   Wt 67.1 kg (147 lb 14.9 oz)   LMP  (LMP Unknown)   BMI 24.62 kg/m²   General: alert and oriented, no acute distress  Respiratory: normal respiratory effort  Abd: soft, non-tender, non-distended     Pelvic:  Ext. Genitalia: normal external genitalia. Normal bartholin's and skeens glands  Vagina: + atrophy. Normal vaginal mucosa without lesions. No discharge noted.  Posterior incision healing well-- sutures still intact.  " Non-tender bladder base without palpable mass.  Cervix: no lesions  Uterus:  uterus is normal size, shape, consistency and nontender   Urethra: no masses or tenderness  Urethral meatus: no lesions, caruncle or prolapse.    Dr. Mukherjee present during exam    Impression  1. Post-operative state     2. Chronic constipation  Ambulatory referral/consult to Physical/Occupational Therapy   3. Pelvic floor weakness  Ambulatory referral/consult to Physical/Occupational Therapy   4. Vaginal atrophy       We reviewed the above issues and discussed options for short-term versus long-term management of her problems.     Plan:   1. Post op healing well. Continue to follow post op instructions.  2. Start using vaginal estrogen cream 0.5 GM nightly   3. Will consider pelvic floor PT after you are healed  4. Patient will follow up with us in 4 week      30 minutes were spent in face to face time with this patient  90 % of this time was spent in counseling and/or coordination of care    NORMA Sevilla-BC  [facility]  Division of Female Pelvic Medicine and Reconstructive Surgery  Department of Obstetrics & Gynecology

## 2020-09-02 NOTE — PATIENT INSTRUCTIONS
1. Post op healing well. Continue to follow post op instructions.  2. Start using vaginal estrogen cream 0.5 GM nightly   3. Will consider pelvic floor PT after you are healed  4. Patient will follow up with us in 4 week

## 2020-09-14 ENCOUNTER — PATIENT MESSAGE (OUTPATIENT)
Dept: UROGYNECOLOGY | Facility: CLINIC | Age: 65
End: 2020-09-14

## 2020-09-14 DIAGNOSIS — Z12.31 ENCOUNTER FOR SCREENING MAMMOGRAM FOR BREAST CANCER: Primary | ICD-10-CM

## 2020-09-16 LAB — FUNGUS SPEC CULT: NORMAL

## 2020-09-16 NOTE — TELEPHONE ENCOUNTER
Back pain is somewhat better, but not completely resolved.  It is consistent with pain she had for years preoperatively.  Explained that it takes 6 -8 weeks for healing. She has returned to work for a few hours twice weekly.  Upper stomach pain has improved since she has stopped ibuprofen.  Will follow up in a few week in clinic.  Julia Burgos, IRAMP-BC

## 2020-09-24 ENCOUNTER — HOSPITAL ENCOUNTER (OUTPATIENT)
Dept: RADIOLOGY | Facility: HOSPITAL | Age: 65
Discharge: HOME | End: 2020-09-24
Attending: NURSE PRACTITIONER
Payer: COMMERCIAL

## 2020-09-24 DIAGNOSIS — Z12.31 ENCOUNTER FOR SCREENING MAMMOGRAM FOR BREAST CANCER: ICD-10-CM

## 2020-09-24 PROCEDURE — 77063 MAMMO DIGITAL SCREENING BILAT WITH TOMO: ICD-10-PCS | Mod: 26,,, | Performed by: RADIOLOGY

## 2020-09-24 PROCEDURE — 77067 SCR MAMMO BI INCL CAD: CPT | Mod: TC

## 2020-09-24 PROCEDURE — 77067 SCR MAMMO BI INCL CAD: CPT | Mod: 26,,, | Performed by: RADIOLOGY

## 2020-09-24 PROCEDURE — 77067 MAMMO DIGITAL SCREENING BILAT WITH TOMO: ICD-10-PCS | Mod: 26,,, | Performed by: RADIOLOGY

## 2020-09-24 PROCEDURE — 77063 BREAST TOMOSYNTHESIS BI: CPT | Mod: 26,,, | Performed by: RADIOLOGY

## 2020-09-28 ENCOUNTER — TELEPHONE (OUTPATIENT)
Dept: FAMILY MEDICINE | Facility: CLINIC | Age: 65
End: 2020-09-28

## 2020-09-28 ENCOUNTER — PATIENT MESSAGE (OUTPATIENT)
Dept: UROGYNECOLOGY | Facility: CLINIC | Age: 65
End: 2020-09-28

## 2020-09-28 NOTE — TELEPHONE ENCOUNTER
Spoke with patient and she informed me that she had a referral that was placed for the Healthy back program and she tried to schedule and was told that this  has now . Patient said that it has been 6 weeks post her back surgery and she would like to get this started again.

## 2020-09-28 NOTE — TELEPHONE ENCOUNTER
----- Message from Laurita Martin sent at 9/28/2020  8:44 AM CDT -----  Type: Patient Call Back    Who called: pt     What is the request in detail: Pt states asking for new orders for Better Back Program.     Can the clinic reply by [facility]? No     Would the patient rather a call back or a response via [facility]? Call back     Best call back number: [phone] (home)     Additional Information:

## 2020-09-30 ENCOUNTER — OFFICE VISIT (OUTPATIENT)
Dept: UROGYNECOLOGY | Facility: CLINIC | Age: 65
End: 2020-09-30
Payer: COMMERCIAL

## 2020-09-30 ENCOUNTER — TELEPHONE (OUTPATIENT)
Dept: FAMILY MEDICINE | Facility: CLINIC | Age: 65
End: 2020-09-30

## 2020-09-30 VITALS
BODY MASS INDEX: 24.83 KG/M2 | HEART RATE: 76 BPM | DIASTOLIC BLOOD PRESSURE: 72 MMHG | SYSTOLIC BLOOD PRESSURE: 147 MMHG | HEIGHT: 65 IN | WEIGHT: 149.06 LBS

## 2020-09-30 DIAGNOSIS — G89.29 CHRONIC LEFT-SIDED LOW BACK PAIN WITHOUT SCIATICA: Primary | ICD-10-CM

## 2020-09-30 DIAGNOSIS — M54.50 CHRONIC LEFT-SIDED LOW BACK PAIN WITHOUT SCIATICA: Primary | ICD-10-CM

## 2020-09-30 DIAGNOSIS — R10.13 EPIGASTRIC PAIN: ICD-10-CM

## 2020-09-30 DIAGNOSIS — N95.2 VAGINAL ATROPHY: ICD-10-CM

## 2020-09-30 DIAGNOSIS — Z98.890 POST-OPERATIVE STATE: Primary | ICD-10-CM

## 2020-09-30 PROCEDURE — 99024 PR POST-OP FOLLOW-UP VISIT: ICD-10-PCS | Mod: S$GLB,,, | Performed by: NURSE PRACTITIONER

## 2020-09-30 PROCEDURE — 99999 PR PBB SHADOW E&M-EST. PATIENT-LVL III: ICD-10-PCS | Mod: PBBFAC,,, | Performed by: NURSE PRACTITIONER

## 2020-09-30 PROCEDURE — 99999 PR PBB SHADOW E&M-EST. PATIENT-LVL III: CPT | Mod: PBBFAC,,, | Performed by: NURSE PRACTITIONER

## 2020-09-30 PROCEDURE — 99024 POSTOP FOLLOW-UP VISIT: CPT | Mod: S$GLB,,, | Performed by: NURSE PRACTITIONER

## 2020-09-30 NOTE — PROGRESS NOTES
"[facility] UROGYNECOLOGY-[id number]   [address] [address]  September 30, 2020     Sheree Lomeli  [id number]  1955    UROGYN POST-OP  9/30/2020     Sheree Lomeli is a 65 y.o.  here for a post operative visit.    OPERATIVE NOTE  08/13/2020  Title of Operation:  1)  Exam under anesthesia  2)  Hysteroscopy with polypectomy  3)  Posterior repair with perineorrhaphy     Indications for Surgery:  Mrs. Yani gill is a 57-year-old G3, P2-0-1-2 who reports a   suspected rectal injury after doing house work approximately three years   ago. She describes sensation of a rectal "tear" on excessive extension of   her back. Since that time, she has undergone evaluation per several   gastroenterology specialist. Colonoscopy 2006 is normal. A barium enema   2011 is suboptimal due to increased amount of stool in the bowel. She also  reports consultation with an orthopedist and MRI significant for   degenerative joint disease, but negative for neurologic injury. She   initially had discomfort in the left perianal area and sense of vaginal   bulge. Recently, she began to have issues with complete evacuation and   often splints perineally to help evacuate. She also notes use of MiraLax   can be helpful. Exam reveals a distal rectocele without obvious enterocele  or perineocele. There is tenderness to palpation perianally, especially at  the patient's left. There is also mild tenderness to palpation at the   internal levator ani complex, although no increased muscle tone. Rectal   exam reveals normal resting tone, but mildly decreased concentric squeeze.   Otherwise, there are no significant sphincter abnormalities. Perineal   sensation is grossly intact.      03/27/2018  In July, patient describes an episode of constipation lasting 6 days that caused increasing back pain. She used a colon cleanse to finally pass a BM. The constipation led to increasing back pain as described before. At this time " she noticed 4 small brown spots about the size of a freckle on the left external labial skin when wiping. She noticed the bleeding originating from the brown spots. In August, noticed minimal bleeding on her pad again.      Pt has continued taking 4 teaspoons fiber daily 2AM and 2PM to help with BM's and has been successful. BM's daily without the need to splint.  Pt has been using Replens when she feels dry but denies vaginal estrogen use. She still describes mild stress incontinence but not bothersome. Pt continues to do kegel exercises to help strengthen her pelvic floor.     Last HPI from 03/04/2020  --still has some issues with low back/left tailbone & buttock pain--mostly when she reaches forward, feels like she can only go so far before something will be very painful  --takes flexeril PRN--makes her sleepy enough so doesn't feel pain, not sure it really helps pain, though  --the only thing that alleviates pain is if she has BM or does colon cleanse/completely evacuates colon (can go 3 days without pain then)  --wakes in AM, has some pain; takes fiber, has BM easily QAM and pain greatly lessens; if doesn't have AM BM, can have worsening of pain; if has BM, pain starts to increase again as day goes on, can improve if has another BM later in day  --did have PF PT in past--didn't help but starting healthy back program in March  --not needing to splint anymore  2. Mixed urinary incontinence:   --previously:  Currently not bothersome. Continue to monitor. Basic behavioral modifications reviewed. Still occasionally with cough/sneeze. Is doing Kegel's.   --currently:  Mild OLVIN (mostly with sneeze/cough/blowing nose).  No UUI. Volumes are very small.  Uses small liner. Frequency:  Q4-5 hours.  Nocturia: 0-1x/PM (better on neurontin). No dysuria, hematuria. +complete emptying.     3. Atrophic vaginitis: was using Replens; was switched to estradiol cream 2x/week  4. Rectocele:  6/25/12 FL Defecogram: Video recording of  defecation was performed revealing some increase in the anal rectal angle as expected with puborectalis relaxation. The anal canal opens with defecation. There is a small anterior rectocele, but there is no significant residual after evacuation . The patient performs a rocking motion with intermittent straining and relaxation to achieve emptying. IMPRESSION: Small anterior rectocele.  --currently: feels some pressure on L side, can see when has mirror down there  5. Constipation, defecatory dysfunction:    --taking fiber 4 tsps/day has BM every AM, which is easy to pass  6. Abnormal pelvic US 3/3/2020:  --PCP got due to continued pelvic/back pain     POPQ today   Aa -1; Ba -1; C -7 (tethered); Ap -1; Bp -1; D -8.  Genital hiatus 3, perineal body 2 total vaginal length 10-11.  Exam:   Cervix: high in vagina with some tethering of vaginal tissue around area, making visualization of cervix with speculum difficult     03/03/2020  Uterus: Size: 5.8 x 2.8 x 4.2 cm    Appearance: Well-defined echogenic mass within the endocervical canal measuring 0.8 x 0.6 x 0.6 cm with significant vascular flow.  Complex fluid within the cervical canal.  Endometrium: Normal in this post menopausal patient, measuring 1 mm.  Right ovary: Not visualized.    Left ovary: Surgically absent.   Free Fluid: None.   Impression: Echogenic mass within the endocervical canal measuring up to 0.8 cm, likely representing an endocervical polyp.  Complex fluid throughout the endocervical canal.  Recommend direct visualization and histologic sampling.     Preoperative Diagnosis:  1)  Abnormal pelvic ultrasound  2)  Cystocele  3)  Rectocele  4)  Chronic constipation  5)  Abnormal uterine bleeding  6)  Vaginal atrophy  7)  Vaginal stenosis, partial     Postoperative Diagnosis:  1)  Abnormal pelvic ultrasound  2)  Cystocele  3)  Rectocele  4)  Chronic constipation  5)  Abnormal uterine bleeding  6)  Vaginal atrophy  7)  Vaginal stenosis, partial, relieved  8)   Cervical stenosis, relieved  9)  Endometrial polyp (benign on frozen pathology)       HISTORY SINCE LAST VISIT:  Denies pain, bleeding, or discharge.  Bladder issues: Scant OLVIN. Voiding every 3-4 hours. Has not been using estrogen nighly-- she was only using 1-2 times a week  Bowel issues: Denies constipation or straining. Taking fiber and magnesium daily    Epigastric pain with nausea constantly.  She does have a headache intermittently. Has not been taking omeprazole\    Low back/ sacral pain-- consistently-- has been present x 11 years.    Complains of upper back pain recently--aching in quality.  She is waiting for approval to attend [facility] [place].        Past Medical History:   Diagnosis Date    Arthritis     Back pain     Chronic pelvic pain in female     left-sided    De Quervain's tenosynovitis     left    Headache(784.0)     Migraine maybe 1-2 times per year    PONV (postoperative nausea and vomiting)     Right carpal tunnel syndrome     Urinary incontinence     occ olvin       Past Surgical History:   Procedure Laterality Date    ANTERIOR VAGINAL REPAIR N/A 8/13/2020    Procedure: COLPORRHAPHY, ANTERIOR;  Surgeon: Salina Mukherjee MD;  [facility];  Service: OB/GYN;  Laterality: N/A;    CYSTOSCOPY N/A 8/13/2020    Procedure: CYSTOSCOPY;  Surgeon: Salina Mukherjee MD;  Location: [facility] OR;  Service: OB/GYN;  Laterality: N/A;    EPIDURAL STEROID INJECTION N/A 6/12/2018    Procedure: INJECTION-STEROID-EPIDURAL;  Surgeon: Rianna Nina MD;  Location: [facility] PAIN [facility]GT;  Service: Pain Management;  Laterality: N/A;  Coccygeal Nerve Block  59248  (0.5mg Niravam only)    *Pt wants to know if she has NO SEDATION at all, can pt drive herself home*    HYSTEROSCOPY WITH DILATION AND CURETTAGE OF UTERUS N/A 8/13/2020    Procedure: HYSTEROSCOPY, WITH DILATION AND CURETTAGE OF UTERUS;  Surgeon: Salina Mukherjee MD;  Location: [facility] OR;  Service: OB/GYN;  Laterality: N/A;    OOPHORECTOMY      Left ovary        Family History   Problem Relation Age of Onset    Heart disease Father         s/p stenting    Coronary artery disease Father     Prostate cancer Father     Cancer Father     Hypoglycemic Sister     Breast cancer Daughter 35        dx in September 2018, lives in [place]    No Known Problems Sister     Diabetes Maternal Uncle     Diabetes Cousin     Hypertension Neg Hx     Stroke Neg Hx     Colon cancer Neg Hx     Cervical cancer Neg Hx     Vaginal cancer Neg Hx     Endometrial cancer Neg Hx     Ovarian cancer Neg Hx        Social History     Socioeconomic History    Marital status:      Spouse name: Not on file    Number of children: 2    Years of education: Not on file    Highest education level: Not on file   Occupational History    Occupation:      Employer: [employer]   Social Needs    Financial resource strain: Not hard at all    Food insecurity     Worry: Never true     Inability: Never true    Transportation needs     Medical: No     Non-medical: No   Tobacco Use    Smoking status: Never Smoker    Smokeless tobacco: Never Used   Substance and Sexual Activity    Alcohol use: No     Frequency: Never     Binge frequency: Never    Drug use: No    Sexual activity: Yes     Partners: Male   Lifestyle    Physical activity     Days per week: 3 days     Minutes per session: 10 min    Stress: Not at all   Relationships    Social connections     Talks on phone: More than three times a week     Gets together: Once a week     Attends Scientologist service: Not on file     Active member of club or organization: Yes     Attends meetings of clubs or organizations: More than 4 times per year     Relationship status:    Other Topics Concern    Not on file   Social History Narrative    Not on file       Current Outpatient Medications   Medication Sig Dispense Refill    ascorbic acid, vitamin C, (VITAMIN C) 1000 MG tablet Take 1,000 mg by mouth once daily.       butalbital-aspirin-caffeine -40 mg (FIORINAL) -40 mg Cap Take 1 capsule by mouth every 4 (four) hours as needed. (Patient not taking: Reported on 9/30/2020) 30 capsule 0    cyclobenzaprine (FLEXERIL) 5 MG tablet       dextrin (FIBER POWDER ORAL) Take by mouth.      estradioL (ESTRACE) 0.01 % (0.1 mg/gram) vaginal cream USE 0.5 GRAM VAGINALLY EVERY NIGHT FOR 2 WEEKS THEN USE VAGINALLY 2 TIMES PER WEEK THEREAFTER (Patient not taking: Reported on 9/30/2020) 42.5 g 11    HYDROcodone-acetaminophen (NORCO) 5-325 mg per tablet Take 1 tablet by mouth every 4 (four) hours as needed for Pain. (Patient not taking: Reported on 9/2/2020) 10 tablet 0    ibuprofen (ADVIL,MOTRIN) 600 MG tablet TAKE 1 TABLET(600 MG) BY MOUTH EVERY 4 HOURS AS NEEDED (Patient not taking: Reported on 9/30/2020) 30 tablet 0    ibuprofen (ADVIL,MOTRIN) 800 MG tablet Take 1 tablet (800 mg total) by mouth every 8 (eight) hours as needed for Pain. (Patient not taking: Reported on 9/2/2020) 30 tablet 0    MAGNESIUM ORAL Take by mouth.      multivitamin (ONE DAILY MULTIVITAMIN) per tablet Take 1 tablet by mouth once daily.      omega-3 fatty acids/fish oil (FISH OIL-OMEGA-3 FATTY ACIDS) 300-1,000 mg capsule Take by mouth once daily.      omeprazole (PRILOSEC) 40 MG capsule Take 1 capsule (40 mg total) by mouth daily as needed (heartburn). Take only as needed. (Patient not taking: Reported on 9/2/2020) 90 capsule 0    vitamin D (VITAMIN D3) 1000 units Tab Take 1,000 Units by mouth once daily.       No current facility-administered medications for this visit.        Review of patient's allergies indicates:   Allergen Reactions    Adhesive Rash    Iodine Rash    Shellfish containing products Rash     Patient allergic to softshell crabs specifically    Sulfa (sulfonamide antibiotics) Rash        ROS  Per HPI    Well Woman  --pap: 9/2018, normal; HPV neg  --mammogram: 2019, normal  --colonoscopy: 2006, normal.   --DEXA: 2011,  "normal    Physical Exam  BP (!) 147/72   Pulse 76   Ht 5' 5" (1.651 m)   Wt 67.6 kg (149 lb 0.5 oz)   LMP  (LMP Unknown)   BMI 24.80 kg/m²   General: alert and oriented, no acute distress  Respiratory: normal respiratory effort  Abd: soft, non-tender, non-distended     Pelvic:  Ext. Genitalia: normal external genitalia. Normal bartholin's and skeens glands  Vagina: + atrophy. Normal vaginal mucosa without lesions. No discharge noted.  Posterior incision well healed..  Non-tender bladder base without palpable mass.  Cervix: no lesions  Uterus:  uterus is normal size, shape, consistency and nontender   Urethra: no masses or tenderness  Urethral meatus: no lesions, caruncle or prolapse.    Dr. Mukherjee present during exam    Impression  1. Post-operative state     2. Epigastric pain  Ambulatory referral/consult to Gastroenterology   3. Vaginal atrophy       We reviewed the above issues and discussed options for short-term versus long-term management of her problems.     Plan:   1. Post op healing well. Continue to follow post op instructions.  2. Start using vaginal estrogen cream 0.5 GM nightly x 30 days then twice a week  3. Restart omeprazole  4. Consult to GI for epigastric pain  5. Cleared to go to health back  6. Start pelvic floor PT  7. Patient will follow up with us in 4 months      30 minutes were spent in face to face time with this patient  90 % of this time was spent in counseling and/or coordination of care    NORMA Sevilla-BC  [facility]  Division of Female Pelvic Medicine and Reconstructive Surgery  Department of Obstetrics & Gynecology     "

## 2020-09-30 NOTE — PATIENT INSTRUCTIONS
1. Post op healing well. Continue to follow post op instructions.  2. Start using vaginal estrogen cream 0.5 GM nightly x 30 days then twice a week  3. Restart omeprazole  4. Consult to GI for epigastric pain  5. Cleared to go to health back  6. Start pelvic floor PT  7. Patient will follow up with us in 4 months

## 2020-09-30 NOTE — TELEPHONE ENCOUNTER
----- Message from Pamela Brown LPN sent at 9/30/2020  1:10 PM CDT -----  Regarding: FW: Patient Access  Spoke with patient and she did see doc today and the provider put in note that she is cleared to do the healthy back program. Patient wants to know when will order be placed.  ----- Message -----  From: Kaye Douglas  Sent: 9/30/2020  11:09 AM CDT  To: [facility] Staff  Subject: Patient Access                                   Name of Who is Calling:  Sheree Cannon      What is the request in detail:   Patient called requesting the status of her referral for physical therapy / [facility]?  Please give a call and further advise.      Reply by [facility]:  no      Call Back : [phone]

## 2020-10-05 ENCOUNTER — CLINICAL SUPPORT (OUTPATIENT)
Dept: REHABILITATION | Facility: OTHER | Age: 65
End: 2020-10-05
Payer: COMMERCIAL

## 2020-10-05 ENCOUNTER — PATIENT MESSAGE (OUTPATIENT)
Dept: ADMINISTRATIVE | Facility: HOSPITAL | Age: 65
End: 2020-10-05

## 2020-10-05 DIAGNOSIS — M62.89 PELVIC FLOOR DYSFUNCTION: ICD-10-CM

## 2020-10-05 DIAGNOSIS — R27.8 OTHER LACK OF COORDINATION: ICD-10-CM

## 2020-10-05 DIAGNOSIS — K59.09 CHRONIC CONSTIPATION: ICD-10-CM

## 2020-10-05 DIAGNOSIS — Z12.11 SPECIAL SCREENING FOR MALIGNANT NEOPLASM OF COLON: Primary | ICD-10-CM

## 2020-10-05 DIAGNOSIS — R53.1 WEAKNESS: ICD-10-CM

## 2020-10-05 DIAGNOSIS — N81.89 PELVIC FLOOR WEAKNESS: ICD-10-CM

## 2020-10-05 PROCEDURE — 97161 PT EVAL LOW COMPLEX 20 MIN: CPT

## 2020-10-05 PROCEDURE — 97140 MANUAL THERAPY 1/> REGIONS: CPT

## 2020-10-05 PROCEDURE — 97530 THERAPEUTIC ACTIVITIES: CPT | Mod: 59

## 2020-10-05 PROCEDURE — 97150 GROUP THERAPEUTIC PROCEDURES: CPT

## 2020-10-05 NOTE — PATIENT INSTRUCTIONS
""Squeeze Before you Sneeze!"    Perform a kegel before coughing, laughing, sneezing, lifting, bending, squatting, pushing, pulling, getting out of bed, standing, etc.      Home Exercise Program: 10/05/2020    DIAPHRAGMATIC BREATHING     The diaphragm is a dome shaped muscle that forms the floor of the rib cage. It is the most efficient muscle for breathing and relaxation, although most people are not used to using the diaphragm. Diaphragmatic or belly breathing is an important technique to learn because it helps settle down or relax the autonomic nervous system. The correct use of diaphragmatic breathing can help to quiet brain activity resulting in the relaxation of all the muscles and organs of the body. This is accomplished by slow rhythmic breathing concentrated in the diaphragm muscle rather than the chest.    How to do proper relaxation breathing:     Start by lying on your back or reclining in a chair in a relaxed position. Place one hand on your chest and the other on your abdomen.   Relax your jaw by placing your tongue on the roof of your mouth and keeping your teeth slightly apart.    Take a deep breath in, letting the abdomen expand and rise while you keep your upper chest, neck and shoulders relaxed.    As you breathe out, allow your abdomen and chest to fall. Exhale completely.   It doesn't matter if you breathe in/out through your nose and/or mouth. Do whichever feels comfortable.   Remember to breathe slowly.  Do not force your breathing. Do not hold your breath.   Repeat for 2-5 minutes every day.            "

## 2020-10-06 ENCOUNTER — PATIENT OUTREACH (OUTPATIENT)
Dept: ADMINISTRATIVE | Facility: OTHER | Age: 65
End: 2020-10-06

## 2020-10-07 ENCOUNTER — LAB VISIT (OUTPATIENT)
Dept: LAB | Facility: HOSPITAL | Age: 65
End: 2020-10-07
Attending: STUDENT IN AN ORGANIZED HEALTH CARE EDUCATION/TRAINING PROGRAM
Payer: COMMERCIAL

## 2020-10-07 ENCOUNTER — OFFICE VISIT (OUTPATIENT)
Dept: GASTROENTEROLOGY | Facility: CLINIC | Age: 65
End: 2020-10-07
Payer: COMMERCIAL

## 2020-10-07 ENCOUNTER — TELEPHONE (OUTPATIENT)
Dept: ENDOSCOPY | Facility: HOSPITAL | Age: 65
End: 2020-10-07

## 2020-10-07 VITALS
HEIGHT: 64 IN | WEIGHT: 147.69 LBS | BODY MASS INDEX: 25.21 KG/M2 | DIASTOLIC BLOOD PRESSURE: 78 MMHG | SYSTOLIC BLOOD PRESSURE: 132 MMHG

## 2020-10-07 DIAGNOSIS — R63.0 LACK OF APPETITE: ICD-10-CM

## 2020-10-07 DIAGNOSIS — R10.13 EPIGASTRIC PAIN: Primary | ICD-10-CM

## 2020-10-07 DIAGNOSIS — R10.13 EPIGASTRIC PAIN: ICD-10-CM

## 2020-10-07 DIAGNOSIS — Z01.818 PRE-OP TESTING: ICD-10-CM

## 2020-10-07 DIAGNOSIS — R11.0 NAUSEA: ICD-10-CM

## 2020-10-07 DIAGNOSIS — K21.9 GASTROESOPHAGEAL REFLUX DISEASE WITHOUT ESOPHAGITIS: ICD-10-CM

## 2020-10-07 DIAGNOSIS — Z12.11 COLON CANCER SCREENING: Primary | ICD-10-CM

## 2020-10-07 DIAGNOSIS — Z12.11 SPECIAL SCREENING FOR MALIGNANT NEOPLASMS, COLON: Primary | ICD-10-CM

## 2020-10-07 LAB
ALBUMIN SERPL BCP-MCNC: 4.4 G/DL (ref 3.5–5.2)
ALP SERPL-CCNC: 48 U/L (ref 55–135)
ALT SERPL W/O P-5'-P-CCNC: 12 U/L (ref 10–44)
AMYLASE SERPL-CCNC: 54 U/L (ref 20–110)
ANION GAP SERPL CALC-SCNC: 6 MMOL/L (ref 8–16)
AST SERPL-CCNC: 19 U/L (ref 10–40)
BASOPHILS # BLD AUTO: 0.04 K/UL (ref 0–0.2)
BASOPHILS NFR BLD: 0.7 % (ref 0–1.9)
BILIRUB SERPL-MCNC: 0.4 MG/DL (ref 0.1–1)
BUN SERPL-MCNC: 6 MG/DL (ref 8–23)
CALCIUM SERPL-MCNC: 9.3 MG/DL (ref 8.7–10.5)
CHLORIDE SERPL-SCNC: 103 MMOL/L (ref 95–110)
CO2 SERPL-SCNC: 31 MMOL/L (ref 23–29)
CREAT SERPL-MCNC: 0.8 MG/DL (ref 0.5–1.4)
DIFFERENTIAL METHOD: ABNORMAL
EOSINOPHIL # BLD AUTO: 0.6 K/UL (ref 0–0.5)
EOSINOPHIL NFR BLD: 10.6 % (ref 0–8)
ERYTHROCYTE [DISTWIDTH] IN BLOOD BY AUTOMATED COUNT: 12.9 % (ref 11.5–14.5)
EST. GFR  (AFRICAN AMERICAN): >60 ML/MIN/1.73 M^2
EST. GFR  (NON AFRICAN AMERICAN): >60 ML/MIN/1.73 M^2
GLUCOSE SERPL-MCNC: 90 MG/DL (ref 70–110)
HCT VFR BLD AUTO: 41 % (ref 37–48.5)
HGB BLD-MCNC: 13.2 G/DL (ref 12–16)
IMM GRANULOCYTES # BLD AUTO: 0.01 K/UL (ref 0–0.04)
IMM GRANULOCYTES NFR BLD AUTO: 0.2 % (ref 0–0.5)
LIPASE SERPL-CCNC: 17 U/L (ref 4–60)
LYMPHOCYTES # BLD AUTO: 1.3 K/UL (ref 1–4.8)
LYMPHOCYTES NFR BLD: 24.2 % (ref 18–48)
MCH RBC QN AUTO: 29.9 PG (ref 27–31)
MCHC RBC AUTO-ENTMCNC: 32.2 G/DL (ref 32–36)
MCV RBC AUTO: 93 FL (ref 82–98)
MONOCYTES # BLD AUTO: 0.4 K/UL (ref 0.3–1)
MONOCYTES NFR BLD: 7.7 % (ref 4–15)
NEUTROPHILS # BLD AUTO: 3.1 K/UL (ref 1.8–7.7)
NEUTROPHILS NFR BLD: 56.6 % (ref 38–73)
NRBC BLD-RTO: 0 /100 WBC
PLATELET # BLD AUTO: 247 K/UL (ref 150–350)
PMV BLD AUTO: 11 FL (ref 9.2–12.9)
POTASSIUM SERPL-SCNC: 5 MMOL/L (ref 3.5–5.1)
PROT SERPL-MCNC: 7.3 G/DL (ref 6–8.4)
RBC # BLD AUTO: 4.41 M/UL (ref 4–5.4)
SODIUM SERPL-SCNC: 140 MMOL/L (ref 136–145)
WBC # BLD AUTO: 5.45 K/UL (ref 3.9–12.7)

## 2020-10-07 PROCEDURE — 99999 PR PBB SHADOW E&M-EST. PATIENT-LVL III: CPT | Mod: PBBFAC,,, | Performed by: STUDENT IN AN ORGANIZED HEALTH CARE EDUCATION/TRAINING PROGRAM

## 2020-10-07 PROCEDURE — 83690 ASSAY OF LIPASE: CPT

## 2020-10-07 PROCEDURE — 82150 ASSAY OF AMYLASE: CPT

## 2020-10-07 PROCEDURE — 99205 OFFICE O/P NEW HI 60 MIN: CPT | Mod: S$GLB,,, | Performed by: STUDENT IN AN ORGANIZED HEALTH CARE EDUCATION/TRAINING PROGRAM

## 2020-10-07 PROCEDURE — 36415 COLL VENOUS BLD VENIPUNCTURE: CPT

## 2020-10-07 PROCEDURE — 85025 COMPLETE CBC W/AUTO DIFF WBC: CPT

## 2020-10-07 PROCEDURE — 99205 PR OFFICE/OUTPT VISIT, NEW, LEVL V, 60-74 MIN: ICD-10-PCS | Mod: S$GLB,,, | Performed by: STUDENT IN AN ORGANIZED HEALTH CARE EDUCATION/TRAINING PROGRAM

## 2020-10-07 PROCEDURE — 80053 COMPREHEN METABOLIC PANEL: CPT

## 2020-10-07 PROCEDURE — 3008F BODY MASS INDEX DOCD: CPT | Mod: CPTII,S$GLB,, | Performed by: STUDENT IN AN ORGANIZED HEALTH CARE EDUCATION/TRAINING PROGRAM

## 2020-10-07 PROCEDURE — 99999 PR PBB SHADOW E&M-EST. PATIENT-LVL III: ICD-10-PCS | Mod: PBBFAC,,, | Performed by: STUDENT IN AN ORGANIZED HEALTH CARE EDUCATION/TRAINING PROGRAM

## 2020-10-07 PROCEDURE — 3008F PR BODY MASS INDEX (BMI) DOCUMENTED: ICD-10-PCS | Mod: CPTII,S$GLB,, | Performed by: STUDENT IN AN ORGANIZED HEALTH CARE EDUCATION/TRAINING PROGRAM

## 2020-10-07 RX ORDER — OMEPRAZOLE 40 MG/1
40 CAPSULE, DELAYED RELEASE ORAL
Qty: 60 CAPSULE | Refills: 3 | Status: SHIPPED | OUTPATIENT
Start: 2020-10-07 | End: 2020-10-12 | Stop reason: ALTCHOICE

## 2020-10-07 RX ORDER — POLYETHYLENE GLYCOL 3350, SODIUM SULFATE ANHYDROUS, SODIUM BICARBONATE, SODIUM CHLORIDE, POTASSIUM CHLORIDE 236; 22.74; 6.74; 5.86; 2.97 G/4L; G/4L; G/4L; G/4L; G/4L
4 POWDER, FOR SOLUTION ORAL ONCE
Qty: 4000 ML | Refills: 0 | Status: SHIPPED | OUTPATIENT
Start: 2020-10-07 | End: 2020-10-07

## 2020-10-07 RX ORDER — SUCRALFATE 1 G/10ML
1 SUSPENSION ORAL
Qty: 1 BOTTLE | Refills: 1 | Status: SHIPPED | OUTPATIENT
Start: 2020-10-07 | End: 2020-10-28

## 2020-10-07 NOTE — PATIENT INSTRUCTIONS
-Begin taking omeprazole 40mg twice daily - 30m prior to breakfast and dinner  -Begin taking sucralfate prior to meals and bedtime  -You will be scheduled for EGD  -If pain persists, we will order a CT scan

## 2020-10-07 NOTE — PROGRESS NOTES
[facility] Consultation Note    Reason for Consult:  The primary encounter diagnosis was Epigastric pain. Diagnoses of Lack of appetite, Nausea, and Gastroesophageal reflux disease without esophagitis were also pertinent to this visit.    PCP:   Whitney Mera   [address] / [place] [address]    Referring MD:  Julia Burgos, Seun  [address] [address]    HPI:  This is a 65 y.o. female here for evaluation of abdominal pain.    The patient notes that roughly 5 weeks ago she developed severe abdominal pain.  The pain occurs all day and is constant.  She has not had a pain free day in the last 5 weeks.  The pain is in her epigastrium and RUQ quadrant, with occasional radiation to her breast.  The pain fluctuates in severity, but she cannot identify any exacerbating factors.  She thinks that having an empty stomach worsens the pain. No significant change with eating.  Pain seems to be most intense at night and wakes her from sleep.      She denies any alleviating factors, but did experience some relief with ibuprofen.    She denies any acid reflux, regurgitation, or dysphagia.  She does have some associated nausea, but no emesis.  Interestingly, does report balance was off when waking once night.    The patient notes that she had pain similar in January.  It was attributed to a new OTC supplement.  She stopped the supplement and symptoms improved, however, they recurred with worsening severity 5 weeks ago.    She underwent rectocele repair and cervical biopsy in August.  She felt well after surgery for 2 weeks, but after that she developed severe abdominal pain as above.     She is taking omeprazole when she uses ibuprofen.  Though she is taking this once daily at the moment without any improvement.  Regarding NSAIDs, she does take this for back pain, but she did increase it to daily after her rectocele repair.  She stopped the medication with her pain and it  has not improved.    Appetite is decreased from baseline.  She does not have any desire for food.    Bowel habits are regular.      ROS:  Constitutional: No fevers, chills, No weight loss  ENT: No allergies  CV: No chest pain  Pulm: No cough, No shortness of breath  Ophtho: No vision changes  GI: see HPI  Derm: No rash  Heme: No lymphadenopathy, No bruising  MSK: No arthritis  : No dysuria, No hematuria  Endo: No hot or cold intolerance  Neuro: No syncope, No seizure  Psych: No anxiety, No depression    Medical History:  has a past medical history of Arthritis, Back pain, Chronic pelvic pain in female, De Quervain's tenosynovitis, Headache(784.0), PONV (postoperative nausea and vomiting), Right carpal tunnel syndrome, and Urinary incontinence.    Surgical History:  has a past surgical history that includes Oophorectomy; Epidural steroid injection (N/A, 6/12/2018); Hysteroscopy with dilation and curettage of uterus (N/A, 8/13/2020); Anterior vaginal repair (N/A, 8/13/2020); and Cystoscopy (N/A, 8/13/2020).    Family History: family history includes Breast cancer (age of onset: 35) in her daughter; Cancer in her father; Coronary artery disease in her father; Diabetes in her cousin and maternal uncle; Heart disease in her father; Hypoglycemic in her sister; No Known Problems in her sister; Prostate cancer in her father..     Social History:  reports that she has never smoked. She has never used smokeless tobacco. She reports that she does not drink alcohol or use drugs.    Review of patient's allergies indicates:   Allergen Reactions    Adhesive Rash    Iodine Rash    Shellfish containing products Rash     Patient allergic to softshell crabs specifically    Sulfa (sulfonamide antibiotics) Rash       Current Outpatient Rx   Medication Sig Dispense Refill    ascorbic acid, vitamin C, (VITAMIN C) 1000 MG tablet Take 1,000 mg by mouth once daily.      butalbital-aspirin-caffeine -40 mg (FIORINAL) -40 mg  "Cap Take 1 capsule by mouth every 4 (four) hours as needed. 30 capsule 0    cyclobenzaprine (FLEXERIL) 5 MG tablet       dextrin (FIBER POWDER ORAL) Take by mouth.      estradioL (ESTRACE) 0.01 % (0.1 mg/gram) vaginal cream USE 0.5 GRAM VAGINALLY EVERY NIGHT FOR 2 WEEKS THEN USE VAGINALLY 2 TIMES PER WEEK THEREAFTER 42.5 g 11    HYDROcodone-acetaminophen (NORCO) 5-325 mg per tablet Take 1 tablet by mouth every 4 (four) hours as needed for Pain. 10 tablet 0    ibuprofen (ADVIL,MOTRIN) 600 MG tablet TAKE 1 TABLET(600 MG) BY MOUTH EVERY 4 HOURS AS NEEDED 30 tablet 0    ibuprofen (ADVIL,MOTRIN) 800 MG tablet Take 1 tablet (800 mg total) by mouth every 8 (eight) hours as needed for Pain. 30 tablet 0    MAGNESIUM ORAL Take by mouth.      multivitamin (ONE DAILY MULTIVITAMIN) per tablet Take 1 tablet by mouth once daily.      omega-3 fatty acids/fish oil (FISH OIL-OMEGA-3 FATTY ACIDS) 300-1,000 mg capsule Take by mouth once daily.      omeprazole (PRILOSEC) 40 MG capsule Take 1 capsule (40 mg total) by mouth 2 (two) times daily before meals. Take only as needed. 60 capsule 3    vitamin D (VITAMIN D3) 1000 units Tab Take 1,000 Units by mouth once daily.      sucralfate (CARAFATE) 100 mg/mL suspension Take 10 mLs (1 g total) by mouth 4 (four) times daily before meals and nightly. 1 Bottle 1       Objective Findings:    Vital Signs:  /78   Ht 5' 4" (1.626 m)   Wt 67 kg (147 lb 11.3 oz)   LMP  (LMP Unknown)   BMI 25.35 kg/m²   Body mass index is 25.35 kg/m².    Physical Exam:  General Appearance: Well appearing in no acute distress  Head:   Normocephalic, without obvious abnormality  Eyes:    No scleral icterus, EOMI  ENT: Neck supple, Lips, mucosa, and tongue normal; teeth and gums normal  Lungs: CTA bilaterally in anterior and posterior fields, no wheezes, no crackles.  Heart:  Regular rate and rhythm, S1, S2 normal, no murmurs heard  Abdomen: Soft, + Tenderness in epigastrium, non distended with " positive bowel sounds in all four quadrants. No hepatosplenomegaly, ascites, or mass  Extremities: 2+ pulses, no clubbing, cyanosis or edema  Skin: No rash  Neurologic: CN II-XII intact      Labs:  Lab Results   Component Value Date    WBC 6.57 08/12/2020    HGB 13.1 08/12/2020    HCT 40.6 08/12/2020     08/12/2020    CHOL 228 (H) 08/30/2016    TRIG 71 02/25/2017    HDL 81 02/25/2017    ALT 31 02/28/2018    AST 27 02/28/2018     08/12/2020    K 4.8 08/12/2020     08/12/2020    CREATININE 0.8 08/12/2020    BUN 10 08/12/2020    CO2 27 08/12/2020    TSH 1.23 02/25/2017       Imaging:  U/S Pelvis 3/3/2020    Impression:     Echogenic mass within the endocervical canal measuring up to 0.8 cm, likely representing an endocervical polyp.  Complex fluid throughout the endocervical canal.  Recommend direct visualization and histologic sampling.        Assessment:  1. Epigastric pain    2. Lack of appetite    3. Nausea    4. Gastroesophageal reflux disease without esophagitis      In summary, the patient is a 65-year-old female who presents with approximately 5 weeks of epigastric abdominal pain.  The patient's symptoms began shortly after surgery at which time she was using high-dose ibuprofen for pain control.  This raises suspicion for peptic ulcer disease versus erosive esophagitis.    I have considered the possibility of biliary colic, but her pain is not typical for this.  I will obtain a CMP to assess her liver labs.  I will also consider gastroesophageal reflux disease and pancreatitis, though the patients symptoms are not typical for either.    Given the patient's ongoing pain and lack of appetite I have recommended we proceed with an EGD to evaluate for mucosal disease.  I will also increase her omeprazole to twice daily regimen.  I have also prescribed a trial of sucralfate prior to meals and bedtime for symptomatic control.    If her upper endoscopy is unrevealing and her symptoms persist, we  will proceed with cross-sectional abdominal imaging.    In addition a CMP today I will obtain a CBC, amylase and lipase.    Of note she is past due for a screening colonoscopy.  We elected to wait until abdominal pain is resolved to proceed with a screening colonoscopy given that she would likely struggle with the prep.    Follow up in about 2 months (around 12/7/2020).      Order summary:  Orders Placed This Encounter    CBC auto differential    Comprehensive Metabolic Panel    AMYLASE    LIPASE    sucralfate (CARAFATE) 100 mg/mL suspension    omeprazole (PRILOSEC) 40 MG capsule    Case request GI: ESOPHAGOGASTRODUODENOSCOPY (EGD)         Thank you so much for allowing me to participate in the care of Sheree Pulido MD

## 2020-10-07 NOTE — LETTER
October 7, 2020      Julia Burgos, POLO  [address]  [facility] [address]           [facility]  [address]   [place] [address]  Phone: [phone]  Fax: [phone]          Patient: Sheree Lomeli   MR Number: [id number]   YOB: 1955   Date of Visit: 10/7/2020       Dear Julia Burgos:    Thank you for referring Sheree Lomeli to me for evaluation. Attached you will find relevant portions of my assessment and plan of care.    If you have questions, please do not hesitate to call me. I look forward to following Sheree Lomeli along with you.    Sincerely,    Parish Pulido MD    Enclosure  CC:  No Recipients    If you would like to receive this communication electronically, please contact [email or web address] or [phone] to request more information on [employer] access.    For providers and/or their staff who would like to refer a patient to [facility], please contact us through our one-stop-shop provider referral line, [facility], at [phone].    If you feel you have received this communication in error or would no longer like to receive these types of communications, please e-mail [email or web address]

## 2020-10-07 NOTE — TELEPHONE ENCOUNTER
----- Message from Rosa Cunningham sent at 10/7/2020 11:53 AM CDT -----  YECENIA CHIASSON calling regarding Pharmacy Authorization (message) for #omeprazole (PRILOSEC) 40 MG capsule. Call back [phone]

## 2020-10-07 NOTE — TELEPHONE ENCOUNTER
Dr. Pulido,  Pt is scheduled for her EGD on 11/3 with you.  She is asking if she can add the colonoscopy on for that day as well since it's so far away and see how her abdominal pain is doing then and if she can tolerate the prep then proceed with colonoscopy and if not better then cancel it and just do the EGD.  Please advise. Thanks

## 2020-10-12 ENCOUNTER — TELEPHONE (OUTPATIENT)
Dept: ENDOSCOPY | Facility: HOSPITAL | Age: 65
End: 2020-10-12

## 2020-10-12 ENCOUNTER — CLINICAL SUPPORT (OUTPATIENT)
Dept: REHABILITATION | Facility: HOSPITAL | Age: 65
End: 2020-10-12
Attending: INTERNAL MEDICINE
Payer: COMMERCIAL

## 2020-10-12 DIAGNOSIS — M54.50 CHRONIC LEFT-SIDED LOW BACK PAIN WITHOUT SCIATICA: ICD-10-CM

## 2020-10-12 DIAGNOSIS — M62.81 WEAKNESS OF TRUNK MUSCULATURE: ICD-10-CM

## 2020-10-12 DIAGNOSIS — M53.86 DECREASED RANGE OF MOTION OF LUMBAR SPINE: ICD-10-CM

## 2020-10-12 DIAGNOSIS — G89.29 CHRONIC LEFT-SIDED LOW BACK PAIN WITHOUT SCIATICA: ICD-10-CM

## 2020-10-12 DIAGNOSIS — K21.9 GASTROESOPHAGEAL REFLUX DISEASE WITHOUT ESOPHAGITIS: Primary | ICD-10-CM

## 2020-10-12 DIAGNOSIS — K21.9 GASTROESOPHAGEAL REFLUX DISEASE WITHOUT ESOPHAGITIS: ICD-10-CM

## 2020-10-12 DIAGNOSIS — R29.3 POSTURE IMBALANCE: ICD-10-CM

## 2020-10-12 PROCEDURE — 97162 PT EVAL MOD COMPLEX 30 MIN: CPT | Mod: PN | Performed by: PHYSICAL MEDICINE & REHABILITATION

## 2020-10-12 PROCEDURE — 97110 THERAPEUTIC EXERCISES: CPT | Mod: PN | Performed by: PHYSICAL MEDICINE & REHABILITATION

## 2020-10-12 RX ORDER — PANTOPRAZOLE SODIUM 40 MG/1
40 TABLET, DELAYED RELEASE ORAL DAILY
Qty: 60 TABLET | Refills: 5 | Status: ON HOLD | OUTPATIENT
Start: 2020-10-12 | End: 2021-04-23

## 2020-10-12 RX ORDER — PANTOPRAZOLE SODIUM 20 MG/1
20 TABLET, DELAYED RELEASE ORAL
Qty: 60 TABLET | Refills: 5 | Status: SHIPPED | OUTPATIENT
Start: 2020-10-12 | End: 2020-10-12 | Stop reason: SDUPTHER

## 2020-10-12 NOTE — TELEPHONE ENCOUNTER
Hello,  Please let her know I have changed to 40mg once daily.  I updated this prescription.   Thanks,  Dr. POLK

## 2020-10-12 NOTE — TELEPHONE ENCOUNTER
----- Message from Rosa Cunningham sent at 10/12/2020 11:50 AM CDT -----  Narendra called stated the insurance company is only paying for once daily instead of 2 daily of the pantoprazole (PROTONIX) 20 MG tablet. Could they change to once daily      [id number] - [address]  [place] [address]  Phone: [phone] Fax: [phone]

## 2020-10-12 NOTE — PLAN OF CARE
[facility] - PHYSICAL THERAPY EVALUATION             Name: Sheree Lomeli  Clinic Number: [id number]    Therapy Diagnosis:   Encounter Diagnoses   Name Primary?    Chronic left-sided low back pain without sciatica     Decreased range of motion of lumbar spine     Weakness of trunk musculature     Posture imbalance      Physician: Whitney Mera MD    Physician Orders: PT Eval and Treat    Medical Diagnosis from Referral: M54.5,G89.29 (ICD-10-CM) - Chronic left-sided low back pain without sciatica  Evaluation Date: 10/12/2020  Authorization Period Expiration: 12/31/20  Plan of Care Expiration: 1/12/21  Reassessment Due:  11/12/20  Visit # / Visits authorized: 1/ 20    Time In: 3:00 pm  Time Out: 4:30 pm  Total Billable Time: 90 minutes    Precautions: standard    Pattern of pain determined:  1 possible PEP      Subjective   Date of onset: long term history back pain.    History of current condition - Sheree reports: started 11 years ago.  She has had back pain for a long time.  It first started with cleaning in a bathroom.   She was stretching to clean on the floor and she felt something pop in her back.   This is when her back pain started, and it was all in her sacral region.    She feels this is when all her symptoms started.   She had trouble with sacral pain, and with her bowels.   She was constipated.     She had back pain for weeks.  She took muscle relaxer.   She had therapy, but it didn't help much.   Over the last 11 years she has had 2 episodes of physical therapy and she also had pelvic floor therapy as well.  She has had pain for the last 11 years.   Pain is in the back, sacral region.   It will get better, months of no pain, then months of pain.  She could go 3-4 months without symptoms, and then symptoms would start again.   She can't think of what the triggers are for it to flare up.    She did see a Physician who said she had rectocele on her rectal side and she has been on fiber  and stool softeners which has kept her regular but she still has the tail bone pain on and off.   She reports that the tail bone pain has become constant and she also has left low back pain now.   She had a CT scan which showed a curved tail bone.   She had anterior vaginal repair and biopsy of polyp, and has recovered over last 6 weeks.    The left sided back pain has been going on for about 3 years now.   Constant , daily left sided back pain.   Worse with lifting, as the day progresses, she is totally unsure what makes it worse or better.   She has quite all exercise because she is fearful to irritate it.  She walks 1 mile a day.  She does some stretching.   She really doesn't know what makes it better or worse.         Prior Therapy: pelvic floor and regular therapy  Prior Treatment: no, 1 MAVIS  Social History: lives in house, no stairs , she manages shopping, cooking, cleaning  Occupation: works for an 's office, standing desk, she goes into work  Leisure: walks      Prior Level of Function: able to do 9 min work out at home  Current Level of Function: manages work and ADL  DME owned/used: no        Pain:  Current 5/10, worst 7/10, best 1/10   Location: left back   Description: Dull and Burning  Aggravating Factors: Sitting and Bending  Easing Factors: hot bath  Disturbed Sleep: she wakes often        Pattern of pain questions:  1.  Where is your pain the worst? Left back/sacral pain  2.  Is your pain constant or intermittent? constant  3.  Does bending forward make your typical pain worse? yes  4.  Since the start of your back pain, has there been a change in your bowel or bladder? Yes, sacral issues for years and in pelvic floor therapy  5.  What can't you do now that you use to be able to do? She has stopped exercising like she use to          Medical History:   Past Medical History:   Diagnosis Date    Arthritis     Back pain     Chronic pelvic pain in female     left-sided    De  Quervain's tenosynovitis     left    Headache(784.0)     Migraine maybe 1-2 times per year    PONV (postoperative nausea and vomiting)     Right carpal tunnel syndrome     Urinary incontinence     occ kira       Surgical History:   Sheree Lomeli  has a past surgical history that includes Oophorectomy; Epidural steroid injection (N/A, 6/12/2018); Hysteroscopy with dilation and curettage of uterus (N/A, 8/13/2020); Anterior vaginal repair (N/A, 8/13/2020); and Cystoscopy (N/A, 8/13/2020).    Medications:   Sheree has a current medication list which includes the following prescription(s): ascorbic acid (vitamin c), butalbital-aspirin-caffeine -40 mg, cyclobenzaprine, dextrin, estradiol, hydrocodone-acetaminophen, ibuprofen, ibuprofen, magnesium, multivitamin, fish oil-omega-3 fatty acids, pantoprazole, sucralfate, and vitamin d.    Allergies:   Review of patient's allergies indicates:   Allergen Reactions    Adhesive Rash    Iodine Rash    Shellfish containing products Rash     Patient allergic to softshell crabs specifically    Sulfa (sulfonamide antibiotics) Rash        Imaging, MRI studies: 3/27/18 in Epic IMPRESSION:      Multilevel lumbar spondylosis most significant at L2-3 and L3-4 with no greater than mild spinal canal stenosis.     Prominent left L4-5 neural foraminal recess protrusion and annular fissure abutting the exiting left L4 root contributing to moderate neural foraminal narrowing.     Additional multilevel neural foraminal narrowing as detailed above.         Pts goals: reduce left sided back pain, and reduce sacral pain      Red Flag Screening:   Cough  Sneeze  Strain: (--)  Bladder/ bowel: (+) seeing pelvic floor  Falls: (--)  Night pain: (--) wakes up and is uncomfortable and she can move around and feel better  Unexplained weight loss: (--)  General health: fair    OBJECTIVE     Postural examination/scapula alignment: Rounded shoulder and Head forward  Sitting: sits with hips  shifted to side and moves back and forth from side to side  Standing: fair  Correction of posture: better with lumbar roll    MOVEMENT LOSS    ROM Loss   Flexion minimal loss   Extension minimal loss   Side bending Right minimal loss   Side bending Left minimal loss   Rotation Right minimal loss   Rotation Left minimal loss     Lower Extremity Strength  Right LE  Left LE    Hip flexion: 4+/5 Hip flexion: 4+/5   Hip extension:  4+/5 Hip extension: 4+/5   Hip abduction: 4+/5 Hip abduction: 4+/5   Hip adduction:  4+/5 Hip adduction:  4+/5   Hip Internal rotation   4+/5 Hip Internal rotation 4+/5   Knee Flexion 5/5 Knee Flexion 5/5   Knee Extension 5/5 Knee Extension 5/5   Ankle dorsiflexion: 5/5 Ankle dorsiflexion: 5/5   Ankle plantarflexion: 5/5 Ankle plantarflexion: 5/5       GAIT:  Assistive Device used: none  Level of Assistance: independent  Patient displays the following gait deviations:  no gait deviations observed.     Special Tests:   Test Name  Test Result   Prone Instability Test (+)   SI Joint Provocation Test (--)   Straight Leg Raise (--)   Neural Tension Test (--)   Crossed Straight Leg Raise (--)   Walking on toes (--)   Walking on heels  (--)       NEUROLOGICAL SCREENING     Sensory deficit: intact    Reflexes:    Left Right   Patella Tendon 1+ 1+   Achilles Tendon 1+ 1+   Babinski  (--) (--)   Clonus (--) (--)     REPEATED TEST MOVEMENTS:  Back pain 1/10, with pain with left and right glide  Repeated Flexion in Standing end range pain no worse, no change in mechanics   Repeated Extension in Standing end range pain, no change in mechanics   Repeated Flexion in lying no effect   Repeated Extension in lying  end range pain no worse initially which reduce with reps, and glides improved, possible mechanical response       STATIC TESTS   Sitting slouched  end range pain  pain during motion  worse   Sitting erect better   Standing slouched worse   Standing erect  better       Baseline Isometric Testing on  Med X equipment: Testing administered by PT  Date of testing: 10/12/20  ROM 0-42 deg   Max Peak Torque 82    Min Peak Torque 23    Flex/Ext Ratio 2.8/1   % below normative data -43         Limitation/Restriction for FOTO 10/12/20 Survey    Therapist reviewed FOTO scores for Sheree Lomeli on 10/12/2020.   FOTO documents entered into [employer] - see Media section.    Limitation Score: 37%  Goal  25 %           Treatment   Treatment Time In: 3:59 pm  Treatment Time Out: 4:33 pm  Total Treatment time separate from Evaluation: 30 minutes      Sheree received therapeutic exercises to develop/improve posture, lumbar/cervical ROM, strength and muscular endurance for 30 minutes including the following exercises:     Med x dynamic exercise and baseline IM test        Written Home Exercises Provided: yes.  Exercises were reviewed and Sheree was able to demonstrate them prior to the end of the session.  Sheree demonstrated good  understanding of the education provided.     See EMR under Patient Instructions for exercises provided 10/12/2020.      Education provided:   HEP:  Trial ext in lie with sag 3/day and note effect, continue walking  - Patient received education regarding proper posture and body mechanics.  Patient was given top [facility] Visit 1 handouts which discuss what to expect in therapy, the purpose and opportunity for health coaching, the program,  wellness when discharged from therapy, back education and care specifically for posture seated, standing, lifting correctly, components of exercise, importance of nutrition and hydration, and importance of sleep.   Information on lumbar rolls provided.  - Ashwin roll tried, recommended, and purchase information was provided.    - Patient received a handout regarding anticipated muscular soreness following the isometric test and strategies for management were reviewed with patient including stretching, using ice and scheduled rest.   - Patient received  education on the [facility] program, purpose of the isometric test, progression of back strengthening as well as wellness approach and systemic strengthening.  Details of the program were discussed.  Reviewed that patient should feel support/pressure from med ex restraints but no pain or discomfort and patient expressed understanding.    Sheree received cold pack for 10 minutes to low back.    Assessment   Sheree is a 65 y.o. female referred to [facility] with a medical diagnosis of M54.5,G89.29 (ICD-10-CM) - Chronic left-sided low back pain without sciatica. Pt presents with low back pain of chronic nature as well as sacral pain.   Examination reveals back dominant pain, worse with bending, possible extension response, poor stability with strength 43% below normative data, poor multifidus firing, poor posture, poor ability to perform exercise and engage in travel and recreation    Pain Pattern: 1 PEP       Pt prognosis is Good.   Pt will benefit from skilled outpatient Physical Therapy to address the deficits stated above and in the chart below, provide pt/family education, and to maximize pt's level of independence. Based on the above history and physical examination an active physical therapy program is recommended.  Pt will continue to benefit from skilled outpatient physical therapy to address the deficits listed below in the chart, provide pt/family education and to maximize pt's level of independence in the home and community environment. .       Plan of care discussed with patient: Yes  Pt's spiritual, cultural and educational needs considered and patient is agreeable to the plan of care and goals as stated below:     Anticipated Barriers for therapy:  Pelvic floor issues as well    PT Evaluation Completed? Yes    Medical necessity is demonstrated by the following problem list.    Pt presents with the following impairments:     History  Co-morbidities and personal factors that may impact the plan  of care Co-morbidities:   prior pelvic surgery    Personal Factors:   lifestyle  attitudes     moderate   Examination  Body Structures and Functions, activity limitations and participation restrictions that may impact the plan of care Body Regions:   back  lower extremities    Body Systems:    gross symmetry  ROM  strength  gross coordinated movement  transfers  transitions  motor control  motor learning    Participation Restrictions:   Attendance with work    Activity limitations:   Learning and applying knowledge  no deficits    General Tasks and Commands  no deficits    Communication  no deficits    Mobility  walking  moving around using equipment (WC)  using transportation (bus, train, plane, car)  driving (bike, car, motorcycle)    Self care  washing oneself (bathing, drying, washing hands)  caring for body parts (brushing teeth, shaving, grooming)  looking after one's health    Domestic Life  shopping  cooking  doing house work (cleaning house, washing dishes, laundry)    Interactions/Relationships  no deficits    Life Areas  no deficits    Community and Social Life  no deficits         moderate   Clinical Presentation evolving clinical presentation with changing clinical characteristics moderate   Decision Making/ Complexity Score: moderate       GOALS: Pt is in agreement with the following goals.    Short term goals:  6 weeks or 10 visits   1.  Pt will demonstrate increased lumbar ROM by at least 3 degrees from the initial ROM value with improvements noted in functional ROM and ability to perform ADLs.  2.  Pt will demonstrate increased MedX average isometric strength value  by 10% from initial test resulting in improved ability to perform bending, lifting, and carrying activities safely, confidently.    3.  Patient report a reduction in worst pain score by 1-2 points for improved tolerance for 5/10 at worst.  4.  Pt able to perform HEP correctly with minimal cueing or supervision from therapist to encourage  "independent management of symptoms.       Long term goals: 10 weeks or 20 visits   1. Pt will demonstrate increased lumbar ROM by at least 6 degrees from initial ROM value, resulting in improved ability to perform functional fwd bending while standing and sitting.   2. Pt will demonstrate increased MedX average isometric strength value  by 25% from initial test resulting in improved ability to perform bending, lifting, and carrying activities safely, confidently.  3. Pt to demonstrate ability to independently control and reduce their pain through posture positioning and mechanical movements throughout a typical day.  4.  Pt will demonstrate reduced pain and improved functional outcomes as reported on the FOTO by reaching a limitation score of < or = 25% or less in order to demonstrate subjective improvement in pt's condition.    5. Pt will demonstrate independence with the HEP at discharge  6.  Return to regular exercise in the morning, in the past  She did a rotation of 6 exercises every morning  7. Pain 4/10 at worst and intermittent in back      Plan   Outpatient physical therapy 2x week for 10 weeks or 20 visits to include the following:   - Patient education  - Therapeutic exercise  - Manual therapy  - Performance testing   - Neuromuscular Re-education  - Therapeutic activity   - Modalities    Pt may be seen by PTA as part of the rehabilitation team.     Therapist: Supriya Reeder, PT  10/12/2020    "I certify the need for these services furnished under this plan of treatment and while under my care."    ____________________________________  Physician/Referring Practitioner    _______________  Date of Signature            "

## 2020-10-12 NOTE — PATIENT INSTRUCTIONS
Do the extensions in lie, 10 reps, do it 3 times a day  Start off with hands by ears and loosen up your back  Once you have done a few, then add locking your elbows and dropping your stomach onto the mat

## 2020-10-15 LAB
ACID FAST MOD KINY STN SPEC: NORMAL
MYCOBACTERIUM SPEC QL CULT: NORMAL

## 2020-10-19 ENCOUNTER — CLINICAL SUPPORT (OUTPATIENT)
Dept: REHABILITATION | Facility: HOSPITAL | Age: 65
End: 2020-10-19
Payer: COMMERCIAL

## 2020-10-19 ENCOUNTER — PATIENT MESSAGE (OUTPATIENT)
Dept: UROGYNECOLOGY | Facility: CLINIC | Age: 65
End: 2020-10-19

## 2020-10-19 DIAGNOSIS — M62.81 WEAKNESS OF TRUNK MUSCULATURE: ICD-10-CM

## 2020-10-19 DIAGNOSIS — M53.86 DECREASED RANGE OF MOTION OF LUMBAR SPINE: Primary | ICD-10-CM

## 2020-10-19 DIAGNOSIS — R29.3 POSTURE IMBALANCE: ICD-10-CM

## 2020-10-19 PROCEDURE — 97110 THERAPEUTIC EXERCISES: CPT | Mod: PN | Performed by: PHYSICAL MEDICINE & REHABILITATION

## 2020-10-19 NOTE — TELEPHONE ENCOUNTER
Explained to patient may have small surge of hormone release after surgery especially with daily use of estrogen cream.  She will continue to monitor.  NORMA Sevilla-BC

## 2020-10-19 NOTE — PROGRESS NOTES
[facility] Physical Therapy Treatment      Name: Sheree Lomeli  Clinic Number: [id number]     Therapy Diagnosis:        Encounter Diagnoses   Name Primary?    Chronic left-sided low back pain without sciatica      Decreased range of motion of lumbar spine      Weakness of trunk musculature      Posture imbalance        Physician: Whitney Mera MD     Physician Orders: PT Eval and Treat    Medical Diagnosis from Referral: M54.5,G89.29 (ICD-10-CM) - Chronic left-sided low back pain without sciatica  Evaluation Date: 10/12/2020  Authorization Period Expiration: 12/31/20  Plan of Care Expiration: 1/12/21  Reassessment Due:  11/12/20  Visit # / Visits authorized: 2/ 20     Time In: 5:00 pm  Time Out: 6:00 pm  Total Billable Time:  58 minutes     Precautions: standard     Pattern of pain determined:  1 possible PEP      Subjective   Sheree reports she has done the extensions in lie and they have gotten easier.  She still has days where she has more pain but she thinks it helps.  She drove a lot this weekend and that is aggravating.     We started 2 of her 6 morning exercises as this is a goal for her.   3/10 back pain pre visit and 1/10 post visit.      Patient reports tolerating previous visit well with no soreness from test  Patient reports their pain to be 3/10 on a 0-10 scale with 0 being no pain and 10 being the worst pain imaginable.  Pain Location: low back     Occupation: works for an 's office, standing desk, she goes into work  Leisure: walks        Objective   Baseline Isometric Testing on Med X equipment: Testing administered by PT  Date of testing: 10/12/20  ROM 0-42 deg   Max Peak Torque 82    Min Peak Torque 23    Flex/Ext Ratio 2.8/1   % below normative data -43            Limitation/Restriction for FOTO 10/12/20 Survey     Therapist reviewed FOTO scores for Sheree Lomeli on 10/12/2020.   FOTO documents entered into [employer] - see Media section.     Limitation Score: 37%  Goal   25 %             Treatment    Pt was instructed in and performed the following:     Sheree received therapeutic exercises to develop/improved posture, cardiovascular endurance, muscular endurance, lumbar ROM, strength and muscular endurance for 45 minutes including the following exercises:     Ext in lie with sag with mild discomfort end range, reducing with reps, 10 reps  Ext in lie with sag with towel for over pressure with all end range pain abolished  Bridge with ball squeeze and glut squeeze 10 reps  Jogging in spot 2 sets 30 sec and mini lateral jumps 2 sets 30 sec    [facility] 10/19/2020   Visit Number 2   VAS Pain Rating 3   Treadmill Time (in min.) 9   Speed 2.8   Extension in Lying 10   Lumbar Extension Seat Pad -   Femur Restraint -   Top Dead Center -   Counterweight -   Lumbar Flexion -   Lumbar Extension -   Lumbar Peak Torque -   Min Torque -   Test Percent Below Normative Data -   Lumbar Weight 45   Repetitions 15   Rating of Perceived Exertion 4   Ice - Z Lie (in min.) 10             Peripheral muscle strengthening which included 1 set of 15-20 repetitions at a slow, controlled 10-13 second per rep pace focused on strengthening supporting musculature for improved body mechanics and functional mobility.  Pt and therapist focused on proper form during treatment to ensure optimal strengthening of each targeted muscle group.  Machines were utilized including torso rotation, chest press and row.   Leg extension, leg curl,  Tricep extension, bicep curl, leg press, and hip abduction added visit 3    Sheree received the following manual therapy techniques: nil were applied to the: 0 for 0 minutes.         Home Exercises Provided and Patient Education Provided   Home exercises include: ext in lie with sag, and with towel over pressure by   Bridge with ball squeeze and glut squeeze 10 reps  Cardio program:walk 2 miles 4/week  Started 2 of 6 morning regiment:  Jog on spot 2 sets 30 sec  Mini  lat jumps 2 reps 30 sec    Plan to add:  Jumping jacks  Side to side stretch  Knee lift with arm swing  shuffle    Education provided:   - bridging    Written Home Exercises Provided: yes.  Exercises were reviewed and Sheree was able to demonstrate them prior to the end of the session.  Sheree demonstrated good  understanding of the education provided.     See EMR under Patient Instructions for exercises provided 10/19/2020.          Assessment       Patient is making good progress towards established goals.   She had some back pain with driving to [place] this weekend, but thinks over all the extensions help a little.   She had no pain with ext in lie with towel over pressure.  We progressed to add bridging stability exercise with ball squeeze.  She has goal to resume 6 exercise program she use to do in the morning at home and we initiated jog on spot and lateral jumps for 2 min cardio in am.  We initiated lumbar medx strengthening and she tolerated 45 ft lbs, 15 reps well.  Began peripheral strengthening with good tolerance.  Pt will continue to benefit from skilled outpatient physical therapy to address the deficits stated in the impairment chart, provide pt/family education and to maximize pt's level of independence in the home and community environment.     Anticipated Barriers for therapy: nil  Pt's spiritual, cultural and educational needs considered and pt agreeable to plan of care and goals as stated below:             Goals:     Short term goals:  6 weeks or 10 visits   1.  Pt will demonstrate increased lumbar ROM by at least 3 degrees from the initial ROM value with improvements noted in functional ROM and ability to perform ADLs.  Appropriate and ongoing  2.  Pt will demonstrate increased MedX average isometric strength value  by 10% from initial test resulting in improved ability to perform bending, lifting, and carrying activities safely, confidently.Appropriate and ongoing   3.  Patient report a  reduction in worst pain score by 1-2 points for improved tolerance for 5/10 at worst.Appropriate and ongoing  4.  Pt able to perform HEP correctly with minimal cueing or supervision from therapist to encourage independent management of symptoms. Appropriate and ongoing        Long term goals: 10 weeks or 20 visits   1. Pt will demonstrate increased lumbar ROM by at least 6 degrees from initial ROM value, resulting in improved ability to perform functional fwd bending while standing and sitting. Appropriate and ongoing  2. Pt will demonstrate increased MedX average isometric strength value  by 25% from initial test resulting in improved ability to perform bending, lifting, and carrying activities safely, confidently.Appropriate and ongoing  3. Pt to demonstrate ability to independently control and reduce their pain through posture positioning and mechanical movements throughout a typical day.Appropriate and ongoing  4.  Pt will demonstrate reduced pain and improved functional outcomes as reported on the FOTO by reaching a limitation score of < or = 25% or less in order to demonstrate subjective improvement in pt's condition.  Appropriate and ongoing  5. Pt will demonstrate independence with the HEP at discharge  Appropriate and ongoing  6.  Return to regular exercise in the morning, in the past  She did a rotation of 6 exercises every morning  Appropriate and ongoing  7. Pain 4/10 at worst and intermittent in back  Appropriate and ongoing       Plan   Continue with established Plan of Care towards established PT goals.

## 2020-10-19 NOTE — TELEPHONE ENCOUNTER
Please advise,    Julia,  I have had a pain in my right breast for about a few weeks.  I think I first noticed it some time during the stomach pain (which started in early September).  I have seen the gastro doctor and am scheduled for an endoscopy (although the pain in stomach has subsided greatly since taking the meds he gave me).  However, this pain inside the tissue of my breast seems to have increased.  I can't feel anything like a lump in the breast, and I recently had a mammogram with no issues.  The pain feels like nerves, it throbs, almost like a pulse, and sometimes feels sharp, but can then settle for a bit.  It happened several times while driving home yesterday, several times last night, and has been pretty consistent this morning as well.  I feel like it is at least something I should ask about, I just don't know if this is something I should be asking you, or Dr. Mukherjee, or someone else.  Any guidance you can give as to where to go will be greatly appreciated.

## 2020-10-21 ENCOUNTER — CLINICAL SUPPORT (OUTPATIENT)
Dept: REHABILITATION | Facility: HOSPITAL | Age: 65
End: 2020-10-21
Payer: COMMERCIAL

## 2020-10-21 DIAGNOSIS — M62.81 WEAKNESS OF TRUNK MUSCULATURE: ICD-10-CM

## 2020-10-21 DIAGNOSIS — M53.86 DECREASED RANGE OF MOTION OF LUMBAR SPINE: ICD-10-CM

## 2020-10-21 DIAGNOSIS — R29.3 POSTURE IMBALANCE: ICD-10-CM

## 2020-10-21 PROCEDURE — 97110 THERAPEUTIC EXERCISES: CPT | Mod: PN

## 2020-10-21 PROCEDURE — 97750 PHYSICAL PERFORMANCE TEST: CPT | Mod: 32,PN

## 2020-10-21 NOTE — PROGRESS NOTES
[facility] Back Physical Therapy Treatment      Name: Sheree Lomeli  Clinic Number: [id number]     Therapy Diagnosis:        Encounter Diagnoses   Name Primary?    Chronic left-sided low back pain without sciatica      Decreased range of motion of lumbar spine      Weakness of trunk musculature      Posture imbalance        Physician: Whitney Mera MD     Physician Orders: PT Eval and Treat    Medical Diagnosis from Referral: M54.5,G89.29 (ICD-10-CM) - Chronic left-sided low back pain without sciatica  Evaluation Date: 10/12/2020  Authorization Period Expiration: 12/31/20  Plan of Care Expiration: 1/12/21  Reassessment Due:  11/12/20  Visit # / Visits authorized: 2/ 20     Time In: 7:45am  Time Out: 8:45am  Total Billable Time:  58 minutes (EMPLOYEE CHARGE)     Precautions: standard     Pattern of pain determined:  1 possible PEP      Subjective   Sheree reports she is feeling better, one of her issues is increasing low back pain if she goes too long without a full bowel movement, that was able to occur last night and she got a good bit of relief since then; pain increased a little overnight but still better than it was prior to that.      Patient reports tolerating previous visit well with no soreness from test  Patient reports their pain to be 3/10 on a 0-10 scale with 0 being no pain and 10 being the worst pain imaginable.  Pain Location: low back     Occupation: works for an 's office, standing desk  Leisure: walks        Objective   Baseline Isometric Testing on Med X equipment: Testing administered by PT  Date of testing: 10/12/20  ROM 0-42 deg   Max Peak Torque 82    Min Peak Torque 23    Flex/Ext Ratio 2.8/1   % below normative data -43            Limitation/Restriction for FOTO 10/12/20 Survey     Therapist reviewed FOTO scores for Sheree Lomeli on 10/12/2020.   FOTO documents entered into [employer] - see Media section.     Limitation Score: 37%  Goal  25 %             Treatment    Pt  was instructed in and performed the following:     Sheree received therapeutic exercises to develop/improved posture, cardiovascular endurance, muscular endurance, lumbar ROM, strength and muscular endurance for 45 minutes including the following exercises:     1/2 jumping ashley x 1 min  Ext in lie with sag with towel for over pressure with all end range pain abolished  Bridge with ball squeeze and glut squeeze 10 reps  Jogging in spot 2 sets 30 sec  Mini lateral jumps 2 sets 30 sec  Knee lift/arm swing x 30sec    [facility] - Short 10/21/2020   Visit Number 3   VAS Pain Rating 3   Treadmill Time (in min.) 10   Speed 2.5   Extension in Lying 10   Lumbar Extension - Seat Pad -   Femur Restraint -   Top Dead Center -   Counterweight -   Lumbar Flexion -   Lumbar Extension -   Lumbar Peak Torque -   Lumbar Weight 45   Repetitions 18   Rating of Perceived Exertion 4         Peripheral muscle strengthening which included 1 set of 15-20 repetitions at a slow, controlled 10-13 second per rep pace focused on strengthening supporting musculature for improved body mechanics and functional mobility.  Pt and therapist focused on proper form during treatment to ensure optimal strengthening of each targeted muscle group.  Machines were utilized including torso rotation, chest press and row.   Leg extension, leg curl,  Tricep extension, bicep curl, leg press, and hip abduction added visit 3    Sheree received the following manual therapy techniques: nil were applied to the: 0 for 0 minutes.         Home Exercises Provided and Patient Education Provided   Home exercises include: ext in lie with sag, and with towel over pressure by   Bridge with ball squeeze and glut squeeze 10 reps  Cardio program:walk 2 miles 4/week  Started 2 of 6 morning regiment:  Jog on spot 2 sets 30 sec  Mini lat jumps 2 reps 30 sec    Plan to add:  Jumping jacks  Side to side stretch  Knee lift with arm swing  shuffle    Education provided:    - PPT for bridging, monitoring amount of bridge to decrease upper shoulder tension    Written Home Exercises Provided: yes.  Exercises were reviewed and Sheree was able to demonstrate them prior to the end of the session.  Sheree demonstrated good  understanding of the education provided.     See EMR under Patient Instructions for exercises provided 10/19/2020.          Assessment   Patient is making good progress towards established goals. Patient reported upper cervical and shoulder pain with bridging and PPT,discussed with patient regarding amount of ROM and exertion in order to prevent this, which she was somewhat able to do with VCs for better attention to technique. Added UT and levator stretch afterward with very positive results. Also slight modification to maintain hip/knee/foot alignment during skee jumps which increased difficulty and rectus femoris strain but still doable. Patient continued lumbar medx strengthening at 45 ft lbs and completed 18 repetitions with a final reported RPE of 4/10. Also completed a full peripheral strengthening circuit with good tolerance.  Pt will continue to benefit from skilled outpatient physical therapy to address the deficits stated in the impairment chart, provide pt/family education and to maximize pt's level of independence in the home and community environment.     Anticipated Barriers for therapy: nil  Pt's spiritual, cultural and educational needs considered and pt agreeable to plan of care and goals as stated below:             Goals:     Short term goals:  6 weeks or 10 visits   1.  Pt will demonstrate increased lumbar ROM by at least 3 degrees from the initial ROM value with improvements noted in functional ROM and ability to perform ADLs.  Appropriate and ongoing  2.  Pt will demonstrate increased MedX average isometric strength value  by 10% from initial test resulting in improved ability to perform bending, lifting, and carrying activities safely,  confidently.Appropriate and ongoing   3.  Patient report a reduction in worst pain score by 1-2 points for improved tolerance for 5/10 at worst.Appropriate and ongoing  4.  Pt able to perform HEP correctly with minimal cueing or supervision from therapist to encourage independent management of symptoms. Appropriate and ongoing        Long term goals: 10 weeks or 20 visits   1. Pt will demonstrate increased lumbar ROM by at least 6 degrees from initial ROM value, resulting in improved ability to perform functional fwd bending while standing and sitting. Appropriate and ongoing  2. Pt will demonstrate increased MedX average isometric strength value  by 25% from initial test resulting in improved ability to perform bending, lifting, and carrying activities safely, confidently.Appropriate and ongoing  3. Pt to demonstrate ability to independently control and reduce their pain through posture positioning and mechanical movements throughout a typical day.Appropriate and ongoing  4.  Pt will demonstrate reduced pain and improved functional outcomes as reported on the FOTO by reaching a limitation score of < or = 25% or less in order to demonstrate subjective improvement in pt's condition.  Appropriate and ongoing  5. Pt will demonstrate independence with the HEP at discharge  Appropriate and ongoing  6.  Return to regular exercise in the morning, in the past  She did a rotation of 6 exercises every morning  Appropriate and ongoing  7. Pain 4/10 at worst and intermittent in back  Appropriate and ongoing       Plan   Continue with established Plan of Care towards established PT goals.

## 2020-10-26 ENCOUNTER — CLINICAL SUPPORT (OUTPATIENT)
Dept: REHABILITATION | Facility: OTHER | Age: 65
End: 2020-10-26
Attending: NURSE PRACTITIONER
Payer: COMMERCIAL

## 2020-10-26 DIAGNOSIS — R53.1 WEAKNESS: Primary | ICD-10-CM

## 2020-10-26 DIAGNOSIS — R27.8 OTHER LACK OF COORDINATION: ICD-10-CM

## 2020-10-26 DIAGNOSIS — M62.89 PELVIC FLOOR DYSFUNCTION: ICD-10-CM

## 2020-10-26 PROCEDURE — 97110 THERAPEUTIC EXERCISES: CPT

## 2020-10-26 PROCEDURE — 97140 MANUAL THERAPY 1/> REGIONS: CPT

## 2020-10-26 NOTE — PROGRESS NOTES
Pelvic Health Physical Therapy   Treatment Note     Name: Sheree Lomeli  Clinic Number: [id number]    Therapy Diagnosis:   Encounter Diagnoses   Name Primary?    Weakness Yes    Other lack of coordination     Pelvic floor dysfunction      Physician: Julia Burgos NP    Visit Date: 10/26/2020    Physician Orders: PT Eval and Treat   Medical Diagnosis from Referral: chronic constipation, pelvic floor weakness  Evaluation Date: 10/5/2020  Authorization Period Expiration: 12/31/2020  Plan of Care Expiration: 12-  Visit # / Visits authorized: 2/ 20  Cancelled Visits: 0  No Show Visits: 0    Time In: 900  Time Out: 955  Total Billable Time: 55 minutes    Precautions: Standard    Subjective     Pt reports: She is having a colonoscopy and and endoscopy next Tuesday.  She was compliant with home exercise program.  Response to previous treatment: no issues reported  Functional change: no changes reported    Pain: 3/10  Location: bilateral back      Objective   RECTAL PELVIC FLOOR EXAM    EXTERNAL ASSESSMENT  Anus: WNL  Skin condition: WNL   Scarring: none  Sensation: WNL   Pain: none  Voluntary contraction: visible lift  Voluntary relaxation: visible drop  Bearing down: visible drop  Discharge: none       INTERNAL ASSESSMENT  EAS tone: WNL   Impaction: none   Pain: tender areas noted as follows: gilmar coccygeus and levator ani  Sensation: able to localize pressure appropriately   Muscle Bulk: hypertonus   Muscle Power: 2/5     Quality of contraction: slow relaxation and incomplete relaxation   Specificity: WNL  Coordination: tends to hold breath during PFM contration   Comments: coccyx appears to be shifted to the left    Sheree received the following manual therapy techniques: to develop flexibility and extensibility for 40 minutes including: joint mobilizations of coccyx intrarectaly to help decresae left side-shift and trigger point/myofascial release of coccygeous and levator ani groups [place]   Pt  consents to intrarectal treatment of PFM today.      Bowel massage performed to help with gut motility.  Pt edu in self-bowel massage technique to help with gut motility needed to have a comfortable BM.          Sheree participated in neuromuscular re-education activities to develop Coordination, Control and Down training for 15 minutes including: pelvic floor relaxation/bulging training  Pt consents to internal rectal pelvic floor treatment: Worked on pelvic floor muscle relaxation and coordination training using intravaginal tapping directly over muscle bellies and then asking the patient to soften the muscles where she feels the tap.  Also worked on diaphragmatic breathing to coordinate pelvic floor muscle relaxation.  Pt reports a decrease in pain to direct muscle palpation after relaxing and softening her pelvic muscles.        Home Exercises Provided and Patient Education Provided     Education provided:   - anatomy/physiology of pelvic floor  Discussed progression of plan of care with patient; educated pt in activity modification; reviewed HEP with pt. Pt demonstrated and verbalized understanding of all instruction and was provided with a handout of HEP (see Patient Instructions).      Written Home Exercises Provided: yes.  Exercises were reviewed and Sheree was able to demonstrate them prior to the end of the session.  Sheree demonstrated good  understanding of the education provided.     See EMR under Patient Instructions for exercises provided 10/26/2020.    Assessment     Pt consents to intrarectal assessment and  treatment of PFM today. She is noted to have increased resting muscle activation and reports pain with palpation around her coccyx.  Her coccyx is shifted to the left.  Joint mobilizations of coccyx performed intrarectaly to help decresae left side-shift as well as trigger point/myofascial release of coccygeous and levator ani groups [place].   Bowel massage performed to help with gut motility.  Pt  "edu in self-bowel massage technique to help with gut motility needed to have a comfortable BM.      Sheree is progressing well towards her goals.   Pt prognosis is Excellent.     Pt will continue to benefit from skilled outpatient physical therapy to address the deficits listed in the problem list box on initial evaluation, provide pt/family education and to maximize pt's level of independence in the home and community environment.     Pt's spiritual, cultural and educational needs considered and pt agreeable to plan of care and goals.     Anticipated barriers to physical therapy: none    Goals:   Short Term Goals: 4 weeks   Pt to perform "the knack" prior to coughing, laughing or sneezing to decrease risk of incontinence.  Pt to report being able to sneeze without incontinence demonstrating improved pelvic floor strength and coordination to improve confidence in social situations  Pt to demonstrate proper diaphragmatic breathing to help with calming the nervous system in order to decrease pain and to improve abdominal wall musculature extensibility.   Pt to report minimal pain with palpation of abdominal wall due to improvement in soft tissue mobility from moderate to minimal restrictions.  Pt to report a decrease in pain to no more than 3 at it's worst with direct pelvic floor muscle provocation.          Long Term Goals: 12 weeks   Pt to be discharged with home plan for carry over after discharge.   Pt to report being able to have a comfortable vaginal exam without significant increase in pelvic pain.  Pt to report a decrease in pain to no more than 2 at it's worst with direct pelvic floor muscle provocation.    Pt to report an improved ability to tolerate sitting without a significant increase in reported pain levels of her coccyx to allow her to better participate in social activities.     Plan     Plan of care Certification: 10/5/2020 to 12-.     Outpatient Physical Therapy 2 times weekly for 12 weeks " to include the following interventions: therapeutic exercises, therapeutic activity, neuromuscular re-education, gait training, manual therapy, modalities PRN, patient/family education, dry needling and self care/home management    Laurita Zepeda, PT

## 2020-10-31 ENCOUNTER — LAB VISIT (OUTPATIENT)
Dept: URGENT CARE | Facility: CLINIC | Age: 65
End: 2020-10-31
Payer: COMMERCIAL

## 2020-10-31 DIAGNOSIS — Z01.818 PRE-OP TESTING: Primary | ICD-10-CM

## 2020-10-31 DIAGNOSIS — U07.1 COVID-19: ICD-10-CM

## 2020-10-31 PROCEDURE — U0003 INFECTIOUS AGENT DETECTION BY NUCLEIC ACID (DNA OR RNA); SEVERE ACUTE RESPIRATORY SYNDROME CORONAVIRUS 2 (SARS-COV-2) (CORONAVIRUS DISEASE [COVID-19]), AMPLIFIED PROBE TECHNIQUE, MAKING USE OF HIGH THROUGHPUT TECHNOLOGIES AS DESCRIBED BY CMS-2020-01-R: HCPCS

## 2020-10-31 PROCEDURE — 99211 PR OFFICE/OUTPT VISIT, EST, LEVL I: ICD-10-PCS | Mod: 24,S$GLB,, | Performed by: PHYSICIAN ASSISTANT

## 2020-10-31 PROCEDURE — 99211 OFF/OP EST MAY X REQ PHY/QHP: CPT | Mod: 24,S$GLB,, | Performed by: PHYSICIAN ASSISTANT

## 2020-11-01 LAB — SARS-COV-2 RNA RESP QL NAA+PROBE: NOT DETECTED

## 2020-11-02 ENCOUNTER — PATIENT MESSAGE (OUTPATIENT)
Dept: UROGYNECOLOGY | Facility: CLINIC | Age: 65
End: 2020-11-02

## 2020-11-03 ENCOUNTER — ANESTHESIA (OUTPATIENT)
Dept: ENDOSCOPY | Facility: HOSPITAL | Age: 65
End: 2020-11-03
Payer: COMMERCIAL

## 2020-11-03 ENCOUNTER — ANESTHESIA EVENT (OUTPATIENT)
Dept: ENDOSCOPY | Facility: HOSPITAL | Age: 65
End: 2020-11-03
Payer: COMMERCIAL

## 2020-11-03 ENCOUNTER — HOSPITAL ENCOUNTER (OUTPATIENT)
Facility: HOSPITAL | Age: 65
Discharge: HOME | End: 2020-11-03
Attending: STUDENT IN AN ORGANIZED HEALTH CARE EDUCATION/TRAINING PROGRAM | Admitting: STUDENT IN AN ORGANIZED HEALTH CARE EDUCATION/TRAINING PROGRAM
Payer: COMMERCIAL

## 2020-11-03 VITALS
RESPIRATION RATE: 18 BRPM | BODY MASS INDEX: 24.75 KG/M2 | SYSTOLIC BLOOD PRESSURE: 125 MMHG | HEIGHT: 64 IN | DIASTOLIC BLOOD PRESSURE: 66 MMHG | OXYGEN SATURATION: 100 % | WEIGHT: 145 LBS | TEMPERATURE: 98 F | HEART RATE: 55 BPM

## 2020-11-03 DIAGNOSIS — K21.9 GASTROESOPHAGEAL REFLUX DISEASE WITHOUT ESOPHAGITIS: Primary | ICD-10-CM

## 2020-11-03 DIAGNOSIS — R10.9 ABDOMINAL PAIN: ICD-10-CM

## 2020-11-03 PROCEDURE — 25000003 PHARM REV CODE 250: Performed by: NURSE ANESTHETIST, CERTIFIED REGISTERED

## 2020-11-03 PROCEDURE — 88305 TISSUE EXAM BY PATHOLOGIST: CPT | Mod: 26,,, | Performed by: PATHOLOGY

## 2020-11-03 PROCEDURE — 88342 IMHCHEM/IMCYTCHM 1ST ANTB: CPT | Mod: 26,,, | Performed by: PATHOLOGY

## 2020-11-03 PROCEDURE — 43239 PR EGD, FLEX, W/BIOPSY, SGL/MULTI: ICD-10-PCS | Mod: 51,,, | Performed by: STUDENT IN AN ORGANIZED HEALTH CARE EDUCATION/TRAINING PROGRAM

## 2020-11-03 PROCEDURE — 45380 PR COLONOSCOPY,BIOPSY: ICD-10-PCS | Mod: 33,,, | Performed by: STUDENT IN AN ORGANIZED HEALTH CARE EDUCATION/TRAINING PROGRAM

## 2020-11-03 PROCEDURE — 43239 EGD BIOPSY SINGLE/MULTIPLE: CPT | Performed by: STUDENT IN AN ORGANIZED HEALTH CARE EDUCATION/TRAINING PROGRAM

## 2020-11-03 PROCEDURE — 43239 EGD BIOPSY SINGLE/MULTIPLE: CPT | Mod: 51,,, | Performed by: STUDENT IN AN ORGANIZED HEALTH CARE EDUCATION/TRAINING PROGRAM

## 2020-11-03 PROCEDURE — 45380 COLONOSCOPY AND BIOPSY: CPT | Performed by: STUDENT IN AN ORGANIZED HEALTH CARE EDUCATION/TRAINING PROGRAM

## 2020-11-03 PROCEDURE — 88305 TISSUE EXAM BY PATHOLOGIST: CPT | Performed by: PATHOLOGY

## 2020-11-03 PROCEDURE — 37000008 HC ANESTHESIA 1ST 15 MINUTES: Performed by: STUDENT IN AN ORGANIZED HEALTH CARE EDUCATION/TRAINING PROGRAM

## 2020-11-03 PROCEDURE — 25000003 PHARM REV CODE 250: Performed by: STUDENT IN AN ORGANIZED HEALTH CARE EDUCATION/TRAINING PROGRAM

## 2020-11-03 PROCEDURE — 37000009 HC ANESTHESIA EA ADD 15 MINS: Performed by: STUDENT IN AN ORGANIZED HEALTH CARE EDUCATION/TRAINING PROGRAM

## 2020-11-03 PROCEDURE — 63600175 PHARM REV CODE 636 W HCPCS: Performed by: NURSE ANESTHETIST, CERTIFIED REGISTERED

## 2020-11-03 PROCEDURE — 88342 CHG IMMUNOCYTOCHEMISTRY: ICD-10-PCS | Mod: 26,,, | Performed by: PATHOLOGY

## 2020-11-03 PROCEDURE — E9220 PRA ENDO ANESTHESIA: ICD-10-PCS | Mod: 33,,, | Performed by: NURSE ANESTHETIST, CERTIFIED REGISTERED

## 2020-11-03 PROCEDURE — 27201012 HC FORCEPS, HOT/COLD, DISP: Performed by: STUDENT IN AN ORGANIZED HEALTH CARE EDUCATION/TRAINING PROGRAM

## 2020-11-03 PROCEDURE — 45380 COLONOSCOPY AND BIOPSY: CPT | Mod: 33,,, | Performed by: STUDENT IN AN ORGANIZED HEALTH CARE EDUCATION/TRAINING PROGRAM

## 2020-11-03 PROCEDURE — 88342 IMHCHEM/IMCYTCHM 1ST ANTB: CPT | Performed by: PATHOLOGY

## 2020-11-03 PROCEDURE — 88305 TISSUE EXAM BY PATHOLOGIST: ICD-10-PCS | Mod: 26,,, | Performed by: PATHOLOGY

## 2020-11-03 PROCEDURE — E9220 PRA ENDO ANESTHESIA: HCPCS | Mod: 33,,, | Performed by: NURSE ANESTHETIST, CERTIFIED REGISTERED

## 2020-11-03 RX ORDER — LIDOCAINE HYDROCHLORIDE 20 MG/ML
INJECTION INTRAVENOUS
Status: DISCONTINUED | OUTPATIENT
Start: 2020-11-03 | End: 2020-11-03

## 2020-11-03 RX ORDER — SODIUM CHLORIDE 9 MG/ML
INJECTION, SOLUTION INTRAVENOUS CONTINUOUS
Status: DISCONTINUED | OUTPATIENT
Start: 2020-11-03 | End: 2020-11-03 | Stop reason: HOSPADM

## 2020-11-03 RX ORDER — PROPOFOL 10 MG/ML
VIAL (ML) INTRAVENOUS
Status: DISCONTINUED | OUTPATIENT
Start: 2020-11-03 | End: 2020-11-03

## 2020-11-03 RX ADMIN — PROPOFOL 50 MG: 10 INJECTION, EMULSION INTRAVENOUS at 07:11

## 2020-11-03 RX ADMIN — PROPOFOL 50 MG: 10 INJECTION, EMULSION INTRAVENOUS at 08:11

## 2020-11-03 RX ADMIN — LIDOCAINE HYDROCHLORIDE 50 MG: 20 INJECTION, SOLUTION INTRAVENOUS at 07:11

## 2020-11-03 RX ADMIN — SODIUM CHLORIDE: 0.9 INJECTION, SOLUTION INTRAVENOUS at 07:11

## 2020-11-03 RX ADMIN — SODIUM CHLORIDE: 0.9 INJECTION, SOLUTION INTRAVENOUS at 08:11

## 2020-11-03 RX ADMIN — PROPOFOL 100 MG: 10 INJECTION, EMULSION INTRAVENOUS at 07:11

## 2020-11-03 NOTE — [employer]OVATION PATIENT INSTRUCTIONS
Discharge Summary/Instructions after an Endoscopic Procedure  Patient Name: Sheree Lomeli  Patient MRN: [id number]  Patient YOB: 1955  Tuesday, November 3, 2020  Parish Pulido MD  RESTRICTIONS:  During your procedure today, you received medications for sedation.  These   medications may affect your judgment, balance and coordination.  Therefore,   for 24 hours, you have the following restrictions:   - DO NOT drive a car, operate machinery, make legal/financial decisions,   sign important papers or drink alcohol.    ACTIVITY:  Today: no heavy lifting, straining or running due to procedural   sedation/anesthesia.  The following day: return to full activity including work.  DIET:  Eat and drink normally unless instructed otherwise.     TREATMENT FOR COMMON SIDE EFFECTS:  - Mild abdominal pain, nausea, belching, bloating or excessive gas:  rest,   eat lightly and use a heating pad.  - Sore Throat: treat with throat lozenges and/or gargle with warm salt   water.  - Because air was used during the procedure, expelling large amounts of air   from your rectum or belching is normal.  - If a bowel prep was taken, you may not have a bowel movement for 1-3 days.    This is normal.  SYMPTOMS TO WATCH FOR AND REPORT TO YOUR PHYSICIAN:  1. Abdominal pain or bloating, other than gas cramps.  2. Chest pain.  3. Back pain.  4. Signs of infection such as: chills or fever occurring within 24 hours   after the procedure.  5. Rectal bleeding, which would show as bright red, maroon, or black stools.   (A tablespoon of blood from the rectum is not serious, especially if   hemorrhoids are present.)  6. Vomiting.  7. Weakness or dizziness.  GO DIRECTLY TO THE NEAREST EMERGENCY ROOM IF YOU HAVE ANY OF THE FOLLOWING:      Difficulty breathing              Chills and/or fever over 101 F   Persistent vomiting and/or vomiting blood   Severe abdominal pain   Severe chest pain   Black, tarry stools   Bleeding- more than one  tablespoon   Any other symptom or condition that you feel may need urgent attention  Your doctor recommends these additional instructions:  If any biopsies were taken, your doctors clinic will contact you in 1 to 2   weeks with any results.  - The findings and recommendations were discussed with the patient.   - Await pathology results.   - Repeat colonoscopy in 3 - 5 years for surveillance based on pathology   results.  A results letter will be sent with the definitive   recommendation.  - Patient has a contact number available for emergencies.  The signs and   symptoms of potential delayed complications were discussed with the   patient.  Return to normal activities tomorrow.  Written discharge   instructions were provided to the patient.   - Discharge patient to home.  For questions, problems or results please call your physician - Parish Pulido MD at Work:  [phone].  [facility], EMERGENCY ROOM PHONE NUMBER: [phone]  IF A COMPLICATION OR EMERGENCY SITUATION ARISES AND YOU ARE UNABLE TO REACH   YOUR PHYSICIAN - GO DIRECTLY TO THE EMERGENCY ROOM.  Parish Pulido MD  11/3/2020 8:13:47 AM  This report has been verified and signed electronically.  [employer]ATION

## 2020-11-03 NOTE — PROVATION PATIENT INSTRUCTIONS
Discharge Summary/Instructions after an Endoscopic Procedure  Patient Name: Sheree Lomeli  Patient MRN: [id number]  Patient YOB: 1955  Tuesday, November 3, 2020  Parish Pulido MD  RESTRICTIONS:  During your procedure today, you received medications for sedation.  These   medications may affect your judgment, balance and coordination.  Therefore,   for 24 hours, you have the following restrictions:   - DO NOT drive a car, operate machinery, make legal/financial decisions,   sign important papers or drink alcohol.    ACTIVITY:  Today: no heavy lifting, straining or running due to procedural   sedation/anesthesia.  The following day: return to full activity including work.  DIET:  Eat and drink normally unless instructed otherwise.     TREATMENT FOR COMMON SIDE EFFECTS:  - Mild abdominal pain, nausea, belching, bloating or excessive gas:  rest,   eat lightly and use a heating pad.  - Sore Throat: treat with throat lozenges and/or gargle with warm salt   water.  - Because air was used during the procedure, expelling large amounts of air   from your rectum or belching is normal.  - If a bowel prep was taken, you may not have a bowel movement for 1-3 days.    This is normal.  SYMPTOMS TO WATCH FOR AND REPORT TO YOUR PHYSICIAN:  1. Abdominal pain or bloating, other than gas cramps.  2. Chest pain.  3. Back pain.  4. Signs of infection such as: chills or fever occurring within 24 hours   after the procedure.  5. Rectal bleeding, which would show as bright red, maroon, or black stools.   (A tablespoon of blood from the rectum is not serious, especially if   hemorrhoids are present.)  6. Vomiting.  7. Weakness or dizziness.  GO DIRECTLY TO THE NEAREST EMERGENCY ROOM IF YOU HAVE ANY OF THE FOLLOWING:      Difficulty breathing              Chills and/or fever over 101 F   Persistent vomiting and/or vomiting blood   Severe abdominal pain   Severe chest pain   Black, tarry stools   Bleeding- more than one  tablespoon   Any other symptom or condition that you feel may need urgent attention  Your doctor recommends these additional instructions:  If any biopsies were taken, your doctors clinic will contact you in 1 to 2   weeks with any results.  - Continue present medications.   - Await pathology results.   - The findings and recommendations were discussed with the patient.   - Patient has a contact number available for emergencies.  The signs and   symptoms of potential delayed complications were discussed with the   patient.  Return to normal activities tomorrow.  Written discharge   instructions were provided to the patient.   - Discharge patient to home after colonoscopy.  For questions, problems or results please call your physician - Parish Pulido MD at Work:  [phone].  [facility], EMERGENCY ROOM PHONE NUMBER: [phone]  IF A COMPLICATION OR EMERGENCY SITUATION ARISES AND YOU ARE UNABLE TO REACH   YOUR PHYSICIAN - GO DIRECTLY TO THE EMERGENCY ROOM.  Parish Pulido MD  11/3/2020 7:37:32 AM  This report has been verified and signed electronically.  [employer]

## 2020-11-03 NOTE — ADDENDUM NOTE
Addendum  created 11/03/20 0834 by Promise Mancilla, CRNA    Attestation recorded in Intraprocedure, Intraprocedure Attestations filed

## 2020-11-03 NOTE — H&P
Short Stay Endoscopy History and Physical    PCP - Whitney Mera MD  Referring Physician - Parish Pulido MD  [address] [facility] - Dept of Gastroenterology 4th floor, [place] [address]    Procedure - EGD and Colonoscopy  ASA - per anesthesia  Mallampati - per anesthesia  History of Anesthesia problems - no  Family history Anesthesia problems -  no   Plan of anesthesia - General    HPI  65 y.o. female    Reason for procedure:   Epigastric pain [R10.13]  Screening for colon cancer [Z12.11]      ROS:  Constitutional: No fevers, chills, No weight loss  CV: No chest pain  Pulm: No cough, No shortness of breath  GI: see HPI    Medical History:  has a past medical history of Arthritis, Back pain, Chronic pelvic pain in female, De Quervain's tenosynovitis, Headache(784.0), PONV (postoperative nausea and vomiting), Right carpal tunnel syndrome, and Urinary incontinence.    Surgical History:  has a past surgical history that includes Oophorectomy; Epidural steroid injection (N/A, 6/12/2018); Hysteroscopy with dilation and curettage of uterus (N/A, 8/13/2020); Anterior vaginal repair (N/A, 8/13/2020); and Cystoscopy (N/A, 8/13/2020).    Family History: family history includes Breast cancer (age of onset: 35) in her daughter; Cancer in her father; Coronary artery disease in her father; Diabetes in her cousin and maternal uncle; Heart disease in her father; Hypoglycemic in her sister; No Known Problems in her sister; Prostate cancer in her father.    Social History:  reports that she has never smoked. She has never used smokeless tobacco. She reports that she does not drink alcohol or use drugs.    Review of patient's allergies indicates:   Allergen Reactions    Adhesive Rash    Iodine Rash    Shellfish containing products Rash     Patient allergic to softshell crabs specifically    Sulfa (sulfonamide antibiotics) Rash       Medications:   Medications Prior to Admission    Medication Sig Dispense Refill Last Dose    ascorbic acid, vitamin C, (VITAMIN C) 1000 MG tablet Take 1,000 mg by mouth once daily.   Past Week at Unknown time    butalbital-aspirin-caffeine -40 mg (FIORINAL) -40 mg Cap Take 1 capsule by mouth every 4 (four) hours as needed. 30 capsule 0 Past Month at Unknown time    cyclobenzaprine (FLEXERIL) 5 MG tablet    Past Week at Unknown time    dextrin (FIBER POWDER ORAL) Take by mouth.   11/2/2020 at Unknown time    ibuprofen (ADVIL,MOTRIN) 600 MG tablet TAKE 1 TABLET(600 MG) BY MOUTH EVERY 4 HOURS AS NEEDED 30 tablet 0 Past Week at Unknown time    MAGNESIUM ORAL Take by mouth.   Past Week at Unknown time    multivitamin (ONE DAILY MULTIVITAMIN) per tablet Take 1 tablet by mouth once daily.   Past Week at Unknown time    omega-3 fatty acids/fish oil (FISH OIL-OMEGA-3 FATTY ACIDS) 300-1,000 mg capsule Take by mouth once daily.   Past Week at Unknown time    pantoprazole (PROTONIX) 40 MG tablet Take 1 tablet (40 mg total) by mouth once daily. 60 tablet 5 Past Week at Unknown time    sucralfate (CARAFATE) 100 mg/mL suspension TAKE 10 ML BY MOUTH FOUR TIMES DAILY: BEFORE MEALS AND NIGHTLY 420 mL 2 Past Week at Unknown time    vitamin D (VITAMIN D3) 1000 units Tab Take 1,000 Units by mouth once daily.   Past Week at Unknown time    estradioL (ESTRACE) 0.01 % (0.1 mg/gram) vaginal cream USE 0.5 GRAM VAGINALLY EVERY NIGHT FOR 2 WEEKS THEN USE VAGINALLY 2 TIMES PER WEEK THEREAFTER 42.5 g 11     HYDROcodone-acetaminophen (NORCO) 5-325 mg per tablet Take 1 tablet by mouth every 4 (four) hours as needed for Pain. 10 tablet 0     ibuprofen (ADVIL,MOTRIN) 800 MG tablet Take 1 tablet (800 mg total) by mouth every 8 (eight) hours as needed for Pain. 30 tablet 0        Physical Exam:    Vital Signs:   Vitals:    11/03/20 0717   BP: (!) 147/73   Pulse: 79   Resp: 15   Temp: 98.8 °F (37.1 °C)       General Appearance: Well appearing in no acute distress  Abdomen:  Soft, non tender, non distended with normal bowel sounds, no masses    Labs:  Lab Results   Component Value Date    WBC 5.45 10/07/2020    HGB 13.2 10/07/2020    HCT 41.0 10/07/2020     10/07/2020    CHOL 228 (H) 08/30/2016    TRIG 71 02/25/2017    HDL 81 02/25/2017    ALT 12 10/07/2020    AST 19 10/07/2020     10/07/2020    K 5.0 10/07/2020     10/07/2020    CREATININE 0.8 10/07/2020    BUN 6 (L) 10/07/2020    CO2 31 (H) 10/07/2020    TSH 1.23 02/25/2017       I have explained the risks and benefits of this endoscopic procedure to the patient including but not limited to bleeding, inflammation, infection, perforation, and death.      Parish Pulido MD

## 2020-11-03 NOTE — ANESTHESIA POSTPROCEDURE EVALUATION
Anesthesia Post Evaluation    Patient: Sheree Lomeli    Procedure(s) Performed: Procedure(s) (LRB):  ESOPHAGOGASTRODUODENOSCOPY (EGD) (N/A)  COLONOSCOPY (N/A)    Final Anesthesia Type: general    Patient location during evaluation: PACU  Patient participation: Yes- Able to Participate  Level of consciousness: awake and alert  Post-procedure vital signs: reviewed and stable  Pain management: adequate  Airway patency: patent    PONV status at discharge: No PONV  Anesthetic complications: no      Cardiovascular status: blood pressure returned to baseline  Respiratory status: unassisted and spontaneous ventilation  Hydration status: euvolemic  Follow-up not needed.          Vitals Value Taken Time   /57 11/03/20 0829   Temp 36.5 °C (97.7 °F) 11/03/20 0816   Pulse 64 11/03/20 0829   Resp 17 11/03/20 0829   SpO2 100 % 11/03/20 0829         No case tracking events are documented in the log.      Pain/Yves Score: Yves Score: 10 (11/3/2020  8:30 AM)

## 2020-11-03 NOTE — TRANSFER OF CARE
"Anesthesia Transfer of Care Note    Patient: Sheree Lomeli    Procedure(s) Performed: Procedure(s) (LRB):  ESOPHAGOGASTRODUODENOSCOPY (EGD) (N/A)  COLONOSCOPY (N/A)    Patient location: PACU    Anesthesia Type: general    Transport from OR: Transported from OR on room air with adequate spontaneous ventilation    Post pain: adequate analgesia    Post assessment: tolerated procedure well and no apparent anesthetic complications    Post vital signs: stable    Level of consciousness: awake    Nausea/Vomiting: no nausea/vomiting    Complications: none    Transfer of care protocol was followed      Last vitals:   Visit Vitals  BP (!) 147/73 (BP Location: Left arm, Patient Position: Lying)   Pulse 79   Temp 37.1 °C (98.8 °F) (Temporal)   Resp 15   Ht 5' 4" (1.626 m)   Wt 65.8 kg (145 lb)   LMP  (LMP Unknown)   SpO2 100%   Breastfeeding No   BMI 24.89 kg/m²     "

## 2020-11-03 NOTE — ANESTHESIA PREPROCEDURE EVALUATION
11/03/2020  Sheree Lomeli is a 65 y.o., female.  Past Medical History:   Diagnosis Date    Arthritis     Back pain     Chronic pelvic pain in female     left-sided    De Quervain's tenosynovitis     left    Headache(784.0)     Migraine maybe 1-2 times per year    PONV (postoperative nausea and vomiting)     Right carpal tunnel syndrome     Urinary incontinence     occ kira     Past Surgical History:   Procedure Laterality Date    ANTERIOR VAGINAL REPAIR N/A 8/13/2020    Procedure: COLPORRHAPHY, ANTERIOR;  Surgeon: Salina Mukherjee MD;  Location: [facility] OR;  Service: OB/GYN;  Laterality: N/A;    CYSTOSCOPY N/A 8/13/2020    Procedure: CYSTOSCOPY;  Surgeon: Salina Mukherjee MD;  Location: [facility] OR;  Service: OB/GYN;  Laterality: N/A;    EPIDURAL STEROID INJECTION N/A 6/12/2018    Procedure: INJECTION-STEROID-EPIDURAL;  Surgeon: Rianna Nina MD;  Location: [facility] PAIN MGT;  Service: Pain Management;  Laterality: N/A;  Coccygeal Nerve Block  97399  (0.5mg Niravam only)    *Pt wants to know if she has NO SEDATION at all, can pt drive herself home*    HYSTEROSCOPY WITH DILATION AND CURETTAGE OF UTERUS N/A 8/13/2020    Procedure: HYSTEROSCOPY, WITH DILATION AND CURETTAGE OF UTERUS;  Surgeon: Salina Mukherjee MD;  Location: [facility];  Service: OB/GYN;  Laterality: N/A;    OOPHORECTOMY      Left ovary         Anesthesia Evaluation    I have reviewed the Patient Summary Reports.    I have reviewed the Nursing Notes. I have reviewed the NPO Status.   I have reviewed the Medications.     Review of Systems  Anesthesia Hx:  Neg history of prior surgery. Denies Family Hx of Anesthesia complications.   Denies Personal Hx of Anesthesia complications.   Social:  Non-Smoker, No Alcohol Use    Hepatic/GI:   GERD    Neurological:   Headaches        Physical Exam  General:  Well nourished    Airway/Jaw/Neck:  Airway  Findings: Mouth Opening: Normal Tongue: Normal  General Airway Assessment: Adult  TM Distance: Normal, at least 6 cm  Jaw/Neck Findings:  Neck ROM: Normal ROM      Dental:  Dental Findings: In tact   Chest/Lungs:  Chest/Lungs Findings: Clear to auscultation     Heart/Vascular:  Heart Findings: Rate: Normal  Rhythm: Regular Rhythm     Abdomen:  Abdomen Findings:  Normal     Musculoskeletal:  Musculoskeletal Findings: Normal   Skin:  Skin Findings: Normal         Anesthesia Plan  Type of Anesthesia, risks & benefits discussed:  Anesthesia Type:  general  Patient's Preference:   Intra-op Monitoring Plan: standard ASA monitors  Intra-op Monitoring Plan Comments:   Post Op Pain Control Plan: per primary service following discharge from PACU  Post Op Pain Control Plan Comments:   Induction:   IV  Beta Blocker:  Patient is not currently on a Beta-Blocker (No further documentation required).       Informed Consent: Patient understands risks and agrees with Anesthesia plan.  Questions answered. Anesthesia consent signed with patient.  ASA Score: 2     Day of Surgery Review of History & Physical:  There are no significant changes.  H&P update referred to the provider.         Ready For Surgery From Anesthesia Perspective.

## 2020-11-05 ENCOUNTER — CLINICAL SUPPORT (OUTPATIENT)
Dept: REHABILITATION | Facility: HOSPITAL | Age: 65
End: 2020-11-05
Payer: COMMERCIAL

## 2020-11-05 DIAGNOSIS — M53.86 DECREASED RANGE OF MOTION OF LUMBAR SPINE: Primary | ICD-10-CM

## 2020-11-05 DIAGNOSIS — M62.81 WEAKNESS OF TRUNK MUSCULATURE: ICD-10-CM

## 2020-11-05 DIAGNOSIS — R29.3 POSTURE IMBALANCE: ICD-10-CM

## 2020-11-05 PROCEDURE — 97110 THERAPEUTIC EXERCISES: CPT | Mod: PN | Performed by: PHYSICAL MEDICINE & REHABILITATION

## 2020-11-05 NOTE — PROGRESS NOTES
[facility]      Name: Sheree Lomeli  Clinic Number: [id number]     Therapy Diagnosis:        Encounter Diagnoses   Name Primary?    Chronic left-sided low back pain without sciatica      Decreased range of motion of lumbar spine      Weakness of trunk musculature      Posture imbalance        Physician: Whitney Mera MD     Physician Orders: PT Eval and Treat    Medical Diagnosis from Referral: M54.5,G89.29 (ICD-10-CM) - Chronic left-sided low back pain without sciatica  Evaluation Date: 10/12/2020  Authorization Period Expiration: 12/31/20  Plan of Care Expiration: 1/12/21  Reassessment Due:  11/12/20  Visit # / Visits authorized: 4/ 20     Time In:  8:35 am  Time Out: 9:42  am  Total Billable Time:  53 minutes       Precautions: standard     Pattern of pain determined:  1 possible PEP      Subjective   Sheree reports she is feeling better, one of her issues is increasing low back pain if she goes too long without a full bowel movement.  She reports that the pelvic floor therapy and internal mobilization of her sacrum has really helped her.  She reports she reached across a table last weekend and felt the usual jolt of pain, but was surprised that it reduced with her stretches.  She thinks she is learning tools to manage symptoms.   She is consistent with her stretches.   She has 2/10 back pain today, slightly less after her visit. .      Patient reports tolerating previous visit well with no soreness from test  Patient reports their pain to be 2/10 on a 0-10 scale with 0 being no pain and 10 being the worst pain imaginable.  Pain Location: low back     Occupation: works for an 's office, standing desk  Leisure: walks        Objective   Baseline Isometric Testing on Med X equipment: Testing administered by PT  Date of testing: 10/12/20  ROM 0-42 deg   Max Peak Torque 82    Min Peak Torque 23    Flex/Ext Ratio 2.8/1   % below normative data -43             Limitation/Restriction for FOTO 10/12/20 Survey     Therapist reviewed FOTO scores for Sheree Lomeli on 10/12/2020.   FOTO documents entered into [facility] - see Media section.     Limitation Score: 37%  Goal  25 %             Treatment    Pt was instructed in and performed the following:     Sheree received therapeutic exercises to develop/improved posture, cardiovascular endurance, muscular endurance, lumbar ROM, strength and muscular endurance for 53 minutes including the following exercises:     3 of 6 morning routine:  Jogging in spot 2 sets 30 sec  Mini lateral jumps 2 sets 30 sec  1/2 jumping ashley sideways and forward, added to her morning routine of exercises, so she is now doing 3 of the 6 morning exercises for her goal, each x 1 min-performed here  Ext in lie with sag with towel for over pressure with all end range pain abolished  Bridge with ball squeeze and glut squeeze 10 reps  -engaged transverse abdominals and pelvic tilt 10  -engaged transverse abdominals and pelvic tilt 10 with 10 bilat hip drop outs  -engaged transverse abdominals and pelvic tilt 10 with 10 bilat hip flex  -Quad lumb stretch in standing 5 sec, 5 reps bilat      [facility] 11/5/2020   Visit Number 4   VAS Pain Rating 3   Treadmill Time (in min.) 10   Speed 2.5   Extension in Lying 10   Lumbar Extension Seat Pad -   Femur Restraint -   Top Dead Center -   Counterweight -   Lumbar Flexion -   Lumbar Extension -   Lumbar Peak Torque -   Min Torque -   Test Percent Below Normative Data -   Lumbar Weight 45   Repetitions 20   Rating of Perceived Exertion 4   Ice - Z Lie (in min.) 10                     Peripheral muscle strengthening which included 1 set of 15-20 repetitions at a slow, controlled 10-13 second per rep pace focused on strengthening supporting musculature for improved body mechanics and functional mobility.  Pt and therapist focused on proper form during treatment to ensure optimal strengthening of each  targeted muscle group.  Machines were utilized including torso rotation, chest press and row.   Leg extension, leg curl,  Tricep extension, bicep curl, leg press, and hip abduction  And add added visit 3    Sheree received the following manual therapy techniques: 4 minutes of left  lateral quad lumb traction release techniques with patient on right side         Home Exercises Provided and Patient Education Provided   Home exercises include: ext in lie with sag, and with towel over pressure by  and quad lumborum stretch  Bridge with ball squeeze and glut squeeze 10 reps  Transverse abd with pelvic tilt, hip drop outs, and marching 10 reps daily  Cardio program:walk 2 miles 4/week  Started 3 of 6 morning regiment:  Jog on spot 2 sets 30 sec  Mini lat jumps 2 reps 30 sec  Jumping jacks side and forward, each 30 reps or 1 min    Plan to add:  Side to side stretch  Knee lift with arm swing  shuffle    Education provided:   - reviewed PPT for bridging, monitoring amount of bridge to decrease upper shoulder tension, and added hip drop outs, and marching, also added 3 of 6 morning routine exercises of jumping jacks    Written Home Exercises Provided: yes.  Exercises were reviewed and Sheree was able to demonstrate them prior to the end of the session.  Sheree demonstrated good  understanding of the education provided.     See EMR under Patient Instructions for exercises provided 11/5/20          Assessment   Patient is making good progress towards established goals. Patient managed bridging and PPT with good form.  Added hip drop outs and marching to Transverse abdominal engaging and PPT.   Also added 3 of 6 morning routine exercises patient does, so she now does 1 min of jumping jacks (modified) jogging on spot, and lateral jumps.   Continued  UT and levator stretch afterward with very positive results. Patient continued lumbar medx strengthening at 45 ft lbs and completed 20 repetitions with a final reported RPE of  4/10. Also completed a full peripheral strengthening circuit with good tolerance.  Pt will continue to benefit from skilled outpatient physical therapy to address the deficits stated in the impairment chart, provide pt/family education and to maximize pt's level of independence in the home and community environment.     Anticipated Barriers for therapy: nil  Pt's spiritual, cultural and educational needs considered and pt agreeable to plan of care and goals as stated below:             Goals:     Short term goals:  6 weeks or 10 visits   1.  Pt will demonstrate increased lumbar ROM by at least 3 degrees from the initial ROM value with improvements noted in functional ROM and ability to perform ADLs.  Appropriate and ongoing  2.  Pt will demonstrate increased MedX average isometric strength value  by 10% from initial test resulting in improved ability to perform bending, lifting, and carrying activities safely, confidently.Appropriate and ongoing   3.  Patient report a reduction in worst pain score by 1-2 points for improved tolerance for 5/10 at worst.Appropriate and ongoing  4.  Pt able to perform HEP correctly with minimal cueing or supervision from therapist to encourage independent management of symptoms. Appropriate and ongoing        Long term goals: 10 weeks or 20 visits   1. Pt will demonstrate increased lumbar ROM by at least 6 degrees from initial ROM value, resulting in improved ability to perform functional fwd bending while standing and sitting. Appropriate and ongoing  2. Pt will demonstrate increased MedX average isometric strength value  by 25% from initial test resulting in improved ability to perform bending, lifting, and carrying activities safely, confidently.Appropriate and ongoing  3. Pt to demonstrate ability to independently control and reduce their pain through posture positioning and mechanical movements throughout a typical day.Appropriate and ongoing  4.  Pt will demonstrate reduced pain  and improved functional outcomes as reported on the FOTO by reaching a limitation score of < or = 25% or less in order to demonstrate subjective improvement in pt's condition.  Appropriate and ongoing  5. Pt will demonstrate independence with the HEP at discharge  Appropriate and ongoing  6.  Return to regular exercise in the morning, in the past  She did a rotation of 6 exercises every morning  Appropriate and ongoing  7. Pain 4/10 at worst and intermittent in back  Appropriate and ongoing       Plan   Continue with established Plan of Care towards established PT goals.

## 2020-11-06 ENCOUNTER — CLINICAL SUPPORT (OUTPATIENT)
Dept: REHABILITATION | Facility: HOSPITAL | Age: 65
End: 2020-11-06
Payer: COMMERCIAL

## 2020-11-06 DIAGNOSIS — M53.86 DECREASED RANGE OF MOTION OF LUMBAR SPINE: ICD-10-CM

## 2020-11-06 DIAGNOSIS — M62.81 WEAKNESS OF TRUNK MUSCULATURE: ICD-10-CM

## 2020-11-06 DIAGNOSIS — R29.3 POSTURE IMBALANCE: ICD-10-CM

## 2020-11-06 LAB
FINAL PATHOLOGIC DIAGNOSIS: NORMAL
GROSS: NORMAL
Lab: NORMAL

## 2020-11-06 PROCEDURE — 97110 THERAPEUTIC EXERCISES: CPT | Mod: PN

## 2020-11-06 NOTE — PROGRESS NOTES
[facility]      Name: Sheree Lomeli  Clinic Number: [id number]     Therapy Diagnosis:        Encounter Diagnoses   Name Primary?    Chronic left-sided low back pain without sciatica      Decreased range of motion of lumbar spine      Weakness of trunk musculature      Posture imbalance        Physician: Whitney Mera MD     Physician Orders: PT Eval and Treat    Medical Diagnosis from Referral: M54.5,G89.29 (ICD-10-CM) - Chronic left-sided low back pain without sciatica  Evaluation Date: 10/12/2020  Authorization Period Expiration: 12/31/20  Plan of Care Expiration: 1/12/21  Reassessment Due:  11/12/20  Visit # / Visits authorized: 5/ 20     Time In:  9:15 am  Time Out: 10:15  am  Total Billable Time:  60 minutes       Precautions: standard     Pattern of pain determined:  1 possible PEP      Subjective   Sheree reports she is feeling better, states that after her last pelvic health visit she saw some significant progress with bowel activity and sacral pain with driving, and is very glad to be doing both therapies.      Patient reports tolerating previous visit well with no soreness from test  Patient reports their pain to be 2/10 on a 0-10 scale with 0 being no pain and 10 being the worst pain imaginable.  Pain Location: low back     Occupation: works for an 's office, standing desk  Leisure: walks        Objective   Baseline Isometric Testing on Med X equipment: Testing administered by PT  Date of testing: 10/12/20  ROM 0-42 deg   Max Peak Torque 82    Min Peak Torque 23    Flex/Ext Ratio 2.8/1   % below normative data -43            Limitation/Restriction for FOTO 10/12/20 Survey     Therapist reviewed FOTO scores for Sheree Lomeli on 10/12/2020.   FOTO documents entered into [employer] - see Media section.     Limitation Score: 37%  Goal  25 %             Treatment    Pt was instructed in and performed the following:     Sheree received therapeutic  "exercises to develop/improved posture, cardiovascular endurance, muscular endurance, lumbar ROM, strength and muscular endurance for 53 minutes including the following exercises:     3 of 6 morning routine:  Ext in lie with sag with towel for over pressure with all end range pain abolished  PPT with bilat hip drop outs 2x10  -Quad lumb stretch in standing 5 sec, 5 reps bilat  QP rocking into child's pose w/ manual postural cue 4x10"  QP multifidus lift with half roll x10 ea  Standing partial/deep squat with BUE support 5x5"    [facility] 11/6/2020   Visit Number 5   VAS Pain Rating 2   Treadmill Time (in min.) 10   Speed 3   Extension in Lying 10   Lumbar Extension - Seat Pad -   Femur Restraint -   Top Dead Center -   Counterweight -   Lumbar Flexion -   Lumbar Extension -   Lumbar Peak Torque -   Lumbar Weight 50   Repetitions 15   Rating of Perceived Exertion 5           Peripheral muscle strengthening which included 1 set of 15-20 repetitions at a slow, controlled 10-13 second per rep pace focused on strengthening supporting musculature for improved body mechanics and functional mobility.  Pt and therapist focused on proper form during treatment to ensure optimal strengthening of each targeted muscle group.  Machines were utilized including torso rotation, chest press and row.   Leg extension, leg curl,  Tricep extension, bicep curl, leg press, and hip abduction  And add added visit 3    Sheree received the following manual therapy techniques: none      Home Exercises Provided and Patient Education Provided   Home exercises include: ext in lie with sag, and with towel over pressure by  and quad lumborum stretch  Bridge with ball squeeze and glut squeeze 10 reps  Transverse abd with pelvic tilt, hip drop outs, and marching 10 reps daily  Cardio program:walk 2 miles 4/week  Started 3 of 6 morning regiment:  Jog on spot 2 sets 30 sec  Mini lat jumps 2 reps 30 sec  Jumping jacks side and " forward, each 30 reps or 1 min    Plan to add:  Side to side stretch  Knee lift with arm swing  shuffle    Education provided:   - reviewed PPT for bridging, demonstrated modified rocking into child's pose and standing deep squat with UE support for lumosacral stretch    Written Home Exercises Provided: yes.  Exercises were reviewed and Sheree was able to demonstrate them prior to the end of the session.  Sheree demonstrated good  understanding of the education provided.     See EMR under Patient Instructions for exercises provided 11/5/20          Assessment   Patient is making good progress towards established goals, was able to perform all exercises without UE tension and compensation from previous sessions. Addition of quadruped rocking stretch as well as multifidus strengthening tolerated very well without compensations. Patient was able to increase lumbar medx strengthening to 50 ft lbs and completed 15 repetitions with a final reported RPE of 5/10. Also completed a full peripheral strengthening circuit with good tolerance.    Pt will continue to benefit from skilled outpatient physical therapy to address the deficits stated in the impairment chart, provide pt/family education and to maximize pt's level of independence in the home and community environment.     Anticipated Barriers for therapy: nil  Pt's spiritual, cultural and educational needs considered and pt agreeable to plan of care and goals as stated below:             Goals:     Short term goals:  6 weeks or 10 visits   1.  Pt will demonstrate increased lumbar ROM by at least 3 degrees from the initial ROM value with improvements noted in functional ROM and ability to perform ADLs.  Appropriate and ongoing  2.  Pt will demonstrate increased MedX average isometric strength value  by 10% from initial test resulting in improved ability to perform bending, lifting, and carrying activities safely, confidently.Appropriate and ongoing   3.  Patient report a  reduction in worst pain score by 1-2 points for improved tolerance for 5/10 at worst.Appropriate and ongoing  4.  Pt able to perform HEP correctly with minimal cueing or supervision from therapist to encourage independent management of symptoms. Appropriate and ongoing        Long term goals: 10 weeks or 20 visits   1. Pt will demonstrate increased lumbar ROM by at least 6 degrees from initial ROM value, resulting in improved ability to perform functional fwd bending while standing and sitting. Appropriate and ongoing  2. Pt will demonstrate increased MedX average isometric strength value  by 25% from initial test resulting in improved ability to perform bending, lifting, and carrying activities safely, confidently.Appropriate and ongoing  3. Pt to demonstrate ability to independently control and reduce their pain through posture positioning and mechanical movements throughout a typical day.Appropriate and ongoing  4.  Pt will demonstrate reduced pain and improved functional outcomes as reported on the FOTO by reaching a limitation score of < or = 25% or less in order to demonstrate subjective improvement in pt's condition.  Appropriate and ongoing  5. Pt will demonstrate independence with the HEP at discharge  Appropriate and ongoing  6.  Return to regular exercise in the morning, in the past  She did a rotation of 6 exercises every morning  Appropriate and ongoing  7. Pain 4/10 at worst and intermittent in back  Appropriate and ongoing       Plan   Continue with established Plan of Care towards established PT goals.

## 2020-11-09 ENCOUNTER — CLINICAL SUPPORT (OUTPATIENT)
Dept: REHABILITATION | Facility: OTHER | Age: 65
End: 2020-11-09
Attending: NURSE PRACTITIONER
Payer: COMMERCIAL

## 2020-11-09 DIAGNOSIS — R53.1 WEAKNESS: Primary | ICD-10-CM

## 2020-11-09 DIAGNOSIS — M62.89 PELVIC FLOOR DYSFUNCTION: ICD-10-CM

## 2020-11-09 DIAGNOSIS — R27.8 OTHER LACK OF COORDINATION: ICD-10-CM

## 2020-11-09 PROCEDURE — 97112 NEUROMUSCULAR REEDUCATION: CPT

## 2020-11-09 PROCEDURE — 97110 THERAPEUTIC EXERCISES: CPT

## 2020-11-09 PROCEDURE — 97140 MANUAL THERAPY 1/> REGIONS: CPT

## 2020-11-09 NOTE — PROGRESS NOTES
Pelvic Health Physical Therapy   Treatment Note     Name: Sheree Lomeli  Clinic Number: [id number]    Therapy Diagnosis:   Encounter Diagnoses   Name Primary?    Weakness Yes    Other lack of coordination     Pelvic floor dysfunction      Physician: Julia Burgos NP    Visit Date: 11/9/2020    Physician Orders: PT Eval and Treat   Medical Diagnosis from Referral: chronic constipation, pelvic floor weakness  Evaluation Date: 10/5/2020  Authorization Period Expiration: 12/31/2020  Plan of Care Expiration: 12-  Visit # / Visits authorized:3/ 20  Cancelled Visits: 0  No Show Visits: 0    Time In: 932  Time Out: 1030  Total Billable Time: 58 minutes    Precautions: Standard    Subjective     Pt reports: She reported she had 3 BM after her last visit and decreased coccyx pain.  Was able to drive 2.5 hours without pain.  She reports she was good for a week and then the pain returned after leaning forward to place a placemat on the table.  She felt a big spasm in her back and the coccyx pain also returned.    She is sitting with weight on her right hip.      She was compliant with home exercise program.  Response to previous treatment: no issues reported  Functional change: no changes reported    Pain: 3/10  Location: bilateral back  And coccyx.  Coccyx pain is described as a fiery, hot, pain.      Objective   RECTAL PELVIC FLOOR EXAM    EXTERNAL ASSESSMENT  Anus: WNL  Skin condition: WNL   Scarring: none  Sensation: WNL   Pain: none  Voluntary contraction: visible lift  Voluntary relaxation: visible drop  Bearing down: visible drop  Discharge: none       INTERNAL ASSESSMENT  EAS tone: WNL   Impaction: none   Pain: tender areas noted as follows: vanda coccygeus and levator ani  Sensation: able to localize pressure appropriately   Muscle Bulk: hypertonus   Muscle Power: 2/5     Quality of contraction: slow relaxation and incomplete relaxation   Specificity: WNL  Coordination: tends to hold breath during  PFM contration   Comments: coccyx appears to be shifted to the left although less so than last visit     Therapeutic Exercise to develop  flexibility for 10 minutes including:   Groin stretch 3x30 sec   Piriformis stretch 3x30 sec gilmar  Happy Baby 3x30 sec   LTR 10 sec x 10 reps      Sheree received the following manual therapy techniques: to develop flexibility and extensibility for 35 minutes including: joint mobilizations of coccyx intrarectaly to help decresae left side-shift and trigger point/myofascial release of coccygeous and levator ani groups Gilmar   Pt consents to intrarectal treatment of PFM today.      Bowel massage performed to help with gut motility.         Sheree participated in neuromuscular re-education activities to develop Coordination, Control and Down training for 10 minutes including: pelvic floor relaxation/bulging training  Pt consents to internal rectal pelvic floor treatment: Worked on pelvic floor muscle relaxation and coordination training using intravaginal tapping directly over muscle bellies and then asking the patient to soften the muscles where she feels the tap.  Also worked on diaphragmatic breathing to coordinate pelvic floor muscle relaxation.  Pt reports a decrease in pain to direct muscle palpation after relaxing and softening her pelvic muscles.        Home Exercises Provided and Patient Education Provided     Education provided:   - anatomy/physiology of pelvic floor  Discussed progression of plan of care with patient; educated pt in activity modification; reviewed HEP with pt. Pt demonstrated and verbalized understanding of all instruction and was provided with a handout of HEP (see Patient Instructions).      Written Home Exercises Provided: yes.  Exercises were reviewed and Sheree was able to demonstrate them prior to the end of the session.  Sheree demonstrated good  understanding of the education provided.     See EMR under Patient Instructions for exercises provided  "10/26/2020.    Assessment      Pt consents to intrarectal assessment and  treatment of PFM today. She is noted to have increased resting muscle activation and reports pain with palpation around her coccyx.  Her coccyx is shifted to the left although significantly improved.  Joint mobilizations of coccyx performed intrarectally to help decrease left side-shift as well as trigger point/myofascial release of coccygeous and levator ani groups Gilmar.   Bowel massage performed to help with gut motility.  Initiated hip and PFM stretching today to help decrease strain to pelvic structures.     Sheree is progressing well towards her goals.   Pt prognosis is Excellent.     Pt will continue to benefit from skilled outpatient physical therapy to address the deficits listed in the problem list box on initial evaluation, provide pt/family education and to maximize pt's level of independence in the home and community environment.     Pt's spiritual, cultural and educational needs considered and pt agreeable to plan of care and goals.     Anticipated barriers to physical therapy: none    Goals:   Short Term Goals: 4 weeks   Pt to perform "the knack" prior to coughing, laughing or sneezing to decrease risk of incontinence.  Pt to report being able to sneeze without incontinence demonstrating improved pelvic floor strength and coordination to improve confidence in social situations  Pt to demonstrate proper diaphragmatic breathing to help with calming the nervous system in order to decrease pain and to improve abdominal wall musculature extensibility.   Pt to report minimal pain with palpation of abdominal wall due to improvement in soft tissue mobility from moderate to minimal restrictions.  Pt to report a decrease in pain to no more than 3 at it's worst with direct pelvic floor muscle provocation.          Long Term Goals: 12 weeks   Pt to be discharged with home plan for carry over after discharge.   Pt to report being able to have " a comfortable vaginal exam without significant increase in pelvic pain.  Pt to report a decrease in pain to no more than 2 at it's worst with direct pelvic floor muscle provocation.    Pt to report an improved ability to tolerate sitting without a significant increase in reported pain levels of her coccyx to allow her to better participate in social activities.     Plan     Plan of care Certification: 10/5/2020 to 12-.     Outpatient Physical Therapy 2 times weekly for 12 weeks to include the following interventions: therapeutic exercises, therapeutic activity, neuromuscular re-education, gait training, manual therapy, modalities PRN, patient/family education, dry needling and self care/home management    Laurita Zepeda, PT

## 2020-11-09 NOTE — PATIENT INSTRUCTIONS
Home Exercise Program: 11/09/2020    Hip and Pelvic Stretching Program                               _

## 2020-11-10 ENCOUNTER — CLINICAL SUPPORT (OUTPATIENT)
Dept: REHABILITATION | Facility: HOSPITAL | Age: 65
End: 2020-11-10
Payer: COMMERCIAL

## 2020-11-10 DIAGNOSIS — M53.86 DECREASED RANGE OF MOTION OF LUMBAR SPINE: ICD-10-CM

## 2020-11-10 DIAGNOSIS — R29.3 POSTURE IMBALANCE: ICD-10-CM

## 2020-11-10 DIAGNOSIS — M62.81 WEAKNESS OF TRUNK MUSCULATURE: ICD-10-CM

## 2020-11-10 PROCEDURE — 97110 THERAPEUTIC EXERCISES: CPT | Mod: PN

## 2020-11-10 NOTE — PROGRESS NOTES
[facility]      Name: Sheree Lomeli  Clinic Number: [id number]     Therapy Diagnosis:        Encounter Diagnoses   Name Primary?    Chronic left-sided low back pain without sciatica      Decreased range of motion of lumbar spine      Weakness of trunk musculature      Posture imbalance        Physician: Whitney Mera MD     Physician Orders: PT Eval and Treat    Medical Diagnosis from Referral: M54.5,G89.29 (ICD-10-CM) - Chronic left-sided low back pain without sciatica  Evaluation Date: 10/12/2020  Authorization Period Expiration: 12/31/20  Plan of Care Expiration: 1/12/21  Reassessment Due:  11/12/20  Visit # / Visits authorized: 6/ 20     Time In: 0730  Time Out: 0830  Total Billable Time:  60 minutes       Precautions: standard     Pattern of pain determined:  1 possible PEP      Subjective   Sheree reports she is feeling so-so today. Is concerned about cost of treatment into the new year; would like to wrap up prior to then.Continues to feel improvement in back pain following sessions, though pain returns to baseline within a few days. Feels as if her ability to independently manage flare ups with stretching and exercise has improved.     Patient reports tolerating previous visit well with no soreness from test  Patient reports their pain to be 3/10 on a 0-10 scale with 0 being no pain and 10 being the worst pain imaginable.  Pain Location: low back     Occupation: works for an 's office, standing desk  Leisure: walks        Objective   Baseline Isometric Testing on Med X equipment: Testing administered by PT  Date of testing: 10/12/20  ROM 0-42 deg   Max Peak Torque 82    Min Peak Torque 23    Flex/Ext Ratio 2.8/1   % below normative data -43        Limitation/Restriction for FOTO 10/12/20 Survey     Therapist reviewed FOTO scores for Sheree Lomeli on 10/12/2020.   FOTO documents entered into [employer] - see Media section.     Limitation Score: 37%  Goal  " 25 %          Treatment    Pt was instructed in and performed the following:     Sheree received therapeutic exercises to develop/improved posture, cardiovascular endurance, muscular endurance, lumbar ROM, strength and muscular endurance for 58 minutes including the following exercises:     3 of 6 morning routine:  Ext in lie with sag with towel for over pressure with all end range pain abolished  PPT with bilat hip drop outs 2x10  Quad lumb stretch in standing 5 sec, 5 reps bilat  QP rocking into child's pose w/ manual postural cue 5x10"  QP multifidus lift with half roll x10 ea  Standing partial/deep squat with BUE support 5x5"    [facility] 11/10/2020   Visit Number 6   VAS Pain Rating 3   Treadmill Time (in min.) 10   Speed 3   Extension in Lying 10   Lumbar Weight 50   Repetitions 15   Rating of Perceived Exertion 3   Ice - Z Lie (in min.) 10     Peripheral muscle strengthening which included 1 set of 15-20 repetitions at a slow, controlled 10-13 second per rep pace focused on strengthening supporting musculature for improved body mechanics and functional mobility.  Pt and therapist focused on proper form during treatment to ensure optimal strengthening of each targeted muscle group.  Machines were utilized including torso rotation, chest press and row.   Leg extension, leg curl,  Tricep extension, bicep curl, leg press, and hip abduction  And add added visit 3    Sheree received the following manual therapy techniques: none      Home Exercises Provided and Patient Education Provided   Home exercises include: ext in lie with sag, and with towel over pressure by  and quad lumborum stretch  Bridge with ball squeeze and glut squeeze 10 reps  Transverse abd with pelvic tilt, hip drop outs, and marching 10 reps daily  Cardio program:walk 2 miles 4/week  Started 3 of 6 morning regiment:  Jog on spot 2 sets 30 sec  Mini lat jumps 2 reps 30 sec  Jumping jacks side and forward, each 30 reps or 1 " min    Plan to add:  Side to side stretch  Knee lift with arm swing  shuffle    Education provided:   - reviewed pain neuroscience education   - rationale for POC and progression     Written Home Exercises Provided: yes.  Exercises were reviewed and Sheree was able to demonstrate them prior to the end of the session.  Sheree demonstrated good  understanding of the education provided.     See EMR under Patient Instructions for exercises provided 11/5/20          Assessment   Sheree performed well in today's session, performing all exercises without exacerbation of symptoms. VC required for improvements to form with quadruped multifidus lifts. Due to difficulty with performance in previous session, MEdX parameters not progressed this session. Instead, Sheree once again completed 15 repetitions of 50 ft-lbs of resistance with a final reported RPE of 3/10.  PT to consider progressing repetitions NV. Also completed a full peripheral strengthening circuit with good tolerance.    Pt will continue to benefit from skilled outpatient physical therapy to address the deficits stated in the impairment chart, provide pt/family education and to maximize pt's level of independence in the home and community environment.     Anticipated Barriers for therapy: nil  Pt's spiritual, cultural and educational needs considered and pt agreeable to plan of care and goals as stated below:         Goals:     Short term goals:  6 weeks or 10 visits   1.  Pt will demonstrate increased lumbar ROM by at least 3 degrees from the initial ROM value with improvements noted in functional ROM and ability to perform ADLs.  Appropriate and ongoing  2.  Pt will demonstrate increased MedX average isometric strength value  by 10% from initial test resulting in improved ability to perform bending, lifting, and carrying activities safely, confidently.Appropriate and ongoing   3.  Patient report a reduction in worst pain score by 1-2 points for improved  tolerance for 5/10 at worst.Appropriate and ongoing  4.  Pt able to perform HEP correctly with minimal cueing or supervision from therapist to encourage independent management of symptoms. Appropriate and ongoing        Long term goals: 10 weeks or 20 visits   1. Pt will demonstrate increased lumbar ROM by at least 6 degrees from initial ROM value, resulting in improved ability to perform functional fwd bending while standing and sitting. Appropriate and ongoing  2. Pt will demonstrate increased MedX average isometric strength value  by 25% from initial test resulting in improved ability to perform bending, lifting, and carrying activities safely, confidently.Appropriate and ongoing  3. Pt to demonstrate ability to independently control and reduce their pain through posture positioning and mechanical movements throughout a typical day.Appropriate and ongoing  4.  Pt will demonstrate reduced pain and improved functional outcomes as reported on the FOTO by reaching a limitation score of < or = 25% or less in order to demonstrate subjective improvement in pt's condition.  Appropriate and ongoing  5. Pt will demonstrate independence with the HEP at discharge  Appropriate and ongoing  6.  Return to regular exercise in the morning, in the past  She did a rotation of 6 exercises every morning  Appropriate and ongoing  7. Pain 4/10 at worst and intermittent in back  Appropriate and ongoing       Plan   Continue with established Plan of Care towards established PT goals.     Gracie Christopher, PT, DPT  11/10/2020

## 2020-11-12 ENCOUNTER — CLINICAL SUPPORT (OUTPATIENT)
Dept: REHABILITATION | Facility: HOSPITAL | Age: 65
End: 2020-11-12
Payer: COMMERCIAL

## 2020-11-12 DIAGNOSIS — G89.29 CHRONIC LEFT-SIDED LOW BACK PAIN WITHOUT SCIATICA: Primary | ICD-10-CM

## 2020-11-12 DIAGNOSIS — M53.86 DECREASED RANGE OF MOTION OF LUMBAR SPINE: ICD-10-CM

## 2020-11-12 DIAGNOSIS — M62.81 WEAKNESS OF TRUNK MUSCULATURE: ICD-10-CM

## 2020-11-12 DIAGNOSIS — M54.50 CHRONIC LEFT-SIDED LOW BACK PAIN WITHOUT SCIATICA: Primary | ICD-10-CM

## 2020-11-12 DIAGNOSIS — R29.3 POSTURE IMBALANCE: ICD-10-CM

## 2020-11-12 PROCEDURE — 97110 THERAPEUTIC EXERCISES: CPT | Mod: PN

## 2020-11-12 NOTE — PROGRESS NOTES
[facility]      Name: Sheree Lomeli  Clinic Number: [id number]     Therapy Diagnosis:        Encounter Diagnoses   Name Primary?    Chronic left-sided low back pain without sciatica      Decreased range of motion of lumbar spine      Weakness of trunk musculature      Posture imbalance        Physician: Whitney Mera MD     Physician Orders: PT Eval and Treat    Medical Diagnosis from Referral: M54.5,G89.29 (ICD-10-CM) - Chronic left-sided low back pain without sciatica  Evaluation Date: 10/12/2020  Authorization Period Expiration: 12/31/20  Plan of Care Expiration: 1/12/21  Reassessment Due:  11/12/20  Visit # / Visits authorized: 7/ 20     Time In: 0845  Time Out: 0945  Total Billable Time:  60 minutes       Precautions: standard     Pattern of pain determined:  1 possible PEP      Subjective   Sheree reports she is feeling better and felt alright since last session but again focused on completing therapy sooner than later. Patient also reports that prone extension no longer produces pain as long as she is gradual with her ROM and she is able to get much more ROM than she did previously.    Patient reports tolerating previous visit well with no soreness from test  Patient reports their pain to be 3/10 on a 0-10 scale with 0 being no pain and 10 being the worst pain imaginable.  Pain Location: low back     Occupation: works for an 's office, standing desk  Leisure: walks        Objective   Baseline Isometric Testing on Med X equipment: Testing administered by PT  Date of testing: 10/12/20  ROM 0-42 deg   Max Peak Torque 82    Min Peak Torque 23    Flex/Ext Ratio 2.8/1   % below normative data -43        Limitation/Restriction for FOTO 10/12/20 Survey     Therapist reviewed FOTO scores for Sheree Lomeli on 10/12/2020.   FOTO documents entered into [employer] [facility] - see Media section.     Limitation Score: 37%  Goal  25 %          Treatment    Pt was instructed in  "and performed the following:     Sheree received therapeutic exercises to develop/improved posture, cardiovascular endurance, muscular endurance, lumbar ROM, strength and muscular endurance for 58 minutes including the following exercises:       Ext in lie  PPT with bilat hip drop outs 2x10  Quad lumb stretch in standing 5 sec, 5 reps bilat  QP rocking into child's pose w/ manual postural cue 5x10"  QP multifidus lift with half roll x10 ea  Standing partial/deep squat with BUE support 5x5"    [facility] - Short 11/12/2020   Visit Number 7   VAS Pain Rating 2   Treadmill Time (in min.) 10   Speed 2.7   Extension in Lying 10   Flexion in Lying 10   Lumbar Extension - Seat Pad -   Femur Restraint -   Top Dead Center -   Counterweight -   Lumbar Flexion -   Lumbar Extension -   Lumbar Peak Torque -   Lumbar Weight 55   Repetitions 15   Rating of Perceived Exertion 5       Peripheral muscle strengthening which included 1 set of 15-20 repetitions at a slow, controlled 10-13 second per rep pace focused on strengthening supporting musculature for improved body mechanics and functional mobility.  Pt and therapist focused on proper form during treatment to ensure optimal strengthening of each targeted muscle group.  Machines were utilized including torso rotation, chest press and row.   Leg extension, leg curl,  Tricep extension, bicep curl, leg press, and hip abduction  And add added visit 3    Sheree received the following manual therapy techniques: none      Home Exercises Provided and Patient Education Provided   Home exercises include: ext in lie with sag, and with towel over pressure by  and quad lumborum stretch  Bridge with ball squeeze and glut squeeze 10 reps  Transverse abd with pelvic tilt, hip drop outs, and marching 10 reps daily  Cardio program:walk 2 miles 4/week  Started 3 of 6 morning regiment:  Jog on spot 2 sets 30 sec  Mini lat jumps 2 reps 30 sec  Jumping jacks side and forward, each 30 " reps or 1 min    Plan to add:  Knee lift with arm swing  shuffle    Education provided:   - rationale for POC and progression     Written Home Exercises Provided: yes.  Exercises were reviewed and Sheree was able to demonstrate them prior to the end of the session.  Sheree demonstrated good  understanding of the education provided.     See EMR under Patient Instructions for exercises provided 11/5/20          Assessment   Sheree performed well in today's session, performing all exercises without exacerbation of symptoms. Less cueing needed for quadruped multifidus lifts. Due to difficulty with performance in previous session, MEdX parameters not progressed this session. Instead, Sheree once again completed 15 repetitions with increased resistance of 55 ft-lbs a final reported RPE of 5/10, will maintain this resistance next session. Completed a full peripheral strengthening circuit with good tolerance.    Pt will continue to benefit from skilled outpatient physical therapy to address the deficits stated in the impairment chart, provide pt/family education and to maximize pt's level of independence in the home and community environment.     Anticipated Barriers for therapy: nil  Pt's spiritual, cultural and educational needs considered and pt agreeable to plan of care and goals as stated below:         Goals:     Short term goals:  6 weeks or 10 visits   1.  Pt will demonstrate increased lumbar ROM by at least 3 degrees from the initial ROM value with improvements noted in functional ROM and ability to perform ADLs.  Appropriate and ongoing  2.  Pt will demonstrate increased MedX average isometric strength value  by 10% from initial test resulting in improved ability to perform bending, lifting, and carrying activities safely, confidently.Appropriate and ongoing   3.  Patient report a reduction in worst pain score by 1-2 points for improved tolerance for 5/10 at worst.Appropriate and ongoing  4.  Pt able to perform  HEP correctly with minimal cueing or supervision from therapist to encourage independent management of symptoms. Appropriate and ongoing        Long term goals: 10 weeks or 20 visits   1. Pt will demonstrate increased lumbar ROM by at least 6 degrees from initial ROM value, resulting in improved ability to perform functional fwd bending while standing and sitting. Appropriate and ongoing  2. Pt will demonstrate increased MedX average isometric strength value  by 25% from initial test resulting in improved ability to perform bending, lifting, and carrying activities safely, confidently.Appropriate and ongoing  3. Pt to demonstrate ability to independently control and reduce their pain through posture positioning and mechanical movements throughout a typical day.Appropriate and ongoing  4.  Pt will demonstrate reduced pain and improved functional outcomes as reported on the FOTO by reaching a limitation score of < or = 25% or less in order to demonstrate subjective improvement in pt's condition.  Appropriate and ongoing  5. Pt will demonstrate independence with the HEP at discharge  Appropriate and ongoing  6.  Return to regular exercise in the morning, in the past  She did a rotation of 6 exercises every morning  Appropriate and ongoing  7. Pain 4/10 at worst and intermittent in back  Appropriate and ongoing       Plan   Continue with established Plan of Care towards established PT goals.     Wali Wolfe, PT, DPT  11/12/2020

## 2020-11-13 ENCOUNTER — PATIENT OUTREACH (OUTPATIENT)
Dept: ADMINISTRATIVE | Facility: OTHER | Age: 65
End: 2020-11-13

## 2020-11-13 DIAGNOSIS — M53.3 COCCYGODYNIA: ICD-10-CM

## 2020-11-13 RX ORDER — IBUPROFEN 600 MG/1
TABLET ORAL
Qty: 30 TABLET | Refills: 0 | Status: SHIPPED | OUTPATIENT
Start: 2020-11-13 | End: 2020-11-16 | Stop reason: SDUPTHER

## 2020-11-13 NOTE — PROGRESS NOTES
LINKS immunization registry not responding  Care Everywhere updated  Health Maintenance updated  Chart reviewed for overdue Proactive [facility]) health maintenance testing (CRS, Breast Ca, Diabetic Eye Exam)   Orders entered:N/A

## 2020-11-16 ENCOUNTER — OFFICE VISIT (OUTPATIENT)
Dept: GASTROENTEROLOGY | Facility: CLINIC | Age: 65
End: 2020-11-16
Payer: COMMERCIAL

## 2020-11-16 ENCOUNTER — CLINICAL SUPPORT (OUTPATIENT)
Dept: REHABILITATION | Facility: OTHER | Age: 65
End: 2020-11-16
Attending: NURSE PRACTITIONER
Payer: COMMERCIAL

## 2020-11-16 VITALS
HEART RATE: 83 BPM | WEIGHT: 151.69 LBS | BODY MASS INDEX: 25.9 KG/M2 | HEIGHT: 64 IN | DIASTOLIC BLOOD PRESSURE: 81 MMHG | SYSTOLIC BLOOD PRESSURE: 121 MMHG

## 2020-11-16 DIAGNOSIS — K21.9 GASTROESOPHAGEAL REFLUX DISEASE WITHOUT ESOPHAGITIS: Primary | ICD-10-CM

## 2020-11-16 DIAGNOSIS — M62.89 PELVIC FLOOR DYSFUNCTION: ICD-10-CM

## 2020-11-16 DIAGNOSIS — R53.1 WEAKNESS: Primary | ICD-10-CM

## 2020-11-16 DIAGNOSIS — R10.13 EPIGASTRIC PAIN: ICD-10-CM

## 2020-11-16 DIAGNOSIS — R27.8 OTHER LACK OF COORDINATION: ICD-10-CM

## 2020-11-16 PROCEDURE — 99999 PR PBB SHADOW E&M-EST. PATIENT-LVL IV: CPT | Mod: PBBFAC,,, | Performed by: STUDENT IN AN ORGANIZED HEALTH CARE EDUCATION/TRAINING PROGRAM

## 2020-11-16 PROCEDURE — 99213 PR OFFICE/OUTPT VISIT, EST, LEVL III, 20-29 MIN: ICD-10-PCS | Mod: S$GLB,,, | Performed by: STUDENT IN AN ORGANIZED HEALTH CARE EDUCATION/TRAINING PROGRAM

## 2020-11-16 PROCEDURE — 99999 PR PBB SHADOW E&M-EST. PATIENT-LVL IV: ICD-10-PCS | Mod: PBBFAC,,, | Performed by: STUDENT IN AN ORGANIZED HEALTH CARE EDUCATION/TRAINING PROGRAM

## 2020-11-16 PROCEDURE — 1125F AMNT PAIN NOTED PAIN PRSNT: CPT | Mod: S$GLB,,, | Performed by: STUDENT IN AN ORGANIZED HEALTH CARE EDUCATION/TRAINING PROGRAM

## 2020-11-16 PROCEDURE — 97140 MANUAL THERAPY 1/> REGIONS: CPT

## 2020-11-16 PROCEDURE — 3008F PR BODY MASS INDEX (BMI) DOCUMENTED: ICD-10-PCS | Mod: CPTII,S$GLB,, | Performed by: STUDENT IN AN ORGANIZED HEALTH CARE EDUCATION/TRAINING PROGRAM

## 2020-11-16 PROCEDURE — 1125F PR PAIN SEVERITY QUANTIFIED, PAIN PRESENT: ICD-10-PCS | Mod: S$GLB,,, | Performed by: STUDENT IN AN ORGANIZED HEALTH CARE EDUCATION/TRAINING PROGRAM

## 2020-11-16 PROCEDURE — 99213 OFFICE O/P EST LOW 20 MIN: CPT | Mod: S$GLB,,, | Performed by: STUDENT IN AN ORGANIZED HEALTH CARE EDUCATION/TRAINING PROGRAM

## 2020-11-16 PROCEDURE — 97110 THERAPEUTIC EXERCISES: CPT

## 2020-11-16 PROCEDURE — 3008F BODY MASS INDEX DOCD: CPT | Mod: CPTII,S$GLB,, | Performed by: STUDENT IN AN ORGANIZED HEALTH CARE EDUCATION/TRAINING PROGRAM

## 2020-11-16 NOTE — PROGRESS NOTES
[facility] Follow-up Note    Reason for Visit:  The primary encounter diagnosis was Gastroesophageal reflux disease without esophagitis. A diagnosis of Epigastric pain was also pertinent to this visit.    PCP:   Whitney Mera       Referring MD:  No referring provider defined for this encounter.    HPI:  This is a 65 y.o. female initially presented to the GI clinic in October 2020 for abdominal pain and reflux.  She was started on PPI therapy and sucralfate.  She is also scheduled for an EGD at the time of her screening colonoscopy.    Interval history:  Since the patient's last visit she has undergone an EGD which was endoscopically unrevealing.  Gastric biopsies were notable for mild chronic gastritis with no evidence of H pylori.  Colonoscopy was done at that time and notable for a few small polyps.    Since her last visit she reports her symptoms have completely resolved.  She is no longer taking any acid suppressive therapy and remained symptom-free.    She is without any complaints at today's visit.    ROS:  Constitutional: No fevers, chills, No weight loss  ENT: No allergies  CV: No chest pain  Pulm: No cough, No shortness of breath  Ophtho: No vision changes  GI: see HPI  Derm: No rash  Heme: No lymphadenopathy, No bruising  MSK: No arthritis  : No dysuria, No hematuria  Endo: No hot or cold intolerance  Neuro: No syncope, No seizure  Psych: No anxiety, No depression    Medical History:  has a past medical history of Arthritis, Back pain, Chronic pelvic pain in female, De Quervain's tenosynovitis, Headache(784.0), PONV (postoperative nausea and vomiting), Right carpal tunnel syndrome, and Urinary incontinence.    Surgical History:  has a past surgical history that includes Oophorectomy; Epidural steroid injection (N/A, 6/12/2018); Hysteroscopy with dilation and curettage of uterus (N/A, 8/13/2020); Anterior vaginal repair (N/A, 8/13/2020); Cystoscopy (N/A, 8/13/2020);  Esophagogastroduodenoscopy (N/A, 11/3/2020); and Colonoscopy (N/A, 11/3/2020).    Family History: family history includes Breast cancer (age of onset: 35) in her daughter; Cancer in her father; Coronary artery disease in her father; Diabetes in her cousin and maternal uncle; Heart disease in her father; Hypoglycemic in her sister; No Known Problems in her sister; Prostate cancer in her father..     Social History:  reports that she has never smoked. She has never used smokeless tobacco. She reports that she does not drink alcohol or use drugs.    Review of patient's allergies indicates:   Allergen Reactions    Adhesive Rash    Iodine Rash    Shellfish containing products Rash     Patient allergic to softshell crabs specifically    Sulfa (sulfonamide antibiotics) Rash       Current Outpatient Rx   Medication Sig Dispense Refill    ascorbic acid, vitamin C, (VITAMIN C) 1000 MG tablet Take 1,000 mg by mouth once daily.      butalbital-aspirin-caffeine -40 mg (FIORINAL) -40 mg Cap Take 1 capsule by mouth every 4 (four) hours as needed. 30 capsule 0    cyclobenzaprine (FLEXERIL) 5 MG tablet       dextrin (FIBER POWDER ORAL) Take by mouth.      estradioL (ESTRACE) 0.01 % (0.1 mg/gram) vaginal cream USE 0.5 GRAM VAGINALLY EVERY NIGHT FOR 2 WEEKS THEN USE VAGINALLY 2 TIMES PER WEEK THEREAFTER 42.5 g 11    ibuprofen (ADVIL,MOTRIN) 600 MG tablet TAKE 1 TABLET(600 MG) BY MOUTH EVERY 4 HOURS AS NEEDED 30 tablet 0    MAGNESIUM ORAL Take by mouth.      multivitamin (ONE DAILY MULTIVITAMIN) per tablet Take 1 tablet by mouth once daily.      omega-3 fatty acids/fish oil (FISH OIL-OMEGA-3 FATTY ACIDS) 300-1,000 mg capsule Take by mouth once daily.      vitamin D (VITAMIN D3) 1000 units Tab Take 1,000 Units by mouth once daily.      ibuprofen (ADVIL,MOTRIN) 800 MG tablet Take 1 tablet (800 mg total) by mouth every 8 (eight) hours as needed for Pain. 30 tablet 0    pantoprazole (PROTONIX) 40 MG tablet Take  "1 tablet (40 mg total) by mouth once daily. (Patient not taking: Reported on 11/16/2020) 60 tablet 5    sucralfate (CARAFATE) 100 mg/mL suspension TAKE 10 ML BY MOUTH FOUR TIMES DAILY: BEFORE MEALS AND NIGHTLY (Patient not taking: Reported on 11/16/2020) 420 mL 2       Objective Findings:    Vital Signs:  /81   Pulse 83   Ht 5' 4" (1.626 m)   Wt 68.8 kg (151 lb 10.8 oz)   LMP  (LMP Unknown)   BMI 26.04 kg/m²   Body mass index is 26.04 kg/m².    Physical Exam:  General Appearance: Well appearing in no acute distress  Head:   Normocephalic, without obvious abnormality  Eyes:    No scleral icterus, EOMI  ENT: Neck supple, Lips, mucosa, and tongue normal; teeth and gums normal  Lungs: CTA bilaterally in anterior and posterior fields, no wheezes, no crackles.  Heart:  Regular rate and rhythm, S1, S2 normal, no murmurs heard  Abdomen: Soft, non tender, non distended with positive bowel sounds in all four quadrants. No hepatosplenomegaly, ascites, or mass  Extremities: 2+ pulses, no clubbing, cyanosis or edema  Skin: No rash  Neurologic: CN II-XII intact      Labs:  Lab Results   Component Value Date    WBC 5.45 10/07/2020    HGB 13.2 10/07/2020    HCT 41.0 10/07/2020     10/07/2020    CHOL 228 (H) 08/30/2016    TRIG 71 02/25/2017    HDL 81 02/25/2017    ALT 12 10/07/2020    AST 19 10/07/2020     10/07/2020    K 5.0 10/07/2020     10/07/2020    CREATININE 0.8 10/07/2020    BUN 6 (L) 10/07/2020    CO2 31 (H) 10/07/2020    TSH 1.23 02/25/2017       Endoscopy:    Colonoscopy 11/3/2020  Findings:        The perianal and digital rectal examinations were normal.        Three sessile polyps were found in the ascending colon. The polyps        were 2 to 3 mm in size. These polyps were removed with a jumbo cold        forceps. Resection and retrieval were complete.        Multiple small-mouthed diverticula were found in the sigmoid colon.        Non-bleeding internal hemorrhoids were found during " retroflexion.        The hemorrhoids were small.     EGD 11/3/2020  Impression:           - Normal esophagus.                         - Normal stomach. Biopsied.                         - Normal examined duodenum.     Assessment:  1. Gastroesophageal reflux disease without esophagitis    2. Epigastric pain      In summary, the patient is a 65-year-old female who presents for follow-up in the setting of acid reflux and epigastric pain.    The patient's symptoms have completely resolved and her endoscopic evaluation was normal.  I suspect the patient's symptoms were likely in the setting of high dose NSAID use.  Since stopping ibuprofen her medications have completely resolved.  She will follow-up in the GI clinic as needed and contact me if symptoms recur.    Follow up if symptoms worsen or fail to improve.      Order summary:         Thank you so much for allowing me to participate in the care of Sheree Pulido MD

## 2020-11-16 NOTE — PROGRESS NOTES
[facility] Physical Therapy   Treatment Note     Name: Sheree Lomeli  Clinic Number: [id number]    Therapy Diagnosis:   Encounter Diagnoses   Name Primary?    Weakness Yes    Other lack of coordination     Pelvic floor dysfunction      Physician: Julia Burgos NP    Visit Date: 11/16/2020    Physician Orders: PT Eval and Treat   Medical Diagnosis from Referral: chronic constipation, pelvic floor weakness  Evaluation Date: 10/5/2020  Authorization Period Expiration: 12/31/2020  Plan of Care Expiration: 12-  Visit # / Visits authorized:4/ 20  Cancelled Visits: 0  No Show Visits: 0    Time In:  1135  Time Out:  1230  Total Billable Time:  55 minutes    Precautions: Standard    Subjective     Pt reports: She reports 11 years ago she used to wake up in the middle of the night with a hot flash and searing tailbone pain that would radiate up her spine.  She reports that once the hot flashes stopped after 2-3 years.      She reports less coccyx pain since her last treatment.     BM daily: takes 1 magnesium tablet and 1 stool softener and fiber with coffee in the morning and is having a BM daily.  No straining.      She is sitting with weight equal.    If does not have a BM she has pain.  Has to plan to get up 2 hours in advance when traveling to make sure she has a BM.  She would like to be able to not have to worry if she is going to have pain if she does not have a BM>     She was compliant with home exercise program.  Response to previous treatment: no issues reported  Functional change: less coccyx pain.  Pain: 3/10  Location: bilateral back  And coccyx.  Coccyx pain is described as a fiery, hot, pain.      Objective   RECTAL PELVIC FLOOR EXAM    EXTERNAL ASSESSMENT  Anus: WNL  Skin condition: WNL   Scarring: none  Sensation: WNL   Pain: none  Voluntary contraction: visible lift  Voluntary relaxation: visible drop  Bearing down: visible drop  Discharge: none       INTERNAL ASSESSMENT  EAS tone:  WNL   Impaction: none   Pain: tender areas noted as follows: left coccygeus and levator ani  Sensation: able to localize pressure appropriately   Muscle Bulk: WNL  Muscle Power: 2/5     Quality of contraction: Improved relaxation time.  Able to fully relax now.   Specificity: WNL  Coordination: tends to hold breath during PFM contration   Comments: coccyx appears to be in a neutral position but very hypermobile.     Therapeutic Exercise to develop  flexibility for 25 minutes including:   Groin stretch 3x30 sec   Piriformis stretch 3x30 sec gilmar  Happy Baby 3x30 sec   Child's post 3x30 sec   Clamshells x 15    Kegel edu and practice.  Increased time spent on edu on proper technique.  Pt improves with practice and verbal cues.        Sheree received the following manual therapy techniques: to develop flexibility and extensibility for 35 minutes including: trigger point/myofascial release of coccygeous and levator ani groups Gilmar   Pt consents to intrarectal treatment of PFM today.           Sheree participated in neuromuscular re-education activities to develop Coordination, Control and Down training for 00 minutes including: pelvic floor relaxation/bulging training  Pt consents to internal rectal pelvic floor treatment: Worked on pelvic floor muscle relaxation and coordination training using intravaginal tapping directly over muscle bellies and then asking the patient to soften the muscles where she feels the tap.  Also worked on diaphragmatic breathing to coordinate pelvic floor muscle relaxation.  Pt reports a decrease in pain to direct muscle palpation after relaxing and softening her pelvic muscles.        Home Exercises Provided and Patient Education Provided     Education provided:   - anatomy/physiology of pelvic floor  Discussed progression of plan of care with patient; educated pt in activity modification; reviewed HEP with pt. Pt demonstrated and verbalized understanding of all instruction and was provided  "with a handout of HEP (see Patient Instructions).      Written Home Exercises Provided: yes.  Exercises were reviewed and Sheree was able to demonstrate them prior to the end of the session.  Sheree demonstrated good  understanding of the education provided.     See EMR under Patient Instructions for exercises provided 10/26/2020.    Assessment     Pt consents to intrarectal assessment and  treatment of PFM today. She is noted to have improved resting muscle tone and reports less pain with palpation around her coccyx.  Her coccyx is in a more neutral position today but appears to have significant hypermobility.  Trigger point/myofascial release of coccygeous and levator ani groups Gilmar was performed.   Continued  hip and PFM stretching today to help decrease strain to pelvic structures. Initiated edu on kegels technique and coordination today.     Sheree is progressing well towards her goals.   Pt prognosis is Excellent.     Pt will continue to benefit from skilled outpatient physical therapy to address the deficits listed in the problem list box on initial evaluation, provide pt/family education and to maximize pt's level of independence in the home and community environment.     Pt's spiritual, cultural and educational needs considered and pt agreeable to plan of care and goals.     Anticipated barriers to physical therapy: none    Goals:   Short Term Goals: 4 weeks   Pt to perform "the knack" prior to coughing, laughing or sneezing to decrease risk of incontinence.  Pt to report being able to sneeze without incontinence demonstrating improved pelvic floor strength and coordination to improve confidence in social situations  Pt to demonstrate proper diaphragmatic breathing to help with calming the nervous system in order to decrease pain and to improve abdominal wall musculature extensibility.   Pt to report minimal pain with palpation of abdominal wall due to improvement in soft tissue mobility from moderate to " minimal restrictions.  Pt to report a decrease in pain to no more than 3 at it's worst with direct pelvic floor muscle provocation.          Long Term Goals: 12 weeks   Pt to be discharged with home plan for carry over after discharge.   Pt to report being able to have a comfortable vaginal exam without significant increase in pelvic pain.  Pt to report a decrease in pain to no more than 2 at it's worst with direct pelvic floor muscle provocation.    Pt to report an improved ability to tolerate sitting without a significant increase in reported pain levels of her coccyx to allow her to better participate in social activities.     Plan     Plan of care Certification: 10/5/2020 to 12-.     Outpatient Physical Therapy 2 times weekly for 12 weeks to include the following interventions: therapeutic exercises, therapeutic activity, neuromuscular re-education, gait training, manual therapy, modalities PRN, patient/family education, dry needling and self care/home management    Laurita Zepeda, PT

## 2020-11-17 ENCOUNTER — CLINICAL SUPPORT (OUTPATIENT)
Dept: REHABILITATION | Facility: HOSPITAL | Age: 65
End: 2020-11-17
Payer: COMMERCIAL

## 2020-11-17 DIAGNOSIS — M62.81 WEAKNESS OF TRUNK MUSCULATURE: ICD-10-CM

## 2020-11-17 DIAGNOSIS — R29.3 POSTURE IMBALANCE: ICD-10-CM

## 2020-11-17 DIAGNOSIS — M53.86 DECREASED RANGE OF MOTION OF LUMBAR SPINE: ICD-10-CM

## 2020-11-17 PROCEDURE — 97110 THERAPEUTIC EXERCISES: CPT | Mod: PN

## 2020-11-17 NOTE — PROGRESS NOTES
[facility]      Name: Sheree Lomeli  Clinic Number: [id number]     Therapy Diagnosis:        Encounter Diagnoses   Name Primary?    Chronic left-sided low back pain without sciatica      Decreased range of motion of lumbar spine      Weakness of trunk musculature      Posture imbalance        Physician: Whitney Mera MD     Physician Orders: PT Eval and Treat    Medical Diagnosis from Referral: M54.5,G89.29 (ICD-10-CM) - Chronic left-sided low back pain without sciatica  Evaluation Date: 10/12/2020  Authorization Period Expiration: 12/31/20  Plan of Care Expiration: 1/12/21  Reassessment Due:  11/12/20  Visit # / Visits authorized: 8/ 20     Time In: 0730  Time Out: 0827  Total Billable Time:  57 minutes       Precautions: standard     Pattern of pain determined:  1 possible PEP      Subjective   Sheree reports continued L LBP, especially in the mornings. Stretching used for relief, though she reports results only last approx. 2 hours.     Patient reports tolerating previous visit well with no soreness from test  Patient reports their pain to be 3/10 on a 0-10 scale with 0 being no pain and 10 being the worst pain imaginable.  Pain Location: low back     Occupation: works for an 's office, standing desk  Leisure: walks        Objective   Baseline Isometric Testing on Med X equipment: Testing administered by PT  Date of testing: 10/12/20  ROM 0-42 deg   Max Peak Torque 82    Min Peak Torque 23    Flex/Ext Ratio 2.8/1   % below normative data -43        Limitation/Restriction for FOTO 10/12/20 Survey     Therapist reviewed FOTO scores for Sheree Lomeli on 10/12/2020.   FOTO documents entered into [employer] - see Media section.     Limitation Score: 37%  Goal  25 %          Treatment    Pt was instructed in and performed the following:     Sheree received therapeutic exercises to develop/improved posture, cardiovascular endurance, muscular endurance, lumbar ROM,  "strength and muscular endurance for 57 minutes including the following exercises:       PPT with bilat hip drop outs 2x10  Dead bigs LE only x10 ea   QP rocking into child's pose w/ manual postural cue 5x10"  QP multifidus lift with half roll x88 ea  Standing partial/deep squat with BUE support 5x5"  SL QL stretch on small bolster 5x10"     [facility] 11/17/2020   Visit Number 8   VAS Pain Rating 3   Treadmill Time (in min.) 10   Speed 2.8   Flexion in Lying 10   Lumbar Weight 55   Repetitions 18   Rating of Perceived Exertion 4        Peripheral muscle strengthening which included 1 set of 15-20 repetitions at a slow, controlled 10-13 second per rep pace focused on strengthening supporting musculature for improved body mechanics and functional mobility.  Pt and therapist focused on proper form during treatment to ensure optimal strengthening of each targeted muscle group.  Machines were utilized including torso rotation, chest press and row.   Leg extension, leg curl,  Tricep extension, bicep curl, leg press, and hip abduction  And add added visit 3    Sheree received the following manual therapy techniques: none      Home Exercises Provided and Patient Education Provided   Home exercises include: ext in lie with sag, and with towel over pressure by  and quad lumborum stretch  Bridge with ball squeeze and glut squeeze 10 reps  Transverse abd with pelvic tilt, hip drop outs, and marching 10 reps daily  Cardio program:walk 2 miles 4/week  Started 3 of 6 morning regiment:  Jog on spot 2 sets 30 sec  Mini lat jumps 2 reps 30 sec  Jumping jacks side and forward, each 30 reps or 1 min    Plan to add:  Knee lift with arm swing  shuffle    Education provided:   - rationale for POC and progression     Written Home Exercises Provided: yes.  Exercises were reviewed and Sheree was able to demonstrate them prior to the end of the session.  Sheree demonstrated good  understanding of the education provided. "     See EMR under Patient Instructions for exercises provided 11/5/20          Assessment   Sheree performed well in today's session, performing all exercises without exacerbation of symptoms. Increased emphasis on core strengthening this session with addition of dead bugs. Goof form noted. Continued cueing required for quadruped multifidus lifts to isolate movement to low back and hip rather than knee. Increased difficulty with asymmetrical core strengthening exercises on L.  MEdX parameters rogressed slightly this session, with Sheree completing  17 repetitions with increased resistance of 55 ft-lbs a final reported RPE of 4/10; PT to consider progression repetitions to tolerance NV . Peripheral strengthening circuit did not include bicep and tricep strengthening this session; all other exercises performed with good tolerance.    Pt will continue to benefit from skilled outpatient physical therapy to address the deficits stated in the impairment chart, provide pt/family education and to maximize pt's level of independence in the home and community environment.     Anticipated Barriers for therapy: nil  Pt's spiritual, cultural and educational needs considered and pt agreeable to plan of care and goals as stated below:         Goals:     Short term goals:  6 weeks or 10 visits   1.  Pt will demonstrate increased lumbar ROM by at least 3 degrees from the initial ROM value with improvements noted in functional ROM and ability to perform ADLs.  Appropriate and ongoing  2.  Pt will demonstrate increased MedX average isometric strength value  by 10% from initial test resulting in improved ability to perform bending, lifting, and carrying activities safely, confidently.Appropriate and ongoing   3.  Patient report a reduction in worst pain score by 1-2 points for improved tolerance for 5/10 at worst.Appropriate and ongoing  4.  Pt able to perform HEP correctly with minimal cueing or supervision from therapist to encourage  independent management of symptoms. Appropriate and ongoing        Long term goals: 10 weeks or 20 visits   1. Pt will demonstrate increased lumbar ROM by at least 6 degrees from initial ROM value, resulting in improved ability to perform functional fwd bending while standing and sitting. Appropriate and ongoing  2. Pt will demonstrate increased MedX average isometric strength value  by 25% from initial test resulting in improved ability to perform bending, lifting, and carrying activities safely, confidently.Appropriate and ongoing  3. Pt to demonstrate ability to independently control and reduce their pain through posture positioning and mechanical movements throughout a typical day.Appropriate and ongoing  4.  Pt will demonstrate reduced pain and improved functional outcomes as reported on the FOTO by reaching a limitation score of < or = 25% or less in order to demonstrate subjective improvement in pt's condition.  Appropriate and ongoing  5. Pt will demonstrate independence with the HEP at discharge  Appropriate and ongoing  6.  Return to regular exercise in the morning, in the past  She did a rotation of 6 exercises every morning  Appropriate and ongoing  7. Pain 4/10 at worst and intermittent in back  Appropriate and ongoing       Plan   Continue with established Plan of Care towards established PT goals.     SLIME DUPONT, PT, DPT  11/17/2020

## 2020-11-19 ENCOUNTER — CLINICAL SUPPORT (OUTPATIENT)
Dept: REHABILITATION | Facility: HOSPITAL | Age: 65
End: 2020-11-19
Payer: COMMERCIAL

## 2020-11-19 DIAGNOSIS — R29.3 POSTURE IMBALANCE: ICD-10-CM

## 2020-11-19 DIAGNOSIS — M62.81 WEAKNESS OF TRUNK MUSCULATURE: ICD-10-CM

## 2020-11-19 DIAGNOSIS — M53.86 DECREASED RANGE OF MOTION OF LUMBAR SPINE: ICD-10-CM

## 2020-11-19 PROCEDURE — 97110 THERAPEUTIC EXERCISES: CPT | Mod: PN

## 2020-11-19 NOTE — PROGRESS NOTES
[facility]      Name: Sheree Lomeli  Clinic Number: [id number]     Therapy Diagnosis:        Encounter Diagnoses   Name Primary?    Chronic left-sided low back pain without sciatica      Decreased range of motion of lumbar spine      Weakness of trunk musculature      Posture imbalance        Physician: Whitney Mera MD     Physician Orders: PT Eval and Treat    Medical Diagnosis from Referral: M54.5,G89.29 (ICD-10-CM) - Chronic left-sided low back pain without sciatica  Evaluation Date: 10/12/2020  Authorization Period Expiration: 12/31/20  Plan of Care Expiration: 1/12/21  Reassessment Due:  11/12/20  Visit # / Visits authorized: 9/ 20     Time In: 0845  Time Out: 0945  Total Billable Time:  60 minutes       Precautions: standard     Pattern of pain determined:  1 possible PEP      Subjective   Sheree reports she is feeling better still with her stretches and she feels both stronger and more flexible, but definitely is glad she can do wellness once her therapy visits are done.      Patient reports tolerating previous visit well with no soreness from test  Patient reports their pain to be 3/10 on a 0-10 scale with 0 being no pain and 10 being the worst pain imaginable.  Pain Location: low back     Occupation: works for an 's office, standing desk  Leisure: walks        Objective   Baseline Isometric Testing on Med X equipment: Testing administered by PT  Date of testing: 10/12/20  ROM 0-42 deg   Max Peak Torque 82    Min Peak Torque 23    Flex/Ext Ratio 2.8/1   % below normative data -43        Limitation/Restriction for FOTO 10/12/20 Survey     Therapist reviewed FOTO scores for Sheree Lomeli on 10/12/2020.   FOTO documents entered into [employer] - see Media section.     Limitation Score: 37%  Goal  25 %          Treatment    Pt was instructed in and performed the following:     Sheree received therapeutic exercises to develop/improved posture,  "cardiovascular endurance, muscular endurance, lumbar ROM, strength and muscular endurance for 57 minutes including the following exercises:       PPT with bilat hip drop outs 2x10  Dead bugs x 12 ea   QP rocking into child's pose w/ manual postural cue 5x10"  QP multifidus lift with half roll x 8 ea  Standing partial/deep squat with BUE support 5x5"  SL QL stretch on small bolster 5x10"     [facility] 11/19/2020   Visit Number 9   VAS Pain Rating 2   Treadmill Time (in min.) 10   Speed 2.4   Extension in Lying -   Extension in Standing 10   Flexion in Lying 10   Lumbar Extension - Seat Pad -   Femur Restraint -   Top Dead Center -   Counterweight -   Lumbar Flexion 48   Lumbar Extension 0   Lumbar Peak Torque -   Lumbar Weight 55   Repetitions 20   Rating of Perceived Exertion 3           Peripheral muscle strengthening which included 1 set of 15-20 repetitions at a slow, controlled 10-13 second per rep pace focused on strengthening supporting musculature for improved body mechanics and functional mobility.  Pt and therapist focused on proper form during treatment to ensure optimal strengthening of each targeted muscle group.  Machines were utilized including torso rotation, chest press and row.   Leg extension, leg curl,  Tricep extension, bicep curl, leg press, and hip abduction  And add added visit 3    Sheree received the following manual therapy techniques: none      Home Exercises Provided and Patient Education Provided   Home exercises include: ext in lie with sag, and with towel over pressure by  and quad lumborum stretch  Bridge with ball squeeze and glut squeeze 10 reps  Transverse abd with pelvic tilt, hip drop outs, and marching 10 reps daily  Cardio program:walk 2 miles 4/week    Started 3 of 6 morning regiment:  Jog on spot 2 sets 30 sec  Mini lat jumps 2 reps 30 sec  Jumping jacks side and forward, each 30 reps or 1 min    Plan to add:  Knee lift with arm " swing  shuffle    Education provided:   - rationale for POC and progression     Written Home Exercises Provided: yes.  Exercises were reviewed and Sheree was able to demonstrate them prior to the end of the session.  Sheree demonstrated good  understanding of the education provided.     See EMR under Patient Instructions for exercises provided 11/5/20          Assessment   Sheree performed well in today's session, performing all exercises without exacerbation of symptoms. Progression of core strengthening this session with addition of dead bugs fully with UE and LE, good form noted. MedX parameters progressed slightly this session, with Sheree completing  20 repetitions with resistance of 55 ft-lbs in an increased ROM of 0-48 degrees and a final reported RPE of 4/10; Peripheral strengthening circuit did not include bicep strengthening this session; all other exercises performed and progressed with good tolerance.    Pt will continue to benefit from skilled outpatient physical therapy to address the deficits stated in the impairment chart, provide pt/family education and to maximize pt's level of independence in the home and community environment.     Anticipated Barriers for therapy: nil  Pt's spiritual, cultural and educational needs considered and pt agreeable to plan of care and goals as stated below:         Goals:     Short term goals:  6 weeks or 10 visits   1.  Pt will demonstrate increased lumbar ROM by at least 3 degrees from the initial ROM value with improvements noted in functional ROM and ability to perform ADLs.  Appropriate and ongoing  2.  Pt will demonstrate increased MedX average isometric strength value  by 10% from initial test resulting in improved ability to perform bending, lifting, and carrying activities safely, confidently.Appropriate and ongoing   3.  Patient report a reduction in worst pain score by 1-2 points for improved tolerance for 5/10 at worst.Appropriate and ongoing  4.  Pt able  to perform HEP correctly with minimal cueing or supervision from therapist to encourage independent management of symptoms. Appropriate and ongoing        Long term goals: 10 weeks or 20 visits   1. Pt will demonstrate increased lumbar ROM by at least 6 degrees from initial ROM value, resulting in improved ability to perform functional fwd bending while standing and sitting. Appropriate and ongoing  2. Pt will demonstrate increased MedX average isometric strength value  by 25% from initial test resulting in improved ability to perform bending, lifting, and carrying activities safely, confidently.Appropriate and ongoing  3. Pt to demonstrate ability to independently control and reduce their pain through posture positioning and mechanical movements throughout a typical day.Appropriate and ongoing  4.  Pt will demonstrate reduced pain and improved functional outcomes as reported on the FOTO by reaching a limitation score of < or = 25% or less in order to demonstrate subjective improvement in pt's condition.  Appropriate and ongoing  5. Pt will demonstrate independence with the HEP at discharge  Appropriate and ongoing  6.  Return to regular exercise in the morning, in the past  She did a rotation of 6 exercises every morning  Appropriate and ongoing  7. Pain 4/10 at worst and intermittent in back  Appropriate and ongoing       Plan   Continue with established Plan of Care towards established PT goals.     Wali Wolfe, PT, DPT  11/19/2020

## 2020-11-23 ENCOUNTER — CLINICAL SUPPORT (OUTPATIENT)
Dept: REHABILITATION | Facility: OTHER | Age: 65
End: 2020-11-23
Payer: COMMERCIAL

## 2020-11-23 DIAGNOSIS — R53.1 WEAKNESS: Primary | ICD-10-CM

## 2020-11-23 DIAGNOSIS — R27.8 OTHER LACK OF COORDINATION: ICD-10-CM

## 2020-11-23 DIAGNOSIS — M62.89 PELVIC FLOOR DYSFUNCTION: ICD-10-CM

## 2020-11-23 PROCEDURE — 97530 THERAPEUTIC ACTIVITIES: CPT

## 2020-11-23 PROCEDURE — 97140 MANUAL THERAPY 1/> REGIONS: CPT

## 2020-11-25 ENCOUNTER — PATIENT MESSAGE (OUTPATIENT)
Dept: GASTROENTEROLOGY | Facility: CLINIC | Age: 65
End: 2020-11-25

## 2020-11-30 ENCOUNTER — CLINICAL SUPPORT (OUTPATIENT)
Dept: REHABILITATION | Facility: OTHER | Age: 65
End: 2020-11-30
Attending: NURSE PRACTITIONER
Payer: COMMERCIAL

## 2020-11-30 DIAGNOSIS — R27.8 OTHER LACK OF COORDINATION: ICD-10-CM

## 2020-11-30 DIAGNOSIS — M62.89 PELVIC FLOOR DYSFUNCTION: ICD-10-CM

## 2020-11-30 DIAGNOSIS — R53.1 WEAKNESS: Primary | ICD-10-CM

## 2020-11-30 PROCEDURE — 97530 THERAPEUTIC ACTIVITIES: CPT

## 2020-11-30 PROCEDURE — 97140 MANUAL THERAPY 1/> REGIONS: CPT

## 2020-11-30 NOTE — PROGRESS NOTES
"  Pelvic Health Physical Therapy   Treatment Note     Name: Sheree Lomeli  Clinic Number: [id number]    Therapy Diagnosis:   Encounter Diagnoses   Name Primary?    Weakness Yes    Other lack of coordination     Pelvic floor dysfunction      Physician: Julia Burgos NP    Visit Date: 11/30/2020    Physician Orders: PT Eval and Treat   Medical Diagnosis from Referral: chronic constipation, pelvic floor weakness  Evaluation Date: 10/5/2020  Authorization Period Expiration: 12/31/2020  Plan of Care Expiration: 12-  Visit # / Visits authorized:5/ 20  Cancelled Visits: 0  No Show Visits: 0    Time In:  932  Time Out:  1030  Total Billable Time:  58 minutes    Precautions: Standard    Subjective     Pt reports: She went on a 6 hour car ride x 2 and reports decreased coccyx pain than she would usually have. She reports the dry needling helped because "I had a good week."      She reports 11 years ago she used to wake up in the middle of the night with a hot flash and searing tailbone pain that would radiate up her spine.  She reports that once the hot flashes stopped after 2-3 years.      She reports less coccyx pain since her last treatment. She is no longer having the fiery, hot, pain.  Has had 4 BM today.  "My tailbone is feeling much better."      BM daily: takes 1 magnesium tablet and 1 stool softener and fiber with coffee in the morning and is having a BM daily.  No straining.      She is sitting with weight equal.    If does not have a BM she has back pain.  Has to plan to get up 2 hours in advance when traveling to make sure she has a BM.  She would like to be able to not have to worry if she is going to have pain if she does not have a BM.    She was compliant with home exercise program.  Response to previous treatment: no issues reported  Functional change: less coccyx pain.  Pain: 1/10  Location:   And coccyx.        Objective   RECTAL PELVIC FLOOR EXAM    EXTERNAL ASSESSMENT  Anus: WNL  Skin " condition: WNL   Scarring: none  Sensation: WNL   Pain: none  Voluntary contraction: visible lift  Voluntary relaxation: visible drop  Bearing down: visible drop  Discharge: none       INTERNAL ASSESSMENT  EAS tone: WNL   Impaction: none   Pain: tender areas noted as follows: left coccygeus and levator ani  Sensation: able to localize pressure appropriately   Muscle Bulk: WNL  Muscle Power: 2/5     Quality of contraction: Improved relaxation time.  Able to fully relax now.   Specificity: WNL  Coordination: tends to hold breath during PFM contration   Comments: coccyx appears to be in a neutral position but very hypermobile.     Therapeutic Exercise to develop  flexibility for 00 minutes including:   Groin stretch 3x30 sec   Piriformis stretch 3x30 sec gilmar  Happy Baby 3x30 sec   Child's post 3x30 sec   Clamshells x 15    Kegel edu and practice.  Increased time spent on edu on proper technique.  Pt improves with practice and verbal cues.        Sheree received the following manual therapy techniques: to develop flexibility and extensibility for 40 minutes including: trigger point/myofascial release of coccygeous and levator ani groups Gilmar   Pt consents to intrarectal treatment of PFM today.       Soft Tissue Palpation Assessment to determine the necessity for Functional Dry Needling.  Discussed the purpose, mechanism, indications, and risks of dry needling with pt. Pt was cleared of all precautions and contraindications, and pt signed consent form for dry needling performance today by a qualified dry needling PT.     Functional Dry Needling performed to L3-5 multifidi.  60mm needle inserted to a bony backdrop.      Patient demonstrated appropriate response to Functional Dry Needling. Patient with improved overall tissue mobility with decreased tissue tension and trigger points upon palpation of treated muscle groups after Functional Dry Needling.      Sheree participated in neuromuscular re-education activities to  develop Coordination, Control and Down training for 00 minutes including: pelvic floor relaxation/bulging training  Pt consents to internal rectal pelvic floor treatment: Worked on pelvic floor muscle relaxation and coordination training using intravaginal tapping directly over muscle bellies and then asking the patient to soften the muscles where she feels the tap.  Also worked on diaphragmatic breathing to coordinate pelvic floor muscle relaxation.  Pt reports a decrease in pain to direct muscle palpation after relaxing and softening her pelvic muscles.      Therapeutic Activity to improve dynamic functional performance, coordination and education for 13 minutes. Including: edu on pathology and anatomy and the impact on pelvic floor muscle function.  Edu on importance of movement and exercise to help wit pain management.      Home Exercises Provided and Patient Education Provided     Education provided:   - anatomy/physiology of pelvic floor  Discussed progression of plan of care with patient; educated pt in activity modification; reviewed HEP with pt. Pt demonstrated and verbalized understanding of all instruction and was provided with a handout of HEP (see Patient Instructions).      Written Home Exercises Provided: yes.  Exercises were reviewed and Sheree was able to demonstrate them prior to the end of the session.  Sheree demonstrated good  understanding of the education provided.     See EMR under Patient Instructions for exercises provided 10/26/2020.    Assessment      Pt consents to intrarectal assessment and  treatment of PFM today. She is noted to have improved resting muscle tone and reports less pain with palpation around her coccyx.  Her coccyx is in a more neutral position today. Trigger point/myofascial release of coccygeous and levator ani groups Gilmar was performed.     Soft Tissue Palpation Assessment to determine the necessity for Functional Dry Needling.  Discussed the purpose, mechanism,  "indications, and risks of dry needling with pt. Pt was cleared of all precautions and contraindications, and pt signed consent form for dry needling performance today by a qualified dry needling PT. Functional Dry Needling performed to L3-5 multifidi.  Patient demonstrated appropriate response to Functional Dry Needling. Patient with improved overall tissue mobility with decreased tissue tension and trigger points upon palpation of treated muscle groups after Functional Dry Needling.    Sheree is progressing well towards her goals.   Pt prognosis is Excellent.     Pt will continue to benefit from skilled outpatient physical therapy to address the deficits listed in the problem list box on initial evaluation, provide pt/family education and to maximize pt's level of independence in the home and community environment.     Pt's spiritual, cultural and educational needs considered and pt agreeable to plan of care and goals.     Anticipated barriers to physical therapy: none    Goals:   Short Term Goals: 4 weeks   Pt to perform "the knack" prior to coughing, laughing or sneezing to decrease risk of incontinence.  Pt to report being able to sneeze without incontinence demonstrating improved pelvic floor strength and coordination to improve confidence in social situations  Pt to demonstrate proper diaphragmatic breathing to help with calming the nervous system in order to decrease pain and to improve abdominal wall musculature extensibility.   Pt to report minimal pain with palpation of abdominal wall due to improvement in soft tissue mobility from moderate to minimal restrictions.  Pt to report a decrease in pain to no more than 3 at it's worst with direct pelvic floor muscle provocation.          Long Term Goals: 12 weeks   Pt to be discharged with home plan for carry over after discharge.   Pt to report being able to have a comfortable vaginal exam without significant increase in pelvic pain.  Pt to report a decrease in " pain to no more than 2 at it's worst with direct pelvic floor muscle provocation.    Pt to report an improved ability to tolerate sitting without a significant increase in reported pain levels of her coccyx to allow her to better participate in social activities.     Plan     Plan of care Certification: 10/5/2020 to 12-.     Outpatient Physical Therapy 2 times weekly for 12 weeks to include the following interventions: therapeutic exercises, therapeutic activity, neuromuscular re-education, gait training, manual therapy, modalities PRN, patient/family education, dry needling and self care/home management    Laurita Zepeda, PT

## 2020-12-01 ENCOUNTER — CLINICAL SUPPORT (OUTPATIENT)
Dept: REHABILITATION | Facility: HOSPITAL | Age: 65
End: 2020-12-01
Payer: COMMERCIAL

## 2020-12-01 ENCOUNTER — TELEPHONE (OUTPATIENT)
Dept: REHABILITATION | Facility: HOSPITAL | Age: 65
End: 2020-12-01

## 2020-12-01 DIAGNOSIS — R29.3 POSTURE IMBALANCE: ICD-10-CM

## 2020-12-01 DIAGNOSIS — M62.81 WEAKNESS OF TRUNK MUSCULATURE: ICD-10-CM

## 2020-12-01 DIAGNOSIS — M53.86 DECREASED RANGE OF MOTION OF LUMBAR SPINE: ICD-10-CM

## 2020-12-01 PROCEDURE — 97110 THERAPEUTIC EXERCISES: CPT | Mod: PN

## 2020-12-01 NOTE — PROGRESS NOTES
[facility]      Name: Sheree Lomeli  Clinic Number: [id number]     Therapy Diagnosis:        Encounter Diagnoses   Name Primary?    Chronic left-sided low back pain without sciatica      Decreased range of motion of lumbar spine      Weakness of trunk musculature      Posture imbalance        Physician: Whitney Mera MD     Physician Orders: PT Eval and Treat    Medical Diagnosis from Referral: M54.5,G89.29 (ICD-10-CM) - Chronic left-sided low back pain without sciatica  Evaluation Date: 10/12/2020  Authorization Period Expiration: 20  Plan of Care Expiration: 21  Reassessment Due:  20  Visit # / Visits authorized: 10/ 20     Time In: 730  Time Out: 835  Total Billable Time:  65 minutes       Precautions: standard     Pattern of pain determined:  1 possible PEP      Subjective   Sheree reports continued minimal low back pain. She reports having 2 good weeks of manageable pain.      Patient reports tolerating previous visit well with no soreness from test  Patient reports their pain to be 2/10 on a 0-10 scale with 0 being no pain and 10 being the worst pain imaginable.  Pain Location: low back     Occupation: works for an 's office, standing desk  Leisure: walks        Objective   Baseline Isometric Testing on Med X equipment: Testing administered by PT  Date of testing: 10/12/20  ROM 0-42 deg   Max Peak Torque 82    Min Peak Torque 23    Flex/Ext Ratio 2.8/1   % below normative data -43      Midpoint Isometric Testing on Med X equipment: Testing administered by PT  Date of testin20  ROM 0-54 deg   Max Peak Torque 119   Min Peak Torque 85   Flex/Ext Ratio 1.4/1   % gain since initial visit 89   % below normative data -1       Limitation/Restriction for FOTO 10/12/20 Survey     Therapist reviewed FOTO scores for Sheree Lomeli on 10/12/2020.   FOTO documents entered into [employer] - see Media section.     Limitation Score: 37%  Goal  25  "%          Treatment    Pt was instructed in and performed the following:     Sheree received therapeutic exercises to develop/improved posture, cardiovascular endurance, muscular endurance, lumbar ROM, strength and muscular endurance for 65 minutes including the following exercises:       PPT with bilat hip drop outs 2x10  Dead bugs x 12 ea    QP rocking into child's pose w/ manual postural cue 5x10"  QP multifidus lift with half roll x 8 ea  Standing partial/deep squat with BUE support 5x5"  SL QL stretch on small bolster 5x10"     [facility] 12/1/2020   Visit Number 10   VAS Pain Rating 2   Treadmill Time (in min.) 10   Speed 2.6   Flexion in Lying 10   Lumbar Flexion 54   Lumbar Extension 0   Lumbar Peak Torque 119   Min Torque 85   Test Percent Below Normative Data 1   Test Percent Gain in Strength from Initial  89        Peripheral muscle strengthening which included 1 set of 15-20 repetitions at a slow, controlled 10-13 second per rep pace focused on strengthening supporting musculature for improved body mechanics and functional mobility.  Pt and therapist focused on proper form during treatment to ensure optimal strengthening of each targeted muscle group.  Machines were utilized including torso rotation, chest press and row.   Leg extension, leg curl,  Tricep extension, bicep curl, leg press, and hip abduction  And add added visit 3    Sheree received the following manual therapy techniques: none      Home Exercises Provided and Patient Education Provided   Home exercises include: ext in lie with sag, and with towel over pressure by  and quad lumborum stretch  Bridge with ball squeeze and glut squeeze 10 reps  Transverse abd with pelvic tilt, hip drop outs, and marching 10 reps daily  Cardio program:walk 2 miles 4/week    Started 3 of 6 morning regiment:  Jog on spot 2 sets 30 sec  Mini lat jumps 2 reps 30 sec  Jumping jacks side and forward, each 30 reps or 1 min    Plan to add:  Knee lift " with arm swing  shuffle    Education provided:   - rationale for POC and progression   - updated on progress     Written Home Exercises Provided: yes.  Exercises were reviewed and Sheree was able to demonstrate them prior to the end of the session.  Sheree demonstrated good  understanding of the education provided.     See EMR under Patient Instructions for exercises provided 11/5/20        Assessment   Sheree performed well in today's session. MedX strength reassessment performed today, with Sheree demonstrating 89% strength gain since initial evaluation and increased ROM to 0-54 degrees. Overall, she performed 1% below age-predicted strength norms. Stretches into lumbar flexion and peripheral strengthening continued with good tolerance. PT to prepare for transition to wellness NV per request. FOTO not performed this session due to time constraints; PT to administer NV. Overall, Sheree has met 4/4 STG and 2/7 LTG. PT to further evaluate LTG NV.     Pt will continue to benefit from skilled outpatient physical therapy to address the deficits stated in the impairment chart, provide pt/family education and to maximize pt's level of independence in the home and community environment.     Anticipated Barriers for therapy: nil  Pt's spiritual, cultural and educational needs considered and pt agreeable to plan of care and goals as stated below:         Goals:     Short term goals:  6 weeks or 10 visits   1.  Pt will demonstrate increased lumbar ROM by at least 3 degrees from the initial ROM value with improvements noted in functional ROM and ability to perform ADLs.  MET 12/1/2020  2.  Pt will demonstrate increased MedX average isometric strength value  by 10% from initial test resulting in improved ability to perform bending, lifting, and carrying activities safely, confidently.MET 12/1/2020  3.  Patient report a reduction in worst pain score by 1-2 points for improved tolerance for 5/10 at worst. MET 12/1/2020  4.  Pt  able to perform HEP correctly with minimal cueing or supervision from therapist to encourage independent management of symptoms. MET 12/1/2020        Long term goals: 10 weeks or 20 visits   1. Pt will demonstrate increased lumbar ROM by at least 6 degrees from initial ROM value, resulting in improved ability to perform functional fwd bending while standing and sitting. MET 12/1/2020  2. Pt will demonstrate increased MedX average isometric strength value  by 25% from initial test resulting in improved ability to perform bending, lifting, and carrying activities safely, confidently. MET 12/1/2020  3. Pt to demonstrate ability to independently control and reduce their pain through posture positioning and mechanical movements throughout a typical day. Appropriate and ongoing  4.  Pt will demonstrate reduced pain and improved functional outcomes as reported on the FOTO by reaching a limitation score of < or = 25% or less in order to demonstrate subjective improvement in pt's condition.  Appropriate and ongoing  5. Pt will demonstrate independence with the HEP at discharge  Appropriate and ongoing  6.  Return to regular exercise in the morning, in the past  She did a rotation of 6 exercises every morning  Appropriate and ongoing  7. Pain 4/10 at worst and intermittent in back  Appropriate and ongoing       Plan   Continue with established Plan of Care towards established PT goals.     SLIME DUPONT, PT, DPT  12/1/2020

## 2020-12-04 ENCOUNTER — CLINICAL SUPPORT (OUTPATIENT)
Dept: REHABILITATION | Facility: HOSPITAL | Age: 65
End: 2020-12-04
Payer: COMMERCIAL

## 2020-12-04 DIAGNOSIS — M62.81 WEAKNESS OF TRUNK MUSCULATURE: ICD-10-CM

## 2020-12-04 DIAGNOSIS — M53.86 DECREASED RANGE OF MOTION OF LUMBAR SPINE: ICD-10-CM

## 2020-12-04 DIAGNOSIS — R29.3 POSTURE IMBALANCE: ICD-10-CM

## 2020-12-04 PROCEDURE — 97110 THERAPEUTIC EXERCISES: CPT | Mod: PN

## 2020-12-04 NOTE — PROGRESS NOTES
[facility]      Name: Sheree Lomeli  Clinic Number: [id number]     Therapy Diagnosis:        Encounter Diagnoses   Name Primary?    Chronic left-sided low back pain without sciatica      Decreased range of motion of lumbar spine      Weakness of trunk musculature      Posture imbalance        Physician: Whitney Mera MD     Physician Orders: PT Eval and Treat    Medical Diagnosis from Referral: M54.5,G89.29 (ICD-10-CM) - Chronic left-sided low back pain without sciatica  Evaluation Date: 10/12/2020  Authorization Period Expiration: 20  Plan of Care Expiration: 21  Reassessment Due:  20  Visit # / Visits authorized:      Time In: 705  Time Out: 805  Total Billable Time:  60 minutes       Precautions: standard     Pattern of pain determined:  1 possible PEP      Subjective   Sheree reports this week has not been great, slightly more irritated and feeling it down into her posterior thigh for the first time in a while; she attributes this to the cold weather but isn't entirely sure. Felt much better this morning after a bowel movement but overall the pain has been worse than usual this week, though reported as better by end of session    Patient reports tolerating previous visit well with no soreness from test  Patient reports their pain to be 3/10 on a 0-10 scale with 0 being no pain and 10 being the worst pain imaginable, down to 1/10 at end of session.  Pain Location: low back     Occupation: works for an 's office, standing desk  Leisure: walks        Objective   Baseline Isometric Testing on Med X equipment: Testing administered by PT  Date of testing: 10/12/20  ROM 0-42 deg   Max Peak Torque 82    Min Peak Torque 23    Flex/Ext Ratio 2.8/1   % below normative data -43      Midpoint Isometric Testing on Med X equipment: Testing administered by PT  Date of testin20  ROM 0-54 deg   Max Peak Torque 119   Min Peak Torque 85  "  Flex/Ext Ratio 1.4/1   % gain since initial visit 89   % below normative data -1       Limitation/Restriction for FOTO 10/12/20 Survey     Therapist reviewed FOTO scores for Sheree Lomeli on 10/12/2020.   FOTO documents entered into [employer] - see Media section.     Limitation Score: 37%  Goal  25 %  Discharge: 25%          Treatment    Pt was instructed in and performed the following:     Sheree received therapeutic exercises to develop/improved posture, cardiovascular endurance, muscular endurance, lumbar ROM, strength and muscular endurance for 65 minutes including the following exercises:       PPT with bilat hip drop outs 2x10  Dead bugs x 12 ea  QP rocking into child's pose w/ manual postural cue 5x10"  QP multifidus lift with half roll x 8 ea  Standing partial/deep squat with BUE support 5x5"  SL QL stretch on small bolster 5x10"     [facility] 12/4/2020   Visit Number 11   VAS Pain Rating 2   Treadmill Time (in min.) 10   Speed 2.8   Extension in Lying -   Extension in Standing -   Flexion in Lying 10   Lumbar Extension - Seat Pad -   Femur Restraint -   Top Dead Center -   Counterweight -   Lumbar Flexion -   Lumbar Extension -   Lumbar Peak Torque -   Lumbar Weight 65   Repetitions 15   Rating of Perceived Exertion 5          Peripheral muscle strengthening which included 1 set of 15-20 repetitions at a slow, controlled 10-13 second per rep pace focused on strengthening supporting musculature for improved body mechanics and functional mobility.  Pt and therapist focused on proper form during treatment to ensure optimal strengthening of each targeted muscle group.  Machines were utilized including torso rotation, chest press and row.   Leg extension, leg curl,  Tricep extension, bicep curl, leg press, and hip abduction  And add added visit 3    Sheree received the following manual therapy techniques: none      Home Exercises Provided and Patient Education Provided   Home exercises " include: ext in lie with sag, and with towel over pressure by  and quad lumborum stretch  Bridge with ball squeeze and glut squeeze 10 reps  Transverse abd with pelvic tilt, hip drop outs, and marching 10 reps daily  Cardio program:walk 2 miles 4/week      Education provided:   - Review of HEP including focus on multifidus strengthening     Written Home Exercises Provided: yes.  Exercises were reviewed and Sheree was able to demonstrate them prior to the end of the session.  Sheree demonstrated good  understanding of the education provided.     See EMR under Patient Instructions for exercises provided 11/5/20        Assessment   Patient has attended 11 visits of the [facility] program for aerobic work, med ex isometric testing with dynamic strengthening on med ex equipment for spine, whole body strengthening on med ex equipment, HEP, education.  Patient has completed [facility] Program and is ready to be transitioned into wellness program.  Importance of wellness program, and attending 1/week to maintain strength stressed.  Importance of continuing there ex and body mech and ergonomics stressed.   Patient demonstrates improvement in ability to reduce symptoms, improved posture, improved ROM and improved strength.   Reviewed HEP, and importance of maintaining a healthy spine through continued stretching and performance of HEP, good posture, good ergonomics, good body mech with ADL, leisure, and work.  Discharge handout with HEP given, and discussed aspects of exercise program, cardio, strengthening, and stretching.    Patient expressed understanding information given.  Patient plans to attend wellness and is ready to transition to wellness.      -Improved posture,   better using lumbar roll  -Improved lumbar ROM,  initially on med ex test 0-42 and   currently 0-54  -Improved strength at each test point on lumbar med ex IM test with   89% average improvement noted with Reduced pain  Noted by  patient  -Initial FOTO score 37% impairment and current outcome tool score  25% impairment indicating reduced pain and improved function          Sheree said that she still is at her worst when she first wakes up; discussed sleeping position and she tends to sleep on her side with her top leg in FADIR position that is potentially strenuous for SIJ; encouraged patient to use a firm pillow for support so she can sleep there without relaxing into further adduction and internal rotation rather than forcing herself to try and sleep in equal and neutral LE positioning that isn't yet comfortable. Patient verbalized understanding and will begin trying this. Sheree only attended 11 visits but was worried about therapy costs resetting, and has been able to achieve all initially established therapy goals in those visits.          Anticipated Barriers for therapy: nil  Pt's spiritual, cultural and educational needs considered and pt agreeable to plan of care and goals as stated below:         Goals:     Short term goals:  6 weeks or 10 visits   1.  Pt will demonstrate increased lumbar ROM by at least 3 degrees from the initial ROM value with improvements noted in functional ROM and ability to perform ADLs.  MET 12/1/2020  2.  Pt will demonstrate increased MedX average isometric strength value  by 10% from initial test resulting in improved ability to perform bending, lifting, and carrying activities safely, confidently.MET 12/1/2020  3.  Patient report a reduction in worst pain score by 1-2 points for improved tolerance for 5/10 at worst. MET 12/1/2020  4.  Pt able to perform HEP correctly with minimal cueing or supervision from therapist to encourage independent management of symptoms. MET 12/1/2020        Long term goals: 10 weeks or 20 visits   1. Pt will demonstrate increased lumbar ROM by at least 6 degrees from initial ROM value, resulting in improved ability to perform functional fwd bending while standing and sitting.  MET 12/1/2020  2. Pt will demonstrate increased MedX average isometric strength value  by 25% from initial test resulting in improved ability to perform bending, lifting, and carrying activities safely, confidently. MET 12/1/2020  3. Pt to demonstrate ability to independently control and reduce their pain through posture positioning and mechanical movements throughout a typical day. MET 12/4/2020  4.  Pt will demonstrate reduced pain and improved functional outcomes as reported on the FOTO by reaching a limitation score of < or = 25% or less in order to demonstrate subjective improvement in pt's condition.  MET 12/1/2020  5. Pt will demonstrate independence with the HEP at discharge  MET 12/4/2020  6.  Return to regular exercise in the morning, in the past  She did a rotation of 6 exercises every morning  MET 12/4/2020  7. Pain 4/10 at worst and intermittent in back  MET 12/4/2020       Plan   Discharge from healthy back program, will begin wellness program first week of January due to holidays.     Wali Wolfe, PT, DPT  12/4/2020

## 2020-12-09 ENCOUNTER — CLINICAL SUPPORT (OUTPATIENT)
Dept: REHABILITATION | Facility: OTHER | Age: 65
End: 2020-12-09
Attending: NURSE PRACTITIONER
Payer: COMMERCIAL

## 2020-12-09 DIAGNOSIS — R27.8 OTHER LACK OF COORDINATION: ICD-10-CM

## 2020-12-09 DIAGNOSIS — M62.89 PELVIC FLOOR DYSFUNCTION: ICD-10-CM

## 2020-12-09 DIAGNOSIS — R53.1 WEAKNESS: Primary | ICD-10-CM

## 2020-12-09 PROCEDURE — 97140 MANUAL THERAPY 1/> REGIONS: CPT

## 2020-12-09 PROCEDURE — 97110 THERAPEUTIC EXERCISES: CPT

## 2020-12-09 NOTE — PROGRESS NOTES
"  Pelvic Health Physical Therapy   Treatment Note     Name: Sheree Lomeli  Clinic Number: [id number]    Therapy Diagnosis:   Encounter Diagnoses   Name Primary?    Weakness Yes    Other lack of coordination     Pelvic floor dysfunction      Physician: Julia Burgos NP    Visit Date: 12/9/2020    Physician Orders: PT Eval and Treat   Medical Diagnosis from Referral: chronic constipation, pelvic floor weakness  Evaluation Date: 10/5/2020  Authorization Period Expiration: 12/31/2020  Plan of Care Expiration: 12-  Visit # / Visits authorized: 6/ 20  Cancelled Visits: 0  No Show Visits: 0    Time In:  1030  Time Out:  1130  Total Billable Time:  60 minutes    Precautions: Standard    Subjective     Pt reports:   She reports her tail bone is "fiery for the last couple of days."  She attributes it to the cold weather possibly.  The tailbone pain started on the drive to her parents house and back.  She reports she had a firm BM yesterday that was a little hard to pass.  She has had to take OTC pain medication due to back pain also.      No issues reported with dry needling.        Previous history:  She reports 11 years ago she used to wake up in the middle of the night with a hot flash and searing tailbone pain that would radiate up her spine.  She reports that once the hot flashes stopped after 2-3 years.      BM daily: takes 1 magnesium tablet and 1 stool softener and fiber with coffee in the morning and is having a BM daily.  No straining.      She is sitting with weight equal.    If does not have a BM she has back pain.  Has to plan to get up 2 hours in advance when traveling to make sure she has a BM.  She would like to be able to not have to worry if she is going to have pain if she does not have a BM.    She was compliant with home exercise program.    Response to previous treatment: no issues reported  Functional change: less coccyx pain until recently   Pain: 3/10  Location:   And coccyx.    "     Objective   RECTAL PELVIC FLOOR EXAM    EXTERNAL ASSESSMENT  Anus: WNL  Skin condition: WNL   Scarring: none  Sensation: WNL   Pain: none  Voluntary contraction: visible lift  Voluntary relaxation: visible drop  Bearing down: visible drop  Discharge: none       INTERNAL ASSESSMENT  EAS tone: WNL   Impaction: none   Pain: tender areas noted as follows: left coccygeus and levator ani  Sensation: able to localize pressure appropriately   Muscle Bulk: WNL  Muscle Power: 2/5     Quality of contraction: Improved relaxation time.  Able to fully relax now.   Specificity: WNL  Coordination: tends to hold breath during PFM contration   Comments: coccyx appears to be in a neutral position but very hypermobile.     Therapeutic Exercise to develop  flexibility for 20 minutes including:   Groin stretch 3x30 sec   Piriformis stretch 3x30 sec gilmar  Happy Baby 3x30 sec   Child's pose 3x30 sec   Clamshells x 15  Posterior pelvic tilt 5 sec x 10 reps x 2 sets     Not performed today:  Kenzie edu and practice.  Increased time spent on edu on proper technique.  Pt improves with practice and verbal cues.        Sheree received the following manual therapy techniques: to develop flexibility and extensibility for 40 minutes including: trigger point/myofascial release of coccygeous and levator ani groups Gilmar CF massage to sacrospinous ligament internally and sacrotuberous ligament externally gilmar.    Pt consents to intrarectal treatment of PFM today.     L5-S1 UPA gr II on right     Edu on use of a raquette ball for self-release of posterior pelvic floor structures.  Practiced technique and made modifications as necessary for comfort.       Not performed today:  Soft Tissue Palpation Assessment to determine the necessity for Functional Dry Needling.  Discussed the purpose, mechanism, indications, and risks of dry needling with pt. Pt was cleared of all precautions and contraindications, and pt signed consent form for dry needling  performance today by a qualified dry needling PT.     Functional Dry Needling performed to L3-5 multifidi.  60mm needle inserted to a bony backdrop.      Patient demonstrated appropriate response to Functional Dry Needling. Patient with improved overall tissue mobility with decreased tissue tension and trigger points upon palpation of treated muscle groups after Functional Dry Needling.      Sheree participated in neuromuscular re-education activities to develop Coordination, Control and Down training for 00 minutes including: pelvic floor relaxation/bulging training  Pt consents to internal rectal pelvic floor treatment: Worked on pelvic floor muscle relaxation and coordination training using intravaginal tapping directly over muscle bellies and then asking the patient to soften the muscles where she feels the tap.  Also worked on diaphragmatic breathing to coordinate pelvic floor muscle relaxation.  Pt reports a decrease in pain to direct muscle palpation after relaxing and softening her pelvic muscles.      Therapeutic Activity to improve dynamic functional performance, coordination and education for 00 minutes. Including: edu on pathology and anatomy and the impact on pelvic floor muscle function.  Edu on importance of movement and exercise to help wit pain management.      Home Exercises Provided and Patient Education Provided     Education provided:   - anatomy/physiology of pelvic floor  Discussed progression of plan of care with patient; educated pt in activity modification; reviewed HEP with pt. Pt demonstrated and verbalized understanding of all instruction and was provided with a handout of HEP (see Patient Instructions).      Written Home Exercises Provided: yes.  Exercises were reviewed and Sheree was able to demonstrate them prior to the end of the session.  Sheree demonstrated good  understanding of the education provided.     See EMR under Patient Instructions for exercises provided  "10/26/2020.    Assessment      Pt consents to intrarectal assessment and  treatment of PFM today. She is noted to have improved resting muscle tone and reports less pain with palpation around her coccyx.  Her coccyx remains is in a more neutral position today. Increased pain reported with palpation over the sacrospinous ligament Trigger point/myofascial release of coccygeous and levator ani groups Gilmar was performed.     Continued hip stretching and strengthening today.      Sheree is progressing well towards her goals.   Pt prognosis is Excellent.     Pt will continue to benefit from skilled outpatient physical therapy to address the deficits listed in the problem list box on initial evaluation, provide pt/family education and to maximize pt's level of independence in the home and community environment.     Pt's spiritual, cultural and educational needs considered and pt agreeable to plan of care and goals.     Anticipated barriers to physical therapy: none    Goals:   Short Term Goals: 4 weeks   Pt to perform "the knack" prior to coughing, laughing or sneezing to decrease risk of incontinence.  Pt to report being able to sneeze without incontinence demonstrating improved pelvic floor strength and coordination to improve confidence in social situations  Pt to demonstrate proper diaphragmatic breathing to help with calming the nervous system in order to decrease pain and to improve abdominal wall musculature extensibility.   Pt to report minimal pain with palpation of abdominal wall due to improvement in soft tissue mobility from moderate to minimal restrictions.  Pt to report a decrease in pain to no more than 3 at it's worst with direct pelvic floor muscle provocation.          Long Term Goals: 12 weeks   Pt to be discharged with home plan for carry over after discharge.   Pt to report being able to have a comfortable vaginal exam without significant increase in pelvic pain.  Pt to report a decrease in pain to no " more than 2 at it's worst with direct pelvic floor muscle provocation.    Pt to report an improved ability to tolerate sitting without a significant increase in reported pain levels of her coccyx to allow her to better participate in social activities.     Plan     Plan of care Certification: 10/5/2020 to 12-.     Outpatient Physical Therapy 2 times weekly for 12 weeks to include the following interventions: therapeutic exercises, therapeutic activity, neuromuscular re-education, gait training, manual therapy, modalities PRN, patient/family education, dry needling and self care/home management    Laurita Zepeda, PT

## 2020-12-11 PROBLEM — R10.9 ABDOMINAL PAIN: Status: RESOLVED | Noted: 2020-11-03 | Resolved: 2020-12-11

## 2020-12-14 ENCOUNTER — CLINICAL SUPPORT (OUTPATIENT)
Dept: REHABILITATION | Facility: OTHER | Age: 65
End: 2020-12-14
Attending: NURSE PRACTITIONER
Payer: COMMERCIAL

## 2020-12-14 DIAGNOSIS — R27.8 OTHER LACK OF COORDINATION: ICD-10-CM

## 2020-12-14 DIAGNOSIS — R53.1 WEAKNESS: Primary | ICD-10-CM

## 2020-12-14 DIAGNOSIS — M62.89 PELVIC FLOOR DYSFUNCTION: ICD-10-CM

## 2020-12-14 PROCEDURE — 97140 MANUAL THERAPY 1/> REGIONS: CPT

## 2020-12-14 PROCEDURE — 97110 THERAPEUTIC EXERCISES: CPT

## 2020-12-16 ENCOUNTER — PATIENT MESSAGE (OUTPATIENT)
Dept: FAMILY MEDICINE | Facility: CLINIC | Age: 65
End: 2020-12-16

## 2020-12-16 ENCOUNTER — OFFICE VISIT (OUTPATIENT)
Dept: FAMILY MEDICINE | Facility: CLINIC | Age: 65
End: 2020-12-16
Payer: COMMERCIAL

## 2020-12-16 VITALS
BODY MASS INDEX: 25.88 KG/M2 | HEIGHT: 64 IN | DIASTOLIC BLOOD PRESSURE: 80 MMHG | HEART RATE: 70 BPM | WEIGHT: 151.56 LBS | OXYGEN SATURATION: 97 % | SYSTOLIC BLOOD PRESSURE: 120 MMHG | TEMPERATURE: 99 F

## 2020-12-16 DIAGNOSIS — Z71.89 ADVANCED DIRECTIVES, COUNSELING/DISCUSSION: ICD-10-CM

## 2020-12-16 DIAGNOSIS — M62.89 PELVIC FLOOR DYSFUNCTION: ICD-10-CM

## 2020-12-16 DIAGNOSIS — M53.3 COCCYGODYNIA: ICD-10-CM

## 2020-12-16 DIAGNOSIS — K21.9 GASTROESOPHAGEAL REFLUX DISEASE WITHOUT ESOPHAGITIS: ICD-10-CM

## 2020-12-16 DIAGNOSIS — Z23 FLU VACCINE NEED: ICD-10-CM

## 2020-12-16 DIAGNOSIS — Z23 NEED FOR SHINGLES VACCINE: ICD-10-CM

## 2020-12-16 DIAGNOSIS — Z00.00 ROUTINE MEDICAL EXAM: Primary | ICD-10-CM

## 2020-12-16 PROCEDURE — 1125F PR PAIN SEVERITY QUANTIFIED, PAIN PRESENT: ICD-10-PCS | Mod: S$GLB,,, | Performed by: INTERNAL MEDICINE

## 2020-12-16 PROCEDURE — 99999 PR PBB SHADOW E&M-EST. PATIENT-LVL IV: ICD-10-PCS | Mod: PBBFAC,,, | Performed by: INTERNAL MEDICINE

## 2020-12-16 PROCEDURE — 1125F AMNT PAIN NOTED PAIN PRSNT: CPT | Mod: S$GLB,,, | Performed by: INTERNAL MEDICINE

## 2020-12-16 PROCEDURE — 99396 PREV VISIT EST AGE 40-64: CPT | Mod: S$GLB,,, | Performed by: INTERNAL MEDICINE

## 2020-12-16 PROCEDURE — 99999 PR PBB SHADOW E&M-EST. PATIENT-LVL IV: CPT | Mod: PBBFAC,,, | Performed by: INTERNAL MEDICINE

## 2020-12-16 PROCEDURE — 3008F PR BODY MASS INDEX (BMI) DOCUMENTED: ICD-10-PCS | Mod: CPTII,S$GLB,, | Performed by: INTERNAL MEDICINE

## 2020-12-16 PROCEDURE — 99396 PR PREVENTIVE VISIT,EST,40-64: ICD-10-PCS | Mod: S$GLB,,, | Performed by: INTERNAL MEDICINE

## 2020-12-16 PROCEDURE — 3008F BODY MASS INDEX DOCD: CPT | Mod: CPTII,S$GLB,, | Performed by: INTERNAL MEDICINE

## 2020-12-16 RX ORDER — IBUPROFEN 600 MG/1
TABLET ORAL
Qty: 90 TABLET | Refills: 0 | Status: SHIPPED | OUTPATIENT
Start: 2020-12-16 | End: 2021-02-05 | Stop reason: SDUPTHER

## 2020-12-16 NOTE — PROGRESS NOTES
HISTORY OF PRESENT ILLNESS:  Sheree Lomeli is a 65 y.o. female who presents to the clinic today for a routine physical exam. Her last physical exam was approximately 1 years(s) ago.        PAST MEDICAL HISTORY:  Past Medical History:   Diagnosis Date    Arthritis     Back pain     Chronic pelvic pain in female     left-sided    De Quervain's tenosynovitis     left    Headache(784.0)     Migraine maybe 1-2 times per year    PONV (postoperative nausea and vomiting)     Right carpal tunnel syndrome     Urinary incontinence     occ kira       PAST SURGICAL HISTORY:  Past Surgical History:   Procedure Laterality Date    ANTERIOR VAGINAL REPAIR N/A 8/13/2020    Procedure: COLPORRHAPHY, ANTERIOR;  Surgeon: Salina Mukherjee MD;  Location: [facility] OR;  Service: OB/GYN;  Laterality: N/A;    COLONOSCOPY N/A 11/3/2020    Procedure: COLONOSCOPY;  Surgeon: Parish Pulido MD;  Location: [facility] (4TH FLR);  Service: Endoscopy;  Laterality: N/A;  colonoscopy added to EGD order    CYSTOSCOPY N/A 8/13/2020    Procedure: CYSTOSCOPY;  Surgeon: Salina Mukherjee MD;  Location: [facility] OR;  Service: OB/GYN;  Laterality: N/A;    EPIDURAL STEROID INJECTION N/A 6/12/2018    Procedure: INJECTION-STEROID-EPIDURAL;  Surgeon: Rianna Nina MD;  Location: [facility] PAIN MGT;  Service: Pain Management;  Laterality: N/A;  Coccygeal Nerve Block  57429  (0.5mg Niravam only)    *Pt wants to know if she has NO SEDATION at all, can pt drive herself home*    ESOPHAGOGASTRODUODENOSCOPY N/A 11/3/2020    Procedure: ESOPHAGOGASTRODUODENOSCOPY (EGD);  Surgeon: Parish Pulido MD;  Location: [facility] ENDO (4TH FLR);  Service: Endoscopy;  Laterality: N/A;  Schedule ASAP - call Dr. Pulido with schedule problems  10/31-covid [facility]-pt going out of town did not want earlier date-tb    HYSTEROSCOPY WITH DILATION AND CURETTAGE OF UTERUS N/A 8/13/2020    Procedure: HYSTEROSCOPY, WITH DILATION AND CURETTAGE OF UTERUS;  Surgeon: Salina Mukherjee,  MD;  Location: [facility];  Service: OB/GYN;  Laterality: N/A;    OOPHORECTOMY      Left ovary       SOCIAL HISTORY:  Social History     Socioeconomic History    Marital status:      Spouse name: Not on file    Number of children: 2    Years of education: Not on file    Highest education level: Not on file   Occupational History    Occupation:      Employer: [employer]   Social Needs    Financial resource strain: Not hard at all    Food insecurity     Worry: Never true     Inability: Never true    Transportation needs     Medical: No     Non-medical: No   Tobacco Use    Smoking status: Never Smoker    Smokeless tobacco: Never Used   Substance and Sexual Activity    Alcohol use: No     Frequency: Never     Binge frequency: Never    Drug use: No    Sexual activity: Yes     Partners: Male   Lifestyle    Physical activity     Days per week: 3 days     Minutes per session: 10 min    Stress: Not at all   Relationships    Social connections     Talks on phone: More than three times a week     Gets together: Once a week     Attends Religion service: Not on file     Active member of club or organization: Yes     Attends meetings of clubs or organizations: More than 4 times per year     Relationship status:    Other Topics Concern    Not on file   Social History Narrative    Not on file       FAMILY HISTORY:  Family History   Problem Relation Age of Onset    Heart disease Father         s/p stenting    Coronary artery disease Father     Prostate cancer Father     Cancer Father         prostate     Hypoglycemic Sister     Breast cancer Daughter 35        dx in September 2018, lives in [place]    No Known Problems Sister     Diabetes Maternal Uncle     Diabetes Cousin     Hypertension Neg Hx     Stroke Neg Hx     Colon cancer Neg Hx     Cervical cancer Neg Hx     Vaginal cancer Neg Hx     Endometrial cancer Neg Hx     Ovarian cancer Neg Hx        ALLERGIES AND  MEDICATIONS: updated and reviewed.  Review of patient's allergies indicates:   Allergen Reactions    Adhesive Rash    Iodine Rash    Shellfish containing products Rash     Patient allergic to softshell crabs specifically    Sulfa (sulfonamide antibiotics) Rash     Medication List with Changes/Refills   Current Medications    ASCORBIC ACID, VITAMIN C, (VITAMIN C) 1000 MG TABLET    Take 1,000 mg by mouth once daily.    BUTALBITAL-ASPIRIN-CAFFEINE -40 MG (FIORINAL) -40 MG CAP    Take 1 capsule by mouth every 4 (four) hours as needed.    CYCLOBENZAPRINE (FLEXERIL) 5 MG TABLET        DEXTRIN (FIBER POWDER ORAL)    Take by mouth.    ESTRADIOL (ESTRACE) 0.01 % (0.1 MG/GRAM) VAGINAL CREAM    USE 0.5 GRAM VAGINALLY EVERY NIGHT FOR 2 WEEKS THEN USE VAGINALLY 2 TIMES PER WEEK THEREAFTER    MAGNESIUM ORAL    Take by mouth.    MULTIVITAMIN (ONE DAILY MULTIVITAMIN) PER TABLET    Take 1 tablet by mouth once daily.    OMEGA-3 FATTY ACIDS/FISH OIL (FISH OIL-OMEGA-3 FATTY ACIDS) 300-1,000 MG CAPSULE    Take by mouth once daily.    PANTOPRAZOLE (PROTONIX) 40 MG TABLET    Take 1 tablet (40 mg total) by mouth once daily.    SUCRALFATE (CARAFATE) 100 MG/ML SUSPENSION    TAKE 10 ML BY MOUTH FOUR TIMES DAILY: BEFORE MEALS AND NIGHTLY    VITAMIN D (VITAMIN D3) 1000 UNITS TAB    Take 1,000 Units by mouth once daily.   Changed and/or Refilled Medications    Modified Medication Previous Medication    IBUPROFEN (ADVIL,MOTRIN) 600 MG TABLET ibuprofen (ADVIL,MOTRIN) 600 MG tablet       TAKE 1 TABLET(600 MG) BY MOUTH EVERY 12 HOURS AS NEEDED    TAKE 1 TABLET(600 MG) BY MOUTH EVERY 4 HOURS AS NEEDED   Discontinued Medications    IBUPROFEN (ADVIL,MOTRIN) 800 MG TABLET    Take 1 tablet (800 mg total) by mouth every 8 (eight) hours as needed for Pain.          CARE TEAM:  Patient Care Team:  Whitney Mera MD as PCP - General (Internal Medicine)  Dorothy Lantigua LPN as Licensed Practical Nurse           SCREENING  "HISTORY:  Health Maintenance       Date Due Completion Date    HIV Screening 06/11/1970 ---    Shingles Vaccine (2 of 3) 11/01/2017 9/6/2017    Influenza Vaccine (1) 08/01/2020 3/4/2020 (Declined)    Override on 3/4/2020: Declined    Lipid Panel 02/25/2022 2/25/2017    Mammogram 09/24/2022 9/24/2020    Pap Smear with HPV Cotest 09/10/2023 9/10/2018    Override on 4/6/2011: Done    TETANUS VACCINE 07/29/2025 7/29/2015    Colorectal Cancer Screening 11/03/2025 11/3/2020    Override on 9/12/2006: Done            REVIEW OF SYSTEMS:   The patient reports: good dietary habits.  The patient reports : that they exercise regularly.  Review of Systems   Constitutional: Negative for chills, fatigue, fever and unexpected weight change.   HENT: Negative for congestion and postnasal drip.    Eyes: Negative for pain and visual disturbance.   Respiratory: Negative for cough, shortness of breath and wheezing.    Cardiovascular: Negative for chest pain, palpitations and leg swelling.   Gastrointestinal: Negative for abdominal pain, constipation, diarrhea, nausea and vomiting.   Genitourinary: Negative for dysuria.   Musculoskeletal: Positive for back pain (- chronic). Negative for arthralgias.   Skin: Negative for rash.   Neurological: Negative for weakness and headaches.   Psychiatric/Behavioral: Negative for dysphoric mood and sleep disturbance. The patient is not nervous/anxious.       ROS (Optional)-: no pelvic pain  Breast ROS (Optional)-: negative for breast lumps/discharge            Physical Examination:   Vitals:    12/16/20 0910   BP: 120/80   Pulse:    Temp:      Weight: 68.8 kg (151 lb 9.1 oz)   Height: 5' 4" (162.6 cm)   Body mass index is 26.02 kg/m².      Patient did not require to have a chaperone present during the exam today.    General appearance - alert, well appearing, and in no distress, overweight  Psychiatric - alert, oriented to person, place, and time, normal mood, behavior, speech, dress, motor activity, " and thought processes  Eyes - pupils equal and reactive, extraocular eye movements intact, sclera anicteric  Mouth - not examined; patient wearing mask due to Covid 19 pandemic  Neck - supple, no significant adenopathy, carotids upstroke normal bilaterally, no bruits, thyroid exam: thyroid is normal in size without nodules or tenderness  Lymphatics - no palpable cervical lymphadenopathy  Chest - clear to auscultation, no wheezes, rales or rhonchi, symmetric air entry  Heart - normal rate and regular rhythm, no gallops noted  Abdomen - soft, nontender, nondistended, no masses or organomegaly  Back exam - not examined  Neurological - alert, normal speech, no focal findings or movement disorder noted, cranial nerves II through XII intact  Musculoskeletal - no joint tenderness, deformity or swelling, no muscular tenderness noted  Extremities - peripheral pulses normal, no pedal edema, no clubbing or cyanosis  Skin - normal coloration and turgor, no rashes, no suspicious skin lesions noted      Labs:  No labs needed at this time      ASSESSMENT AND PLAN:  1. Routine medical exam  Counseled on age appropriate medical preventative services including age appropriate cancer screenings, age appropriate eye and dental exams, over all nutritional health, need for a consistent exercise regimen, and an over all push towards maintaining a vigorous and active lifestyle.  Counseled on age appropriate vaccines and discussed upcoming health care needs based on age/gender. Discussed good sleep hygiene and stress management.    2. Coccygodynia/3. Pelvic floor dysfunction  The current medical regimen is effective;  continue present plan and medications. She will be starting the maintenance phase of the healthy back program in January.  - ibuprofen (ADVIL,MOTRIN) 600 MG tablet; TAKE 1 TABLET(600 MG) BY MOUTH EVERY 12 HOURS AS NEEDED  Dispense: 90 tablet; Refill: 0    4. Gastroesophageal reflux disease without esophagitis  Symptoms  controlled: yes - takes medication occasionally as needed.   Reflux precautions discussed (eliminate tobacco if a smoker; minimize caffeine, chocolate and red/white peppermint intake; avoid heavy and spicy meals; don't lay down within 2-3 hours after eating; minimize the intake of NSAIDs). Medication as needed.   Patient asked to take medication breaks, if possible - discussed chronic use can limit calcium absorption (which can lead to osteopenia/osteoporosis), increases the risk for intestinal infections, and can cause kidney damage. There are also some newer studies that show possible increased risk of mortality.    5. Flu vaccine need  She will complete in the near future at [facility].    6. Need for shingles vaccine  Deferred.    7. Advanced directives, counseling/discussion  Advance Care Planning   I initiated the process and explained the importance of advance care planning today with the patient.  Advanced directives were discussed due to patient's age and/or chronic medical conditions. Prognosis based on current condition: excellent.   Paperwork was previously given to complete living will (at this point in time, the patient does have full decision-making capacity.  We discussed different extreme health states that she could experience, and reviewed what kind of medical care she would want in those situations) and medical POA (The patient received detailed information about the importance of designating a Health Care Power of . She was also instructed to communicate with this person about their wishes for future healthcare, should she become sick and lose decision-making capacity).   [place] was not discussed.   Approximately 3 minute(s) were spent on counseling/discussion regarding end of life decision making.                 Follow up in about 1 year (around 12/16/2021) for annual exam. or sooner as needed.

## 2020-12-23 ENCOUNTER — TELEPHONE (OUTPATIENT)
Dept: REHABILITATION | Facility: HOSPITAL | Age: 65
End: 2020-12-23

## 2020-12-23 NOTE — PROGRESS NOTES
"  Pelvic Health Physical Therapy   Treatment and Discharge Note     Name: Sheree Lomeli  Clinic Number: [id number]    Therapy Diagnosis:   No diagnosis found.  Physician: Julia Burgos NP    Visit Date: 12/28/2020    Physician Orders: PT Eval and Treat   Medical Diagnosis from Referral: chronic constipation, pelvic floor weakness  Evaluation Date: 10/5/2020  Authorization Period Expiration: 12/31/2020  Plan of Care Expiration: 12-  Visit # / Visits authorized: 8/ 20  Cancelled Visits: 0  No Show Visits: 0        Time In:  937  Time Out:  1030  Total Billable Time:  53minutes    Precautions: Standard    Subjective     Pt reports:   Pt reports she has made at least 80% progress with reduction of coccyx pain.  "It's not gone but it's definitely improved.  I can sit longer, I can stand longer and I can relieve the pain with my techniques. My constipation is still doing better.  I'm taking one magnesium and 1 stool softener at night which is much better than 3 magnesium and 3 stool softeners a day.  I go in the morning and it's no problem.  On the days where I have 3 BM my tailbone seems to be more irritated.          Previous history:  She reports 11 years ago she used to wake up in the middle of the night with a hot flash and searing tailbone pain that would radiate up her spine.  She reports that once the hot flashes stopped after 2-3 years.      BM daily: takes 1 magnesium tablet and 1 stool softener and fiber with coffee in the morning and is having a BM daily.  No straining.      If does not have a BM she has back pain.  Has to plan to get up 2 hours in advance when traveling to make sure she has a BM.  She would like to be able to not have to worry if she is going to have pain if she does not have a BM.     She was compliant with home exercise program.    Response to previous treatment: no issues reported  Functional change: less coccyx pain with ADLs  Pain: 1/10   Location:   And coccyx.    "     Objective     Therapeutic Exercise to develop  flexibility for 13 minutes including:   Groin stretch 5x30 sec   Piriformis stretch 3x30 sec gilmar  Happy Baby 3x30 sec   Child's pose 3x30 sec       Sheree received the following manual therapy techniques: to develop flexibility and extensibility for 40 minutes including:  Pt consents to intrarectal treatment of PFM today.   Trigger point/myofascial release of coccygeous and levator ani groups Gilmar CF massage to sacrospinous ligament internally    Discussed use of therawand intrarectally to help with self-treatment of tight posterior pelvic floor muscles.        Therapeutic Activity to improve dynamic functional performance, coordination and education for 00 minutes. Including: edu on pathology and anatomy and the impact on pelvic floor muscle function.  Edu on importance of movement and exercise to help wit pain management.      Home Exercises Provided and Patient Education Provided     Education provided:   - anatomy/physiology of pelvic floor  Discussed progression of plan of care with patient; educated pt in activity modification; reviewed HEP with pt. Pt demonstrated and verbalized understanding of all instruction and was provided with a handout of HEP (see Patient Instructions).      Written Home Exercises Provided: yes.  Exercises were reviewed and Sheree was able to demonstrate them prior to the end of the session.  Sheree demonstrated good  understanding of the education provided.     See EMR under Patient Instructions for exercises provided prior visit.    Assessment   Status Towards Goals Met:  Met  Discharge reason : Met goals and Patient has maximized benefit from PT at this time    Sheree has made excellent progress with participation in the POC towards reduction of coccyx pain with ADLs and improved ability to have regular BMs.  She has met the majority of her goals and is independent with her home program.  At this time Sheree no longer requires  "skilled intervention and is appropriate for discharge.       Goals:   Short Term Goals: 4 weeks   Pt to perform "the knack" prior to coughing, laughing or sneezing to decrease risk of incontinence. MET  Pt to report being able to sneeze without incontinence demonstrating improved pelvic floor strength and coordination to improve confidence in social situations MET  Pt to demonstrate proper diaphragmatic breathing to help with calming the nervous system in order to decrease pain and to improve abdominal wall musculature extensibility. MET  Pt to report minimal pain with palpation of abdominal wall due to improvement in soft tissue mobility from moderate to minimal restrictions.  MET  Pt to report a decrease in pain to no more than 3 at it's worst with direct pelvic floor muscle provocation.   MET        Long Term Goals: 12 weeks   Pt to be discharged with home plan for carry over after discharge.  MET  Pt to report being able to have a comfortable vaginal exam without significant increase in pelvic pain.  MET  Pt to report a decrease in pain to no more than 2 at it's worst with direct pelvic floor muscle provocation.   PARTIALLY MET  Pt to report an improved ability to tolerate sitting without a significant increase in reported pain levels of her coccyx to allow her to better participate in social activities.  MET    Plan   This patient is discharged from Physical Therapy Services.     Laurita Zepeda, PT     "

## 2020-12-28 ENCOUNTER — CLINICAL SUPPORT (OUTPATIENT)
Dept: REHABILITATION | Facility: OTHER | Age: 65
End: 2020-12-28
Attending: NURSE PRACTITIONER
Payer: COMMERCIAL

## 2020-12-28 PROBLEM — M62.89 PELVIC FLOOR DYSFUNCTION: Status: RESOLVED | Noted: 2020-10-05 | Resolved: 2020-12-28

## 2020-12-28 PROBLEM — R53.1 WEAKNESS: Status: RESOLVED | Noted: 2020-10-05 | Resolved: 2020-12-28

## 2020-12-28 PROBLEM — R27.8 OTHER LACK OF COORDINATION: Status: RESOLVED | Noted: 2020-10-05 | Resolved: 2020-12-28

## 2020-12-28 PROCEDURE — 97110 THERAPEUTIC EXERCISES: CPT

## 2020-12-28 PROCEDURE — 97140 MANUAL THERAPY 1/> REGIONS: CPT

## 2021-01-11 ENCOUNTER — DOCUMENTATION ONLY (OUTPATIENT)
Dept: REHABILITATION | Facility: HOSPITAL | Age: 66
End: 2021-01-11
Payer: COMMERCIAL

## 2021-01-23 ENCOUNTER — TELEPHONE (OUTPATIENT)
Dept: FAMILY MEDICINE | Facility: CLINIC | Age: 66
End: 2021-01-23

## 2021-01-25 ENCOUNTER — DOCUMENTATION ONLY (OUTPATIENT)
Dept: REHABILITATION | Facility: HOSPITAL | Age: 66
End: 2021-01-25
Payer: COMMERCIAL

## 2021-01-27 ENCOUNTER — PATIENT MESSAGE (OUTPATIENT)
Dept: FAMILY MEDICINE | Facility: CLINIC | Age: 66
End: 2021-01-27

## 2021-02-01 ENCOUNTER — DOCUMENTATION ONLY (OUTPATIENT)
Dept: REHABILITATION | Facility: HOSPITAL | Age: 66
End: 2021-02-01
Payer: COMMERCIAL

## 2021-02-04 ENCOUNTER — PATIENT OUTREACH (OUTPATIENT)
Dept: ADMINISTRATIVE | Facility: OTHER | Age: 66
End: 2021-02-04

## 2021-02-05 ENCOUNTER — OFFICE VISIT (OUTPATIENT)
Dept: GASTROENTEROLOGY | Facility: CLINIC | Age: 66
End: 2021-02-05
Payer: COMMERCIAL

## 2021-02-05 ENCOUNTER — LAB VISIT (OUTPATIENT)
Dept: LAB | Facility: HOSPITAL | Age: 66
End: 2021-02-05
Attending: STUDENT IN AN ORGANIZED HEALTH CARE EDUCATION/TRAINING PROGRAM
Payer: COMMERCIAL

## 2021-02-05 VITALS
SYSTOLIC BLOOD PRESSURE: 140 MMHG | HEIGHT: 64 IN | BODY MASS INDEX: 26.79 KG/M2 | HEART RATE: 80 BPM | DIASTOLIC BLOOD PRESSURE: 80 MMHG | WEIGHT: 156.94 LBS

## 2021-02-05 DIAGNOSIS — M53.3 COCCYGODYNIA: ICD-10-CM

## 2021-02-05 DIAGNOSIS — R10.13 EPIGASTRIC PAIN: ICD-10-CM

## 2021-02-05 DIAGNOSIS — R14.0 ABDOMINAL DISTENSION (GASEOUS): ICD-10-CM

## 2021-02-05 PROCEDURE — 1125F PR PAIN SEVERITY QUANTIFIED, PAIN PRESENT: ICD-10-PCS | Mod: S$GLB,,, | Performed by: STUDENT IN AN ORGANIZED HEALTH CARE EDUCATION/TRAINING PROGRAM

## 2021-02-05 PROCEDURE — 85025 COMPLETE CBC W/AUTO DIFF WBC: CPT

## 2021-02-05 PROCEDURE — 80053 COMPREHEN METABOLIC PANEL: CPT

## 2021-02-05 PROCEDURE — 3008F PR BODY MASS INDEX (BMI) DOCUMENTED: ICD-10-PCS | Mod: CPTII,S$GLB,, | Performed by: STUDENT IN AN ORGANIZED HEALTH CARE EDUCATION/TRAINING PROGRAM

## 2021-02-05 PROCEDURE — 99214 OFFICE O/P EST MOD 30 MIN: CPT | Mod: S$GLB,,, | Performed by: STUDENT IN AN ORGANIZED HEALTH CARE EDUCATION/TRAINING PROGRAM

## 2021-02-05 PROCEDURE — 1125F AMNT PAIN NOTED PAIN PRSNT: CPT | Mod: S$GLB,,, | Performed by: STUDENT IN AN ORGANIZED HEALTH CARE EDUCATION/TRAINING PROGRAM

## 2021-02-05 PROCEDURE — 99214 PR OFFICE/OUTPT VISIT, EST, LEVL IV, 30-39 MIN: ICD-10-PCS | Mod: S$GLB,,, | Performed by: STUDENT IN AN ORGANIZED HEALTH CARE EDUCATION/TRAINING PROGRAM

## 2021-02-05 PROCEDURE — 3008F BODY MASS INDEX DOCD: CPT | Mod: CPTII,S$GLB,, | Performed by: STUDENT IN AN ORGANIZED HEALTH CARE EDUCATION/TRAINING PROGRAM

## 2021-02-05 PROCEDURE — 99999 PR PBB SHADOW E&M-EST. PATIENT-LVL IV: CPT | Mod: PBBFAC,,, | Performed by: STUDENT IN AN ORGANIZED HEALTH CARE EDUCATION/TRAINING PROGRAM

## 2021-02-05 PROCEDURE — 99999 PR PBB SHADOW E&M-EST. PATIENT-LVL IV: ICD-10-PCS | Mod: PBBFAC,,, | Performed by: STUDENT IN AN ORGANIZED HEALTH CARE EDUCATION/TRAINING PROGRAM

## 2021-02-05 PROCEDURE — 83516 IMMUNOASSAY NONANTIBODY: CPT | Mod: 59

## 2021-02-05 RX ORDER — IBUPROFEN 600 MG/1
TABLET ORAL
Qty: 90 TABLET | Refills: 0 | Status: SHIPPED | OUTPATIENT
Start: 2021-02-05 | End: 2021-05-24 | Stop reason: SDUPTHER

## 2021-02-06 LAB
ALBUMIN SERPL BCP-MCNC: 4.2 G/DL (ref 3.5–5.2)
ALP SERPL-CCNC: 45 U/L (ref 55–135)
ALT SERPL W/O P-5'-P-CCNC: 15 U/L (ref 10–44)
ANION GAP SERPL CALC-SCNC: 7 MMOL/L (ref 8–16)
AST SERPL-CCNC: 19 U/L (ref 10–40)
BASOPHILS # BLD AUTO: 0.05 K/UL (ref 0–0.2)
BASOPHILS NFR BLD: 0.6 % (ref 0–1.9)
BILIRUB SERPL-MCNC: 0.1 MG/DL (ref 0.1–1)
BUN SERPL-MCNC: 11 MG/DL (ref 8–23)
CALCIUM SERPL-MCNC: 9.2 MG/DL (ref 8.7–10.5)
CHLORIDE SERPL-SCNC: 102 MMOL/L (ref 95–110)
CO2 SERPL-SCNC: 31 MMOL/L (ref 23–29)
CREAT SERPL-MCNC: 0.8 MG/DL (ref 0.5–1.4)
DIFFERENTIAL METHOD: NORMAL
EOSINOPHIL # BLD AUTO: 0.1 K/UL (ref 0–0.5)
EOSINOPHIL NFR BLD: 0.9 % (ref 0–8)
ERYTHROCYTE [DISTWIDTH] IN BLOOD BY AUTOMATED COUNT: 13.2 % (ref 11.5–14.5)
EST. GFR  (AFRICAN AMERICAN): >60 ML/MIN/1.73 M^2
EST. GFR  (NON AFRICAN AMERICAN): >60 ML/MIN/1.73 M^2
GLUCOSE SERPL-MCNC: 98 MG/DL (ref 70–110)
HCT VFR BLD AUTO: 38.1 % (ref 37–48.5)
HGB BLD-MCNC: 12.2 G/DL (ref 12–16)
IMM GRANULOCYTES # BLD AUTO: 0.03 K/UL (ref 0–0.04)
IMM GRANULOCYTES NFR BLD AUTO: 0.4 % (ref 0–0.5)
LYMPHOCYTES # BLD AUTO: 2.1 K/UL (ref 1–4.8)
LYMPHOCYTES NFR BLD: 25.8 % (ref 18–48)
MCH RBC QN AUTO: 30.3 PG (ref 27–31)
MCHC RBC AUTO-ENTMCNC: 32 G/DL (ref 32–36)
MCV RBC AUTO: 95 FL (ref 82–98)
MONOCYTES # BLD AUTO: 0.4 K/UL (ref 0.3–1)
MONOCYTES NFR BLD: 5.4 % (ref 4–15)
NEUTROPHILS # BLD AUTO: 5.4 K/UL (ref 1.8–7.7)
NEUTROPHILS NFR BLD: 66.9 % (ref 38–73)
NRBC BLD-RTO: 0 /100 WBC
PLATELET # BLD AUTO: 227 K/UL (ref 150–350)
PMV BLD AUTO: 12.5 FL (ref 9.2–12.9)
POTASSIUM SERPL-SCNC: 4.2 MMOL/L (ref 3.5–5.1)
PROT SERPL-MCNC: 7 G/DL (ref 6–8.4)
RBC # BLD AUTO: 4.03 M/UL (ref 4–5.4)
SODIUM SERPL-SCNC: 140 MMOL/L (ref 136–145)
WBC # BLD AUTO: 8.01 K/UL (ref 3.9–12.7)

## 2021-02-08 ENCOUNTER — DOCUMENTATION ONLY (OUTPATIENT)
Dept: REHABILITATION | Facility: HOSPITAL | Age: 66
End: 2021-02-08
Payer: COMMERCIAL

## 2021-02-08 ENCOUNTER — PATIENT MESSAGE (OUTPATIENT)
Dept: GASTROENTEROLOGY | Facility: CLINIC | Age: 66
End: 2021-02-08

## 2021-02-10 LAB
GLIADIN PEPTIDE IGA SER-ACNC: 6 UNITS
GLIADIN PEPTIDE IGG SER-ACNC: 2 UNITS
IGA SERPL-MCNC: 300 MG/DL (ref 70–400)
TTG IGA SER-ACNC: 5 UNITS
TTG IGG SER-ACNC: 3 UNITS

## 2021-03-01 ENCOUNTER — DOCUMENTATION ONLY (OUTPATIENT)
Dept: REHABILITATION | Facility: HOSPITAL | Age: 66
End: 2021-03-01
Payer: COMMERCIAL

## 2021-03-08 ENCOUNTER — DOCUMENTATION ONLY (OUTPATIENT)
Dept: REHABILITATION | Facility: HOSPITAL | Age: 66
End: 2021-03-08
Payer: COMMERCIAL

## 2021-03-22 ENCOUNTER — DOCUMENTATION ONLY (OUTPATIENT)
Dept: REHABILITATION | Facility: HOSPITAL | Age: 66
End: 2021-03-22
Payer: COMMERCIAL

## 2021-03-29 ENCOUNTER — DOCUMENTATION ONLY (OUTPATIENT)
Dept: REHABILITATION | Facility: HOSPITAL | Age: 66
End: 2021-03-29
Payer: COMMERCIAL

## 2021-04-05 ENCOUNTER — DOCUMENTATION ONLY (OUTPATIENT)
Dept: REHABILITATION | Facility: HOSPITAL | Age: 66
End: 2021-04-05
Payer: COMMERCIAL

## 2021-04-12 ENCOUNTER — DOCUMENTATION ONLY (OUTPATIENT)
Dept: REHABILITATION | Facility: HOSPITAL | Age: 66
End: 2021-04-12
Payer: COMMERCIAL

## 2021-04-12 ENCOUNTER — PATIENT MESSAGE (OUTPATIENT)
Dept: GASTROENTEROLOGY | Facility: CLINIC | Age: 66
End: 2021-04-12

## 2021-04-14 ENCOUNTER — HOSPITAL ENCOUNTER (OUTPATIENT)
Dept: RADIOLOGY | Facility: HOSPITAL | Age: 66
Discharge: HOME | End: 2021-04-14
Attending: STUDENT IN AN ORGANIZED HEALTH CARE EDUCATION/TRAINING PROGRAM
Payer: COMMERCIAL

## 2021-04-14 DIAGNOSIS — R14.0 ABDOMINAL DISTENSION (GASEOUS): ICD-10-CM

## 2021-04-14 DIAGNOSIS — R10.13 EPIGASTRIC PAIN: ICD-10-CM

## 2021-04-14 LAB
CREAT SERPL-MCNC: 0.7 MG/DL (ref 0.5–1.4)
SAMPLE: NORMAL

## 2021-04-14 PROCEDURE — 74177 CT ABD & PELVIS W/CONTRAST: CPT | Mod: 26,,, | Performed by: RADIOLOGY

## 2021-04-14 PROCEDURE — 74177 CT ABD & PELVIS W/CONTRAST: CPT | Mod: TC

## 2021-04-14 PROCEDURE — 74177 CT ABDOMEN PELVIS WITH CONTRAST: ICD-10-PCS | Mod: 26,,, | Performed by: RADIOLOGY

## 2021-04-14 PROCEDURE — 25500020 PHARM REV CODE 255: Performed by: STUDENT IN AN ORGANIZED HEALTH CARE EDUCATION/TRAINING PROGRAM

## 2021-04-14 RX ADMIN — IOHEXOL 15 ML: 350 INJECTION, SOLUTION INTRAVENOUS at 05:04

## 2021-04-14 RX ADMIN — IOHEXOL 15 ML: 350 INJECTION, SOLUTION INTRAVENOUS at 04:04

## 2021-04-14 RX ADMIN — IOHEXOL 75 ML: 350 INJECTION, SOLUTION INTRAVENOUS at 05:04

## 2021-04-14 NOTE — PROGRESS NOTES
Health Maintenance Due   Topic Date Due    HIV Screening  06/11/1970    Shingles Vaccine (2 of 3) 11/01/2017    Influenza Vaccine (1) 08/01/2020     Updates were requested from care everywhere.  Chart was reviewed for overdue Proactive [facility] Encounters ([facility]) topics (CRS, Breast Cancer Screening, Eye exam)  Health Maintenance has been updated.  LINKS immunization registry triggered.  Immunizations were reconciled.    
Satisfactory

## 2021-04-15 ENCOUNTER — TELEPHONE (OUTPATIENT)
Dept: GASTROENTEROLOGY | Facility: CLINIC | Age: 66
End: 2021-04-15

## 2021-04-15 ENCOUNTER — OFFICE VISIT (OUTPATIENT)
Dept: SURGERY | Facility: CLINIC | Age: 66
End: 2021-04-15
Payer: COMMERCIAL

## 2021-04-15 VITALS
DIASTOLIC BLOOD PRESSURE: 67 MMHG | HEIGHT: 64 IN | BODY MASS INDEX: 26.1 KG/M2 | WEIGHT: 152.88 LBS | SYSTOLIC BLOOD PRESSURE: 134 MMHG | HEART RATE: 70 BPM

## 2021-04-15 DIAGNOSIS — R10.9 ABDOMINAL PAIN, UNSPECIFIED ABDOMINAL LOCATION: Primary | ICD-10-CM

## 2021-04-15 DIAGNOSIS — Z01.818 PREOP EXAMINATION: ICD-10-CM

## 2021-04-15 DIAGNOSIS — K80.20 GALLSTONES: Primary | ICD-10-CM

## 2021-04-15 PROCEDURE — 99203 OFFICE O/P NEW LOW 30 MIN: CPT | Mod: S$GLB,,, | Performed by: SURGERY

## 2021-04-15 PROCEDURE — 99999 PR PBB SHADOW E&M-EST. PATIENT-LVL IV: ICD-10-PCS | Mod: PBBFAC,,, | Performed by: SURGERY

## 2021-04-15 PROCEDURE — 1125F AMNT PAIN NOTED PAIN PRSNT: CPT | Mod: S$GLB,,, | Performed by: SURGERY

## 2021-04-15 PROCEDURE — 99203 PR OFFICE/OUTPT VISIT, NEW, LEVL III, 30-44 MIN: ICD-10-PCS | Mod: S$GLB,,, | Performed by: SURGERY

## 2021-04-15 PROCEDURE — 3008F BODY MASS INDEX DOCD: CPT | Mod: CPTII,S$GLB,, | Performed by: SURGERY

## 2021-04-15 PROCEDURE — 99999 PR PBB SHADOW E&M-EST. PATIENT-LVL IV: CPT | Mod: PBBFAC,,, | Performed by: SURGERY

## 2021-04-15 PROCEDURE — 1125F PR PAIN SEVERITY QUANTIFIED, PAIN PRESENT: ICD-10-PCS | Mod: S$GLB,,, | Performed by: SURGERY

## 2021-04-15 PROCEDURE — 3008F PR BODY MASS INDEX (BMI) DOCUMENTED: ICD-10-PCS | Mod: CPTII,S$GLB,, | Performed by: SURGERY

## 2021-04-15 RX ORDER — TRAMADOL HYDROCHLORIDE 50 MG/1
50 TABLET ORAL EVERY 6 HOURS PRN
Qty: 20 TABLET | Refills: 0 | Status: SHIPPED | OUTPATIENT
Start: 2021-04-15 | End: 2021-04-23

## 2021-04-19 ENCOUNTER — DOCUMENTATION ONLY (OUTPATIENT)
Dept: REHABILITATION | Facility: HOSPITAL | Age: 66
End: 2021-04-19
Payer: COMMERCIAL

## 2021-04-20 ENCOUNTER — LAB VISIT (OUTPATIENT)
Dept: FAMILY MEDICINE | Facility: CLINIC | Age: 66
End: 2021-04-20
Payer: COMMERCIAL

## 2021-04-20 DIAGNOSIS — Z01.818 PREOP EXAMINATION: ICD-10-CM

## 2021-04-20 PROCEDURE — U0003 INFECTIOUS AGENT DETECTION BY NUCLEIC ACID (DNA OR RNA); SEVERE ACUTE RESPIRATORY SYNDROME CORONAVIRUS 2 (SARS-COV-2) (CORONAVIRUS DISEASE [COVID-19]), AMPLIFIED PROBE TECHNIQUE, MAKING USE OF HIGH THROUGHPUT TECHNOLOGIES AS DESCRIBED BY CMS-2020-01-R: HCPCS | Performed by: SURGERY

## 2021-04-20 PROCEDURE — U0005 INFEC AGEN DETEC AMPLI PROBE: HCPCS | Performed by: SURGERY

## 2021-04-21 ENCOUNTER — DOCUMENTATION ONLY (OUTPATIENT)
Dept: REHABILITATION | Facility: OTHER | Age: 66
End: 2021-04-21

## 2021-04-21 LAB — SARS-COV-2 RNA RESP QL NAA+PROBE: NOT DETECTED

## 2021-04-22 ENCOUNTER — ANESTHESIA EVENT (OUTPATIENT)
Dept: SURGERY | Facility: HOSPITAL | Age: 66
End: 2021-04-22
Payer: COMMERCIAL

## 2021-04-23 ENCOUNTER — HOSPITAL ENCOUNTER (OUTPATIENT)
Facility: HOSPITAL | Age: 66
Discharge: HOME | End: 2021-04-23
Attending: SURGERY | Admitting: SURGERY
Payer: COMMERCIAL

## 2021-04-23 ENCOUNTER — ANESTHESIA (OUTPATIENT)
Dept: SURGERY | Facility: HOSPITAL | Age: 66
End: 2021-04-23
Payer: COMMERCIAL

## 2021-04-23 VITALS
SYSTOLIC BLOOD PRESSURE: 95 MMHG | DIASTOLIC BLOOD PRESSURE: 50 MMHG | BODY MASS INDEX: 25.1 KG/M2 | OXYGEN SATURATION: 97 % | RESPIRATION RATE: 18 BRPM | TEMPERATURE: 98 F | HEIGHT: 64 IN | HEART RATE: 55 BPM | WEIGHT: 147 LBS

## 2021-04-23 DIAGNOSIS — K80.20 GALLSTONES: Primary | ICD-10-CM

## 2021-04-23 PROCEDURE — 71000015 HC POSTOP RECOV 1ST HR: Performed by: SURGERY

## 2021-04-23 PROCEDURE — 94761 N-INVAS EAR/PLS OXIMETRY MLT: CPT

## 2021-04-23 PROCEDURE — 25000003 PHARM REV CODE 250: Performed by: SURGERY

## 2021-04-23 PROCEDURE — 25000003 PHARM REV CODE 250: Performed by: ANESTHESIOLOGY

## 2021-04-23 PROCEDURE — 25000003 PHARM REV CODE 250: Performed by: NURSE ANESTHETIST, CERTIFIED REGISTERED

## 2021-04-23 PROCEDURE — 63600175 PHARM REV CODE 636 W HCPCS: Performed by: NURSE ANESTHETIST, CERTIFIED REGISTERED

## 2021-04-23 PROCEDURE — 71000033 HC RECOVERY, INTIAL HOUR: Performed by: SURGERY

## 2021-04-23 PROCEDURE — 88304 PR  SURG PATH,LEVEL III: ICD-10-PCS | Mod: 26,,, | Performed by: PATHOLOGY

## 2021-04-23 PROCEDURE — 27201423 OPTIME MED/SURG SUP & DEVICES STERILE SUPPLY: Performed by: SURGERY

## 2021-04-23 PROCEDURE — D9220A PRA ANESTHESIA: ICD-10-PCS | Mod: ,,, | Performed by: ANESTHESIOLOGY

## 2021-04-23 PROCEDURE — 47562 LAPAROSCOPIC CHOLECYSTECTOMY: CPT | Mod: ,,, | Performed by: SURGERY

## 2021-04-23 PROCEDURE — 88304 TISSUE EXAM BY PATHOLOGIST: CPT | Mod: 26,,, | Performed by: PATHOLOGY

## 2021-04-23 PROCEDURE — 71000039 HC RECOVERY, EACH ADD'L HOUR: Performed by: SURGERY

## 2021-04-23 PROCEDURE — 88304 TISSUE EXAM BY PATHOLOGIST: CPT | Performed by: PATHOLOGY

## 2021-04-23 PROCEDURE — 25000003 PHARM REV CODE 250: Performed by: STUDENT IN AN ORGANIZED HEALTH CARE EDUCATION/TRAINING PROGRAM

## 2021-04-23 PROCEDURE — 36000709 HC OR TIME LEV III EA ADD 15 MIN: Performed by: SURGERY

## 2021-04-23 PROCEDURE — D9220A PRA ANESTHESIA: ICD-10-PCS | Mod: ,,, | Performed by: NURSE ANESTHETIST, CERTIFIED REGISTERED

## 2021-04-23 PROCEDURE — D9220A PRA ANESTHESIA: Mod: ,,, | Performed by: NURSE ANESTHETIST, CERTIFIED REGISTERED

## 2021-04-23 PROCEDURE — 63600175 PHARM REV CODE 636 W HCPCS: Performed by: ANESTHESIOLOGY

## 2021-04-23 PROCEDURE — 37000009 HC ANESTHESIA EA ADD 15 MINS: Performed by: SURGERY

## 2021-04-23 PROCEDURE — 36000708 HC OR TIME LEV III 1ST 15 MIN: Performed by: SURGERY

## 2021-04-23 PROCEDURE — 37000008 HC ANESTHESIA 1ST 15 MINUTES: Performed by: SURGERY

## 2021-04-23 PROCEDURE — 63600175 PHARM REV CODE 636 W HCPCS: Performed by: SURGERY

## 2021-04-23 PROCEDURE — 47562 PR LAP,CHOLECYSTECTOMY: ICD-10-PCS | Mod: ,,, | Performed by: SURGERY

## 2021-04-23 PROCEDURE — D9220A PRA ANESTHESIA: Mod: ,,, | Performed by: ANESTHESIOLOGY

## 2021-04-23 PROCEDURE — 63600175 PHARM REV CODE 636 W HCPCS

## 2021-04-23 PROCEDURE — 71000016 HC POSTOP RECOV ADDL HR: Performed by: SURGERY

## 2021-04-23 RX ORDER — CEFAZOLIN SODIUM 1 G/3ML
2 INJECTION, POWDER, FOR SOLUTION INTRAMUSCULAR; INTRAVENOUS
Status: COMPLETED | OUTPATIENT
Start: 2021-04-23 | End: 2021-04-23

## 2021-04-23 RX ORDER — FENTANYL CITRATE 50 UG/ML
INJECTION, SOLUTION INTRAMUSCULAR; INTRAVENOUS
Status: COMPLETED
Start: 2021-04-23 | End: 2021-04-23

## 2021-04-23 RX ORDER — BUPIVACAINE HYDROCHLORIDE 2.5 MG/ML
INJECTION, SOLUTION EPIDURAL; INFILTRATION; INTRACAUDAL
Status: DISCONTINUED | OUTPATIENT
Start: 2021-04-23 | End: 2021-04-23 | Stop reason: HOSPADM

## 2021-04-23 RX ORDER — SCOLOPAMINE TRANSDERMAL SYSTEM 1 MG/1
1 PATCH, EXTENDED RELEASE TRANSDERMAL ONCE
Status: DISCONTINUED | OUTPATIENT
Start: 2021-04-23 | End: 2021-04-23 | Stop reason: HOSPADM

## 2021-04-23 RX ORDER — ACETAMINOPHEN 10 MG/ML
INJECTION, SOLUTION INTRAVENOUS
Status: DISCONTINUED | OUTPATIENT
Start: 2021-04-23 | End: 2021-04-23

## 2021-04-23 RX ORDER — LIDOCAINE HYDROCHLORIDE 20 MG/ML
INJECTION, SOLUTION EPIDURAL; INFILTRATION; INTRACAUDAL; PERINEURAL
Status: DISCONTINUED | OUTPATIENT
Start: 2021-04-23 | End: 2021-04-23

## 2021-04-23 RX ORDER — HYDROMORPHONE HYDROCHLORIDE 1 MG/ML
0.2 INJECTION, SOLUTION INTRAMUSCULAR; INTRAVENOUS; SUBCUTANEOUS EVERY 5 MIN PRN
Status: DISCONTINUED | OUTPATIENT
Start: 2021-04-23 | End: 2021-04-23 | Stop reason: HOSPADM

## 2021-04-23 RX ORDER — FENTANYL CITRATE 50 UG/ML
25 INJECTION, SOLUTION INTRAMUSCULAR; INTRAVENOUS EVERY 5 MIN PRN
Status: DISCONTINUED | OUTPATIENT
Start: 2021-04-23 | End: 2021-04-23 | Stop reason: HOSPADM

## 2021-04-23 RX ORDER — PHENYLEPHRINE HCL IN 0.9% NACL 1 MG/10 ML
SYRINGE (ML) INTRAVENOUS
Status: DISCONTINUED | OUTPATIENT
Start: 2021-04-23 | End: 2021-04-23

## 2021-04-23 RX ORDER — NEOSTIGMINE METHYLSULFATE 0.5 MG/ML
INJECTION, SOLUTION INTRAVENOUS
Status: DISCONTINUED | OUTPATIENT
Start: 2021-04-23 | End: 2021-04-23

## 2021-04-23 RX ORDER — ONDANSETRON 2 MG/ML
INJECTION INTRAMUSCULAR; INTRAVENOUS
Status: DISCONTINUED | OUTPATIENT
Start: 2021-04-23 | End: 2021-04-23

## 2021-04-23 RX ORDER — OXYCODONE AND ACETAMINOPHEN 5; 325 MG/1; MG/1
1 TABLET ORAL ONCE
Status: COMPLETED | OUTPATIENT
Start: 2021-04-23 | End: 2021-04-23

## 2021-04-23 RX ORDER — LIDOCAINE HYDROCHLORIDE 10 MG/ML
1 INJECTION, SOLUTION EPIDURAL; INFILTRATION; INTRACAUDAL; PERINEURAL ONCE
Status: DISCONTINUED | OUTPATIENT
Start: 2021-04-23 | End: 2021-04-23 | Stop reason: HOSPADM

## 2021-04-23 RX ORDER — ROCURONIUM BROMIDE 10 MG/ML
INJECTION, SOLUTION INTRAVENOUS
Status: DISCONTINUED | OUTPATIENT
Start: 2021-04-23 | End: 2021-04-23

## 2021-04-23 RX ORDER — DEXAMETHASONE SODIUM PHOSPHATE 4 MG/ML
INJECTION, SOLUTION INTRA-ARTICULAR; INTRALESIONAL; INTRAMUSCULAR; INTRAVENOUS; SOFT TISSUE
Status: DISCONTINUED | OUTPATIENT
Start: 2021-04-23 | End: 2021-04-23

## 2021-04-23 RX ORDER — OXYCODONE AND ACETAMINOPHEN 5; 325 MG/1; MG/1
1 TABLET ORAL EVERY 6 HOURS PRN
Qty: 15 TABLET | Refills: 0 | Status: SHIPPED | OUTPATIENT
Start: 2021-04-23 | End: 2021-05-11

## 2021-04-23 RX ORDER — PROPOFOL 10 MG/ML
VIAL (ML) INTRAVENOUS
Status: DISCONTINUED | OUTPATIENT
Start: 2021-04-23 | End: 2021-04-23

## 2021-04-23 RX ORDER — KETAMINE HCL IN 0.9 % NACL 50 MG/5 ML
SYRINGE (ML) INTRAVENOUS
Status: DISCONTINUED | OUTPATIENT
Start: 2021-04-23 | End: 2021-04-23

## 2021-04-23 RX ORDER — PROPOFOL 10 MG/ML
VIAL (ML) INTRAVENOUS CONTINUOUS PRN
Status: DISCONTINUED | OUTPATIENT
Start: 2021-04-23 | End: 2021-04-23

## 2021-04-23 RX ORDER — MIDAZOLAM HYDROCHLORIDE 1 MG/ML
INJECTION, SOLUTION INTRAMUSCULAR; INTRAVENOUS
Status: DISCONTINUED | OUTPATIENT
Start: 2021-04-23 | End: 2021-04-23

## 2021-04-23 RX ORDER — DEXMEDETOMIDINE HYDROCHLORIDE 100 UG/ML
INJECTION, SOLUTION INTRAVENOUS
Status: DISCONTINUED | OUTPATIENT
Start: 2021-04-23 | End: 2021-04-23

## 2021-04-23 RX ORDER — ONDANSETRON 2 MG/ML
4 INJECTION INTRAMUSCULAR; INTRAVENOUS ONCE
Status: COMPLETED | OUTPATIENT
Start: 2021-04-23 | End: 2021-04-23

## 2021-04-23 RX ORDER — SODIUM CHLORIDE 9 MG/ML
INJECTION, SOLUTION INTRAVENOUS CONTINUOUS
Status: DISCONTINUED | OUTPATIENT
Start: 2021-04-23 | End: 2021-04-23 | Stop reason: HOSPADM

## 2021-04-23 RX ORDER — FENTANYL CITRATE 50 UG/ML
INJECTION, SOLUTION INTRAMUSCULAR; INTRAVENOUS
Status: DISCONTINUED | OUTPATIENT
Start: 2021-04-23 | End: 2021-04-23

## 2021-04-23 RX ADMIN — FENTANYL CITRATE 50 MCG: 50 INJECTION INTRAMUSCULAR; INTRAVENOUS at 10:04

## 2021-04-23 RX ADMIN — ONDANSETRON 4 MG: 2 INJECTION INTRAMUSCULAR; INTRAVENOUS at 03:04

## 2021-04-23 RX ADMIN — Medication 200 MCG: at 09:04

## 2021-04-23 RX ADMIN — SODIUM CHLORIDE: 0.9 INJECTION, SOLUTION INTRAVENOUS at 09:04

## 2021-04-23 RX ADMIN — NEOSTIGMINE METHYLSULFATE 3 MG: 0.5 INJECTION INTRAVENOUS at 10:04

## 2021-04-23 RX ADMIN — MIDAZOLAM 2 MG: 1 INJECTION INTRAMUSCULAR; INTRAVENOUS at 09:04

## 2021-04-23 RX ADMIN — DEXAMETHASONE SODIUM PHOSPHATE 8 MG: 4 INJECTION, SOLUTION INTRAMUSCULAR; INTRAVENOUS at 09:04

## 2021-04-23 RX ADMIN — OXYCODONE HYDROCHLORIDE AND ACETAMINOPHEN 1 TABLET: 5; 325 TABLET ORAL at 11:04

## 2021-04-23 RX ADMIN — PROPOFOL 150 MG: 10 INJECTION, EMULSION INTRAVENOUS at 09:04

## 2021-04-23 RX ADMIN — FENTANYL CITRATE 25 MCG: 50 INJECTION, SOLUTION INTRAMUSCULAR; INTRAVENOUS at 12:04

## 2021-04-23 RX ADMIN — Medication 20 MG: at 09:04

## 2021-04-23 RX ADMIN — SCOPALAMINE 1 PATCH: 1 PATCH, EXTENDED RELEASE TRANSDERMAL at 08:04

## 2021-04-23 RX ADMIN — CEFAZOLIN 2 G: 330 INJECTION, POWDER, FOR SOLUTION INTRAMUSCULAR; INTRAVENOUS at 09:04

## 2021-04-23 RX ADMIN — FENTANYL CITRATE 100 MCG: 50 INJECTION INTRAMUSCULAR; INTRAVENOUS at 09:04

## 2021-04-23 RX ADMIN — ROCURONIUM BROMIDE 40 MG: 10 INJECTION, SOLUTION INTRAVENOUS at 09:04

## 2021-04-23 RX ADMIN — DEXMEDETOMIDINE HYDROCHLORIDE 8 MCG: 100 INJECTION, SOLUTION INTRAVENOUS at 09:04

## 2021-04-23 RX ADMIN — GLYCOPYRROLATE 0.4 MG: 0.2 INJECTION, SOLUTION INTRAMUSCULAR; INTRAVITREAL at 10:04

## 2021-04-23 RX ADMIN — ONDANSETRON 4 MG: 2 INJECTION INTRAMUSCULAR; INTRAVENOUS at 10:04

## 2021-04-23 RX ADMIN — LIDOCAINE HYDROCHLORIDE 80 MG: 20 INJECTION, SOLUTION EPIDURAL; INFILTRATION; INTRACAUDAL at 09:04

## 2021-04-23 RX ADMIN — HYDROMORPHONE HYDROCHLORIDE 0.2 MG: 1 INJECTION, SOLUTION INTRAMUSCULAR; INTRAVENOUS; SUBCUTANEOUS at 02:04

## 2021-04-23 RX ADMIN — PROPOFOL 150 MCG/KG/MIN: 10 INJECTION, EMULSION INTRAVENOUS at 09:04

## 2021-04-23 RX ADMIN — ACETAMINOPHEN 1000 MG: 10 INJECTION INTRAVENOUS at 09:04

## 2021-04-23 RX ADMIN — FENTANYL CITRATE 25 MCG: 50 INJECTION INTRAMUSCULAR; INTRAVENOUS at 10:04

## 2021-04-23 RX ADMIN — FENTANYL CITRATE 25 MCG: 50 INJECTION INTRAMUSCULAR; INTRAVENOUS at 12:04

## 2021-04-24 ENCOUNTER — DOCUMENTATION ONLY (OUTPATIENT)
Dept: SURGERY | Facility: CLINIC | Age: 66
End: 2021-04-24

## 2021-04-27 ENCOUNTER — PATIENT MESSAGE (OUTPATIENT)
Dept: SURGERY | Facility: CLINIC | Age: 66
End: 2021-04-27

## 2021-04-28 LAB
FINAL PATHOLOGIC DIAGNOSIS: NORMAL
GROSS: NORMAL
Lab: NORMAL

## 2021-04-29 RX ORDER — ONDANSETRON 4 MG/1
4 TABLET, FILM COATED ORAL EVERY 6 HOURS PRN
Qty: 12 TABLET | Refills: 0 | Status: SHIPPED | OUTPATIENT
Start: 2021-04-29 | End: 2021-05-11

## 2021-05-11 ENCOUNTER — OFFICE VISIT (OUTPATIENT)
Dept: SURGERY | Facility: CLINIC | Age: 66
End: 2021-05-11
Payer: COMMERCIAL

## 2021-05-11 ENCOUNTER — PATIENT MESSAGE (OUTPATIENT)
Dept: SURGERY | Facility: CLINIC | Age: 66
End: 2021-05-11

## 2021-05-11 VITALS
TEMPERATURE: 99 F | WEIGHT: 152.13 LBS | HEIGHT: 64 IN | BODY MASS INDEX: 25.97 KG/M2 | DIASTOLIC BLOOD PRESSURE: 72 MMHG | SYSTOLIC BLOOD PRESSURE: 136 MMHG | HEART RATE: 69 BPM

## 2021-05-11 DIAGNOSIS — Z09 POSTOP CHECK: Primary | ICD-10-CM

## 2021-05-11 PROBLEM — K80.20 GALLSTONES: Status: RESOLVED | Noted: 2021-04-15 | Resolved: 2021-05-11

## 2021-05-11 PROCEDURE — 3008F BODY MASS INDEX DOCD: CPT | Mod: CPTII,S$GLB,, | Performed by: SURGERY

## 2021-05-11 PROCEDURE — 99999 PR PBB SHADOW E&M-EST. PATIENT-LVL III: CPT | Mod: PBBFAC,,, | Performed by: SURGERY

## 2021-05-11 PROCEDURE — 1125F PR PAIN SEVERITY QUANTIFIED, PAIN PRESENT: ICD-10-PCS | Mod: S$GLB,,, | Performed by: SURGERY

## 2021-05-11 PROCEDURE — 99024 POSTOP FOLLOW-UP VISIT: CPT | Mod: S$GLB,,, | Performed by: SURGERY

## 2021-05-11 PROCEDURE — 99999 PR PBB SHADOW E&M-EST. PATIENT-LVL III: ICD-10-PCS | Mod: PBBFAC,,, | Performed by: SURGERY

## 2021-05-11 PROCEDURE — 99024 PR POST-OP FOLLOW-UP VISIT: ICD-10-PCS | Mod: S$GLB,,, | Performed by: SURGERY

## 2021-05-11 PROCEDURE — 3008F PR BODY MASS INDEX (BMI) DOCUMENTED: ICD-10-PCS | Mod: CPTII,S$GLB,, | Performed by: SURGERY

## 2021-05-11 PROCEDURE — 1125F AMNT PAIN NOTED PAIN PRSNT: CPT | Mod: S$GLB,,, | Performed by: SURGERY

## 2021-05-24 ENCOUNTER — OFFICE VISIT (OUTPATIENT)
Dept: FAMILY MEDICINE | Facility: CLINIC | Age: 66
End: 2021-05-24
Payer: COMMERCIAL

## 2021-05-24 ENCOUNTER — PATIENT MESSAGE (OUTPATIENT)
Dept: FAMILY MEDICINE | Facility: CLINIC | Age: 66
End: 2021-05-24

## 2021-05-24 ENCOUNTER — HOSPITAL ENCOUNTER (OUTPATIENT)
Dept: RADIOLOGY | Facility: HOSPITAL | Age: 66
Discharge: HOME | End: 2021-05-24
Attending: INTERNAL MEDICINE
Payer: COMMERCIAL

## 2021-05-24 VITALS
SYSTOLIC BLOOD PRESSURE: 128 MMHG | BODY MASS INDEX: 25.95 KG/M2 | TEMPERATURE: 98 F | HEART RATE: 67 BPM | DIASTOLIC BLOOD PRESSURE: 70 MMHG | HEIGHT: 64 IN | OXYGEN SATURATION: 97 % | WEIGHT: 152 LBS

## 2021-05-24 DIAGNOSIS — R07.81 RIB PAIN ON RIGHT SIDE: ICD-10-CM

## 2021-05-24 DIAGNOSIS — R10.9 RIGHT SIDED ABDOMINAL PAIN: Primary | ICD-10-CM

## 2021-05-24 DIAGNOSIS — M53.3 COCCYGODYNIA: ICD-10-CM

## 2021-05-24 DIAGNOSIS — R10.9 RIGHT SIDED ABDOMINAL PAIN: ICD-10-CM

## 2021-05-24 DIAGNOSIS — G89.29 CHRONIC LEFT-SIDED LOW BACK PAIN WITHOUT SCIATICA: Primary | ICD-10-CM

## 2021-05-24 DIAGNOSIS — M54.50 CHRONIC LEFT-SIDED LOW BACK PAIN WITHOUT SCIATICA: Primary | ICD-10-CM

## 2021-05-24 PROBLEM — R93.89 ABNORMAL PELVIC ULTRASOUND: Status: RESOLVED | Noted: 2020-03-08 | Resolved: 2021-05-24

## 2021-05-24 PROCEDURE — 99999 PR PBB SHADOW E&M-EST. PATIENT-LVL V: CPT | Mod: PBBFAC,,, | Performed by: INTERNAL MEDICINE

## 2021-05-24 PROCEDURE — 3008F BODY MASS INDEX DOCD: CPT | Mod: CPTII,S$GLB,, | Performed by: INTERNAL MEDICINE

## 2021-05-24 PROCEDURE — 72070 X-RAY EXAM THORAC SPINE 2VWS: CPT | Mod: 26,,, | Performed by: RADIOLOGY

## 2021-05-24 PROCEDURE — 99214 OFFICE O/P EST MOD 30 MIN: CPT | Mod: S$GLB,,, | Performed by: INTERNAL MEDICINE

## 2021-05-24 PROCEDURE — 99214 PR OFFICE/OUTPT VISIT, EST, LEVL IV, 30-39 MIN: ICD-10-PCS | Mod: S$GLB,,, | Performed by: INTERNAL MEDICINE

## 2021-05-24 PROCEDURE — 72070 XR THORACIC SPINE AP LATERAL: ICD-10-PCS | Mod: 26,,, | Performed by: RADIOLOGY

## 2021-05-24 PROCEDURE — 72070 X-RAY EXAM THORAC SPINE 2VWS: CPT | Mod: TC,FY,PO

## 2021-05-24 PROCEDURE — 99999 PR PBB SHADOW E&M-EST. PATIENT-LVL V: ICD-10-PCS | Mod: PBBFAC,,, | Performed by: INTERNAL MEDICINE

## 2021-05-24 PROCEDURE — 71100 X-RAY EXAM RIBS UNI 2 VIEWS: CPT | Mod: 26,RT,, | Performed by: RADIOLOGY

## 2021-05-24 PROCEDURE — 3008F PR BODY MASS INDEX (BMI) DOCUMENTED: ICD-10-PCS | Mod: CPTII,S$GLB,, | Performed by: INTERNAL MEDICINE

## 2021-05-24 PROCEDURE — 1125F AMNT PAIN NOTED PAIN PRSNT: CPT | Mod: S$GLB,,, | Performed by: INTERNAL MEDICINE

## 2021-05-24 PROCEDURE — 71100 X-RAY EXAM RIBS UNI 2 VIEWS: CPT | Mod: TC,FY,PO,RT

## 2021-05-24 PROCEDURE — 71100 XR RIBS 2 VIEW RIGHT: ICD-10-PCS | Mod: 26,RT,, | Performed by: RADIOLOGY

## 2021-05-24 PROCEDURE — 1125F PR PAIN SEVERITY QUANTIFIED, PAIN PRESENT: ICD-10-PCS | Mod: S$GLB,,, | Performed by: INTERNAL MEDICINE

## 2021-05-24 RX ORDER — IBUPROFEN 600 MG/1
TABLET ORAL
Qty: 90 TABLET | Refills: 0 | Status: SHIPPED | OUTPATIENT
Start: 2021-05-24 | End: 2021-07-11

## 2021-05-25 ENCOUNTER — PATIENT MESSAGE (OUTPATIENT)
Dept: FAMILY MEDICINE | Facility: CLINIC | Age: 66
End: 2021-05-25

## 2021-05-25 RX ORDER — CYCLOBENZAPRINE HCL 5 MG
5 TABLET ORAL DAILY PRN
Qty: 90 TABLET | Refills: 1 | Status: SHIPPED | OUTPATIENT
Start: 2021-05-25 | End: 2022-01-03

## 2021-06-03 ENCOUNTER — DOCUMENTATION ONLY (OUTPATIENT)
Dept: REHABILITATION | Facility: HOSPITAL | Age: 66
End: 2021-06-03
Payer: COMMERCIAL

## 2021-06-04 ENCOUNTER — DOCUMENTATION ONLY (OUTPATIENT)
Dept: REHABILITATION | Facility: OTHER | Age: 66
End: 2021-06-04

## 2021-06-10 ENCOUNTER — PATIENT MESSAGE (OUTPATIENT)
Dept: REHABILITATION | Facility: HOSPITAL | Age: 66
End: 2021-06-10

## 2021-06-10 ENCOUNTER — DOCUMENTATION ONLY (OUTPATIENT)
Dept: REHABILITATION | Facility: HOSPITAL | Age: 66
End: 2021-06-10
Payer: COMMERCIAL

## 2021-06-17 ENCOUNTER — DOCUMENTATION ONLY (OUTPATIENT)
Dept: REHABILITATION | Facility: HOSPITAL | Age: 66
End: 2021-06-17
Payer: COMMERCIAL

## 2021-07-08 ENCOUNTER — DOCUMENTATION ONLY (OUTPATIENT)
Dept: REHABILITATION | Facility: HOSPITAL | Age: 66
End: 2021-07-08
Payer: COMMERCIAL

## 2021-07-15 ENCOUNTER — DOCUMENTATION ONLY (OUTPATIENT)
Dept: REHABILITATION | Facility: HOSPITAL | Age: 66
End: 2021-07-15
Payer: COMMERCIAL

## 2021-07-29 ENCOUNTER — DOCUMENTATION ONLY (OUTPATIENT)
Dept: REHABILITATION | Facility: HOSPITAL | Age: 66
End: 2021-07-29
Payer: COMMERCIAL

## 2021-08-03 ENCOUNTER — DOCUMENTATION ONLY (OUTPATIENT)
Dept: REHABILITATION | Facility: HOSPITAL | Age: 66
End: 2021-08-03
Payer: COMMERCIAL

## 2021-08-24 ENCOUNTER — DOCUMENTATION ONLY (OUTPATIENT)
Dept: REHABILITATION | Facility: HOSPITAL | Age: 66
End: 2021-08-24
Payer: COMMERCIAL

## 2021-09-08 ENCOUNTER — TELEPHONE (OUTPATIENT)
Dept: FAMILY MEDICINE | Facility: CLINIC | Age: 66
End: 2021-09-08

## 2021-09-12 ENCOUNTER — NURSE TRIAGE (OUTPATIENT)
Dept: ADMINISTRATIVE | Facility: CLINIC | Age: 66
End: 2021-09-12

## 2021-09-12 ENCOUNTER — PATIENT MESSAGE (OUTPATIENT)
Dept: UROGYNECOLOGY | Facility: CLINIC | Age: 66
End: 2021-09-12

## 2021-09-12 ENCOUNTER — PATIENT MESSAGE (OUTPATIENT)
Dept: FAMILY MEDICINE | Facility: CLINIC | Age: 66
End: 2021-09-12

## 2021-09-12 DIAGNOSIS — R30.0 DYSURIA: Primary | ICD-10-CM

## 2021-09-12 RX ORDER — CIPROFLOXACIN 500 MG/1
500 TABLET ORAL 2 TIMES DAILY
Qty: 6 TABLET | Refills: 0 | Status: SHIPPED | OUTPATIENT
Start: 2021-09-12 | End: 2021-09-15

## 2021-09-14 ENCOUNTER — LAB VISIT (OUTPATIENT)
Dept: LAB | Facility: HOSPITAL | Age: 66
End: 2021-09-14
Attending: INTERNAL MEDICINE
Payer: COMMERCIAL

## 2021-09-14 ENCOUNTER — TELEPHONE (OUTPATIENT)
Dept: UROGYNECOLOGY | Facility: CLINIC | Age: 66
End: 2021-09-14

## 2021-09-14 ENCOUNTER — OFFICE VISIT (OUTPATIENT)
Dept: FAMILY MEDICINE | Facility: CLINIC | Age: 66
End: 2021-09-14
Payer: COMMERCIAL

## 2021-09-14 ENCOUNTER — PATIENT OUTREACH (OUTPATIENT)
Dept: ADMINISTRATIVE | Facility: OTHER | Age: 66
End: 2021-09-14

## 2021-09-14 ENCOUNTER — TELEPHONE (OUTPATIENT)
Dept: REHABILITATION | Facility: HOSPITAL | Age: 66
End: 2021-09-14

## 2021-09-14 VITALS
WEIGHT: 146.19 LBS | SYSTOLIC BLOOD PRESSURE: 120 MMHG | OXYGEN SATURATION: 99 % | TEMPERATURE: 99 F | BODY MASS INDEX: 24.96 KG/M2 | DIASTOLIC BLOOD PRESSURE: 80 MMHG | HEIGHT: 64 IN | HEART RATE: 84 BPM

## 2021-09-14 DIAGNOSIS — N30.01 ACUTE CYSTITIS WITH HEMATURIA: ICD-10-CM

## 2021-09-14 DIAGNOSIS — N30.01 ACUTE CYSTITIS WITH HEMATURIA: Primary | ICD-10-CM

## 2021-09-14 DIAGNOSIS — R07.81 RIB PAIN ON RIGHT SIDE: ICD-10-CM

## 2021-09-14 LAB
BILIRUB UR QL STRIP: NEGATIVE
CLARITY UR REFRACT.AUTO: CLEAR
COLOR UR AUTO: NORMAL
GLUCOSE UR QL STRIP: NEGATIVE
HGB UR QL STRIP: NEGATIVE
KETONES UR QL STRIP: NEGATIVE
LEUKOCYTE ESTERASE UR QL STRIP: NEGATIVE
NITRITE UR QL STRIP: NEGATIVE
PH UR STRIP: 7 [PH] (ref 5–8)
PROT UR QL STRIP: NEGATIVE
SP GR UR STRIP: 1 (ref 1–1.03)
URN SPEC COLLECT METH UR: NORMAL

## 2021-09-14 PROCEDURE — 3074F SYST BP LT 130 MM HG: CPT | Mod: CPTII,S$GLB,, | Performed by: INTERNAL MEDICINE

## 2021-09-14 PROCEDURE — 3079F PR MOST RECENT DIASTOLIC BLOOD PRESSURE 80-89 MM HG: ICD-10-PCS | Mod: CPTII,S$GLB,, | Performed by: INTERNAL MEDICINE

## 2021-09-14 PROCEDURE — 1159F MED LIST DOCD IN RCRD: CPT | Mod: CPTII,S$GLB,, | Performed by: INTERNAL MEDICINE

## 2021-09-14 PROCEDURE — 99999 PR PBB SHADOW E&M-EST. PATIENT-LVL III: ICD-10-PCS | Mod: PBBFAC,,, | Performed by: INTERNAL MEDICINE

## 2021-09-14 PROCEDURE — 3079F DIAST BP 80-89 MM HG: CPT | Mod: CPTII,S$GLB,, | Performed by: INTERNAL MEDICINE

## 2021-09-14 PROCEDURE — 3008F PR BODY MASS INDEX (BMI) DOCUMENTED: ICD-10-PCS | Mod: CPTII,S$GLB,, | Performed by: INTERNAL MEDICINE

## 2021-09-14 PROCEDURE — 99214 PR OFFICE/OUTPT VISIT, EST, LEVL IV, 30-39 MIN: ICD-10-PCS | Mod: S$GLB,,, | Performed by: INTERNAL MEDICINE

## 2021-09-14 PROCEDURE — 3074F PR MOST RECENT SYSTOLIC BLOOD PRESSURE < 130 MM HG: ICD-10-PCS | Mod: CPTII,S$GLB,, | Performed by: INTERNAL MEDICINE

## 2021-09-14 PROCEDURE — 1159F PR MEDICATION LIST DOCUMENTED IN MEDICAL RECORD: ICD-10-PCS | Mod: CPTII,S$GLB,, | Performed by: INTERNAL MEDICINE

## 2021-09-14 PROCEDURE — 99999 PR PBB SHADOW E&M-EST. PATIENT-LVL III: CPT | Mod: PBBFAC,,, | Performed by: INTERNAL MEDICINE

## 2021-09-14 PROCEDURE — 87086 URINE CULTURE/COLONY COUNT: CPT | Performed by: INTERNAL MEDICINE

## 2021-09-14 PROCEDURE — 81003 URINALYSIS AUTO W/O SCOPE: CPT | Performed by: INTERNAL MEDICINE

## 2021-09-14 PROCEDURE — 3008F BODY MASS INDEX DOCD: CPT | Mod: CPTII,S$GLB,, | Performed by: INTERNAL MEDICINE

## 2021-09-14 PROCEDURE — 99214 OFFICE O/P EST MOD 30 MIN: CPT | Mod: S$GLB,,, | Performed by: INTERNAL MEDICINE

## 2021-09-15 ENCOUNTER — PATIENT MESSAGE (OUTPATIENT)
Dept: FAMILY MEDICINE | Facility: CLINIC | Age: 66
End: 2021-09-15

## 2021-09-15 LAB — BACTERIA UR CULT: NO GROWTH

## 2021-09-16 ENCOUNTER — OFFICE VISIT (OUTPATIENT)
Dept: OBSTETRICS AND GYNECOLOGY | Facility: CLINIC | Age: 66
End: 2021-09-16
Payer: COMMERCIAL

## 2021-09-16 ENCOUNTER — TELEPHONE (OUTPATIENT)
Dept: UROGYNECOLOGY | Facility: CLINIC | Age: 66
End: 2021-09-16

## 2021-09-16 VITALS
SYSTOLIC BLOOD PRESSURE: 142 MMHG | BODY MASS INDEX: 24.24 KG/M2 | DIASTOLIC BLOOD PRESSURE: 80 MMHG | HEIGHT: 64 IN | WEIGHT: 142 LBS

## 2021-09-16 DIAGNOSIS — N95.2 ATROPHIC VAGINITIS: ICD-10-CM

## 2021-09-16 DIAGNOSIS — R39.9 UTI SYMPTOMS: ICD-10-CM

## 2021-09-16 DIAGNOSIS — Z12.4 PAP SMEAR FOR CERVICAL CANCER SCREENING: ICD-10-CM

## 2021-09-16 DIAGNOSIS — Z01.411 ENCOUNTER FOR WELL WOMAN EXAM WITH ABNORMAL FINDINGS: Primary | ICD-10-CM

## 2021-09-16 PROCEDURE — 3008F BODY MASS INDEX DOCD: CPT | Mod: CPTII,S$GLB,, | Performed by: NURSE PRACTITIONER

## 2021-09-16 PROCEDURE — 3008F PR BODY MASS INDEX (BMI) DOCUMENTED: ICD-10-PCS | Mod: CPTII,S$GLB,, | Performed by: NURSE PRACTITIONER

## 2021-09-16 PROCEDURE — 87086 URINE CULTURE/COLONY COUNT: CPT | Performed by: NURSE PRACTITIONER

## 2021-09-16 PROCEDURE — 1159F PR MEDICATION LIST DOCUMENTED IN MEDICAL RECORD: ICD-10-PCS | Mod: CPTII,S$GLB,, | Performed by: NURSE PRACTITIONER

## 2021-09-16 PROCEDURE — 99396 PR PREVENTIVE VISIT,EST,40-64: ICD-10-PCS | Mod: S$GLB,,, | Performed by: NURSE PRACTITIONER

## 2021-09-16 PROCEDURE — 1159F MED LIST DOCD IN RCRD: CPT | Mod: CPTII,S$GLB,, | Performed by: NURSE PRACTITIONER

## 2021-09-16 PROCEDURE — 99396 PREV VISIT EST AGE 40-64: CPT | Mod: S$GLB,,, | Performed by: NURSE PRACTITIONER

## 2021-09-16 PROCEDURE — 99999 PR PBB SHADOW E&M-EST. PATIENT-LVL III: CPT | Mod: PBBFAC,,, | Performed by: NURSE PRACTITIONER

## 2021-09-16 PROCEDURE — 3079F DIAST BP 80-89 MM HG: CPT | Mod: CPTII,S$GLB,, | Performed by: NURSE PRACTITIONER

## 2021-09-16 PROCEDURE — 87624 HPV HI-RISK TYP POOLED RSLT: CPT | Performed by: NURSE PRACTITIONER

## 2021-09-16 PROCEDURE — 3077F PR MOST RECENT SYSTOLIC BLOOD PRESSURE >= 140 MM HG: ICD-10-PCS | Mod: CPTII,S$GLB,, | Performed by: NURSE PRACTITIONER

## 2021-09-16 PROCEDURE — 99999 PR PBB SHADOW E&M-EST. PATIENT-LVL III: ICD-10-PCS | Mod: PBBFAC,,, | Performed by: NURSE PRACTITIONER

## 2021-09-16 PROCEDURE — 3079F PR MOST RECENT DIASTOLIC BLOOD PRESSURE 80-89 MM HG: ICD-10-PCS | Mod: CPTII,S$GLB,, | Performed by: NURSE PRACTITIONER

## 2021-09-16 PROCEDURE — 88175 CYTOPATH C/V AUTO FLUID REDO: CPT | Performed by: NURSE PRACTITIONER

## 2021-09-16 PROCEDURE — 3077F SYST BP >= 140 MM HG: CPT | Mod: CPTII,S$GLB,, | Performed by: NURSE PRACTITIONER

## 2021-09-16 RX ORDER — ESTRADIOL 0.1 MG/G
CREAM VAGINAL
Qty: 42.5 G | Refills: 11 | Status: SHIPPED | OUTPATIENT
Start: 2021-09-16 | End: 2022-10-19 | Stop reason: SDUPTHER

## 2021-09-17 LAB — BACTERIA UR CULT: NO GROWTH

## 2021-09-21 ENCOUNTER — DOCUMENTATION ONLY (OUTPATIENT)
Dept: REHABILITATION | Facility: HOSPITAL | Age: 66
End: 2021-09-21
Payer: COMMERCIAL

## 2021-09-21 LAB
FINAL PATHOLOGIC DIAGNOSIS: NORMAL
HPV HR 12 DNA SPEC QL NAA+PROBE: NEGATIVE
HPV16 AG SPEC QL: NEGATIVE
HPV18 DNA SPEC QL NAA+PROBE: NEGATIVE
Lab: NORMAL

## 2021-09-24 ENCOUNTER — TELEPHONE (OUTPATIENT)
Dept: UROGYNECOLOGY | Facility: CLINIC | Age: 66
End: 2021-09-24

## 2021-09-24 ENCOUNTER — LAB VISIT (OUTPATIENT)
Dept: LAB | Facility: HOSPITAL | Age: 66
End: 2021-09-24
Attending: NURSE PRACTITIONER
Payer: COMMERCIAL

## 2021-09-24 DIAGNOSIS — R30.0 DYSURIA: ICD-10-CM

## 2021-09-24 DIAGNOSIS — R30.0 DYSURIA: Primary | ICD-10-CM

## 2021-09-24 LAB
BILIRUB UR QL STRIP: NEGATIVE
CLARITY UR REFRACT.AUTO: CLEAR
COLOR UR AUTO: COLORLESS
GLUCOSE UR QL STRIP: NEGATIVE
HGB UR QL STRIP: NEGATIVE
KETONES UR QL STRIP: NEGATIVE
LEUKOCYTE ESTERASE UR QL STRIP: NEGATIVE
NITRITE UR QL STRIP: NEGATIVE
PH UR STRIP: 8 [PH] (ref 5–8)
PROT UR QL STRIP: NEGATIVE
SP GR UR STRIP: 1 (ref 1–1.03)
URN SPEC COLLECT METH UR: ABNORMAL

## 2021-09-24 PROCEDURE — 87086 URINE CULTURE/COLONY COUNT: CPT | Performed by: NURSE PRACTITIONER

## 2021-09-24 PROCEDURE — 81003 URINALYSIS AUTO W/O SCOPE: CPT | Performed by: NURSE PRACTITIONER

## 2021-09-24 RX ORDER — NITROFURANTOIN 25; 75 MG/1; MG/1
100 CAPSULE ORAL 2 TIMES DAILY
Qty: 14 CAPSULE | Refills: 0 | Status: SHIPPED | OUTPATIENT
Start: 2021-09-24 | End: 2021-10-20

## 2021-09-26 ENCOUNTER — PATIENT MESSAGE (OUTPATIENT)
Dept: UROGYNECOLOGY | Facility: CLINIC | Age: 66
End: 2021-09-26

## 2021-09-26 LAB — BACTERIA UR CULT: NO GROWTH

## 2021-09-27 ENCOUNTER — OFFICE VISIT (OUTPATIENT)
Dept: UROGYNECOLOGY | Facility: CLINIC | Age: 66
End: 2021-09-27
Payer: COMMERCIAL

## 2021-09-27 VITALS
DIASTOLIC BLOOD PRESSURE: 74 MMHG | SYSTOLIC BLOOD PRESSURE: 118 MMHG | WEIGHT: 143.94 LBS | BODY MASS INDEX: 24.57 KG/M2 | HEIGHT: 64 IN

## 2021-09-27 DIAGNOSIS — M62.89 PELVIC FLOOR TENSION: Primary | ICD-10-CM

## 2021-09-27 DIAGNOSIS — N95.2 VAGINAL ATROPHY: ICD-10-CM

## 2021-09-27 DIAGNOSIS — N89.5 VAGINAL STENOSIS: ICD-10-CM

## 2021-09-27 PROCEDURE — 3078F PR MOST RECENT DIASTOLIC BLOOD PRESSURE < 80 MM HG: ICD-10-PCS | Mod: CPTII,S$GLB,, | Performed by: NURSE PRACTITIONER

## 2021-09-27 PROCEDURE — 1159F MED LIST DOCD IN RCRD: CPT | Mod: CPTII,S$GLB,, | Performed by: NURSE PRACTITIONER

## 2021-09-27 PROCEDURE — 1160F PR REVIEW ALL MEDS BY PRESCRIBER/CLIN PHARMACIST DOCUMENTED: ICD-10-PCS | Mod: CPTII,S$GLB,, | Performed by: NURSE PRACTITIONER

## 2021-09-27 PROCEDURE — 99214 OFFICE O/P EST MOD 30 MIN: CPT | Mod: S$GLB,,, | Performed by: NURSE PRACTITIONER

## 2021-09-27 PROCEDURE — 99999 PR PBB SHADOW E&M-EST. PATIENT-LVL IV: CPT | Mod: PBBFAC,,, | Performed by: NURSE PRACTITIONER

## 2021-09-27 PROCEDURE — 1160F RVW MEDS BY RX/DR IN RCRD: CPT | Mod: CPTII,S$GLB,, | Performed by: NURSE PRACTITIONER

## 2021-09-27 PROCEDURE — 99214 PR OFFICE/OUTPT VISIT, EST, LEVL IV, 30-39 MIN: ICD-10-PCS | Mod: S$GLB,,, | Performed by: NURSE PRACTITIONER

## 2021-09-27 PROCEDURE — 99999 PR PBB SHADOW E&M-EST. PATIENT-LVL IV: ICD-10-PCS | Mod: PBBFAC,,, | Performed by: NURSE PRACTITIONER

## 2021-09-27 PROCEDURE — 3078F DIAST BP <80 MM HG: CPT | Mod: CPTII,S$GLB,, | Performed by: NURSE PRACTITIONER

## 2021-09-27 PROCEDURE — 3008F PR BODY MASS INDEX (BMI) DOCUMENTED: ICD-10-PCS | Mod: CPTII,S$GLB,, | Performed by: NURSE PRACTITIONER

## 2021-09-27 PROCEDURE — 3074F PR MOST RECENT SYSTOLIC BLOOD PRESSURE < 130 MM HG: ICD-10-PCS | Mod: CPTII,S$GLB,, | Performed by: NURSE PRACTITIONER

## 2021-09-27 PROCEDURE — 3074F SYST BP LT 130 MM HG: CPT | Mod: CPTII,S$GLB,, | Performed by: NURSE PRACTITIONER

## 2021-09-27 PROCEDURE — 3008F BODY MASS INDEX DOCD: CPT | Mod: CPTII,S$GLB,, | Performed by: NURSE PRACTITIONER

## 2021-09-27 PROCEDURE — 1159F PR MEDICATION LIST DOCUMENTED IN MEDICAL RECORD: ICD-10-PCS | Mod: CPTII,S$GLB,, | Performed by: NURSE PRACTITIONER

## 2021-09-28 ENCOUNTER — PATIENT MESSAGE (OUTPATIENT)
Dept: UROGYNECOLOGY | Facility: CLINIC | Age: 66
End: 2021-09-28

## 2021-09-28 ENCOUNTER — DOCUMENTATION ONLY (OUTPATIENT)
Dept: REHABILITATION | Facility: HOSPITAL | Age: 66
End: 2021-09-28
Payer: COMMERCIAL

## 2021-09-30 ENCOUNTER — HOSPITAL ENCOUNTER (OUTPATIENT)
Dept: RADIOLOGY | Facility: HOSPITAL | Age: 66
Discharge: HOME | End: 2021-09-30
Attending: INTERNAL MEDICINE
Payer: COMMERCIAL

## 2021-09-30 VITALS — BODY MASS INDEX: 23.9 KG/M2 | HEIGHT: 64 IN | WEIGHT: 140 LBS

## 2021-09-30 DIAGNOSIS — Z12.31 ENCOUNTER FOR SCREENING MAMMOGRAM FOR MALIGNANT NEOPLASM OF BREAST: ICD-10-CM

## 2021-09-30 PROCEDURE — 77067 MAMMO DIGITAL SCREENING BILAT WITH TOMOSYNTHESIS_CAD: ICD-10-PCS | Mod: 26,,, | Performed by: RADIOLOGY

## 2021-09-30 PROCEDURE — 77063 BREAST TOMOSYNTHESIS BI: CPT | Mod: 26,,, | Performed by: RADIOLOGY

## 2021-09-30 PROCEDURE — 77063 MAMMO DIGITAL SCREENING BILAT WITH TOMOSYNTHESIS_CAD: ICD-10-PCS | Mod: 26,,, | Performed by: RADIOLOGY

## 2021-09-30 PROCEDURE — 77067 SCR MAMMO BI INCL CAD: CPT | Mod: TC

## 2021-09-30 PROCEDURE — 77067 SCR MAMMO BI INCL CAD: CPT | Mod: 26,,, | Performed by: RADIOLOGY

## 2021-10-04 ENCOUNTER — CLINICAL SUPPORT (OUTPATIENT)
Dept: REHABILITATION | Facility: OTHER | Age: 66
End: 2021-10-04
Attending: NURSE PRACTITIONER
Payer: COMMERCIAL

## 2021-10-04 DIAGNOSIS — M62.89 PELVIC FLOOR TENSION: ICD-10-CM

## 2021-10-04 DIAGNOSIS — R10.2 PELVIC PAIN: ICD-10-CM

## 2021-10-04 DIAGNOSIS — M62.838 MUSCLE SPASM: ICD-10-CM

## 2021-10-04 PROCEDURE — 97161 PT EVAL LOW COMPLEX 20 MIN: CPT

## 2021-10-04 PROCEDURE — 97140 MANUAL THERAPY 1/> REGIONS: CPT

## 2021-10-04 PROCEDURE — 97112 NEUROMUSCULAR REEDUCATION: CPT

## 2021-10-05 ENCOUNTER — DOCUMENTATION ONLY (OUTPATIENT)
Dept: REHABILITATION | Facility: HOSPITAL | Age: 66
End: 2021-10-05
Payer: COMMERCIAL

## 2021-10-07 ENCOUNTER — PATIENT MESSAGE (OUTPATIENT)
Dept: FAMILY MEDICINE | Facility: CLINIC | Age: 66
End: 2021-10-07

## 2021-10-19 ENCOUNTER — DOCUMENTATION ONLY (OUTPATIENT)
Dept: REHABILITATION | Facility: HOSPITAL | Age: 66
End: 2021-10-19
Payer: COMMERCIAL

## 2021-10-20 ENCOUNTER — OFFICE VISIT (OUTPATIENT)
Dept: FAMILY MEDICINE | Facility: CLINIC | Age: 66
End: 2021-10-20
Payer: COMMERCIAL

## 2021-10-20 VITALS
TEMPERATURE: 99 F | OXYGEN SATURATION: 99 % | HEIGHT: 64 IN | HEART RATE: 91 BPM | WEIGHT: 140 LBS | DIASTOLIC BLOOD PRESSURE: 68 MMHG | SYSTOLIC BLOOD PRESSURE: 108 MMHG | BODY MASS INDEX: 23.9 KG/M2

## 2021-10-20 DIAGNOSIS — Z23 FLU VACCINE NEED: ICD-10-CM

## 2021-10-20 DIAGNOSIS — M25.551 RIGHT HIP PAIN: ICD-10-CM

## 2021-10-20 DIAGNOSIS — N39.46 MIXED STRESS AND URGE URINARY INCONTINENCE: ICD-10-CM

## 2021-10-20 DIAGNOSIS — Z28.21 COVID-19 VACCINE DOSE DECLINED: ICD-10-CM

## 2021-10-20 DIAGNOSIS — Z23 NEED FOR SHINGLES VACCINE: ICD-10-CM

## 2021-10-20 DIAGNOSIS — R10.9 RIGHT FLANK PAIN: Primary | ICD-10-CM

## 2021-10-20 PROCEDURE — 3078F DIAST BP <80 MM HG: CPT | Mod: CPTII,S$GLB,, | Performed by: INTERNAL MEDICINE

## 2021-10-20 PROCEDURE — 3074F PR MOST RECENT SYSTOLIC BLOOD PRESSURE < 130 MM HG: ICD-10-PCS | Mod: CPTII,S$GLB,, | Performed by: INTERNAL MEDICINE

## 2021-10-20 PROCEDURE — 1159F PR MEDICATION LIST DOCUMENTED IN MEDICAL RECORD: ICD-10-PCS | Mod: CPTII,S$GLB,, | Performed by: INTERNAL MEDICINE

## 2021-10-20 PROCEDURE — 1160F PR REVIEW ALL MEDS BY PRESCRIBER/CLIN PHARMACIST DOCUMENTED: ICD-10-PCS | Mod: CPTII,S$GLB,, | Performed by: INTERNAL MEDICINE

## 2021-10-20 PROCEDURE — 99214 OFFICE O/P EST MOD 30 MIN: CPT | Mod: S$GLB,,, | Performed by: INTERNAL MEDICINE

## 2021-10-20 PROCEDURE — 1160F RVW MEDS BY RX/DR IN RCRD: CPT | Mod: CPTII,S$GLB,, | Performed by: INTERNAL MEDICINE

## 2021-10-20 PROCEDURE — 3074F SYST BP LT 130 MM HG: CPT | Mod: CPTII,S$GLB,, | Performed by: INTERNAL MEDICINE

## 2021-10-20 PROCEDURE — 3008F PR BODY MASS INDEX (BMI) DOCUMENTED: ICD-10-PCS | Mod: CPTII,S$GLB,, | Performed by: INTERNAL MEDICINE

## 2021-10-20 PROCEDURE — 3008F BODY MASS INDEX DOCD: CPT | Mod: CPTII,S$GLB,, | Performed by: INTERNAL MEDICINE

## 2021-10-20 PROCEDURE — 3078F PR MOST RECENT DIASTOLIC BLOOD PRESSURE < 80 MM HG: ICD-10-PCS | Mod: CPTII,S$GLB,, | Performed by: INTERNAL MEDICINE

## 2021-10-20 PROCEDURE — 99999 PR PBB SHADOW E&M-EST. PATIENT-LVL III: ICD-10-PCS | Mod: PBBFAC,,, | Performed by: INTERNAL MEDICINE

## 2021-10-20 PROCEDURE — 99214 PR OFFICE/OUTPT VISIT, EST, LEVL IV, 30-39 MIN: ICD-10-PCS | Mod: S$GLB,,, | Performed by: INTERNAL MEDICINE

## 2021-10-20 PROCEDURE — 1159F MED LIST DOCD IN RCRD: CPT | Mod: CPTII,S$GLB,, | Performed by: INTERNAL MEDICINE

## 2021-10-20 PROCEDURE — 99999 PR PBB SHADOW E&M-EST. PATIENT-LVL III: CPT | Mod: PBBFAC,,, | Performed by: INTERNAL MEDICINE

## 2021-10-20 RX ORDER — UREA 10 %
220 LOTION (ML) TOPICAL DAILY
COMMUNITY
Start: 2021-10-11

## 2021-10-21 ENCOUNTER — CLINICAL SUPPORT (OUTPATIENT)
Dept: REHABILITATION | Facility: OTHER | Age: 66
End: 2021-10-21
Attending: NURSE PRACTITIONER
Payer: COMMERCIAL

## 2021-10-21 DIAGNOSIS — M62.838 MUSCLE SPASM: Primary | ICD-10-CM

## 2021-10-21 PROCEDURE — 97140 MANUAL THERAPY 1/> REGIONS: CPT | Mod: CQ

## 2021-10-25 ENCOUNTER — CLINICAL SUPPORT (OUTPATIENT)
Dept: REHABILITATION | Facility: OTHER | Age: 66
End: 2021-10-25
Attending: NURSE PRACTITIONER
Payer: COMMERCIAL

## 2021-10-25 DIAGNOSIS — M62.838 MUSCLE SPASM: Primary | ICD-10-CM

## 2021-10-25 DIAGNOSIS — R10.2 PELVIC PAIN: ICD-10-CM

## 2021-10-25 DIAGNOSIS — R27.8 OTHER LACK OF COORDINATION: ICD-10-CM

## 2021-10-25 PROCEDURE — 97112 NEUROMUSCULAR REEDUCATION: CPT

## 2021-10-25 PROCEDURE — 97140 MANUAL THERAPY 1/> REGIONS: CPT

## 2021-10-26 ENCOUNTER — DOCUMENTATION ONLY (OUTPATIENT)
Dept: REHABILITATION | Facility: HOSPITAL | Age: 66
End: 2021-10-26
Payer: COMMERCIAL

## 2021-10-29 ENCOUNTER — PATIENT MESSAGE (OUTPATIENT)
Dept: FAMILY MEDICINE | Facility: CLINIC | Age: 66
End: 2021-10-29
Payer: COMMERCIAL

## 2021-10-29 DIAGNOSIS — R07.81 RIB PAIN: Primary | ICD-10-CM

## 2021-10-29 RX ORDER — METHYLPREDNISOLONE 4 MG/1
TABLET ORAL
Qty: 21 EACH | Refills: 0 | Status: SHIPPED | OUTPATIENT
Start: 2021-10-29 | End: 2022-10-19

## 2021-11-01 ENCOUNTER — CLINICAL SUPPORT (OUTPATIENT)
Dept: REHABILITATION | Facility: OTHER | Age: 66
End: 2021-11-01
Attending: NURSE PRACTITIONER
Payer: COMMERCIAL

## 2021-11-01 DIAGNOSIS — R10.2 PELVIC PAIN: ICD-10-CM

## 2021-11-01 DIAGNOSIS — R27.8 OTHER LACK OF COORDINATION: ICD-10-CM

## 2021-11-01 DIAGNOSIS — M62.838 MUSCLE SPASM: Primary | ICD-10-CM

## 2021-11-01 PROCEDURE — 97112 NEUROMUSCULAR REEDUCATION: CPT

## 2021-11-01 PROCEDURE — 97140 MANUAL THERAPY 1/> REGIONS: CPT

## 2021-11-01 PROCEDURE — 97110 THERAPEUTIC EXERCISES: CPT

## 2021-11-11 ENCOUNTER — DOCUMENTATION ONLY (OUTPATIENT)
Dept: REHABILITATION | Facility: HOSPITAL | Age: 66
End: 2021-11-11
Payer: COMMERCIAL

## 2021-11-11 ENCOUNTER — PATIENT OUTREACH (OUTPATIENT)
Dept: ADMINISTRATIVE | Facility: OTHER | Age: 66
End: 2021-11-11
Payer: COMMERCIAL

## 2021-11-12 ENCOUNTER — OFFICE VISIT (OUTPATIENT)
Dept: UROGYNECOLOGY | Facility: CLINIC | Age: 66
End: 2021-11-12
Payer: COMMERCIAL

## 2021-11-12 VITALS
SYSTOLIC BLOOD PRESSURE: 120 MMHG | HEIGHT: 64 IN | WEIGHT: 138.25 LBS | DIASTOLIC BLOOD PRESSURE: 70 MMHG | BODY MASS INDEX: 23.6 KG/M2

## 2021-11-12 DIAGNOSIS — N89.5 VAGINAL STENOSIS: ICD-10-CM

## 2021-11-12 DIAGNOSIS — M62.89 PELVIC FLOOR TENSION: ICD-10-CM

## 2021-11-12 DIAGNOSIS — N95.2 VAGINAL ATROPHY: Primary | ICD-10-CM

## 2021-11-12 PROCEDURE — 99999 PR PBB SHADOW E&M-EST. PATIENT-LVL IV: ICD-10-PCS | Mod: PBBFAC,,, | Performed by: NURSE PRACTITIONER

## 2021-11-12 PROCEDURE — 1160F RVW MEDS BY RX/DR IN RCRD: CPT | Mod: CPTII,S$GLB,, | Performed by: NURSE PRACTITIONER

## 2021-11-12 PROCEDURE — 3074F PR MOST RECENT SYSTOLIC BLOOD PRESSURE < 130 MM HG: ICD-10-PCS | Mod: CPTII,S$GLB,, | Performed by: NURSE PRACTITIONER

## 2021-11-12 PROCEDURE — 3074F SYST BP LT 130 MM HG: CPT | Mod: CPTII,S$GLB,, | Performed by: NURSE PRACTITIONER

## 2021-11-12 PROCEDURE — 1159F PR MEDICATION LIST DOCUMENTED IN MEDICAL RECORD: ICD-10-PCS | Mod: CPTII,S$GLB,, | Performed by: NURSE PRACTITIONER

## 2021-11-12 PROCEDURE — 99213 PR OFFICE/OUTPT VISIT, EST, LEVL III, 20-29 MIN: ICD-10-PCS | Mod: S$GLB,,, | Performed by: NURSE PRACTITIONER

## 2021-11-12 PROCEDURE — 3078F PR MOST RECENT DIASTOLIC BLOOD PRESSURE < 80 MM HG: ICD-10-PCS | Mod: CPTII,S$GLB,, | Performed by: NURSE PRACTITIONER

## 2021-11-12 PROCEDURE — 3008F PR BODY MASS INDEX (BMI) DOCUMENTED: ICD-10-PCS | Mod: CPTII,S$GLB,, | Performed by: NURSE PRACTITIONER

## 2021-11-12 PROCEDURE — 3008F BODY MASS INDEX DOCD: CPT | Mod: CPTII,S$GLB,, | Performed by: NURSE PRACTITIONER

## 2021-11-12 PROCEDURE — 99999 PR PBB SHADOW E&M-EST. PATIENT-LVL IV: CPT | Mod: PBBFAC,,, | Performed by: NURSE PRACTITIONER

## 2021-11-12 PROCEDURE — 1159F MED LIST DOCD IN RCRD: CPT | Mod: CPTII,S$GLB,, | Performed by: NURSE PRACTITIONER

## 2021-11-12 PROCEDURE — 3078F DIAST BP <80 MM HG: CPT | Mod: CPTII,S$GLB,, | Performed by: NURSE PRACTITIONER

## 2021-11-12 PROCEDURE — 1160F PR REVIEW ALL MEDS BY PRESCRIBER/CLIN PHARMACIST DOCUMENTED: ICD-10-PCS | Mod: CPTII,S$GLB,, | Performed by: NURSE PRACTITIONER

## 2021-11-12 PROCEDURE — 99213 OFFICE O/P EST LOW 20 MIN: CPT | Mod: S$GLB,,, | Performed by: NURSE PRACTITIONER

## 2021-11-15 ENCOUNTER — CLINICAL SUPPORT (OUTPATIENT)
Dept: REHABILITATION | Facility: OTHER | Age: 66
End: 2021-11-15
Attending: INTERNAL MEDICINE
Payer: COMMERCIAL

## 2021-11-15 DIAGNOSIS — M62.838 MUSCLE SPASM: Primary | ICD-10-CM

## 2021-11-15 PROCEDURE — 97140 MANUAL THERAPY 1/> REGIONS: CPT

## 2021-11-15 PROCEDURE — 97112 NEUROMUSCULAR REEDUCATION: CPT

## 2021-11-16 ENCOUNTER — DOCUMENTATION ONLY (OUTPATIENT)
Dept: REHABILITATION | Facility: HOSPITAL | Age: 66
End: 2021-11-16
Payer: COMMERCIAL

## 2021-11-16 ENCOUNTER — PATIENT MESSAGE (OUTPATIENT)
Dept: FAMILY MEDICINE | Facility: CLINIC | Age: 66
End: 2021-11-16
Payer: COMMERCIAL

## 2021-11-22 ENCOUNTER — CLINICAL SUPPORT (OUTPATIENT)
Dept: REHABILITATION | Facility: OTHER | Age: 66
End: 2021-11-22
Attending: NURSE PRACTITIONER
Payer: COMMERCIAL

## 2021-11-22 DIAGNOSIS — M62.838 MUSCLE SPASM: Primary | ICD-10-CM

## 2021-11-22 PROCEDURE — 97110 THERAPEUTIC EXERCISES: CPT

## 2021-11-22 PROCEDURE — 97140 MANUAL THERAPY 1/> REGIONS: CPT

## 2021-11-29 ENCOUNTER — CLINICAL SUPPORT (OUTPATIENT)
Dept: REHABILITATION | Facility: OTHER | Age: 66
End: 2021-11-29
Attending: NURSE PRACTITIONER
Payer: COMMERCIAL

## 2021-11-29 DIAGNOSIS — M62.838 MUSCLE SPASM: Primary | ICD-10-CM

## 2021-11-29 PROCEDURE — 97140 MANUAL THERAPY 1/> REGIONS: CPT

## 2021-11-29 PROCEDURE — 97110 THERAPEUTIC EXERCISES: CPT

## 2021-11-30 ENCOUNTER — DOCUMENTATION ONLY (OUTPATIENT)
Dept: REHABILITATION | Facility: HOSPITAL | Age: 66
End: 2021-11-30
Payer: COMMERCIAL

## 2021-12-06 ENCOUNTER — CLINICAL SUPPORT (OUTPATIENT)
Dept: REHABILITATION | Facility: OTHER | Age: 66
End: 2021-12-06
Attending: INTERNAL MEDICINE
Payer: COMMERCIAL

## 2021-12-06 DIAGNOSIS — R27.8 OTHER LACK OF COORDINATION: ICD-10-CM

## 2021-12-06 DIAGNOSIS — R10.2 PELVIC PAIN: ICD-10-CM

## 2021-12-06 DIAGNOSIS — M62.838 MUSCLE SPASM: Primary | ICD-10-CM

## 2021-12-06 PROCEDURE — 97112 NEUROMUSCULAR REEDUCATION: CPT

## 2021-12-06 PROCEDURE — 97140 MANUAL THERAPY 1/> REGIONS: CPT

## 2021-12-06 PROCEDURE — 97110 THERAPEUTIC EXERCISES: CPT

## 2021-12-07 ENCOUNTER — DOCUMENTATION ONLY (OUTPATIENT)
Dept: REHABILITATION | Facility: HOSPITAL | Age: 66
End: 2021-12-07
Payer: COMMERCIAL

## 2022-01-01 DIAGNOSIS — G89.29 CHRONIC LEFT-SIDED LOW BACK PAIN WITHOUT SCIATICA: ICD-10-CM

## 2022-01-01 DIAGNOSIS — M54.50 CHRONIC LEFT-SIDED LOW BACK PAIN WITHOUT SCIATICA: ICD-10-CM

## 2022-01-03 RX ORDER — CYCLOBENZAPRINE HCL 5 MG
TABLET ORAL
Qty: 90 TABLET | Refills: 0 | Status: SHIPPED | OUTPATIENT
Start: 2022-01-03 | End: 2022-10-19

## 2022-01-21 ENCOUNTER — OFFICE VISIT (OUTPATIENT)
Dept: URGENT CARE | Facility: CLINIC | Age: 67
End: 2022-01-21
Payer: COMMERCIAL

## 2022-01-21 ENCOUNTER — TELEPHONE (OUTPATIENT)
Dept: FAMILY MEDICINE | Facility: CLINIC | Age: 67
End: 2022-01-21
Payer: COMMERCIAL

## 2022-01-21 VITALS
TEMPERATURE: 99 F | OXYGEN SATURATION: 97 % | BODY MASS INDEX: 23.56 KG/M2 | SYSTOLIC BLOOD PRESSURE: 131 MMHG | DIASTOLIC BLOOD PRESSURE: 86 MMHG | RESPIRATION RATE: 18 BRPM | HEIGHT: 64 IN | WEIGHT: 138 LBS | HEART RATE: 77 BPM

## 2022-01-21 DIAGNOSIS — Z20.822 ENCOUNTER FOR LABORATORY TESTING FOR COVID-19 VIRUS: ICD-10-CM

## 2022-01-21 DIAGNOSIS — B09 VIRAL EXANTHEM: ICD-10-CM

## 2022-01-21 DIAGNOSIS — R21 RASH: Primary | ICD-10-CM

## 2022-01-21 DIAGNOSIS — U07.1 COVID-19 VIRUS INFECTION: ICD-10-CM

## 2022-01-21 LAB
CTP QC/QA: YES
SARS-COV-2 RDRP RESP QL NAA+PROBE: POSITIVE

## 2022-01-21 PROCEDURE — 1159F PR MEDICATION LIST DOCUMENTED IN MEDICAL RECORD: ICD-10-PCS | Mod: CPTII,S$GLB,, | Performed by: PHYSICIAN ASSISTANT

## 2022-01-21 PROCEDURE — 3075F SYST BP GE 130 - 139MM HG: CPT | Mod: CPTII,S$GLB,, | Performed by: PHYSICIAN ASSISTANT

## 2022-01-21 PROCEDURE — 99213 PR OFFICE/OUTPT VISIT, EST, LEVL III, 20-29 MIN: ICD-10-PCS | Mod: S$GLB,,, | Performed by: PHYSICIAN ASSISTANT

## 2022-01-21 PROCEDURE — 3008F PR BODY MASS INDEX (BMI) DOCUMENTED: ICD-10-PCS | Mod: CPTII,S$GLB,, | Performed by: PHYSICIAN ASSISTANT

## 2022-01-21 PROCEDURE — 99213 OFFICE O/P EST LOW 20 MIN: CPT | Mod: S$GLB,,, | Performed by: PHYSICIAN ASSISTANT

## 2022-01-21 PROCEDURE — U0002: ICD-10-PCS | Mod: QW,S$GLB,, | Performed by: PHYSICIAN ASSISTANT

## 2022-01-21 PROCEDURE — 3079F DIAST BP 80-89 MM HG: CPT | Mod: CPTII,S$GLB,, | Performed by: PHYSICIAN ASSISTANT

## 2022-01-21 PROCEDURE — U0002 COVID-19 LAB TEST NON-CDC: HCPCS | Mod: QW,S$GLB,, | Performed by: PHYSICIAN ASSISTANT

## 2022-01-21 PROCEDURE — 1160F PR REVIEW ALL MEDS BY PRESCRIBER/CLIN PHARMACIST DOCUMENTED: ICD-10-PCS | Mod: CPTII,S$GLB,, | Performed by: PHYSICIAN ASSISTANT

## 2022-01-21 PROCEDURE — 3075F PR MOST RECENT SYSTOLIC BLOOD PRESS GE 130-139MM HG: ICD-10-PCS | Mod: CPTII,S$GLB,, | Performed by: PHYSICIAN ASSISTANT

## 2022-01-21 PROCEDURE — 3008F BODY MASS INDEX DOCD: CPT | Mod: CPTII,S$GLB,, | Performed by: PHYSICIAN ASSISTANT

## 2022-01-21 PROCEDURE — 1160F RVW MEDS BY RX/DR IN RCRD: CPT | Mod: CPTII,S$GLB,, | Performed by: PHYSICIAN ASSISTANT

## 2022-01-21 PROCEDURE — 1159F MED LIST DOCD IN RCRD: CPT | Mod: CPTII,S$GLB,, | Performed by: PHYSICIAN ASSISTANT

## 2022-01-21 PROCEDURE — 3079F PR MOST RECENT DIASTOLIC BLOOD PRESSURE 80-89 MM HG: ICD-10-PCS | Mod: CPTII,S$GLB,, | Performed by: PHYSICIAN ASSISTANT

## 2022-01-21 RX ORDER — PREDNISONE 20 MG/1
40 TABLET ORAL DAILY
Qty: 10 TABLET | Refills: 0 | Status: SHIPPED | OUTPATIENT
Start: 2022-01-21 | End: 2022-01-26

## 2022-01-21 NOTE — PROGRESS NOTES
"Subjective:       Patient ID: Sheree Lomeli is a 66 y.o. female.    Vitals:  height is 5' 4" (1.626 m) and weight is 62.6 kg (138 lb). Her temperature is 98.7 °F (37.1 °C). Her blood pressure is 131/86 and her pulse is 77. Her respiration is 18 and oxygen saturation is 97%.     Chief Complaint: Rash    Patient stated her symptoms started this morning.  It is all over her body on her face, arms, back, stomach, and chest.  She was exposed to COVID 2 weeks ago and had URI symptoms including fever, chills, sinus congestion, and mild cough.  She never did get tested at that time.  Her  was her exposure and he had similar symptoms.  She says her URI symptoms have resolved and she has been asymptomatic for the past week.    She woke up this morning with the rash on her face that has since spread to her trunk, arms, and legs.  She says the rash is itchy.    Rash  This is a new problem. The current episode started today. The problem is unchanged. The affected locations include the back, face, left arm and right arm. The rash is characterized by itchiness and redness. It is unknown if there was an exposure to a precipitant. Associated symptoms include coughing. Pertinent negatives include no congestion, diarrhea, fatigue, fever, shortness of breath or vomiting. Past treatments include nothing. The treatment provided no relief. There is no history of allergies, asthma or eczema.       Constitution: Negative for chills, fatigue and fever.   HENT: Negative for congestion.    Neck: Negative for neck pain.   Cardiovascular: Negative for chest pain.   Eyes: Negative for blurred vision.   Respiratory: Positive for cough. Negative for shortness of breath.    Gastrointestinal: Negative for nausea, vomiting and diarrhea.   Musculoskeletal: Negative for pain, joint pain and joint swelling.   Skin: Positive for rash.   Allergic/Immunologic: Positive for itching.   Neurological: Negative for headaches, altered mental status and " numbness.   Psychiatric/Behavioral: Negative for altered mental status.       Objective:      Physical Exam   Constitutional: She is oriented to person, place, and time. She appears well-developed. No distress.   HENT:   Head: Normocephalic and atraumatic.   Ears:   Right Ear: Hearing, tympanic membrane, external ear and ear canal normal.   Left Ear: Hearing, tympanic membrane, external ear and ear canal normal.   Nose: Nose normal.   Mouth/Throat: Uvula is midline and oropharynx is clear and moist.   Eyes: Conjunctivae are normal.   Cardiovascular: Normal rate, regular rhythm and normal heart sounds.   No murmur heard.Exam reveals no gallop and no friction rub.   Pulmonary/Chest: Effort normal and breath sounds normal. No respiratory distress. She has no wheezes.   Musculoskeletal: Normal range of motion.         General: No tenderness. Normal range of motion.   Neurological: She is alert and oriented to person, place, and time.   Skin: Skin is warm, dry, not diaphoretic, rash (to trunk, face, neck, scalp, bilateral arms and legs) and papular.   Psychiatric: Her behavior is normal. Judgment and thought content normal.   Nursing note and vitals reviewed.        Results for orders placed or performed in visit on 01/21/22   POCT COVID-19 Rapid Screening   Result Value Ref Range    POC Rapid COVID Positive (A) Negative     Acceptable Yes        Assessment:       1. Rash    2. Encounter for laboratory testing for COVID-19 virus    3. Viral exanthem    4. COVID-19 virus infection          Plan:         Rash  -     predniSONE (DELTASONE) 20 MG tablet; Take 2 tablets (40 mg total) by mouth once daily. for 5 days  Dispense: 10 tablet; Refill: 0    Encounter for laboratory testing for COVID-19 virus  -     POCT COVID-19 Rapid Screening    Viral exanthem    COVID-19 virus infection                 Patient Instructions   You have tested POSITIVE for COVID-19 today.         ISOLATION    If you tested positive and  you have no symptoms, you must isolate for 5 days starting on the day of the positive test.     If you tested positive and have symptoms, you must isolate for 5 days starting on the day of the first symptoms, not the day of the positive test.     This is the most important part, both the CDC and the [facility] emphasize that you do not test out of isolation.     After 5 days, if your symptoms have improved and you have not had fever on day 5, you can return to the community on day 6- NO TESTING REQUIRED!  In fact, we do not retest if you were positive in the last 90 days.    After your 5 days of isolation are completed, the CDC recommends strict mask use for the first 5 days that you come out of isolation.       CDC Testing and Quarantine Guidelines for patients with exposure to a known-positive COVID-19 person:    ·  A 'close exposure' is defined as anyone who has had an exposure (masked or unmasked) to a known COVID -19 positive person (within 6 feet of someone for a cumulative total of 15 minutes or more over a 24-hour period.)    ·  Vaccinated (Have been boosted or completed the primary series of Pfizer or Moderna vaccine within the last 6 months or completed the primary series of J&J vaccine within the last 2 months) and/or had a positive test within 90 days           - do NOT need to quarantine after contact with someone who had COVID-19 unless they have symptoms.           - fully vaccinated people who have not had a positive test within 90 days, should get tested 3-5 days after their exposure, even if they don't have symptoms and wear a mask indoors in public for 10 days following exposure or until their test result is negative on day 5.  If you develop symptoms, test and quarantine.         ·   Unvaccinated, or are more than six months out from their second mRNA dose (or more than 2 months after the J&J vaccine) and not yet boosted,  and/or NOT had a positive test within 90 days and meet 'close exposure'            "- you are required by CDC guidelines to quarantine for at least 5 days from time of exposure followed by 5 days of strict masking. It is recommended, but not required to test after 5 days, unless you develop symptoms, in which case you should test at that time.  If you do decide to test at 5 days and are asymptomatic, the risk is that if you test without symptoms on Day 5, for example, and you are positive, your 5 day isolation begins on that day, and you turned your 5 day quarantine into 10 days.           - If your exposure does not meet the above definition, you can return to your normal daily activities to include social distancing, wearing a mask and frequent handwashing.    Alternatively, if a 5-day quarantine is not feasible, it is imperative that an exposed person wear a well-fitting mask at all times when around others for 10 days after exposure.         - Rest.  - Drink plenty of fluids.  - Take Tylenol and/or Ibuprofen as directed as needed for fever/pain.  Do not take more than the recommended dose.  - follow up with your PCP within the next 1-2 weeks as needed.  - You can take over the counter Day-quil/Ni-quil (or other combination medication) as directed for symptom relief.  Watch for Tylenol content if you are also using Tylenol.  You should not "double up" on medications like Tylenol or decongestants as these are both found in Day-quil.  You should not take Day-quil if you have high blood pressure or heart disease as it does have a decongestant in it.  - You must understand that you have received an Urgent Care treatment only and that you may be released before all of your medical problems are known or treated.   - You, the patient, will arrange for follow up care as instructed.   - If your condition worsens or fails to improve we recommend that you receive another evaluation at the ER immediately or contact your PCP to discuss your concerns.   - You can call [phone] or [phone] to help " schedule an appointment with the appropriate provider.    Patient Education       Viral Exanthem Discharge Instructions   About this topic   A viral exanthem is a skin rash or eruption that shows up as your body is fighting off a virus. Another reason that a rash may appear can be because of a drug you took. A virus is a kind of germ that spreads easily from one person to another. Some rashes are very specific to the kind of virus that is causing them. Others just look like blotchy red spots on the body. Often, you never find out exactly which virus causes the rash. Some rashes may be a sign of a serious illness. They may also be more serious if you are pregnant.  Care depends on what is causing your rash and what other signs are. Most viral exanthems will go away without any care. Your doctor may treat itchy or bothered skin to help make you feel better.  What care is needed at home?   · Ask your doctor what you need to do when you go home. Make sure you ask questions if you do not understand what the doctor says. This way you will know what you need to do.   · Your doctor may suggest warm oatmeal baths to help with itching. You may also need drugs to help with itching.  · Some drugs that help with itching can make you extra sleepy. Be sure not to drive or use machinery if the drugs make you tired.  · Skin moisturizers, steroid creams, or ointments may help with irritation.  · Take all drugs ordered by your doctor.  · Drink 6 to 8 glasses of water each day. If your child has a rash, ask the doctor about the best fluids for them to drink and how much they should drink.  What follow-up care is needed?   Your doctor may ask you to make visits to the office to check on your progress. Be sure to keep these visits.  When do I need to call the doctor?   · Signs of infection. These include a fever of 100.4°F (38°C) or higher, chills, pus, or redness that is moving beyond the original rash.  · Trouble breathing  · Blisters  on your skin  · Skin that starts to peel off  · Rash is not getting better or if it gets worse  Teach Back: Helping You Understand   The Teach Back Method helps you understand the information we are giving you. After you talk with the staff, tell them in your own words what you learned. This helps to make sure the staff has described each thing clearly. It also helps to explain things that may have been confusing. Before going home, make sure you can do these:  · I can tell you about my condition.  · I can tell you how to care for my rash.  · I can tell you what I will do if I have trouble breathing, blisters, or my rash is not getting better.  Where can I learn more?   American Academy of Pediatrics  https://www.healthychildren.org/English/health-issues/conditions/skin/Pages/Fifth-Disease-Parvovirus-B19.aspx   https://www.healthychildren.org/English/safety-prevention/immunizations/Pages/Chickenpox-Vaccine-What-You-Need-to-Know.aspx   KidsHealth  https://kidshealth.org/en/parents/roseola.html?ref=search   National Health Service  https://www.nhs.uk/conditions/rashes-babies-and-children/   Last Reviewed Date   2021-03-31  Consumer Information Use and Disclaimer   This information is not specific medical advice and does not replace information you receive from your health care provider. This is only a brief summary of general information. It does NOT include all information about conditions, illnesses, injuries, tests, procedures, treatments, therapies, discharge instructions or life-style choices that may apply to you. You must talk with your health care provider for complete information about your health and treatment options. This information should not be used to decide whether or not to accept your health care providers advice, instructions or recommendations. Only your health care provider has the knowledge and training to provide advice that is right for you.  Copyright   Copyright © 2021 [employer] and  its affiliates and/or licensors. All rights reserved.  Patient Education       Skin Rash Discharge Instructions   About this topic   Skin rash is also called dermatitis. Many things can cause your rash. You may have a rash if something is irritating your skin. An allergy can cause a rash and so can plants, soaps, and some kinds of metal. The doctors may not know what is causing your rash.     What care is needed at home?   · Ask your doctor what you need to do when you go home. Make sure you ask questions if you do not understand what the doctor says.  · Use an unscented cream or lotion to keep your skin moist.  · Drink plenty of fluids to keep your body hydrated.  · Bathe with cool or warm water. Do not use hot water. Pat yourself dry with a clean, thick, soft towel. Use mild and unscented soap, moisturizers, and deodorants.  · At-home care to help with scratching:  ? Wear gloves to protect skin on your hands. Try wearing cotton gloves under plastic gloves. Remove both sets of gloves from time to time to prevent sweating.  ? Keep nails short and clean.  ? If you scratch in your sleep, wear white cotton gloves to bed.  ? Try using cool compresses on the skin. They may help with swelling and itching. Dip a cloth in cold water and put it right on your itchy skin.  What follow-up care is needed?   · Your doctor may ask you to make visits to the office to check on your progress. Be sure to keep these visits.  · You may need to see a doctor who takes care of allergies or a dermatologist.  When do I need to call the doctor?   · You start to have severe trouble breathing or swallowing (for example, you cannot speak in full sentences).  · The rash spreads over large parts of your body and most of your skin becomes red.  · It is becoming hard to breathe, but you can still talk in full sentences.  · You have a fever of 100.4°F (38°C) or higher or chills.  · You have signs of a wound infection like swelling, redness, warmth,  pain, or drainage from the wound.  Teach Back: Helping You Understand   The Teach Back Method helps you understand the information we are giving you. After you talk with the staff, tell them in your own words what you learned. This helps to make sure the staff has described each thing clearly. It also helps to explain things that may have been confusing. Before going home, make sure you can do these:  · I can tell you about my condition.  · I can tell you how to care for my skin.  · I can tell you what I will do if I have a fever, trouble breathing, blisters, or my rash is not getting better.  Where can I learn more?   NHS  https://www.nhs.uk/conditions/rashes-babies-and-children/   National [place] of Arthritis and Musculoskeletal and Skin Diseases  http://www.niams.nih.gov/Health_Info/Atopic_Dermatitis/default.asp   Last Reviewed Date   2021-09-09  Consumer Information Use and Disclaimer   This information is not specific medical advice and does not replace information you receive from your health care provider. This is only a brief summary of general information. It does NOT include all information about conditions, illnesses, injuries, tests, procedures, treatments, therapies, discharge instructions or life-style choices that may apply to you. You must talk with your health care provider for complete information about your health and treatment options. This information should not be used to decide whether or not to accept your health care providers advice, instructions or recommendations. Only your health care provider has the knowledge and training to provide advice that is right for you.  Copyright   Copyright © 2021 [employer] and its affiliates and/or licensors. All rights reserved.

## 2022-01-21 NOTE — TELEPHONE ENCOUNTER
Patient can try oral benadryl, but would recommend she go to urgent care for immediate evaluation if her symptoms worsen or persist.

## 2022-01-21 NOTE — TELEPHONE ENCOUNTER
Spoke to patient. Patient accepted the soonest appointment available. She also stated that the rash has moved to her lips since she last spoke to the other staff member. Would like to know what else she can use OTC to help hold her over the weekend until her appointment on 01/24/2022 at 1 pm.

## 2022-01-21 NOTE — TELEPHONE ENCOUNTER
Spoke with patient states she woke up this morning with a rash all over her, patient states it's on her face, belly, breast, back and wrist  Patient states she was fine when she went to sleep last night.    Patient states it is little red bumps, not really itching, states it feels more like dry skin, patient states she put Eucerin lotion on it but not helping.    Patient wants to know if something can be called in or does she need to make an appointment     Please advise

## 2022-01-21 NOTE — TELEPHONE ENCOUNTER
Patient went to urgent care - tested positive for covid.  Can see patient virtually next week, if needed.

## 2022-01-21 NOTE — TELEPHONE ENCOUNTER
----- Message from Meera Simmons sent at 1/21/2022  8:53 AM CST -----  Type: Patient Call Back    Who called:self     What is the request in detail: Patient states she woke up and is covered in a rash. Would like to know if there's anything that can be prescribed to help clear this up, or if she needs to make an appt for the doctor to look at the rash.     Can the clinic reply by [facility]? Yes     Would the patient rather a call back or a response via [facility]? [facility]     Best call back number: [phone] (home)

## 2022-01-21 NOTE — PATIENT INSTRUCTIONS
You have tested POSITIVE for COVID-19 today.         ISOLATION    If you tested positive and you have no symptoms, you must isolate for 5 days starting on the day of the positive test.     If you tested positive and have symptoms, you must isolate for 5 days starting on the day of the first symptoms, not the day of the positive test.     This is the most important part, both the CDC and the [facility] emphasize that you do not test out of isolation.     After 5 days, if your symptoms have improved and you have not had fever on day 5, you can return to the community on day 6- NO TESTING REQUIRED!  In fact, we do not retest if you were positive in the last 90 days.    After your 5 days of isolation are completed, the CDC recommends strict mask use for the first 5 days that you come out of isolation.       CDC Testing and Quarantine Guidelines for patients with exposure to a known-positive COVID-19 person:    ·  A 'close exposure' is defined as anyone who has had an exposure (masked or unmasked) to a known COVID -19 positive person (within 6 feet of someone for a cumulative total of 15 minutes or more over a 24-hour period.)    ·  Vaccinated (Have been boosted or completed the primary series of Pfizer or Moderna vaccine within the last 6 months or completed the primary series of J&J vaccine within the last 2 months) and/or had a positive test within 90 days           - do NOT need to quarantine after contact with someone who had COVID-19 unless they have symptoms.           - fully vaccinated people who have not had a positive test within 90 days, should get tested 3-5 days after their exposure, even if they don't have symptoms and wear a mask indoors in public for 10 days following exposure or until their test result is negative on day 5.  If you develop symptoms, test and quarantine.         ·   Unvaccinated, or are more than six months out from their second mRNA dose (or more than 2 months after the J&J vaccine) and not  "yet boosted,  and/or NOT had a positive test within 90 days and meet 'close exposure'           - you are required by CDC guidelines to quarantine for at least 5 days from time of exposure followed by 5 days of strict masking. It is recommended, but not required to test after 5 days, unless you develop symptoms, in which case you should test at that time.  If you do decide to test at 5 days and are asymptomatic, the risk is that if you test without symptoms on Day 5, for example, and you are positive, your 5 day isolation begins on that day, and you turned your 5 day quarantine into 10 days.           - If your exposure does not meet the above definition, you can return to your normal daily activities to include social distancing, wearing a mask and frequent handwashing.    Alternatively, if a 5-day quarantine is not feasible, it is imperative that an exposed person wear a well-fitting mask at all times when around others for 10 days after exposure.         - Rest.  - Drink plenty of fluids.  - Take Tylenol and/or Ibuprofen as directed as needed for fever/pain.  Do not take more than the recommended dose.  - follow up with your PCP within the next 1-2 weeks as needed.  - You can take over the counter Day-quil/Ni-quil (or other combination medication) as directed for symptom relief.  Watch for Tylenol content if you are also using Tylenol.  You should not "double up" on medications like Tylenol or decongestants as these are both found in Day-quil.  You should not take Day-quil if you have high blood pressure or heart disease as it does have a decongestant in it.  - You must understand that you have received an Urgent Care treatment only and that you may be released before all of your medical problems are known or treated.   - You, the patient, will arrange for follow up care as instructed.   - If your condition worsens or fails to improve we recommend that you receive another evaluation at the ER immediately or " contact your PCP to discuss your concerns.   - You can call [phone] or [phone] to help schedule an appointment with the appropriate provider.    Patient Education       Viral Exanthem Discharge Instructions   About this topic   A viral exanthem is a skin rash or eruption that shows up as your body is fighting off a virus. Another reason that a rash may appear can be because of a drug you took. A virus is a kind of germ that spreads easily from one person to another. Some rashes are very specific to the kind of virus that is causing them. Others just look like blotchy red spots on the body. Often, you never find out exactly which virus causes the rash. Some rashes may be a sign of a serious illness. They may also be more serious if you are pregnant.  Care depends on what is causing your rash and what other signs are. Most viral exanthems will go away without any care. Your doctor may treat itchy or bothered skin to help make you feel better.  What care is needed at home?   · Ask your doctor what you need to do when you go home. Make sure you ask questions if you do not understand what the doctor says. This way you will know what you need to do.   · Your doctor may suggest warm oatmeal baths to help with itching. You may also need drugs to help with itching.  · Some drugs that help with itching can make you extra sleepy. Be sure not to drive or use machinery if the drugs make you tired.  · Skin moisturizers, steroid creams, or ointments may help with irritation.  · Take all drugs ordered by your doctor.  · Drink 6 to 8 glasses of water each day. If your child has a rash, ask the doctor about the best fluids for them to drink and how much they should drink.  What follow-up care is needed?   Your doctor may ask you to make visits to the office to check on your progress. Be sure to keep these visits.  When do I need to call the doctor?   · Signs of infection. These include a fever of 100.4°F (38°C) or  higher, chills, pus, or redness that is moving beyond the original rash.  · Trouble breathing  · Blisters on your skin  · Skin that starts to peel off  · Rash is not getting better or if it gets worse  Teach Back: Helping You Understand   The Teach Back Method helps you understand the information we are giving you. After you talk with the staff, tell them in your own words what you learned. This helps to make sure the staff has described each thing clearly. It also helps to explain things that may have been confusing. Before going home, make sure you can do these:  · I can tell you about my condition.  · I can tell you how to care for my rash.  · I can tell you what I will do if I have trouble breathing, blisters, or my rash is not getting better.  Where can I learn more?   American Academy of Pediatrics  https://www.healthychildren.org/English/health-issues/conditions/skin/Pages/Fifth-Disease-Parvovirus-B19.aspx   https://www.healthychildren.org/English/safety-prevention/immunizations/Pages/Chickenpox-Vaccine-What-You-Need-to-Know.aspx   KidsHealth  https://kidshealth.org/en/parents/roseola.html?ref=search   National Health Service  https://www.nhs.uk/conditions/rashes-babies-and-children/   Last Reviewed Date   2021-03-31  Consumer Information Use and Disclaimer   This information is not specific medical advice and does not replace information you receive from your health care provider. This is only a brief summary of general information. It does NOT include all information about conditions, illnesses, injuries, tests, procedures, treatments, therapies, discharge instructions or life-style choices that may apply to you. You must talk with your health care provider for complete information about your health and treatment options. This information should not be used to decide whether or not to accept your health care providers advice, instructions or recommendations. Only your health care provider has the knowledge  and training to provide advice that is right for you.  Copyright   Copyright © 2021 UpToDate, Inc. and its affiliates and/or licensors. All rights reserved.  Patient Education       Skin Rash Discharge Instructions   About this topic   Skin rash is also called dermatitis. Many things can cause your rash. You may have a rash if something is irritating your skin. An allergy can cause a rash and so can plants, soaps, and some kinds of metal. The doctors may not know what is causing your rash.     What care is needed at home?   · Ask your doctor what you need to do when you go home. Make sure you ask questions if you do not understand what the doctor says.  · Use an unscented cream or lotion to keep your skin moist.  · Drink plenty of fluids to keep your body hydrated.  · Bathe with cool or warm water. Do not use hot water. Pat yourself dry with a clean, thick, soft towel. Use mild and unscented soap, moisturizers, and deodorants.  · At-home care to help with scratching:  ? Wear gloves to protect skin on your hands. Try wearing cotton gloves under plastic gloves. Remove both sets of gloves from time to time to prevent sweating.  ? Keep nails short and clean.  ? If you scratch in your sleep, wear white cotton gloves to bed.  ? Try using cool compresses on the skin. They may help with swelling and itching. Dip a cloth in cold water and put it right on your itchy skin.  What follow-up care is needed?   · Your doctor may ask you to make visits to the office to check on your progress. Be sure to keep these visits.  · You may need to see a doctor who takes care of allergies or a dermatologist.  When do I need to call the doctor?   · You start to have severe trouble breathing or swallowing (for example, you cannot speak in full sentences).  · The rash spreads over large parts of your body and most of your skin becomes red.  · It is becoming hard to breathe, but you can still talk in full sentences.  · You have a fever of  100.4°F (38°C) or higher or chills.  · You have signs of a wound infection like swelling, redness, warmth, pain, or drainage from the wound.  Teach Back: Helping You Understand   The Teach Back Method helps you understand the information we are giving you. After you talk with the staff, tell them in your own words what you learned. This helps to make sure the staff has described each thing clearly. It also helps to explain things that may have been confusing. Before going home, make sure you can do these:  · I can tell you about my condition.  · I can tell you how to care for my skin.  · I can tell you what I will do if I have a fever, trouble breathing, blisters, or my rash is not getting better.  Where can I learn more?   NHS  https://www.nhs.uk/conditions/rashes-babies-and-children/   National [employer] of [employer]th[employer] and Musculoskeletal and Skin Diseases  http://www.niams.nih.gov/Health_Info/Atopic_Dermatitis/default.asp   Last Reviewed Date   2021-09-09  Consumer Information Use and Disclaimer   This information is not specific medical advice and does not replace information you receive from your health care provider. This is only a brief summary of general information. It does NOT include all information about conditions, illnesses, injuries, tests, procedures, treatments, therapies, discharge instructions or life-style choices that may apply to you. You must talk with your health care provider for complete information about your health and treatment options. This information should not be used to decide whether or not to accept your health care providers advice, instructions or recommendations. Only your health care provider has the knowledge and training to provide advice that is right for you.  Copyright   Copyright © 2021 UpToDate, Inc. and its affiliates and/or licensors. All rights reserved.

## 2022-04-20 DIAGNOSIS — M53.3 COCCYGODYNIA: ICD-10-CM

## 2022-04-20 RX ORDER — IBUPROFEN 600 MG/1
TABLET ORAL
Qty: 90 TABLET | Refills: 0 | Status: SHIPPED | OUTPATIENT
Start: 2022-04-20 | End: 2022-09-14

## 2022-05-11 ENCOUNTER — PATIENT MESSAGE (OUTPATIENT)
Dept: FAMILY MEDICINE | Facility: CLINIC | Age: 67
End: 2022-05-11
Payer: COMMERCIAL

## 2022-05-11 ENCOUNTER — TELEPHONE (OUTPATIENT)
Dept: FAMILY MEDICINE | Facility: CLINIC | Age: 67
End: 2022-05-11
Payer: COMMERCIAL

## 2022-05-11 NOTE — TELEPHONE ENCOUNTER
Spoke to patient and she would like to be seen for calf pain. I was able to get her in to see Np Yosvany Barr on 5/12 @ 10:00.

## 2022-08-11 NOTE — PROGRESS NOTES
"Subjective:       Patient ID: Sheree Lomeli is a 63 y.o. female.    Chief Complaint: lower back pain   HPI   Ms. Cannon is a 63 year old female with past medical history of lower back pain, b/l vitreal hemorrhages who presents for evaluation of lower back pain. Patient states that her back pain began in 2009 with an acute injuy. On chart review it seems that her back pain has been a long standing problem even before 2009. She states that sine 2009 she would have the pain for a few mos that would improve with physical therapy and NSAIDs. For the past 14 mos the pain has worsening. She was started on gabapentin by a pain docotr which caused her right ankle fire like pain which resolved once she was off. Her pain is now starting to involve her mid thoracic back. She also 2 weeks starting having gradual b/l groin and buttock pain. No morning stiffness, worse at night, the same with activity. She states that she has muscle pain in her upper arms. She was doing laundry this morning and developed tenderness. No weakness.   Shewas previosuly evaluated by Rheumatology. HLA B27 negative in 2011      Review of Systems   Constitutional: Negative for chills, fever and unexpected weight change.   HENT: Negative for mouth sores and sinus pain.    Eyes: Negative for pain and redness.   Respiratory: Negative for cough, chest tightness and shortness of breath.    Cardiovascular: Negative for chest pain and leg swelling.   Gastrointestinal: Negative for abdominal pain, constipation, diarrhea and nausea.   Endocrine: Negative for polyphagia and polyuria.   Genitourinary: Negative for dysuria and hematuria.   Musculoskeletal: Positive for arthralgias and back pain. Negative for joint swelling, neck pain and neck stiffness.   Skin: Negative for rash.   Neurological: Negative for dizziness and headaches.   Psychiatric/Behavioral: Negative for agitation and behavioral problems.         Objective:   /78   Pulse 68   Ht 5' 4" " (1.626 m)   Wt 66.7 kg (147 lb)   LMP  (LMP Unknown)   BMI 25.23 kg/m²      Physical Exam   Constitutional: She is oriented to person, place, and time and well-developed, well-nourished, and in no distress.   HENT:   Head: Normocephalic and atraumatic.   Mouth/Throat: No oropharyngeal exudate.   Eyes: Conjunctivae are normal. Pupils are equal, round, and reactive to light. Right eye exhibits no discharge. No scleral icterus.   Neck: Normal range of motion. Neck supple.   Cardiovascular: Normal rate and regular rhythm.    No murmur heard.  Pulmonary/Chest: Effort normal and breath sounds normal. She has no wheezes.   Abdominal: Soft. Bowel sounds are normal. There is no tenderness.       Right Side Rheumatological Exam     Examination finds the shoulder, elbow, wrist, knee, 1st PIP, 1st MCP, 2nd PIP, 2nd MCP, 3rd PIP, 3rd MCP, 4th PIP, 4th MCP, 5th PIP and 5th MCP normal.    Left Side Rheumatological Exam     Examination finds the shoulder, elbow, wrist, knee, 1st PIP, 1st MCP, 2nd PIP, 2nd MCP, 3rd PIP, 3rd MCP, 4th PIP, 4th MCP, 5th PIP and 5th MCP normal.      Neurological: She is alert and oriented to person, place, and time. No cranial nerve deficit.   Skin: Skin is warm and dry. No rash noted. She is not diaphoretic.     Musculoskeletal: Normal range of motion. She exhibits no edema.           Assessment:       1. Myofacial muscle pain    2. Osteoarthritis, unspecified osteoarthritis type, unspecified site            Plan:       Ms. Cannon is a 63 year old female with past medical history of lower back pain, b/l vitreal hemorrhages who presents for evaluation of lower back pain. Her pain is likely myofacial in the setting of OA of the spine  -At this time we recommend Meloxicam daily, Zanaflex daily at night, she is to discontinue her ibuprofen and flexeril.     Patient seen and discussed with Dr. Christy QUIÑONEZ in 4-6 mos     Jadyn Logan MD  Rheumatology PGY 4      Follow Instructions Provided by your Surgical Team

## 2022-08-26 DIAGNOSIS — R51.9 INTRACTABLE EPISODIC HEADACHE, UNSPECIFIED HEADACHE TYPE: ICD-10-CM

## 2022-08-26 RX ORDER — BUTALBITAL, ASPIRIN, AND CAFFEINE 325; 50; 40 MG/1; MG/1; MG/1
1 CAPSULE ORAL EVERY 4 HOURS PRN
Qty: 30 CAPSULE | Refills: 0 | Status: SHIPPED | OUTPATIENT
Start: 2022-08-26

## 2022-08-26 NOTE — TELEPHONE ENCOUNTER
No new care gaps identified.  [facility] Embedded Care Gaps. Reference number: [id number]. 8/26/2022   12:08:18 PM CDT

## 2022-08-27 ENCOUNTER — NURSE TRIAGE (OUTPATIENT)
Dept: ADMINISTRATIVE | Facility: CLINIC | Age: 67
End: 2022-08-27
Payer: COMMERCIAL

## 2022-08-27 NOTE — TELEPHONE ENCOUNTER
Reason for Disposition   [1] Pain in the upper back over the ribs (rib cage) AND [2] worsened by coughing (or clearly increases with breathing)   [1] Chest pain lasts > 5 minutes AND [2] occurred > 3 days ago (72 hours) AND [3] NO chest pain or cardiac symptoms now    Additional Information   Negative: Passed out (i.e., lost consciousness, collapsed and was not responding)   Negative: Shock suspected (e.g., cold/pale/clammy skin, too weak to stand, low BP, rapid pulse)   Negative: Sounds like a life-threatening emergency to the triager   Negative: Major injury to the back (e.g., MVA, fall > 10 feet or 3 meters, penetrating injury, etc.)   Negative: Followed a tailbone injury   Negative: [1] Pain in the upper back over the ribs (rib cage) AND [2] radiates (travels, goes) into chest   Negative: SEVERE difficulty breathing (e.g., struggling for each breath, speaks in single words)   Negative: Difficult to awaken or acting confused (e.g., disoriented, slurred speech)   Negative: Shock suspected (e.g., cold/pale/clammy skin, too weak to stand, low BP, rapid pulse)   Negative: Passed out (i.e., fainted, collapsed and was not responding)   Negative: [1] Chest pain lasts > 5 minutes AND [2] age > 44   Negative: [1] Chest pain lasts > 5 minutes AND [2] age > 30 AND [3] one or more cardiac risk factors (e.g., diabetes, high blood pressure, high cholesterol, smoker, or strong family history of heart disease)   Negative: [1] Chest pain lasts > 5 minutes AND [2] history of heart disease (i.e., angina, heart attack, heart failure, bypass surgery, takes nitroglycerin)   Negative: [1] Chest pain lasts > 5 minutes AND [2] described as crushing, pressure-like, or heavy   Negative: Heart beating < 50 beats per minute OR > 140 beats per minute   Negative: Visible sweat on face or sweat dripping down face   Negative: Sounds like a life-threatening emergency to the triager   Negative: Followed a chest injury   Negative: SEVERE chest  "pain   Negative: [1] Chest pain (or "angina") comes and goes AND [2] is happening more often (increasing in frequency) or getting worse (increasing in severity) (Exception: chest pains that last only a few seconds)   Negative: Pain also in shoulder(s) or arm(s) or jaw (Exception: pain is clearly made worse by movement)   Negative: Difficulty breathing   Negative: Dizziness or lightheadedness   Negative: Coughing up blood   Negative: Cocaine use within last 3 days   Negative: Major surgery in past month   Negative: Hip or leg fracture (broken bone) in past month (or had cast on leg or ankle in past month)   Negative: Illness requiring prolonged bedrest in past month (e.g., immobilization, long hospital stay)   Negative: Long-distance travel in past month (e.g., car, bus, train, plane; with trip lasting 6 or more hours)   Negative: History of prior "blood clot" in leg or lungs (i.e., deep vein thrombosis, pulmonary embolism)   Negative: History of inherited increased risk of blood clots (e.g., Factor 5 Leiden, Anti-thrombin 3, Protein C or Protein S deficiency, Prothrombin mutation)   Negative: Cancer treatment in past six months (or has cancer now)   Negative: [1] Chest pain lasts > 5 minutes AND [2] occurred in past 3 days (72 hours) (Exception: feels exactly the same as previously diagnosed heartburn and has accompanying sour taste in mouth)   Negative: Taking a deep breath makes pain worse   Negative: Patient sounds very sick or weak to the triager    Protocols used: Back Pain-A-AH, Chest Pain-A-AH  Pt called re stomach pain, back pain, nausea, low grade headache, part of her back is sensitive to touch on each side of spine.  pt states she has lived with back pain x 12 years. pt states pain is different than usual back pain-upper back pain, denies CP, no SOB. sx ongoing x 4 days. tried to make appt no appt till 10/3. afeb. rates pain =5-6. constant pain. Rec to see dr within 24 hours. Pt agrees. Offered protocol " advice. Call back with questions

## 2022-08-28 ENCOUNTER — OFFICE VISIT (OUTPATIENT)
Dept: URGENT CARE | Facility: CLINIC | Age: 67
End: 2022-08-28
Payer: COMMERCIAL

## 2022-08-28 VITALS
DIASTOLIC BLOOD PRESSURE: 96 MMHG | RESPIRATION RATE: 18 BRPM | BODY MASS INDEX: 23.56 KG/M2 | WEIGHT: 138 LBS | OXYGEN SATURATION: 98 % | HEIGHT: 64 IN | SYSTOLIC BLOOD PRESSURE: 155 MMHG | TEMPERATURE: 98 F | HEART RATE: 95 BPM

## 2022-08-28 DIAGNOSIS — M54.6 ACUTE MIDLINE THORACIC BACK PAIN: Primary | ICD-10-CM

## 2022-08-28 PROCEDURE — 3008F PR BODY MASS INDEX (BMI) DOCUMENTED: ICD-10-PCS | Mod: CPTII,S$GLB,, | Performed by: FAMILY MEDICINE

## 2022-08-28 PROCEDURE — 3008F BODY MASS INDEX DOCD: CPT | Mod: CPTII,S$GLB,, | Performed by: FAMILY MEDICINE

## 2022-08-28 PROCEDURE — 3077F SYST BP >= 140 MM HG: CPT | Mod: CPTII,S$GLB,, | Performed by: FAMILY MEDICINE

## 2022-08-28 PROCEDURE — 96372 PR INJECTION,THERAP/PROPH/DIAG2ST, IM OR SUBCUT: ICD-10-PCS | Mod: S$GLB,,, | Performed by: FAMILY MEDICINE

## 2022-08-28 PROCEDURE — 1159F PR MEDICATION LIST DOCUMENTED IN MEDICAL RECORD: ICD-10-PCS | Mod: CPTII,S$GLB,, | Performed by: FAMILY MEDICINE

## 2022-08-28 PROCEDURE — 1159F MED LIST DOCD IN RCRD: CPT | Mod: CPTII,S$GLB,, | Performed by: FAMILY MEDICINE

## 2022-08-28 PROCEDURE — 3080F DIAST BP >= 90 MM HG: CPT | Mod: CPTII,S$GLB,, | Performed by: FAMILY MEDICINE

## 2022-08-28 PROCEDURE — 3077F PR MOST RECENT SYSTOLIC BLOOD PRESSURE >= 140 MM HG: ICD-10-PCS | Mod: CPTII,S$GLB,, | Performed by: FAMILY MEDICINE

## 2022-08-28 PROCEDURE — 99214 OFFICE O/P EST MOD 30 MIN: CPT | Mod: 25,S$GLB,, | Performed by: FAMILY MEDICINE

## 2022-08-28 PROCEDURE — 1160F PR REVIEW ALL MEDS BY PRESCRIBER/CLIN PHARMACIST DOCUMENTED: ICD-10-PCS | Mod: CPTII,S$GLB,, | Performed by: FAMILY MEDICINE

## 2022-08-28 PROCEDURE — 96372 THER/PROPH/DIAG INJ SC/IM: CPT | Mod: S$GLB,,, | Performed by: FAMILY MEDICINE

## 2022-08-28 PROCEDURE — 1160F RVW MEDS BY RX/DR IN RCRD: CPT | Mod: CPTII,S$GLB,, | Performed by: FAMILY MEDICINE

## 2022-08-28 PROCEDURE — 99214 PR OFFICE/OUTPT VISIT, EST, LEVL IV, 30-39 MIN: ICD-10-PCS | Mod: 25,S$GLB,, | Performed by: FAMILY MEDICINE

## 2022-08-28 PROCEDURE — 3080F PR MOST RECENT DIASTOLIC BLOOD PRESSURE >= 90 MM HG: ICD-10-PCS | Mod: CPTII,S$GLB,, | Performed by: FAMILY MEDICINE

## 2022-08-28 RX ORDER — KETOROLAC TROMETHAMINE 30 MG/ML
15 INJECTION, SOLUTION INTRAMUSCULAR; INTRAVENOUS
Status: COMPLETED | OUTPATIENT
Start: 2022-08-28 | End: 2022-08-28

## 2022-08-28 RX ORDER — CYCLOBENZAPRINE HCL 10 MG
10 TABLET ORAL 3 TIMES DAILY PRN
Qty: 30 TABLET | Refills: 0 | Status: SHIPPED | OUTPATIENT
Start: 2022-08-28 | End: 2022-09-07

## 2022-08-28 RX ORDER — LIDOCAINE 50 MG/G
1 PATCH TOPICAL DAILY
Qty: 7 PATCH | Refills: 0 | Status: SHIPPED | OUTPATIENT
Start: 2022-08-28 | End: 2022-09-04

## 2022-08-28 RX ADMIN — KETOROLAC TROMETHAMINE 15 MG: 30 INJECTION, SOLUTION INTRAMUSCULAR; INTRAVENOUS at 10:08

## 2022-08-28 NOTE — PROGRESS NOTES
"Subjective:       Patient ID: Sheree Lomeli is a 67 y.o. female.    Vitals:  height is 5' 4" (1.626 m) and weight is 62.6 kg (138 lb). Her tympanic temperature is 98.2 °F (36.8 °C). Her blood pressure is 155/96 (abnormal) and her pulse is 95. Her respiration is 18 and oxygen saturation is 98%.     Chief Complaint: Back Pain    Pt states that she is having nausea , headaches and upper back pain for about a week now. Pt states that her back pain is tender to touch and she is having abdominal pain as well . Pt denies any urinary symptoms. Pt is taking motrin , sudafed and tylenol.  Provider note begins below:  Pt with hx of chronic lower back pain, she has hx of cholecystectomy 2 years ago. She reports for the past week she is having mid back pain that is very tender to touch, different than her usual back pain. She also says she started with nausea and upper abd pain and slight headache this has been going on for the past week as well. She denies any injury or trauma, she says she has tried multiple things at home and having no relief. Denies  complaints, denies constipation, lbm this morning, normal for her, she is passing gas, she has hx of left ovary removed. States, "I have this back pain, pushing into my stomach, the tenderness in the back, it feels like the pain is coming thru to the stomach." She says she slept better the past 2 nights than she did the previous this week. Reports she called on call and advised to come to .     Back Pain  This is a new problem. The current episode started in the past 7 days. The problem occurs constantly. The problem is unchanged. The quality of the pain is described as aching. The pain does not radiate. The pain is at a severity of 5/10. The pain is moderate. Associated symptoms include abdominal pain and headaches. Pertinent negatives include no bladder incontinence, bowel incontinence, chest pain, dysuria, fever, leg pain, numbness, paresis, paresthesias, pelvic pain, " perianal numbness, tingling, weakness or weight loss. She has tried NSAIDs for the symptoms. The treatment provided no relief.     Constitution: Negative for activity change, appetite change, chills, sweating, fatigue, fever, unexpected weight change and generalized weakness.   Cardiovascular:  Negative for chest trauma, chest pain, leg swelling, palpitations, sob on exertion and passing out.   Gastrointestinal:  Positive for abdominal pain, abdominal bloating (felt bloated after eating last night, resolved.), history of abdominal surgery and nausea (worse with exacerbation of back pain). Negative for abdominal trauma, vomiting, constipation, diarrhea, bright red blood in stool, dark colored stools, rectal bleeding, rectal pain, hemorrhoids, heartburn and bowel incontinence.   Genitourinary:  Negative for dysuria, bladder incontinence and pelvic pain.   Musculoskeletal:  Positive for pain, arthritis, back pain and history of spine disorder. Negative for trauma, joint pain, joint swelling, muscle cramps and muscle ache.   Neurological:  Positive for headaches. Negative for numbness.     Objective:      Physical Exam   Constitutional: She is oriented to person, place, and time. She appears well-developed.  Non-toxic appearance. She does not appear ill. No distress.   HENT:   Head: Normocephalic and atraumatic.   Ears:   Right Ear: External ear normal.   Left Ear: External ear normal.   Nose: Nose normal.   Mouth/Throat: Oropharynx is clear and moist.   Eyes: Conjunctivae, EOM and lids are normal. Pupils are equal, round, and reactive to light.   Neck: Trachea normal and phonation normal. Neck supple.   Abdominal: Bowel sounds are normal. Soft. flat abdomen There is abdominal tenderness (no guarding, just notes uncomfortable) in the left lower quadrant. There is no rebound, no guarding, no tenderness at McBurney's point, negative Mohamud's sign, no left CVA tenderness, negative Rovsing's sign, negative psoas sign, no  right CVA tenderness and negative obturator sign.      Comments: Patient able to transition from heel to toe without pain or grimacing. No sign of acute abdomen at this time, ambulatory without grimacing, Abdomen is soft there is llq tenderness present.  No pulsatile masses.     Musculoskeletal: Normal range of motion.         General: Normal range of motion.      Thoracic back: She exhibits tenderness and spasm. She exhibits normal range of motion, no bony tenderness, no swelling, no edema, no deformity and no laceration.        Back:    Neurological: She is alert and oriented to person, place, and time.   Skin: Skin is warm, dry, intact and not diaphoretic.   Psychiatric: Her speech is normal and behavior is normal. Judgment and thought content normal.   Nursing note and vitals reviewed.      Assessment:       1. Acute midline thoracic back pain          Plan:       Sched pt for fu with Dr. Skyler kaur at 0740  Well appearing today, non toxic  Unsure cause of pain, will try for muscle spasms, close fu tomorrow sched for her  Try anti-inflammatories, Toradol today, heating packs, icy Hot and Biofreeze.  Gentle stretching.    Discussed results/diagnosis/plan with patient in clinic. Strict precautions given to patient to monitor for worsening signs and symptoms. Advised to follow up with PCP or specialist.    Explained side effects of medications prescribed with patient and informed him/her to discontinue use if he/she has any side effects and to inform UC or PCP if this occurs. All questions answered. Strict ED verses clinic return precautions stressed and given in depth. Advised if symptoms worsens of fail to improve he/she should go to the Emergency Room. Discharge and follow-up instructions given verbally/printed with the patient who expressed understanding and willingness to comply with my recommendations. Patient voiced understanding and in agreement with current treatment plan. Patient exits the exam room in no  acute distress. Conversant and engaged during discharge discussion, verbalized understanding.      30 minutes spent on patient's encounter. This includes face to face time and non-face to face time preparing to see the patient (eg, review of tests), obtaining and/or reviewing separately obtained history, documenting clinical information in the electronic or other health record, independently interpreting results and communicating results to the patient/family/caregiver, or care coordinator.    Acute midline thoracic back pain  -     ketorolac injection 15 mg  -     cyclobenzaprine (FLEXERIL) 10 MG tablet; Take 1 tablet (10 mg total) by mouth 3 (three) times daily as needed for Muscle spasms.  Dispense: 30 tablet; Refill: 0  -     LIDOcaine (LIDODERM) 5 %; Place 1 patch onto the skin once daily. Remove & Discard patch within 12 hours or as directed by MD for 7 days  Dispense: 7 patch; Refill: 0               Patient Instructions   General Discharge Instructions   PLEASE READ YOUR DISCHARGE INSTRUCTIONS ENTIRELY AS IT CONTAINS IMPORTANT INFORMATION.  If you were prescribed a narcotic or controlled medication, do not drive or operate heavy equipment or machinery while taking these medications.  If you were prescribed antibiotics, please take them to completion.  You must understand that you've received an Urgent Care treatment only and that you may be released before all your medical problems are known or treated. You, the patient, will arrange for follow up care as instructed.    OVER THE COUNTER RECOMMENDATIONS/SUGGESTIONS.    Make sure to stay well hydrated.    Use Nasal Saline to mechanically move any post nasal drip from your eustachian tube or from the back of your throat.    Use warm salt water gargles to ease your throat pain. Warm salt water gargles as needed for sore throat- 1/2 tsp salt to 1 cup warm water, gargle as desired.    Use an antihistamine such as Claritin, Zyrtec or Allegra to dry you out.    Use  pseudoephedrine (behind the counter) to decongest. Pseudoephedrine 30 mg up to 240 mg /day. It can raise your blood pressure and give you palpitations.    Use mucinex (guaifenesin) to break up mucous up to 2400mg/day to loosen any mucous.    The mucinex DM pill has a cough suppressant that can be sedating. It can be used at night to stop the tickle at the back of your throat.    You can use Mucinex D (it has guaifenesin and a high dose of pseudoephedrine) in the mornings to help decongest.    Use Afrin in each nare for no longer than 3 days, as it is addictive. It can also dry out your mucous membranes and cause elevated blood pressure. This is especially useful if you are flying.    Use Flonase 1-2 sprays/nostril per day. It is a local acting steroid nasal spray, if you develop a bloody nose, stop using the medication immediately.    Sometimes Nyquil at night is beneficial to help you get some rest, however it is sedating and it does have an antihistamine, and tylenol.    Honey is a natural cough suppressant that can be used.    Tylenol up to 4,000 mg a day is safe for short periods and can be used for body aches, pain, and fever. However in high doses and prolonged use it can cause liver irritation.    Ibuprofen is a non-steroidal anti-inflammatory that can be used for body aches, pain, and fever.However it can also cause stomach irritation if over used.     Follow up with your PCP or specialty clinic as instructed in the next 2-3 days if not improved or as needed. You can call [phone] to schedule an appointment with appropriate provider.      If you condition worsens, we recommend that you receive another evaluation at the emergency room immediately or contact your primary medical clinic's after hours call service to discuss your concerns.      Please return here or go to the Emergency Department for any concerns or worsening condition.   You can also call [phone] to schedule an appointment with the  appropriate provider.    Please return here or go to the Emergency Department for any concerns or worsening of condition.    Thank you for choosing [facility]!    Our goal in the Urgent Care is to always provide outstanding medical care. You may receive a survey by mail or e-mail in the next week regarding your experience today. We would greatly appreciate you completing and returning the survey. Your feedback provides us with a way to recognize our staff who provide very good care, and it helps us learn how to improve when your experience was below our aspiration of excellence.      We appreciate you trusting us with your medical care. We hope you feel better soon. We will be happy to take care of you for all of your future medical needs.    Sincerely,    NORMA Hines-GABBIE      Back Pain Discharge Instructions    Alternate heat and ice for first 48 hours then  apply heat. You may do gently stretching if tolerable.    Moist warm compresss to area several times daily.  May use a heating pad on LOW to provide heat over a towel which was dampended with warm water. DO NOT FALL ASLEEP WITH HEATING PAD ON.  Do not stay in one position to long.  When sleeping on your back place a pillow under knees to reduce tension on back.  If sleeping on your side, place pillow between knees to keep spine in better alinement.  Wear supportive shoes such as tennis shoes for support of the lower back.  Take any medication as directed.    If you were not prescribed an anti-inflammatory medication, and if you do not have any history of stomach/intestinal ulcers, or kidney disease, or are not taking a blood thinner such as Coumadin, Plavix, Pradaxa, Eloquis, or Xaralta for example, it is OK to take over the counter Ibuprofen or Advil or Motrin or Aleve as directed.  Do not take these medications on an empty stomach.    If you were prescribed a narcotic medication, do not drive or operate heavy equipment or machinery while taking  these medications.    If you lose control of your bowel and/or bladder; lose sensation in between your legs by your genitalia and/or rectum or  lose control or sensation of any extremity, please go to the nearest Emergency Department immediately.    Flexeril Warning:   The medication you have been prescribed may cause drowsiness and impair your judgement.  Therefore, you should avoid driving, climbing, using machinery, etc., so as not to increase your risk of injury.  Do NOT drink any alcohol while on this medication(s).     You received an injection of a powerful NSAID today (Toradol).  It's effects will last up to 24 hours. Please do not take another NSAID (ie aspirin, ibuprofen, Aleve, Advil or Motrin) until this time tomorrow.  If you continue to have pain, you may take Tylenol (acetaminophen) if you are not allergic to this medication.

## 2022-08-28 NOTE — PROGRESS NOTES
Subjective:     HPI  Sheree Lomeli is a 67 y.o. female with medical diagnoses as listed in the medical history and problem list that presents for above complaint(s).      Patient went to urgent care yesterday with symptoms of abdominal pain/back pain that is sharp in nature   No trauma or recent change in physical activity  Does note some belching and pain with eating as well  No changes in her eating patterns  History of chronic constipation for which she takes Miralax  Does have a history of chronic low back pain   MRI L-spine in 2018 documented/reviewed as noted - history of seeing Pain management and Physical Therapy   She has also been going through pelvic floor therapy     Patient Care Team:  Whitney Mera MD as PCP - General (Internal Medicine)  Dorothy Lantigua LPN as Licensed Practical Nurse      PAST MEDICAL HISTORY:  Past Medical History:   Diagnosis Date    Arthritis     Back pain     Chronic pelvic pain in female     left-sided    De Quervain's tenosynovitis     left    Headache(784.0)     Migraine maybe 1-2 times per year    PONV (postoperative nausea and vomiting)     Right carpal tunnel syndrome     Urinary incontinence     occ kira       PAST SURGICAL HISTORY:  Past Surgical History:   Procedure Laterality Date    ANTERIOR VAGINAL REPAIR N/A 8/13/2020    Procedure: COLPORRHAPHY, ANTERIOR;  Surgeon: Salina Mukherjee MD;  Location: [facility] OR;  Service: OB/GYN;  Laterality: N/A;    COLONOSCOPY N/A 11/3/2020    Procedure: COLONOSCOPY;  Surgeon: Parish Pulido MD;  Location: [address]);  Service: Endoscopy;  Laterality: N/A;  colonoscopy added to EGD order    CYSTOSCOPY N/A 8/13/2020    Procedure: CYSTOSCOPY;  Surgeon: Salina Mukherjee MD;  Location: [facility] OR;  Service: OB/GYN;  Laterality: N/A;    EPIDURAL STEROID INJECTION N/A 6/12/2018    Procedure: INJECTION-STEROID-EPIDURAL;  Surgeon: Rianna Nina MD;  Location: [facility] PAIN MGT;  Service: Pain Management;  Laterality: N/A;   Coccygeal Nerve Block  15216  (0.5mg Niravam only)    *Pt wants to know if she has NO SEDATION at all, can pt drive herself home*    ESOPHAGOGASTRODUODENOSCOPY N/A 11/3/2020    Procedure: ESOPHAGOGASTRODUODENOSCOPY (EGD);  Surgeon: Parish Pulido MD;  Location: [facility] (4TH FLR);  Service: Endoscopy;  Laterality: N/A;  Schedule ASAP - call Dr. Pulido with schedule problems  10/31-covid [facility]-pt going out of town did not want earlier date-tb    HYSTEROSCOPY WITH DILATION AND CURETTAGE OF UTERUS N/A 8/13/2020    Procedure: HYSTEROSCOPY, WITH DILATION AND CURETTAGE OF UTERUS;  Surgeon: Salina Mukherjee MD;  Location: [facility];  Service: OB/GYN;  Laterality: N/A;    LAPAROSCOPIC CHOLECYSTECTOMY N/A 4/23/2021    Procedure: CHOLECYSTECTOMY, LAPAROSCOPIC;  Surgeon: Ivan Bryson MD;  Location: [facility] 2ND FLR;  Service: General;  Laterality: N/A;    OOPHORECTOMY      Left ovary       SOCIAL HISTORY:  Social History     Socioeconomic History    Marital status:     Number of children: 2   Occupational History    Occupation:      Employer: [employer]   Tobacco Use    Smoking status: Never    Smokeless tobacco: Never   Substance and Sexual Activity    Alcohol use: Never    Drug use: Never    Sexual activity: Yes     Partners: Male       FAMILY HISTORY:  Family History   Problem Relation Age of Onset    Heart disease Father         s/p stenting    Coronary artery disease Father     Prostate cancer Father     Cancer Father         prostate     Hypoglycemic Sister     Breast cancer Daughter 35        dx in September 2018, lives in [place]    No Known Problems Sister     Diabetes Maternal Uncle     Diabetes Cousin     Hypertension Neg Hx     Stroke Neg Hx     Colon cancer Neg Hx     Cervical cancer Neg Hx     Vaginal cancer Neg Hx     Endometrial cancer Neg Hx     Ovarian cancer Neg Hx        ALLERGIES AND MEDICATIONS: updated and reviewed.  Review of patient's allergies indicates:    Allergen Reactions    Dermabond [tissue glues] Dermatitis    Adhesive Rash    Shellfish containing products Rash     Patient allergic to softshell crabs specifically    Sulfa (sulfonamide antibiotics) Rash     Current Outpatient Medications   Medication Sig Dispense Refill    ascorbic acid, vitamin C, (VITAMIN C) 1000 MG tablet Take 1,000 mg by mouth once daily.      butalbital-aspirin-caffeine -40 mg (FIORINAL) -40 mg Cap Take 1 capsule by mouth every 4 (four) hours as needed. 30 capsule 0    cyclobenzaprine (FLEXERIL) 10 MG tablet Take 1 tablet (10 mg total) by mouth 3 (three) times daily as needed for Muscle spasms. 30 tablet 0    cyclobenzaprine (FLEXERIL) 5 MG tablet TAKE 1 TABLET(5 MG) BY MOUTH DAILY AS NEEDED FOR PAIN (Patient not taking: No sig reported) 90 tablet 0    dextrin (FIBER POWDER ORAL) Take by mouth.      estradioL (ESTRACE) 0.01 % (0.1 mg/gram) vaginal cream USE 0.5 GRAM VAGINALLY EVERY NIGHT FOR 2 WEEKS THEN USE VAGINALLY 2 TIMES PER WEEK THEREAFTER (Patient not taking: Reported on 8/28/2022) 42.5 g 11    ibuprofen (ADVIL,MOTRIN) 600 MG tablet TAKE 1 TABLET(600 MG) BY MOUTH EVERY 12 HOURS AS NEEDED 90 tablet 0    lactulose (CHRONULAC) 20 gram/30 mL Soln Take 30 mLs (20 g total) by mouth 2 (two) times daily. for 14 days 1000 mL 0    LIDOcaine (LIDODERM) 5 % Place 1 patch onto the skin once daily. Remove & Discard patch within 12 hours or as directed by MD for 7 days 7 patch 0    MAGNESIUM ORAL Take by mouth.      methylPREDNISolone (MEDROL DOSEPACK) 4 mg tablet use as directed (Patient not taking: Reported on 8/28/2022) 21 each 0    multivitamin (THERAGRAN) per tablet Take 1 tablet by mouth once daily.      vitamin D (VITAMIN D3) 1000 units Tab Take 1,000 Units by mouth once daily.      zinc sulfate 50 mg zinc (220 mg) Tab tablet Take 220 mg by mouth once daily.       No current facility-administered medications for this visit.         Objective:       Physical Exam  Vitals:     "08/29/22 0734   BP: 114/80   BP Location: Left arm   Patient Position: Sitting   BP Method: Medium (Manual)   Pulse: 76   Temp: 98.1 °F (36.7 °C)   TempSrc: Oral   SpO2: 97%   Weight: 67.5 kg (148 lb 14.7 oz)   Height: 5' 4" (1.626 m)    Body mass index is 25.56 kg/m².  Weight: 67.5 kg (148 lb 14.7 oz)   Height: 5' 4" (162.6 cm)   Physical Exam  Constitutional:       General: She is not in acute distress.     Appearance: She is well-developed. She is not diaphoretic.   HENT:      Head: Normocephalic and atraumatic.   Pulmonary:      Effort: Pulmonary effort is normal.   Abdominal:      General: There is no distension.      Palpations: There is no mass.      Tenderness: There is abdominal tenderness (diffusely). There is guarding. There is no rebound.      Hernia: No hernia is present.   Musculoskeletal:      Comments: Left thoracic paraspinous tenderness   Neurological:      Mental Status: She is alert.   Psychiatric:         Behavior: Behavior normal.       EXAMINATION:  MRI LUMBAR SPINE WITHOUT     CLINICAL HISTORY:  Low back pain, >6wks conservative tx, persistent-progressive sx, surgical candidate;coccyx "taibone pain" s/p reaching for something years ago; felt pop; Sacrococcygeal disorders, not elsewhere classified     TECHNIQUE:  Multiplanar, multisequence MR images were acquired from the thoracolumbar junction to the sacrum without the administration of contrast.     COMPARISON:  Lumbar radiograph 09/21/2017; MRI lumbar spine 12/10/2009     FINDINGS:  Alignment: Mild retrolisthesis of L2 on L3.     Vertebrae: Normal marrow signal. No fracture.     Discs: Mild disc height loss and desiccation throughout the lumbar spine most significant L1-2 and L2-3.     Cord: Normal.  Conus terminates at L1.  Cauda equina is within normal limits.     Degenerative findings:     T12-L1: No evidence of neural foraminal narrowing or spinal canal stenosis.     L1-L2: Mild broad-based posterior disc bulge and mild facet " arthropathy without significant spinal canal or neural foraminal narrowing.     L2-L3: Mild broad-based posterior disc bulge, moderate facet arthropathy and ligamentum flavum thickening resulting in no greater than mild spinal canal stenosis.  No significant neural foraminal narrowing noting a right perineural sleeve cyst.     L3-L4: Broad-based posterior disc bulge, left-sided annular fissure and moderate facet arthropathy resulting in no greater mild spinal canal stenosis and mild left-sided neural foraminal narrowing.     L4-L5: Broad-based posterior disc bulge, left-sided annular fissure and protrusion abutting the exiting left L4 nerve root.  There is facet arthropathy and ligamentum flavum thickening contributes to mild right and moderate left-sided neural foraminal narrowing.     L5-S1: No evidence of neural foraminal narrowing or spinal canal stenosis.     Paraspinal muscles & soft tissues: Unremarkable.     IMPRESSION:      Multilevel lumbar spondylosis most significant at L2-3 and L3-4 with no greater than mild spinal canal stenosis.     Prominent left L4-5 neural foraminal recess protrusion and annular fissure abutting the exiting left L4 root contributing to moderate neural foraminal narrowing.     Additional multilevel neural foraminal narrowing as detailed above.     Electronically signed by resident: Patric Lawton  Date:                                            04/04/2018  Time:                                           13:29     Electronically signed by: Jia Haque MD  Date:                                            04/04/2018  Time:                                           14:29           Exam Ended: 04/04/18 11:39 Last Resulted: 04/04/18 14:29       Order Details     View Encounter     Lab and Collection Details     Routing     Result History     View Encounter Conversation           Result Care Coordination      Patient Communication     Add Comments   Seen Back to Top        "      MRI Lumbar Spine Without Contrast: Patient Communication     Add Comments   Seen     External Result Report    External Result Report     Narrative & Impression    EXAMINATION:  MRI LUMBAR SPINE WITHOUT     CLINICAL HISTORY:  Low back pain, >6wks conservative tx, persistent-progressive sx, surgical candidate;coccyx "taibone pain" s/p reaching for something years ago; felt pop; Sacrococcygeal disorders, not elsewhere classified     TECHNIQUE:  Multiplanar, multisequence MR images were acquired from the thoracolumbar junction to the sacrum without the administration of contrast.     COMPARISON:  Lumbar radiograph 09/21/2017; MRI lumbar spine 12/10/2009     FINDINGS:  Alignment: Mild retrolisthesis of L2 on L3.     Vertebrae: Normal marrow signal. No fracture.     Discs: Mild disc height loss and desiccation throughout the lumbar spine most significant L1-2 and L2-3.     Cord: Normal.  Conus terminates at L1.  Cauda equina is within normal limits.     Degenerative findings:     T12-L1: No evidence of neural foraminal narrowing or spinal canal stenosis.     L1-L2: Mild broad-based posterior disc bulge and mild facet arthropathy without significant spinal canal or neural foraminal narrowing.     L2-L3: Mild broad-based posterior disc bulge, moderate facet arthropathy and ligamentum flavum thickening resulting in no greater than mild spinal canal stenosis.  No significant neural foraminal narrowing noting a right perineural sleeve cyst.     L3-L4: Broad-based posterior disc bulge, left-sided annular fissure and moderate facet arthropathy resulting in no greater mild spinal canal stenosis and mild left-sided neural foraminal narrowing.     L4-L5: Broad-based posterior disc bulge, left-sided annular fissure and protrusion abutting the exiting left L4 nerve root.  There is facet arthropathy and ligamentum flavum thickening contributes to mild right and moderate left-sided neural foraminal narrowing.     L5-S1: No " evidence of neural foraminal narrowing or spinal canal stenosis.     Paraspinal muscles & soft tissues: Unremarkable.     IMPRESSION:      Multilevel lumbar spondylosis most significant at L2-3 and L3-4 with no greater than mild spinal canal stenosis.     Prominent left L4-5 neural foraminal recess protrusion and annular fissure abutting the exiting left L4 root contributing to moderate neural foraminal narrowing.     Additional multilevel neural foraminal narrowing as detailed above.          Assessment:     1. Acute abdominal pain    2. Slow transit constipation    3. Chronic left-sided low back pain without sciatica      Plan:     Sheree was seen today for abdominal pain, back pain, headache and nausea.    Diagnoses and all orders for this visit:    Acute abdominal pain  Slow transit constipation  Considerations include bowel pathology (eg., chronic constipation) vs other GI problem  Patient with last colonoscopy in Nov 2020  Obtaining plain films confirming clinical suspicion for moderate to severe constipation, will optimize bowel regimen pending  -     Comprehensive Metabolic Panel; Future  -     CBC Auto Differential; Future  -     X-Ray Abdomen AP 1 View; Future  -     LIPASE; Future  -     lactulose (CHRONULAC) 20 gram/30 mL Soln; Take 30 mLs (20 g total) by mouth 2 (two) times daily. for 14 days    Chronic low back pain   Imaging and history of condition reviewed, managing conservatively  Unlikely etiology of present symptomatology, discussed     Health Maintenance reviewed, addressed as per orders    No follow-ups on file.      1. The patient indicates understanding of these issues and agrees with the plan. Brief care plan is updated and reviewed with the patient as applicable.   2. The patient is given an After Visit Summary that lists all medications with directions, allergies, orders placed during this encounter and follow-up instructions.   3. I have reviewed the patient's medical information  including past medical, family, and social history sections including the medications and allergies.   4. We discussed the patient's current medications. I reconciled the patient's medication list and prepared and supplied needed refills.       Ernesto Holland MD  Internal Medicine-Pediatrics

## 2022-08-28 NOTE — PATIENT INSTRUCTIONS
General Discharge Instructions   PLEASE READ YOUR DISCHARGE INSTRUCTIONS ENTIRELY AS IT CONTAINS IMPORTANT INFORMATION.  If you were prescribed a narcotic or controlled medication, do not drive or operate heavy equipment or machinery while taking these medications.  If you were prescribed antibiotics, please take them to completion.  You must understand that you've received an Urgent Care treatment only and that you may be released before all your medical problems are known or treated. You, the patient, will arrange for follow up care as instructed.    OVER THE COUNTER RECOMMENDATIONS/SUGGESTIONS.    Make sure to stay well hydrated.    Use Nasal Saline to mechanically move any post nasal drip from your eustachian tube or from the back of your throat.    Use warm salt water gargles to ease your throat pain. Warm salt water gargles as needed for sore throat- 1/2 tsp salt to 1 cup warm water, gargle as desired.    Use an antihistamine such as Claritin, Zyrtec or Allegra to dry you out.    Use pseudoephedrine (behind the counter) to decongest. Pseudoephedrine 30 mg up to 240 mg /day. It can raise your blood pressure and give you palpitations.    Use mucinex (guaifenesin) to break up mucous up to 2400mg/day to loosen any mucous.    The mucinex DM pill has a cough suppressant that can be sedating. It can be used at night to stop the tickle at the back of your throat.    You can use Mucinex D (it has guaifenesin and a high dose of pseudoephedrine) in the mornings to help decongest.    Use Afrin in each nare for no longer than 3 days, as it is addictive. It can also dry out your mucous membranes and cause elevated blood pressure. This is especially useful if you are flying.    Use Flonase 1-2 sprays/nostril per day. It is a local acting steroid nasal spray, if you develop a bloody nose, stop using the medication immediately.    Sometimes Nyquil at night is beneficial to help you get some rest, however it is sedating and it  does have an antihistamine, and tylenol.    Honey is a natural cough suppressant that can be used.    Tylenol up to 4,000 mg a day is safe for short periods and can be used for body aches, pain, and fever. However in high doses and prolonged use it can cause liver irritation.    Ibuprofen is a non-steroidal anti-inflammatory that can be used for body aches, pain, and fever.However it can also cause stomach irritation if over used.     Follow up with your PCP or specialty clinic as instructed in the next 2-3 days if not improved or as needed. You can call [phone] to schedule an appointment with appropriate provider.      If you condition worsens, we recommend that you receive another evaluation at the emergency room immediately or contact your primary medical clinic's after hours call service to discuss your concerns.      Please return here or go to the Emergency Department for any concerns or worsening condition.   You can also call [phone] to schedule an appointment with the appropriate provider.    Please return here or go to the Emergency Department for any concerns or worsening of condition.    Thank you for choosing [facility] [place]!    Our goal in the Urgent Care is to always provide outstanding medical care. You may receive a survey by mail or e-mail in the next week regarding your experience today. We would greatly appreciate you completing and returning the survey. Your feedback provides us with a way to recognize our staff who provide very good care, and it helps us learn how to improve when your experience was below our aspiration of excellence.      We appreciate you trusting us with your medical care. We hope you feel better soon. We will be happy to take care of you for all of your future medical needs.    Sincerely,    NORMA Hines-GABBIE      Back Pain Discharge Instructions    Alternate heat and ice for first 48 hours then  apply heat. You may do gently stretching if  tolerable.    Moist warm compresss to area several times daily.  May use a heating pad on LOW to provide heat over a towel which was dampended with warm water. DO NOT FALL ASLEEP WITH HEATING PAD ON.  Do not stay in one position to long.  When sleeping on your back place a pillow under knees to reduce tension on back.  If sleeping on your side, place pillow between knees to keep spine in better alinement.  Wear supportive shoes such as tennis shoes for support of the lower back.  Take any medication as directed.    If you were not prescribed an anti-inflammatory medication, and if you do not have any history of stomach/intestinal ulcers, or kidney disease, or are not taking a blood thinner such as Coumadin, Plavix, Pradaxa, Eloquis, or Xaralta for example, it is OK to take over the counter Ibuprofen or Advil or Motrin or Aleve as directed.  Do not take these medications on an empty stomach.    If you were prescribed a narcotic medication, do not drive or operate heavy equipment or machinery while taking these medications.    If you lose control of your bowel and/or bladder; lose sensation in between your legs by your genitalia and/or rectum or  lose control or sensation of any extremity, please go to the nearest Emergency Department immediately.    Flexeril Warning:   The medication you have been prescribed may cause drowsiness and impair your judgement.  Therefore, you should avoid driving, climbing, using machinery, etc., so as not to increase your risk of injury.  Do NOT drink any alcohol while on this medication(s).     You received an injection of a powerful NSAID today (Toradol).  It's effects will last up to 24 hours. Please do not take another NSAID (ie aspirin, ibuprofen, Aleve, Advil or Motrin) until this time tomorrow.  If you continue to have pain, you may take Tylenol (acetaminophen) if you are not allergic to this medication.

## 2022-08-29 ENCOUNTER — OFFICE VISIT (OUTPATIENT)
Dept: FAMILY MEDICINE | Facility: CLINIC | Age: 67
End: 2022-08-29
Payer: COMMERCIAL

## 2022-08-29 ENCOUNTER — PATIENT MESSAGE (OUTPATIENT)
Dept: FAMILY MEDICINE | Facility: CLINIC | Age: 67
End: 2022-08-29

## 2022-08-29 ENCOUNTER — HOSPITAL ENCOUNTER (OUTPATIENT)
Dept: RADIOLOGY | Facility: HOSPITAL | Age: 67
Discharge: HOME | End: 2022-08-29
Attending: INTERNAL MEDICINE
Payer: COMMERCIAL

## 2022-08-29 VITALS
SYSTOLIC BLOOD PRESSURE: 114 MMHG | TEMPERATURE: 98 F | HEIGHT: 64 IN | WEIGHT: 148.94 LBS | BODY MASS INDEX: 25.43 KG/M2 | HEART RATE: 76 BPM | OXYGEN SATURATION: 97 % | DIASTOLIC BLOOD PRESSURE: 80 MMHG

## 2022-08-29 DIAGNOSIS — R10.9 ACUTE ABDOMINAL PAIN: ICD-10-CM

## 2022-08-29 DIAGNOSIS — K59.01 SLOW TRANSIT CONSTIPATION: ICD-10-CM

## 2022-08-29 DIAGNOSIS — M54.50 CHRONIC LEFT-SIDED LOW BACK PAIN WITHOUT SCIATICA: ICD-10-CM

## 2022-08-29 DIAGNOSIS — R10.9 ACUTE ABDOMINAL PAIN: Primary | ICD-10-CM

## 2022-08-29 DIAGNOSIS — G89.29 CHRONIC LEFT-SIDED LOW BACK PAIN WITHOUT SCIATICA: ICD-10-CM

## 2022-08-29 PROCEDURE — 99214 PR OFFICE/OUTPT VISIT, EST, LEVL IV, 30-39 MIN: ICD-10-PCS | Mod: S$GLB,,, | Performed by: INTERNAL MEDICINE

## 2022-08-29 PROCEDURE — 3074F SYST BP LT 130 MM HG: CPT | Mod: CPTII,S$GLB,, | Performed by: INTERNAL MEDICINE

## 2022-08-29 PROCEDURE — 3074F PR MOST RECENT SYSTOLIC BLOOD PRESSURE < 130 MM HG: ICD-10-PCS | Mod: CPTII,S$GLB,, | Performed by: INTERNAL MEDICINE

## 2022-08-29 PROCEDURE — 74018 XR ABDOMEN AP 1 VIEW: ICD-10-PCS | Mod: 26,,, | Performed by: INTERNAL MEDICINE

## 2022-08-29 PROCEDURE — 3079F DIAST BP 80-89 MM HG: CPT | Mod: CPTII,S$GLB,, | Performed by: INTERNAL MEDICINE

## 2022-08-29 PROCEDURE — 74018 RADEX ABDOMEN 1 VIEW: CPT | Mod: 26,,, | Performed by: INTERNAL MEDICINE

## 2022-08-29 PROCEDURE — 3008F PR BODY MASS INDEX (BMI) DOCUMENTED: ICD-10-PCS | Mod: CPTII,S$GLB,, | Performed by: INTERNAL MEDICINE

## 2022-08-29 PROCEDURE — 99999 PR PBB SHADOW E&M-EST. PATIENT-LVL IV: ICD-10-PCS | Mod: PBBFAC,,, | Performed by: INTERNAL MEDICINE

## 2022-08-29 PROCEDURE — 3008F BODY MASS INDEX DOCD: CPT | Mod: CPTII,S$GLB,, | Performed by: INTERNAL MEDICINE

## 2022-08-29 PROCEDURE — 99214 OFFICE O/P EST MOD 30 MIN: CPT | Mod: S$GLB,,, | Performed by: INTERNAL MEDICINE

## 2022-08-29 PROCEDURE — 99999 PR PBB SHADOW E&M-EST. PATIENT-LVL IV: CPT | Mod: PBBFAC,,, | Performed by: INTERNAL MEDICINE

## 2022-08-29 PROCEDURE — 74018 RADEX ABDOMEN 1 VIEW: CPT | Mod: TC,FY,PO

## 2022-08-29 PROCEDURE — 3079F PR MOST RECENT DIASTOLIC BLOOD PRESSURE 80-89 MM HG: ICD-10-PCS | Mod: CPTII,S$GLB,, | Performed by: INTERNAL MEDICINE

## 2022-08-29 RX ORDER — LACTULOSE 10 G/15ML
20 SOLUTION ORAL 2 TIMES DAILY
Qty: 1000 ML | Refills: 0 | Status: SHIPPED | OUTPATIENT
Start: 2022-08-29 | End: 2022-09-12

## 2022-08-31 DIAGNOSIS — Z78.0 ASYMPTOMATIC MENOPAUSAL STATE: ICD-10-CM

## 2022-09-07 ENCOUNTER — TELEPHONE (OUTPATIENT)
Dept: FAMILY MEDICINE | Facility: CLINIC | Age: 67
End: 2022-09-07
Payer: COMMERCIAL

## 2022-09-07 NOTE — TELEPHONE ENCOUNTER
Patient was informed of lab results below. VU.          Dear Sheree Lomeli,  Your lab results are all within expected limits and no additional testing is required. Let me know if you have any questions or concerns.  Ernesto Holland MD  Internal Medicine-Pediatrics

## 2022-09-18 ENCOUNTER — PATIENT MESSAGE (OUTPATIENT)
Dept: UROGYNECOLOGY | Facility: CLINIC | Age: 67
End: 2022-09-18
Payer: COMMERCIAL

## 2022-09-18 ENCOUNTER — PATIENT MESSAGE (OUTPATIENT)
Dept: FAMILY MEDICINE | Facility: CLINIC | Age: 67
End: 2022-09-18
Payer: COMMERCIAL

## 2022-09-18 DIAGNOSIS — M94.9 DISORDER OF BONE AND CARTILAGE: ICD-10-CM

## 2022-09-18 DIAGNOSIS — R10.11 RUQ PAIN: Primary | ICD-10-CM

## 2022-09-18 DIAGNOSIS — Z12.39 ENCOUNTER FOR BREAST CANCER SCREENING OTHER THAN MAMMOGRAM: Primary | ICD-10-CM

## 2022-09-18 DIAGNOSIS — M89.9 DISORDER OF BONE AND CARTILAGE: ICD-10-CM

## 2022-09-30 ENCOUNTER — PATIENT MESSAGE (OUTPATIENT)
Dept: FAMILY MEDICINE | Facility: CLINIC | Age: 67
End: 2022-09-30
Payer: COMMERCIAL

## 2022-10-10 ENCOUNTER — PATIENT MESSAGE (OUTPATIENT)
Dept: ADMINISTRATIVE | Facility: HOSPITAL | Age: 67
End: 2022-10-10
Payer: COMMERCIAL

## 2022-10-11 ENCOUNTER — PATIENT MESSAGE (OUTPATIENT)
Dept: UROGYNECOLOGY | Facility: CLINIC | Age: 67
End: 2022-10-11
Payer: COMMERCIAL

## 2022-10-19 ENCOUNTER — OFFICE VISIT (OUTPATIENT)
Dept: UROGYNECOLOGY | Facility: CLINIC | Age: 67
End: 2022-10-19
Payer: COMMERCIAL

## 2022-10-19 VITALS — SYSTOLIC BLOOD PRESSURE: 120 MMHG | BODY MASS INDEX: 25.56 KG/M2 | DIASTOLIC BLOOD PRESSURE: 80 MMHG | HEIGHT: 64 IN

## 2022-10-19 DIAGNOSIS — K59.00 CONSTIPATION, UNSPECIFIED CONSTIPATION TYPE: ICD-10-CM

## 2022-10-19 DIAGNOSIS — Z01.419 WELL WOMAN EXAM: Primary | ICD-10-CM

## 2022-10-19 DIAGNOSIS — N95.2 ATROPHIC VAGINITIS: ICD-10-CM

## 2022-10-19 DIAGNOSIS — M62.89 PELVIC FLOOR TENSION: ICD-10-CM

## 2022-10-19 DIAGNOSIS — N89.5 VAGINAL STENOSIS: ICD-10-CM

## 2022-10-19 PROCEDURE — 99999 PR PBB SHADOW E&M-EST. PATIENT-LVL III: CPT | Mod: PBBFAC,HCNC,, | Performed by: NURSE PRACTITIONER

## 2022-10-19 PROCEDURE — 1159F PR MEDICATION LIST DOCUMENTED IN MEDICAL RECORD: ICD-10-PCS | Mod: HCNC,CPTII,S$GLB, | Performed by: NURSE PRACTITIONER

## 2022-10-19 PROCEDURE — 99397 PER PM REEVAL EST PAT 65+ YR: CPT | Mod: HCNC,S$GLB,, | Performed by: NURSE PRACTITIONER

## 2022-10-19 PROCEDURE — 1101F PT FALLS ASSESS-DOCD LE1/YR: CPT | Mod: HCNC,CPTII,S$GLB, | Performed by: NURSE PRACTITIONER

## 2022-10-19 PROCEDURE — 3288F FALL RISK ASSESSMENT DOCD: CPT | Mod: HCNC,CPTII,S$GLB, | Performed by: NURSE PRACTITIONER

## 2022-10-19 PROCEDURE — 3288F PR FALLS RISK ASSESSMENT DOCUMENTED: ICD-10-PCS | Mod: HCNC,CPTII,S$GLB, | Performed by: NURSE PRACTITIONER

## 2022-10-19 PROCEDURE — 1101F PR PT FALLS ASSESS DOC 0-1 FALLS W/OUT INJ PAST YR: ICD-10-PCS | Mod: HCNC,CPTII,S$GLB, | Performed by: NURSE PRACTITIONER

## 2022-10-19 PROCEDURE — 3079F DIAST BP 80-89 MM HG: CPT | Mod: HCNC,CPTII,S$GLB, | Performed by: NURSE PRACTITIONER

## 2022-10-19 PROCEDURE — 3079F PR MOST RECENT DIASTOLIC BLOOD PRESSURE 80-89 MM HG: ICD-10-PCS | Mod: HCNC,CPTII,S$GLB, | Performed by: NURSE PRACTITIONER

## 2022-10-19 PROCEDURE — 3074F SYST BP LT 130 MM HG: CPT | Mod: HCNC,CPTII,S$GLB, | Performed by: NURSE PRACTITIONER

## 2022-10-19 PROCEDURE — 3074F PR MOST RECENT SYSTOLIC BLOOD PRESSURE < 130 MM HG: ICD-10-PCS | Mod: HCNC,CPTII,S$GLB, | Performed by: NURSE PRACTITIONER

## 2022-10-19 PROCEDURE — 1160F PR REVIEW ALL MEDS BY PRESCRIBER/CLIN PHARMACIST DOCUMENTED: ICD-10-PCS | Mod: HCNC,CPTII,S$GLB, | Performed by: NURSE PRACTITIONER

## 2022-10-19 PROCEDURE — 99999 PR PBB SHADOW E&M-EST. PATIENT-LVL III: ICD-10-PCS | Mod: PBBFAC,HCNC,, | Performed by: NURSE PRACTITIONER

## 2022-10-19 PROCEDURE — 99397 PR PREVENTIVE VISIT,EST,65 & OVER: ICD-10-PCS | Mod: HCNC,S$GLB,, | Performed by: NURSE PRACTITIONER

## 2022-10-19 PROCEDURE — 1160F RVW MEDS BY RX/DR IN RCRD: CPT | Mod: HCNC,CPTII,S$GLB, | Performed by: NURSE PRACTITIONER

## 2022-10-19 PROCEDURE — 1159F MED LIST DOCD IN RCRD: CPT | Mod: HCNC,CPTII,S$GLB, | Performed by: NURSE PRACTITIONER

## 2022-10-19 RX ORDER — ESTRADIOL 0.1 MG/G
1 CREAM VAGINAL DAILY
Qty: 42.5 G | Refills: 11 | Status: SHIPPED | OUTPATIENT
Start: 2022-10-19 | End: 2023-10-19

## 2022-10-19 NOTE — PATIENT INSTRUCTIONS
Well woman  --pap due 2023  --mammogram / bone density scheduled    Vaginal atrophy/ stenosis  -- use vaginal estrogen cream twice weekly  --use therawand once weekly    Constipation  --continue fiber, stool softeners, and magnesium oxide 400 mg      Intermittent pelvic floor tension  --I have sent in the prescription to [facility] in [place], [place].  Their phone number is [phone].  Ask for the [facility] department.    RTC 1 year for annual

## 2022-10-19 NOTE — PROGRESS NOTES
"10/19/2022    SUBJECTIVE:   67 y.o. female for annual exam.      OPERATIVE NOTE  08/13/2020  Title of Operation:  1)  Exam under anesthesia  2)  Hysteroscopy with polypectomy  3)  Posterior repair with perineorrhaphy     Indications for Surgery:  Mrs. Yani gill is a 57-year-old G3, P2-0-1-2 who reports a   suspected rectal injury after doing house work approximately three years   ago. She describes sensation of a rectal "tear" on excessive extension of   her back. Since that time, she has undergone evaluation per several   gastroenterology specialist. Colonoscopy 2006 is normal. A barium enema   2011 is suboptimal due to increased amount of stool in the bowel. She also  reports consultation with an orthopedist and MRI significant for   degenerative joint disease, but negative for neurologic injury. She   initially had discomfort in the left perianal area and sense of vaginal   bulge. Recently, she began to have issues with complete evacuation and   often splints perineally to help evacuate. She also notes use of MiraLax   can be helpful. Exam reveals a distal rectocele without obvious enterocele  or perineocele. There is tenderness to palpation perianally, especially at  the patient's left. There is also mild tenderness to palpation at the   internal levator ani complex, although no increased muscle tone. Rectal   exam reveals normal resting tone, but mildly decreased concentric squeeze.   Otherwise, there are no significant sphincter abnormalities. Perineal   sensation is grossly intact.        Past Medical History:   Diagnosis Date    Arthritis     Back pain     Chronic pelvic pain in female     left-sided    De Quervain's tenosynovitis     left    Headache(784.0)     Migraine maybe 1-2 times per year    PONV (postoperative nausea and vomiting)     Right carpal tunnel syndrome     Urinary incontinence     occ kira       Past Surgical History:   Procedure Laterality Date    ANTERIOR VAGINAL REPAIR N/A 8/13/2020    " Procedure: COLPORRHAPHY, ANTERIOR;  Surgeon: Salina Mukherjee MD;  Location: [facility] OR;  Service: OB/GYN;  Laterality: N/A;    COLONOSCOPY N/A 11/3/2020    Procedure: COLONOSCOPY;  Surgeon: Parish Pulido MD;  Location: [facility] ENDO (4TH FLR);  Service: Endoscopy;  Laterality: N/A;  colonoscopy added to EGD order    CYSTOSCOPY N/A 8/13/2020    Procedure: CYSTOSCOPY;  Surgeon: Salina Mukherjee MD;  Location: [facility] OR;  Service: OB/GYN;  Laterality: N/A;    EPIDURAL STEROID INJECTION N/A 6/12/2018    Procedure: INJECTION-STEROID-EPIDURAL;  Surgeon: Rianna Nina MD;  Location: [facility] PAIN MGT;  Service: Pain Management;  Laterality: N/A;  Coccygeal Nerve Block  23395  (0.5mg Niravam only)    *Pt wants to know if she has NO SEDATION at all, can pt drive herself home*    ESOPHAGOGASTRODUODENOSCOPY N/A 11/3/2020    Procedure: ESOPHAGOGASTRODUODENOSCOPY (EGD);  Surgeon: Parish Pulido MD;  Location: [facility] (4TH FLR);  Service: Endoscopy;  Laterality: N/A;  Schedule ASAP - call Dr. Pulido with schedule problems  10/31-[facility]-pt going out of town did not want earlier date-tb    HYSTEROSCOPY WITH DILATION AND CURETTAGE OF UTERUS N/A 8/13/2020    Procedure: HYSTEROSCOPY, WITH DILATION AND CURETTAGE OF UTERUS;  Surgeon: Salina Mukherjee MD;  Location: [facility];  Service: OB/GYN;  Laterality: N/A;    LAPAROSCOPIC CHOLECYSTECTOMY N/A 4/23/2021    Procedure: CHOLECYSTECTOMY, LAPAROSCOPIC;  Surgeon: Ivan Bryson MD;  Location: [facility] 2ND FLR;  Service: General;  Laterality: N/A;    OOPHORECTOMY      Left ovary       Family History   Problem Relation Age of Onset    Heart disease Father         s/p stenting    Coronary artery disease Father     Prostate cancer Father     Cancer Father         prostate     Hypoglycemic Sister     Breast cancer Daughter 35        dx in September 2018, lives in [place]    No Known Problems Sister     Diabetes Maternal Uncle     Diabetes Cousin     Hypertension Neg Hx      Stroke Neg Hx     Colon cancer Neg Hx     Cervical cancer Neg Hx     Vaginal cancer Neg Hx     Endometrial cancer Neg Hx     Ovarian cancer Neg Hx        Social History     Socioeconomic History    Marital status:     Number of children: 2   Occupational History    Occupation:      Employer: [employer]   Tobacco Use    Smoking status: Never    Smokeless tobacco: Never   Substance and Sexual Activity    Alcohol use: Never    Drug use: Never    Sexual activity: Yes     Partners: Male       Current Outpatient Medications   Medication Sig Dispense Refill    ascorbic acid, vitamin C, (VITAMIN C) 1000 MG tablet Take 1,000 mg by mouth once daily.      butalbital-aspirin-caffeine -40 mg (FIORINAL) -40 mg Cap Take 1 capsule by mouth every 4 (four) hours as needed. 30 capsule 0    dextrin (FIBER POWDER ORAL) Take by mouth.      ibuprofen (ADVIL,MOTRIN) 600 MG tablet TAKE 1 TABLET(600 MG) BY MOUTH EVERY 12 HOURS AS NEEDED 90 tablet 0    MAGNESIUM ORAL Take by mouth.      vitamin D (VITAMIN D3) 1000 units Tab Take 1,000 Units by mouth once daily.      zinc sulfate 50 mg zinc (220 mg) Tab tablet Take 220 mg by mouth once daily.      cyclobenzaprine (FLEXERIL) 5 MG tablet TAKE 1 TABLET(5 MG) BY MOUTH DAILY AS NEEDED FOR PAIN (Patient not taking: No sig reported) 90 tablet 0    estradioL (ESTRACE) 0.01 % (0.1 mg/gram) vaginal cream Place 1 g vaginally once daily. 42.5 g 11    lactulose (CHRONULAC) 10 gram/15 mL solution TAKE 30 ML BY MOUTH TWICE DAILY FOR 14 DAYS 1000 mL 11    methylPREDNISolone (MEDROL DOSEPACK) 4 mg tablet use as directed (Patient not taking: No sig reported) 21 each 0    multivitamin (THERAGRAN) per tablet Take 1 tablet by mouth once daily.       No current facility-administered medications for this visit.       Review of patient's allergies indicates:   Allergen Reactions    Dermabond [tissue glues] Dermatitis    Adhesive Rash    Shellfish containing products Rash      "Patient allergic to softshell crabs specifically    Sulfa (sulfonamide antibiotics) Rash       No LMP recorded (lmp unknown). Patient is postmenopausal.    Well Woman:  --pap: 2021, normal; HPV neg  --mammogram: , normal  --colonoscopy: 2020 Three 2 to 3 mm polyps in the ascending colon,                         removed with a jumbo cold forceps. Resected and                         retrieved.                         - Diverticulosis in the sigmoid colon.                         - Non-bleeding internal hemorrhoids.   --DEXA: , normal       OB History          4    Para   3    Term   3            AB   1    Living   2         SAB   1    IAB        Ectopic        Multiple        Live Births   2                   ROS:  Feeling well.   No dyspnea or chest pain on exertion.    No abdominal pain, change in bowel habits, black or bloody stools.  Intermittent constipation. Taking fiber, stool softeners, and magnesium.  With constipation has LUQ discomfort  +OLVIN with cough.  Denies urinary urgency or frequency  GYN ROS: no breast pain or new or enlarging lumps on self exam, no vaginal bleeding. Using vaginal estrogen cream twice weekly.  Completed pelvic floor PT. No longer using therawand  No neurological complaints.    OBJECTIVE:   The patient appears well, alert, oriented x 3, in no distress.  /80 (BP Location: Right arm, Patient Position: Sitting, BP Method: Medium (Manual))   Ht 5' 4" (1.626 m)   LMP  (LMP Unknown)   BMI 25.56 kg/m²   ENT normal.  Neck supple. No adenopathy or thyromegaly. TYSON.   Normal respiratory effort   Pulse with regular rate and rhythm.   Abdomen soft without tenderness, guarding, mass or organomegaly.   Extremities show no edema, normal peripheral pulses.   Neurological is normal, no focal findings.    BREAST EXAM: breasts appear normal, no suspicious masses, no skin or nipple changes or axillary nodes    PELVIC EXAM:   VULVA: normal appearing vulva with no " masses, tenderness or lesions,   VAGINA: normal appearing vagina with normal color and discharge, no lesions, atrophic, stenosis noted-- depth 4 cm with fingerbreadth opening just before cervix  CERVIX: posterior cervix adhered to vaginal wall-- can visualize os;  cervix without discharge or lesions,   UTERUS: uterus is normal size, shape, consistency and nontender,   ADNEXA: no masses,   RECTAL: normal rectal, no masses    ASSESSMENT:   1. Well woman exam        2. Atrophic vaginitis  estradioL (ESTRACE) 0.01 % (0.1 mg/gram) vaginal cream      3. Vaginal stenosis        4. Constipation, unspecified constipation type        5. Pelvic floor tension            PLAN:    Well woman  --pap due 2023  --mammogram / bone density scheduled    Vaginal atrophy/ stenosis  -- use vaginal estrogen cream twice weekly  --use therawand once weekly    Constipation  --continue fiber, stool softeners, and magnesium oxide 400 mg    Intermittent pelvic floor tension  --I have sent in the prescription to [facility] in [place], [place].  Their phone number is [phone].  Ask for the compounding department.    RTC 1 year for annual        20 minutes were spent in face to face time with this patient  90 % of this time was spent in counseling and/or coordination of care    Julia GUY [facility]and [facility]  Division of Female Pelvic Medicine and Reconstructive Surgery  Department of Obstetrics & Gynecology

## 2022-11-03 NOTE — TELEPHONE ENCOUNTER
----- Message from Linnea De Guzman sent at 12/13/2017 10:43 AM CST -----  Contact: SELF  REFILL: ibuprofen (ADVIL,MOTRIN) 600 MG tablet    PLEASE SEND TO [facility]  
Refill request.  
Opt out

## 2022-11-09 ENCOUNTER — HOSPITAL ENCOUNTER (OUTPATIENT)
Dept: RADIOLOGY | Facility: HOSPITAL | Age: 67
Discharge: HOME | End: 2022-11-09
Attending: NURSE PRACTITIONER
Payer: COMMERCIAL

## 2022-11-09 DIAGNOSIS — Z12.39 ENCOUNTER FOR BREAST CANCER SCREENING OTHER THAN MAMMOGRAM: ICD-10-CM

## 2022-11-09 PROCEDURE — 77067 SCR MAMMO BI INCL CAD: CPT | Mod: TC,HCNC

## 2022-11-09 PROCEDURE — 77067 SCR MAMMO BI INCL CAD: CPT | Mod: 26,HCNC,, | Performed by: RADIOLOGY

## 2022-11-09 PROCEDURE — 77067 MAMMO DIGITAL SCREENING BILAT WITH TOMO: ICD-10-PCS | Mod: 26,HCNC,, | Performed by: RADIOLOGY

## 2022-11-09 PROCEDURE — 77063 BREAST TOMOSYNTHESIS BI: CPT | Mod: TC,HCNC

## 2022-11-09 PROCEDURE — 77063 BREAST TOMOSYNTHESIS BI: CPT | Mod: 26,HCNC,, | Performed by: RADIOLOGY

## 2022-11-09 PROCEDURE — 77063 MAMMO DIGITAL SCREENING BILAT WITH TOMO: ICD-10-PCS | Mod: 26,HCNC,, | Performed by: RADIOLOGY

## 2022-11-16 ENCOUNTER — PATIENT MESSAGE (OUTPATIENT)
Dept: UROGYNECOLOGY | Facility: CLINIC | Age: 67
End: 2022-11-16
Payer: COMMERCIAL

## 2023-02-07 DIAGNOSIS — Z00.00 ENCOUNTER FOR MEDICARE ANNUAL WELLNESS EXAM: ICD-10-CM

## 2023-02-09 DIAGNOSIS — Z00.00 ENCOUNTER FOR MEDICARE ANNUAL WELLNESS EXAM: ICD-10-CM

## 2023-04-02 ENCOUNTER — PATIENT MESSAGE (OUTPATIENT)
Dept: FAMILY MEDICINE | Facility: CLINIC | Age: 68
End: 2023-04-02
Payer: COMMERCIAL

## 2023-04-02 DIAGNOSIS — K59.01 SLOW TRANSIT CONSTIPATION: ICD-10-CM

## 2023-04-04 ENCOUNTER — PATIENT MESSAGE (OUTPATIENT)
Dept: FAMILY MEDICINE | Facility: CLINIC | Age: 68
End: 2023-04-04
Payer: COMMERCIAL

## 2023-04-05 ENCOUNTER — TELEPHONE (OUTPATIENT)
Dept: FAMILY MEDICINE | Facility: CLINIC | Age: 68
End: 2023-04-05
Payer: COMMERCIAL

## 2023-04-05 RX ORDER — LACTULOSE 10 G/15ML
30 SOLUTION ORAL; RECTAL 2 TIMES DAILY
Qty: 1000 ML | Refills: 2 | Status: SHIPPED | OUTPATIENT
Start: 2023-04-05 | End: 2023-04-19

## 2023-04-05 NOTE — TELEPHONE ENCOUNTER
Toy glass can let patient know I refilled lactulose for patient (last seen in August 2022) -- since it has been some time since a visit, can schedule a clinic appointment with PCP or myself (virtual included) to discuss constipation prevention

## 2023-04-05 NOTE — TELEPHONE ENCOUNTER
Spoke to patient and they are moving to [place]. Patient stated that she would need to be seen before the act of sale date of 04/25/2023 in order to use her insurance. Patient is in the process of change her insurance to [facility] [place]. Patient will call back to schedule appointment once insurance is all cleared.

## 2023-04-25 ENCOUNTER — PES CALL (OUTPATIENT)
Dept: ADMINISTRATIVE | Facility: CLINIC | Age: 68
End: 2023-04-25
Payer: COMMERCIAL

## 2023-07-13 ENCOUNTER — PATIENT MESSAGE (OUTPATIENT)
Dept: FAMILY MEDICINE | Facility: CLINIC | Age: 68
End: 2023-07-13
Payer: COMMERCIAL

## 2023-07-18 ENCOUNTER — TELEPHONE (OUTPATIENT)
Dept: FAMILY MEDICINE | Facility: CLINIC | Age: 68
End: 2023-07-18
Payer: COMMERCIAL

## 2023-07-18 NOTE — TELEPHONE ENCOUNTER
Called and scheduled appointment 11/13. Patient declined scheduling appointment with any available provider. Patients states she do not need a referral to see an hand doctor

## 2023-07-18 NOTE — TELEPHONE ENCOUNTER
----- Message from Eveliorandal Khanond Abner sent at 7/18/2023 10:09 AM CDT -----  Regarding: Sheree  Type:  Patient Returning Call     Who Called: Sheree     Who Left Message for Patient: Leti Welch     Does the patient know what this is regarding?:appointment     Would the patient rather a call back or a response via [facility]? Callback     Best Call Back Number: .[phone]      Additional Information: Pt is also needing a referral for a hand doctor for her carpal tunnel and she will also be needing refills on her medication. Please have the nurse call the patient as soon as possible.

## 2023-09-22 DIAGNOSIS — M79.641 RIGHT HAND PAIN: Primary | ICD-10-CM

## 2023-09-23 ENCOUNTER — PATIENT MESSAGE (OUTPATIENT)
Dept: ORTHOPEDICS | Facility: CLINIC | Age: 68
End: 2023-09-23
Payer: MEDICARE

## 2023-09-25 ENCOUNTER — TELEPHONE (OUTPATIENT)
Dept: ORTHOPEDICS | Facility: CLINIC | Age: 68
End: 2023-09-25
Payer: MEDICARE

## 2023-09-28 ENCOUNTER — OFFICE VISIT (OUTPATIENT)
Dept: ORTHOPEDICS | Facility: CLINIC | Age: 68
End: 2023-09-28
Payer: MEDICARE

## 2023-09-28 ENCOUNTER — HOSPITAL ENCOUNTER (OUTPATIENT)
Dept: RADIOLOGY | Facility: HOSPITAL | Age: 68
Discharge: HOME | End: 2023-09-28
Attending: FAMILY MEDICINE
Payer: MEDICARE

## 2023-09-28 VITALS — BODY MASS INDEX: 25.27 KG/M2 | HEIGHT: 64 IN | WEIGHT: 148 LBS | RESPIRATION RATE: 16 BRPM

## 2023-09-28 DIAGNOSIS — M79.641 RIGHT HAND PAIN: ICD-10-CM

## 2023-09-28 DIAGNOSIS — G56.01 CARPAL TUNNEL SYNDROME OF RIGHT WRIST: Primary | ICD-10-CM

## 2023-09-28 PROCEDURE — 99203 OFFICE O/P NEW LOW 30 MIN: CPT | Mod: HCNC,S$GLB,, | Performed by: FAMILY MEDICINE

## 2023-09-28 PROCEDURE — 3288F FALL RISK ASSESSMENT DOCD: CPT | Mod: HCNC,CPTII,S$GLB, | Performed by: FAMILY MEDICINE

## 2023-09-28 PROCEDURE — 1101F PT FALLS ASSESS-DOCD LE1/YR: CPT | Mod: HCNC,CPTII,S$GLB, | Performed by: FAMILY MEDICINE

## 2023-09-28 PROCEDURE — 73130 XR HAND COMPLETE 3 VIEW RIGHT: ICD-10-PCS | Mod: 26,HCNC,RT, | Performed by: RADIOLOGY

## 2023-09-28 PROCEDURE — 1125F PR PAIN SEVERITY QUANTIFIED, PAIN PRESENT: ICD-10-PCS | Mod: HCNC,CPTII,S$GLB, | Performed by: FAMILY MEDICINE

## 2023-09-28 PROCEDURE — 3008F PR BODY MASS INDEX (BMI) DOCUMENTED: ICD-10-PCS | Mod: HCNC,CPTII,S$GLB, | Performed by: FAMILY MEDICINE

## 2023-09-28 PROCEDURE — 73130 X-RAY EXAM OF HAND: CPT | Mod: 26,HCNC,RT, | Performed by: RADIOLOGY

## 2023-09-28 PROCEDURE — 73130 X-RAY EXAM OF HAND: CPT | Mod: TC,HCNC,PO,RT

## 2023-09-28 PROCEDURE — 1125F AMNT PAIN NOTED PAIN PRSNT: CPT | Mod: HCNC,CPTII,S$GLB, | Performed by: FAMILY MEDICINE

## 2023-09-28 PROCEDURE — 3008F BODY MASS INDEX DOCD: CPT | Mod: HCNC,CPTII,S$GLB, | Performed by: FAMILY MEDICINE

## 2023-09-28 PROCEDURE — 99999 PR PBB SHADOW E&M-EST. PATIENT-LVL III: ICD-10-PCS | Mod: PBBFAC,HCNC,, | Performed by: FAMILY MEDICINE

## 2023-09-28 PROCEDURE — 1101F PR PT FALLS ASSESS DOC 0-1 FALLS W/OUT INJ PAST YR: ICD-10-PCS | Mod: HCNC,CPTII,S$GLB, | Performed by: FAMILY MEDICINE

## 2023-09-28 PROCEDURE — 1159F PR MEDICATION LIST DOCUMENTED IN MEDICAL RECORD: ICD-10-PCS | Mod: HCNC,CPTII,S$GLB, | Performed by: FAMILY MEDICINE

## 2023-09-28 PROCEDURE — 99203 PR OFFICE/OUTPT VISIT, NEW, LEVL III, 30-44 MIN: ICD-10-PCS | Mod: HCNC,S$GLB,, | Performed by: FAMILY MEDICINE

## 2023-09-28 PROCEDURE — 1159F MED LIST DOCD IN RCRD: CPT | Mod: HCNC,CPTII,S$GLB, | Performed by: FAMILY MEDICINE

## 2023-09-28 PROCEDURE — 3288F PR FALLS RISK ASSESSMENT DOCUMENTED: ICD-10-PCS | Mod: HCNC,CPTII,S$GLB, | Performed by: FAMILY MEDICINE

## 2023-09-28 PROCEDURE — 99999 PR PBB SHADOW E&M-EST. PATIENT-LVL III: CPT | Mod: PBBFAC,HCNC,, | Performed by: FAMILY MEDICINE

## 2023-09-28 RX ORDER — MELOXICAM 15 MG/1
15 TABLET ORAL DAILY PRN
Qty: 30 TABLET | Refills: 2 | Status: SHIPPED | OUTPATIENT
Start: 2023-09-28 | End: 2023-11-02 | Stop reason: SDUPTHER

## 2023-09-28 NOTE — PROGRESS NOTES
Subjective:     Patient ID: Sheree Lomeli is a 68 y.o. female.    Chief Complaint: Pain of the Right Hand    68-year-old female here today with complaints of right-sided hand pain and numbness.  She states this started in April of this year after she increased her workload in the yd doing lots of raking and pulling weeds.  She does note some symptoms in the left hand but not near severe and they seem to have resolved on their own.  Notes that she feels that the whole hand seems to go numb as if it is falling asleep but the symptoms are most significant in her thumb and 1st two digits.  The pain and numbness will wake her up at night.  She is right-hand dominant.  Notes that she was diagnosed with carpal tunnel syndrome over 15 years ago in the right hand.  Her symptoms eventually resolved then with nighttime bracing and taking ibuprofen.  Since April she is started doing that again on her own and notes a notable improvement in her symptoms.  She is still having some numbness and pain in the right hand however.  She has been wearing night splints and taking ibuprofen for the last six weeks.    Pain  Pertinent negatives include no chest pain, chills, congestion, coughing, headaches, rash or sore throat.       Review of Systems   Constitutional: Negative for chills and decreased appetite.   HENT:  Negative for congestion and sore throat.    Eyes:  Negative for blurred vision.   Cardiovascular:  Negative for chest pain, dyspnea on exertion and palpitations.   Respiratory:  Negative for cough and shortness of breath.    Skin:  Negative for rash.   Neurological:  Negative for difficulty with concentration, disturbances in coordination and headaches.   Psychiatric/Behavioral:  Negative for altered mental status, depression, hallucinations, memory loss and suicidal ideas.        Objective:       General    Nursing note and vitals reviewed.  Constitutional: She is oriented to person, place, and time. She appears  well-developed and well-nourished.   HENT:   Nose: Nose normal.   Eyes: EOM are normal. Pupils are equal, round, and reactive to light.   Neck: Neck supple.   Cardiovascular:  Normal rate.            Pulmonary/Chest: Effort normal.   Abdominal: Soft.   Neurological: She is alert and oriented to person, place, and time. She has normal reflexes.   Psychiatric: She has a normal mood and affect. Her behavior is normal. Judgment and thought content normal.             Right Hand/Wrist Exam     Inspection   Effusion: Wrist - absent   Deformity: Wrist - deformity     Pain   Wrist - The patient exhibits pain of the flexor/pronator group.    Range of Motion     Wrist   Extension:  normal   Flexion:  normal   Pronation:  normal   Supination:  normal     Tests   Phalens Sign: negative  Tinel's sign (median nerve): positive  Carpal Tunnel Compression Test: positive  Cubital Tunnel Compression Test: negative    Atrophy   Thenar:  negative    Other     Neuorologic Exam    Median Distribution: abnormal  Ulnar Distribution: normal  Radial Distribution: normal      Left Hand/Wrist Exam   Left hand exam is normal.    Inspection   Effusion: Wrist - absent   Deformity: Wrist - absent     Range of Motion     Wrist   Extension:  normal   Flexion:  normal   Pronation:  normal   Supination:  normal     Tests   Phalens Sign: negative  Tinel's sign (median nerve): negative  Carpal Tunnel Compression Test: negative  Cubital Tunnel Compression Test: negative    Atrophy  Thenar:  Negative    Other     Sensory Exam  Median Distribution: normal  Ulnar Distribution: normal  Radial Distribution: normal          Muscle Strength   Right Upper Extremity   Wrist extension: 5/5   Wrist flexion: 5/5   : 5/5   Index Finger: 5/5  Middle Finger: 5/5  Ring Finger: 5/5  Little Finger: 5/5  Thumb - APB: 5/5  Thumb - FPL: 5/5  Pinch Mechanism: 5/5  Left Upper Extremity  Wrist extension: 5/5   Wrist flexion: 5/5   :  5/5   Index Finger: 5/5  Middle  Finger: 5/5  Ring Finger: 5/5  Little Finger: 5/5  Thumb - APB: 5/5  Thumb - FPL: 5/5  Pinch Mechanism: 5/5    Vascular Exam       Capillary Refill  Right Hand: normal capillary refill  Left Hand: normal capillary refill          Physical Exam  Vitals and nursing note reviewed.   Constitutional:       Appearance: She is well-developed and well-nourished.   HENT:      Nose: Nose normal.   Eyes:      Extraocular Movements: EOM normal.      Pupils: Pupils are equal, round, and reactive to light.   Cardiovascular:      Rate and Rhythm: Normal rate.   Pulmonary:      Effort: Pulmonary effort is normal.   Abdominal:      Palpations: Abdomen is soft.   Musculoskeletal:      Right wrist: No deformity or effusion.      Left wrist: Normal. No deformity or effusion.      Right hand: Decreased sensation of the median distribution. Normal sensation of the ulnar distribution and radial distribution. Normal capillary refill.      Left hand: Decreased sensation. Normal sensation of the ulnar distribution, median distribution and radial distribution. Normal capillary refill.      Cervical back: Normal range of motion and neck supple.   Neurological:      Mental Status: She is alert and oriented to person, place, and time.      Deep Tendon Reflexes: Reflexes are normal and symmetric.   Psychiatric:         Mood and Affect: Mood and affect normal.         Behavior: Behavior normal.         Thought Content: Thought content normal.         Judgment: Judgment normal.       X-ray images ordered obtained interpreted by me.  They show some minimal to mild joint space narrowing and aspects of the hand consistent with osteoarthritic changes.  There are no acute bony abnormalities or fractures present.      Assessment:     Encounter Diagnoses   Name Primary?    Right hand pain     Carpal tunnel syndrome of right wrist Yes       Plan:      Sheree was seen today for pain.    Diagnoses and all orders for this visit:    Carpal tunnel syndrome of  right wrist    Right hand pain    Other orders  -     meloxicam (MOBIC) 15 MG tablet; Take 1 tablet (15 mg total) by mouth daily as needed for Pain.      Had a long discussion with her today about treatment options for her carpal tunnel syndrome.  Seems to be improving with conservative measures.  Advised her to continue wearing nighttime splinting.  I will change her NSAID to meloxicam.  If there is significant improvement in several weeks I advised she may return and we could consider carpal tunnel injections.  If symptoms ever become severe I would consider EMG and referral to hand surgery for carpal tunnel release.

## 2023-10-03 ENCOUNTER — PATIENT MESSAGE (OUTPATIENT)
Dept: FAMILY MEDICINE | Facility: CLINIC | Age: 68
End: 2023-10-03
Payer: MEDICARE

## 2023-11-02 DIAGNOSIS — G56.01 CARPAL TUNNEL SYNDROME OF RIGHT WRIST: Primary | ICD-10-CM

## 2023-11-03 RX ORDER — MELOXICAM 15 MG/1
15 TABLET ORAL DAILY PRN
Qty: 30 TABLET | Refills: 2 | Status: SHIPPED | OUTPATIENT
Start: 2023-11-03 | End: 2024-03-26 | Stop reason: ALTCHOICE

## 2023-11-07 ENCOUNTER — TELEPHONE (OUTPATIENT)
Dept: FAMILY MEDICINE | Facility: CLINIC | Age: 68
End: 2023-11-07
Payer: MEDICARE

## 2023-11-07 DIAGNOSIS — Z51.81 MEDICATION MONITORING ENCOUNTER: ICD-10-CM

## 2023-11-07 DIAGNOSIS — K21.9 GASTROESOPHAGEAL REFLUX DISEASE WITHOUT ESOPHAGITIS: Primary | ICD-10-CM

## 2023-11-07 DIAGNOSIS — E66.3 OVERWEIGHT (BMI 25.0-29.9): ICD-10-CM

## 2023-11-07 DIAGNOSIS — Z13.6 SCREENING FOR CARDIOVASCULAR CONDITION: ICD-10-CM

## 2023-11-07 DIAGNOSIS — R79.9 ABNORMAL FINDING OF BLOOD CHEMISTRY, UNSPECIFIED: ICD-10-CM

## 2023-11-07 NOTE — TELEPHONE ENCOUNTER
Spoke with patient. Patient states she has moved to [place]. Patient states this will be the last visit with provider. Patient labs scheduled for 11/13/23.

## 2023-11-13 ENCOUNTER — LAB VISIT (OUTPATIENT)
Dept: LAB | Facility: HOSPITAL | Age: 68
End: 2023-11-13
Attending: INTERNAL MEDICINE
Payer: MEDICARE

## 2023-11-13 ENCOUNTER — OFFICE VISIT (OUTPATIENT)
Dept: FAMILY MEDICINE | Facility: CLINIC | Age: 68
End: 2023-11-13
Payer: MEDICARE

## 2023-11-13 VITALS
HEART RATE: 75 BPM | WEIGHT: 150.44 LBS | HEIGHT: 64 IN | TEMPERATURE: 98 F | BODY MASS INDEX: 25.68 KG/M2 | DIASTOLIC BLOOD PRESSURE: 68 MMHG | SYSTOLIC BLOOD PRESSURE: 130 MMHG | OXYGEN SATURATION: 93 %

## 2023-11-13 DIAGNOSIS — R79.9 ABNORMAL FINDING OF BLOOD CHEMISTRY, UNSPECIFIED: ICD-10-CM

## 2023-11-13 DIAGNOSIS — Z23 FLU VACCINE NEED: ICD-10-CM

## 2023-11-13 DIAGNOSIS — M53.3 COCCYGODYNIA: ICD-10-CM

## 2023-11-13 DIAGNOSIS — K21.9 GASTROESOPHAGEAL REFLUX DISEASE WITHOUT ESOPHAGITIS: ICD-10-CM

## 2023-11-13 DIAGNOSIS — K59.09 CHRONIC CONSTIPATION: ICD-10-CM

## 2023-11-13 DIAGNOSIS — Z78.0 MENOPAUSE: ICD-10-CM

## 2023-11-13 DIAGNOSIS — G56.01 CARPAL TUNNEL SYNDROME OF RIGHT WRIST: ICD-10-CM

## 2023-11-13 DIAGNOSIS — Z23 NEED FOR STREPTOCOCCUS PNEUMONIAE VACCINATION: ICD-10-CM

## 2023-11-13 DIAGNOSIS — Z23 NEED FOR SHINGLES VACCINE: ICD-10-CM

## 2023-11-13 DIAGNOSIS — Z51.81 MEDICATION MONITORING ENCOUNTER: ICD-10-CM

## 2023-11-13 DIAGNOSIS — Z12.31 ENCOUNTER FOR SCREENING MAMMOGRAM FOR BREAST CANCER: ICD-10-CM

## 2023-11-13 DIAGNOSIS — Z00.00 ROUTINE MEDICAL EXAM: Primary | ICD-10-CM

## 2023-11-13 DIAGNOSIS — Z13.6 SCREENING FOR CARDIOVASCULAR CONDITION: ICD-10-CM

## 2023-11-13 LAB
ALBUMIN SERPL BCP-MCNC: 4.3 G/DL (ref 3.5–5.2)
ALP SERPL-CCNC: 52 U/L (ref 55–135)
ALT SERPL W/O P-5'-P-CCNC: 14 U/L (ref 10–44)
ANION GAP SERPL CALC-SCNC: 7 MMOL/L (ref 8–16)
AST SERPL-CCNC: 20 U/L (ref 10–40)
BASOPHILS # BLD AUTO: 0.06 K/UL (ref 0–0.2)
BASOPHILS NFR BLD: 0.9 % (ref 0–1.9)
BILIRUB SERPL-MCNC: 0.5 MG/DL (ref 0.1–1)
BUN SERPL-MCNC: 15 MG/DL (ref 8–23)
CALCIUM SERPL-MCNC: 9.9 MG/DL (ref 8.7–10.5)
CHLORIDE SERPL-SCNC: 103 MMOL/L (ref 95–110)
CHOLEST SERPL-MCNC: 235 MG/DL (ref 120–199)
CHOLEST/HDLC SERPL: 3.3 {RATIO} (ref 2–5)
CO2 SERPL-SCNC: 28 MMOL/L (ref 23–29)
CREAT SERPL-MCNC: 0.8 MG/DL (ref 0.5–1.4)
DIFFERENTIAL METHOD: ABNORMAL
EOSINOPHIL # BLD AUTO: 0.1 K/UL (ref 0–0.5)
EOSINOPHIL NFR BLD: 1.5 % (ref 0–8)
ERYTHROCYTE [DISTWIDTH] IN BLOOD BY AUTOMATED COUNT: 13.2 % (ref 11.5–14.5)
EST. GFR  (NO RACE VARIABLE): >60 ML/MIN/1.73 M^2
ESTIMATED AVG GLUCOSE: 114 MG/DL (ref 68–131)
GLUCOSE SERPL-MCNC: 78 MG/DL (ref 70–110)
HBA1C MFR BLD: 5.6 % (ref 4–5.6)
HCT VFR BLD AUTO: 41.4 % (ref 37–48.5)
HDLC SERPL-MCNC: 71 MG/DL (ref 40–75)
HDLC SERPL: 30.2 % (ref 20–50)
HGB BLD-MCNC: 13.4 G/DL (ref 12–16)
IMM GRANULOCYTES # BLD AUTO: 0.02 K/UL (ref 0–0.04)
IMM GRANULOCYTES NFR BLD AUTO: 0.3 % (ref 0–0.5)
LDLC SERPL CALC-MCNC: 148 MG/DL (ref 63–159)
LYMPHOCYTES # BLD AUTO: 1.1 K/UL (ref 1–4.8)
LYMPHOCYTES NFR BLD: 16.3 % (ref 18–48)
MCH RBC QN AUTO: 30.3 PG (ref 27–31)
MCHC RBC AUTO-ENTMCNC: 32.4 G/DL (ref 32–36)
MCV RBC AUTO: 94 FL (ref 82–98)
MONOCYTES # BLD AUTO: 0.6 K/UL (ref 0.3–1)
MONOCYTES NFR BLD: 9.1 % (ref 4–15)
NEUTROPHILS # BLD AUTO: 4.9 K/UL (ref 1.8–7.7)
NEUTROPHILS NFR BLD: 71.9 % (ref 38–73)
NONHDLC SERPL-MCNC: 164 MG/DL
NRBC BLD-RTO: 0 /100 WBC
PLATELET # BLD AUTO: 238 K/UL (ref 150–450)
PMV BLD AUTO: 12.2 FL (ref 9.2–12.9)
POTASSIUM SERPL-SCNC: 4.5 MMOL/L (ref 3.5–5.1)
PROT SERPL-MCNC: 7.3 G/DL (ref 6–8.4)
RBC # BLD AUTO: 4.42 M/UL (ref 4–5.4)
SODIUM SERPL-SCNC: 138 MMOL/L (ref 136–145)
TRIGL SERPL-MCNC: 80 MG/DL (ref 30–150)
WBC # BLD AUTO: 6.83 K/UL (ref 3.9–12.7)

## 2023-11-13 PROCEDURE — 99397 PER PM REEVAL EST PAT 65+ YR: CPT | Mod: HCNC,S$GLB,, | Performed by: INTERNAL MEDICINE

## 2023-11-13 PROCEDURE — 3008F BODY MASS INDEX DOCD: CPT | Mod: HCNC,CPTII,S$GLB, | Performed by: INTERNAL MEDICINE

## 2023-11-13 PROCEDURE — 3075F PR MOST RECENT SYSTOLIC BLOOD PRESS GE 130-139MM HG: ICD-10-PCS | Mod: HCNC,CPTII,S$GLB, | Performed by: INTERNAL MEDICINE

## 2023-11-13 PROCEDURE — 36415 COLL VENOUS BLD VENIPUNCTURE: CPT | Mod: HCNC,PO | Performed by: INTERNAL MEDICINE

## 2023-11-13 PROCEDURE — 3288F PR FALLS RISK ASSESSMENT DOCUMENTED: ICD-10-PCS | Mod: HCNC,CPTII,S$GLB, | Performed by: INTERNAL MEDICINE

## 2023-11-13 PROCEDURE — 3008F PR BODY MASS INDEX (BMI) DOCUMENTED: ICD-10-PCS | Mod: HCNC,CPTII,S$GLB, | Performed by: INTERNAL MEDICINE

## 2023-11-13 PROCEDURE — 1126F PR PAIN SEVERITY QUANTIFIED, NO PAIN PRESENT: ICD-10-PCS | Mod: HCNC,CPTII,S$GLB, | Performed by: INTERNAL MEDICINE

## 2023-11-13 PROCEDURE — 1159F MED LIST DOCD IN RCRD: CPT | Mod: HCNC,CPTII,S$GLB, | Performed by: INTERNAL MEDICINE

## 2023-11-13 PROCEDURE — 1159F PR MEDICATION LIST DOCUMENTED IN MEDICAL RECORD: ICD-10-PCS | Mod: HCNC,CPTII,S$GLB, | Performed by: INTERNAL MEDICINE

## 2023-11-13 PROCEDURE — 99397 PR PREVENTIVE VISIT,EST,65 & OVER: ICD-10-PCS | Mod: HCNC,S$GLB,, | Performed by: INTERNAL MEDICINE

## 2023-11-13 PROCEDURE — 99999 PR PBB SHADOW E&M-EST. PATIENT-LVL IV: ICD-10-PCS | Mod: PBBFAC,HCNC,, | Performed by: INTERNAL MEDICINE

## 2023-11-13 PROCEDURE — 80061 LIPID PANEL: CPT | Mod: HCNC | Performed by: INTERNAL MEDICINE

## 2023-11-13 PROCEDURE — 3078F DIAST BP <80 MM HG: CPT | Mod: HCNC,CPTII,S$GLB, | Performed by: INTERNAL MEDICINE

## 2023-11-13 PROCEDURE — 1101F PT FALLS ASSESS-DOCD LE1/YR: CPT | Mod: HCNC,CPTII,S$GLB, | Performed by: INTERNAL MEDICINE

## 2023-11-13 PROCEDURE — 3288F FALL RISK ASSESSMENT DOCD: CPT | Mod: HCNC,CPTII,S$GLB, | Performed by: INTERNAL MEDICINE

## 2023-11-13 PROCEDURE — 1126F AMNT PAIN NOTED NONE PRSNT: CPT | Mod: HCNC,CPTII,S$GLB, | Performed by: INTERNAL MEDICINE

## 2023-11-13 PROCEDURE — 3075F SYST BP GE 130 - 139MM HG: CPT | Mod: HCNC,CPTII,S$GLB, | Performed by: INTERNAL MEDICINE

## 2023-11-13 PROCEDURE — 99999 PR PBB SHADOW E&M-EST. PATIENT-LVL IV: CPT | Mod: PBBFAC,HCNC,, | Performed by: INTERNAL MEDICINE

## 2023-11-13 PROCEDURE — 1160F RVW MEDS BY RX/DR IN RCRD: CPT | Mod: HCNC,CPTII,S$GLB, | Performed by: INTERNAL MEDICINE

## 2023-11-13 PROCEDURE — 80053 COMPREHEN METABOLIC PANEL: CPT | Mod: HCNC | Performed by: INTERNAL MEDICINE

## 2023-11-13 PROCEDURE — 1101F PR PT FALLS ASSESS DOC 0-1 FALLS W/OUT INJ PAST YR: ICD-10-PCS | Mod: HCNC,CPTII,S$GLB, | Performed by: INTERNAL MEDICINE

## 2023-11-13 PROCEDURE — 83036 HEMOGLOBIN GLYCOSYLATED A1C: CPT | Mod: HCNC | Performed by: INTERNAL MEDICINE

## 2023-11-13 PROCEDURE — 3078F PR MOST RECENT DIASTOLIC BLOOD PRESSURE < 80 MM HG: ICD-10-PCS | Mod: HCNC,CPTII,S$GLB, | Performed by: INTERNAL MEDICINE

## 2023-11-13 PROCEDURE — 1160F PR REVIEW ALL MEDS BY PRESCRIBER/CLIN PHARMACIST DOCUMENTED: ICD-10-PCS | Mod: HCNC,CPTII,S$GLB, | Performed by: INTERNAL MEDICINE

## 2023-11-13 PROCEDURE — 85025 COMPLETE CBC W/AUTO DIFF WBC: CPT | Mod: HCNC | Performed by: INTERNAL MEDICINE

## 2023-11-13 NOTE — PROGRESS NOTES
CHIEF COMPLAINT:   Chief Complaint   Patient presents with    Annual Exam          HISTORY OF PRESENT ILLNESS:  Sheree Lomeli is a 68 y.o. female who presents to the clinic today for a routine physical exam. Her last physical exam was approximately 1 years(s) ago.    Subjective    PAST MEDICAL HISTORY:  Past Medical History:   Diagnosis Date    Arthritis     Back pain     Chronic pelvic pain in female     left-sided    De Quervain's tenosynovitis     left    Headache(784.0)     Migraine maybe 1-2 times per year    PONV (postoperative nausea and vomiting)     Right carpal tunnel syndrome     Urinary incontinence     occ kira       PAST SURGICAL HISTORY:  Past Surgical History:   Procedure Laterality Date    ANTERIOR VAGINAL REPAIR N/A 8/13/2020    Procedure: COLPORRHAPHY, ANTERIOR;  Surgeon: Salina Mukherjee MD;  Location: [facility] OR;  Service: OB/GYN;  Laterality: N/A;    COLONOSCOPY N/A 11/3/2020    Procedure: COLONOSCOPY;  Surgeon: Parish Pulido MD;  Location: [facility] ENDO (4TH FLR);  Service: Endoscopy;  Laterality: N/A;  colonoscopy added to EGD order    CYSTOSCOPY N/A 8/13/2020    Procedure: CYSTOSCOPY;  Surgeon: Salina Mukehrjee MD;  Location: [facility] OR;  Service: OB/GYN;  Laterality: N/A;    EPIDURAL STEROID INJECTION N/A 6/12/2018    Procedure: INJECTION-STEROID-EPIDURAL;  Surgeon: Rianna Nina MD;  Location: [facility] PAIN MGT;  Service: Pain Management;  Laterality: N/A;  Coccygeal Nerve Block  40245  (0.5mg Niravam only)    *Pt wants to know if she has NO SEDATION at all, can pt drive herself home*    ESOPHAGOGASTRODUODENOSCOPY N/A 11/3/2020    Procedure: ESOPHAGOGASTRODUODENOSCOPY (EGD);  Surgeon: Parish Pulido MD;  Location: [facility] ENDO (4TH FLR);  Service: Endoscopy;  Laterality: N/A;  Schedule ASAP - call Dr. Pulido with schedule problems  10/31-covid [facility]-pt going out of town did not want earlier date-tb    HYSTEROSCOPY WITH DILATION AND CURETTAGE OF UTERUS N/A 8/13/2020    Procedure:  HYSTEROSCOPY, WITH DILATION AND CURETTAGE OF UTERUS;  Surgeon: Salina Mukherjee MD;  Location: [facility] OR;  Service: OB/GYN;  Laterality: N/A;    LAPAROSCOPIC CHOLECYSTECTOMY N/A 4/23/2021    Procedure: CHOLECYSTECTOMY, LAPAROSCOPIC;  Surgeon: Ivan Bryson MD;  [facility];  Service: General;  Laterality: N/A;    OOPHORECTOMY      Left ovary       SOCIAL HISTORY:  Social History     Socioeconomic History    Marital status:     Number of children: 2   Occupational History    Occupation:      Employer: [employer]   Tobacco Use    Smoking status: Never    Smokeless tobacco: Never   Substance and Sexual Activity    Alcohol use: Never    Drug use: Never    Sexual activity: Yes     Partners: Male     Social Determinants of Health     Financial Resource Strain: Low Risk  (4/13/2021)    Overall Financial Resource Strain (CARDIA)     Difficulty of Paying Living Expenses: Not hard at all   Food Insecurity: No Food Insecurity (4/13/2021)    Hunger Vital Sign     Worried About Running Out of Food in the Last Year: Never true     Ran Out of Food in the Last Year: Never true   Transportation Needs: No Transportation Needs (4/13/2021)    PRAPARE - Transportation     Lack of Transportation (Medical): No     Lack of Transportation (Non-Medical): No   Physical Activity: Insufficiently Active (4/13/2021)    Exercise Vital Sign     Days of Exercise per Week: 3 days     Minutes of Exercise per Session: 30 min   Stress: No Stress Concern Present (4/13/2021)    [employer] - Occupational Stress Questionnaire     Feeling of Stress : Not at all   Social Connections: Unknown (4/13/2021)    Social Connection and Isolation Panel [NHANES]     Frequency of Communication with Friends and Family: More than three times a week     Frequency of Social Gatherings with Friends and Family: Twice a week     Active Member of Clubs or Organizations: Yes     Attends Club or  Organization Meetings: More than 4 times per year     Marital Status:        FAMILY HISTORY:  Family History   Problem Relation Age of Onset    Heart disease Father         s/p stenting    Coronary artery disease Father     Prostate cancer Father     Cancer Father         prostate     Hypoglycemic Sister     Breast cancer Daughter 35        dx in September 2018, lives in [place]    No Known Problems Sister     Diabetes Maternal Uncle     Diabetes Cousin     Hypertension Neg Hx     Stroke Neg Hx     Colon cancer Neg Hx     Cervical cancer Neg Hx     Vaginal cancer Neg Hx     Endometrial cancer Neg Hx     Ovarian cancer Neg Hx        ALLERGIES AND MEDICATIONS: updated and reviewed.  Review of patient's allergies indicates:   Allergen Reactions    Dermabond [tissue glues] Dermatitis    Adhesive Rash    Shellfish containing products Rash     Patient allergic to softshell crabs specifically    Sulfa (sulfonamide antibiotics) Rash     Medication List with Changes/Refills   Current Medications    ASCORBIC ACID, VITAMIN C, (VITAMIN C) 1000 MG TABLET    Take 1,000 mg by mouth once daily.    BUTALBITAL-ASPIRIN-CAFFEINE -40 MG (FIORINAL) -40 MG CAP    Take 1 capsule by mouth every 4 (four) hours as needed.    DEXTRIN (FIBER POWDER ORAL)    Take by mouth.    ESTRADIOL (ESTRACE) 0.01 % (0.1 MG/GRAM) VAGINAL CREAM    Place 1 g vaginally once daily.    MAGNESIUM ORAL    Take by mouth.    MELOXICAM (MOBIC) 15 MG TABLET    Take 1 tablet (15 mg total) by mouth daily as needed for Pain.    VITAMIN D (VITAMIN D3) 1000 UNITS TAB    Take 1,000 Units by mouth once daily.    ZINC SULFATE 50 MG ZINC (220 MG) TAB TABLET    Take 220 mg by mouth once daily.          CARE TEAM:  Patient Care Team:  Whitney Mera MD as PCP - General (Internal Medicine)  Dorothy Lantigua LPN as Licensed Practical Nurse       SCREENING HISTORY:  Health Maintenance         Date Due Completion Date    COVID-19 Vaccine (1) Never done ---  "   Hemoglobin A1c (Diabetic Prevention Screening) Never done ---    DEXA Scan Never done ---    RSV Vaccine (Age 60+) (1 - 1-dose 60+ series) Never done ---    Shingles Vaccine (2 of 3) 11/01/2017 9/6/2017    Lipid Panel 02/25/2022 2/25/2017    Pneumococcal Vaccines (Age 65+) (1 - PCV) Never done ---    Mammogram 11/09/2023 11/9/2022    TETANUS VACCINE 07/29/2025 7/29/2015    Colorectal Cancer Screening 11/03/2025 11/3/2020    Override on 9/12/2006: Done              REVIEW OF SYSTEMS:  The patient reports : good dietary habits.  The patient reports  : that they exercise regularly.  Review of Systems   Constitutional:  Negative for chills and fever.   HENT:  Negative for congestion.    Respiratory:  Negative for cough and shortness of breath.    Cardiovascular:  Negative for chest pain and leg swelling.   Gastrointestinal:  Negative for abdominal pain.   Genitourinary:  Negative for dysuria.   Musculoskeletal:  Positive for arthralgias (chronic; stable).   Skin:  Negative for rash.   Neurological:         Currently seeing ortho for right CTS - taking mobic daily and using a wrist splint with some relief.      ROS (Optional)-: no pelvic pain  Breast ROS (Optional)-: negative for breast lumps/discharge          Objective    PHYSICAL EXAMINATION/VITALS:  Vitals:    11/13/23 1043 11/13/23 1407   BP: (!) 150/72 130/68  Comment: average home BP reading   Pulse: 75    Temp: 98.1 °F (36.7 °C)    TempSrc: Oral    SpO2: (!) 93%    Weight: 68.2 kg (150 lb 7.4 oz)    Height: 5' 4" (1.626 m)         Body mass index is 25.83 kg/m².      General appearance - alert, well appearing, and in no distress, overweight  Psychiatric - alert, oriented to person, place, and time, normal behavior, speech, dress, motor activity and thought process  Eyes - pupils equal and reactive, extraocular eye movements intact, sclera anicteric  Neck - supple, no significant adenopathy, carotids upstroke normal bilaterally, no bruits  Lymphatics - no " palpable cervical lymphadenopathy, no palpable lymphadenopathy  Chest - clear to auscultation, no wheezes, rales or rhonchi, symmetric air entry  Heart - normal rate and regular rhythm, no gallops noted  Neurological - alert, normal speech, no focal findings; cranial nerves II through XII intact  Musculoskeletal - not examined  Extremities - no pedal edema noted  Skin - normal coloration, no suspicious skin lesions      LABS:  Previsit labs drawn earlier today - results pending.            ASSESSMENT AND PLAN:   1. Routine medical exam  Counseled on age appropriate medical preventative services including age appropriate cancer screenings, age appropriate eye and dental exams, over all nutritional health, need for a consistent exercise regimen, and an over all push towards maintaining a vigorous and active lifestyle.  Counseled on age appropriate vaccines and discussed upcoming health care needs based on age/gender. Discussed good sleep hygiene and stress management.    2. Gastroesophageal reflux disease without esophagitis  Symptoms controlled: yes - takes medication occasionally as needed.   Reflux precautions discussed (eliminate tobacco if a smoker; minimize caffeine, chocolate and red/white peppermint intake; avoid heavy and spicy meals; don't lay down within 2-3 hours after eating; minimize the intake of NSAIDs). Medication as needed.   Patient asked to take medication breaks, if possible - discussed chronic use can limit calcium absorption (which can lead to osteopenia/osteoporosis), increases the risk for intestinal infections, and can cause kidney damage. There are also some newer studies that show possible increased risk of mortality.  Overview:  Very minor - mostly related to high dose ibuprofen intake or spicy food intake      3. Chronic constipation  The current medical regimen is effective;  continue present plan and medications.     4. Coccygodynia  The current medical regimen is effective;  continue  present plan and medications.   Patient was counseled that the chronic use of anti-inflammatory medication can increase the risk of GI bleed and cardiovascular events (stroke and heart attack) as well as cause kidney damage and increase blood pressure.    5. Need for Streptococcus pneumoniae vaccination/6. Need for shingles vaccine/7. Flu vaccine need  Immunizations declined.    8. Encounter for screening mammogram for breast cancer    -     Mammo Digital Screening Bilat w/ Estevan; Future; Expected date: 11/13/2023    9. Menopause    -     DXA Bone Density Axial Skeleton 1 or more sites; Future; Expected date: 11/13/2023    10. Carpal tunnel syndrome of right wrist  She will continue with the Mobic and wrist brace for now.  She can also do some icing.  She will follow-up with orthopedics for next steps if symptoms worsen or persist.             Orders Placed This Encounter   Procedures    Mammo Digital Screening Bilat w/ Estevan    DXA Bone Density Axial Skeleton 1 or more sites      FOLLOW UP: Follow up in about 1 year (around 11/13/2024), or if symptoms worsen or fail to improve, for annual exam. or sooner as needed.

## 2023-12-09 ENCOUNTER — PATIENT MESSAGE (OUTPATIENT)
Dept: ORTHOPEDICS | Facility: CLINIC | Age: 68
End: 2023-12-09
Payer: MEDICARE

## 2023-12-15 ENCOUNTER — OFFICE VISIT (OUTPATIENT)
Dept: ORTHOPEDICS | Facility: CLINIC | Age: 68
End: 2023-12-15
Payer: MEDICARE

## 2023-12-15 VITALS — RESPIRATION RATE: 18 BRPM | WEIGHT: 150 LBS | HEIGHT: 64 IN | BODY MASS INDEX: 25.61 KG/M2

## 2023-12-15 DIAGNOSIS — M79.641 RIGHT HAND PAIN: ICD-10-CM

## 2023-12-15 DIAGNOSIS — G56.01 CARPAL TUNNEL SYNDROME OF RIGHT WRIST: ICD-10-CM

## 2023-12-15 DIAGNOSIS — M18.11 ARTHRITIS OF CARPOMETACARPAL (CMC) JOINT OF RIGHT THUMB: Primary | ICD-10-CM

## 2023-12-15 PROCEDURE — 3008F BODY MASS INDEX DOCD: CPT | Mod: HCNC,CPTII,S$GLB, | Performed by: FAMILY MEDICINE

## 2023-12-15 PROCEDURE — 20600 DRAIN/INJ JOINT/BURSA W/O US: CPT | Mod: HCNC,RT,S$GLB, | Performed by: FAMILY MEDICINE

## 2023-12-15 PROCEDURE — 1125F AMNT PAIN NOTED PAIN PRSNT: CPT | Mod: HCNC,CPTII,S$GLB, | Performed by: FAMILY MEDICINE

## 2023-12-15 PROCEDURE — 3044F PR MOST RECENT HEMOGLOBIN A1C LEVEL <7.0%: ICD-10-PCS | Mod: HCNC,CPTII,S$GLB, | Performed by: FAMILY MEDICINE

## 2023-12-15 PROCEDURE — 1125F PR PAIN SEVERITY QUANTIFIED, PAIN PRESENT: ICD-10-PCS | Mod: HCNC,CPTII,S$GLB, | Performed by: FAMILY MEDICINE

## 2023-12-15 PROCEDURE — 99214 PR OFFICE/OUTPT VISIT, EST, LEVL IV, 30-39 MIN: ICD-10-PCS | Mod: 25,HCNC,S$GLB, | Performed by: FAMILY MEDICINE

## 2023-12-15 PROCEDURE — 99999 PR PBB SHADOW E&M-EST. PATIENT-LVL II: ICD-10-PCS | Mod: PBBFAC,HCNC,, | Performed by: FAMILY MEDICINE

## 2023-12-15 PROCEDURE — 99999 PR PBB SHADOW E&M-EST. PATIENT-LVL II: CPT | Mod: PBBFAC,HCNC,, | Performed by: FAMILY MEDICINE

## 2023-12-15 PROCEDURE — 20600 SMALL JOINT ASPIRATION/INJECTION: R THUMB CMC: ICD-10-PCS | Mod: HCNC,RT,S$GLB, | Performed by: FAMILY MEDICINE

## 2023-12-15 PROCEDURE — 99214 OFFICE O/P EST MOD 30 MIN: CPT | Mod: 25,HCNC,S$GLB, | Performed by: FAMILY MEDICINE

## 2023-12-15 PROCEDURE — 3044F HG A1C LEVEL LT 7.0%: CPT | Mod: HCNC,CPTII,S$GLB, | Performed by: FAMILY MEDICINE

## 2023-12-15 PROCEDURE — 3008F PR BODY MASS INDEX (BMI) DOCUMENTED: ICD-10-PCS | Mod: HCNC,CPTII,S$GLB, | Performed by: FAMILY MEDICINE

## 2023-12-15 RX ORDER — TRIAMCINOLONE ACETONIDE 40 MG/ML
40 INJECTION, SUSPENSION INTRA-ARTICULAR; INTRAMUSCULAR
Status: DISCONTINUED | OUTPATIENT
Start: 2023-12-15 | End: 2023-12-15 | Stop reason: HOSPADM

## 2023-12-15 RX ADMIN — TRIAMCINOLONE ACETONIDE 40 MG: 40 INJECTION, SUSPENSION INTRA-ARTICULAR; INTRAMUSCULAR at 08:12

## 2023-12-15 NOTE — PROGRESS NOTES
Subjective:     Patient ID: Sheree Lomeli is a 68 y.o. female.    Chief Complaint: Pain of the Right Hand    Patient returns today for right hand pain secondary to carpal tunnel syndrome.  Was last seen in September.  Had been using conservative measures with nighttime bracing and taking meloxicam as needed.  She is still experiencing numbness in her right hand but notes that it has gotten better with conservative measures.  She now reports a new issue of having a constant pain in her thumb near the area of the CMC joint.  States that it hurts all the time and is not responsive to meloxicam or any other medications.  Pain increases with movement.  Does not describe the pain as a numbness or tingling but a constant ache.  At last visit her x-ray did show some mild degenerative changes of the CMC joint.    Pain  Pertinent negatives include no chest pain, chills, congestion, coughing, headaches, rash or sore throat.       Review of Systems   Constitutional: Negative for chills and decreased appetite.   HENT:  Negative for congestion and sore throat.    Eyes:  Negative for blurred vision.   Cardiovascular:  Negative for chest pain, dyspnea on exertion and palpitations.   Respiratory:  Negative for cough and shortness of breath.    Skin:  Negative for rash.   Neurological:  Negative for difficulty with concentration, disturbances in coordination and headaches.   Psychiatric/Behavioral:  Negative for altered mental status, depression, hallucinations, memory loss and suicidal ideas.        Objective:       General    Nursing note and vitals reviewed.  Constitutional: She is oriented to person, place, and time. She appears well-developed and well-nourished.   HENT:   Nose: Nose normal.   Eyes: EOM are normal. Pupils are equal, round, and reactive to light.   Neck: Neck supple.   Cardiovascular:  Normal rate.            Pulmonary/Chest: Effort normal.   Abdominal: Soft.   Neurological: She is alert and oriented to  person, place, and time. She has normal reflexes.   Psychiatric: She has a normal mood and affect. Her behavior is normal. Judgment and thought content normal.             Right Hand/Wrist Exam     Inspection   Effusion: Wrist - absent   Deformity: Wrist - deformity     Pain   Wrist - The patient exhibits pain of the CMC.    Tenderness   The patient is tender to palpation of the dorsal area.    Range of Motion     Wrist   Extension:  normal   Flexion:  normal   Pronation:  normal   Supination:  normal     Tests   Phalens Sign: negative  Tinel's sign (median nerve): negative  Finkelstein's test: positive  Carpal Tunnel Compression Test: positive  Cubital Tunnel Compression Test: negative    Atrophy   Thenar:  negative    Other     Neuorologic Exam    Median Distribution: abnormal  Ulnar Distribution: normal  Radial Distribution: normal      Left Hand/Wrist Exam   Left hand exam is normal.    Inspection   Effusion: Wrist - absent   Deformity: Wrist - absent     Range of Motion     Wrist   Extension:  normal   Flexion:  normal   Pronation:  normal   Supination:  normal     Tests   Phalens Sign: negative  Tinel's sign (median nerve): negative  Carpal Tunnel Compression Test: negative  Cubital Tunnel Compression Test: negative    Atrophy  Thenar:  Negative    Other     Sensory Exam  Median Distribution: normal  Ulnar Distribution: normal  Radial Distribution: normal          Muscle Strength   Right Upper Extremity   Wrist extension: 5/5   Wrist flexion: 5/5   : 5/5   Index Finger: 5/5  Middle Finger: 5/5  Ring Finger: 5/5  Little Finger: 5/5  Thumb - APB: 5/5  Thumb - FPL: 5/5  Pinch Mechanism: 5/5  Left Upper Extremity  Wrist extension: 5/5   Wrist flexion: 5/5   :  5/5   Index Finger: 5/5  Middle Finger: 5/5  Ring Finger: 5/5  Little Finger: 5/5  Thumb - APB: 5/5  Thumb - FPL: 5/5  Pinch Mechanism: 5/5    Vascular Exam       Capillary Refill  Right Hand: normal capillary refill  Left Hand: normal capillary  refill        Physical Exam  Vitals and nursing note reviewed.   Constitutional:       Appearance: She is well-developed and well-nourished.   HENT:      Nose: Nose normal.   Eyes:      Extraocular Movements: EOM normal.      Pupils: Pupils are equal, round, and reactive to light.   Cardiovascular:      Rate and Rhythm: Normal rate.   Pulmonary:      Effort: Pulmonary effort is normal.   Abdominal:      Palpations: Abdomen is soft.   Musculoskeletal:      Right wrist: No deformity or effusion.      Left wrist: Normal. No deformity or effusion.      Right hand: Decreased sensation of the median distribution. Normal sensation of the ulnar distribution and radial distribution. Normal capillary refill.      Left hand: Normal sensation of the ulnar distribution, median distribution and radial distribution. Normal capillary refill.      Cervical back: Neck supple.   Neurological:      Mental Status: She is alert and oriented to person, place, and time.      Deep Tendon Reflexes: Reflexes are normal and symmetric.   Psychiatric:         Mood and Affect: Mood and affect normal.         Behavior: Behavior normal.         Thought Content: Thought content normal.         Judgment: Judgment normal.       Assessment:     Encounter Diagnoses   Name Primary?    Right hand pain     Carpal tunnel syndrome of right wrist     Arthritis of carpometacarpal (CMC) joint of right thumb Yes       Plan:      Sheree was seen today for pain.    Diagnoses and all orders for this visit:    Arthritis of carpometacarpal (CMC) joint of right thumb    Right hand pain    Carpal tunnel syndrome of right wrist      I offered her an injection into her right CMC joint today and she agreed with this.  Continue conservative measures for carpal tunnel as she is improving.  If there is not resolution of her symptoms after the holidays I recommended she return and we would consider at that point carpal tunnel injections or more invasive measures.    Small  Joint Aspiration/Injection: R thumb CMC    Date/Time: 12/15/2023 8:20 AM    Performed by: Bryant Clark MD  Authorized by: Bryant Clark MD    Consent Done?:  Yes (Verbal)  Indications:  Arthritis and pain  Site marked: the procedure site was marked    Timeout: prior to procedure the correct patient, procedure, and site was verified    Prep: patient was prepped and draped in usual sterile fashion      Local anesthesia used?: Yes    Local anesthetic:  Topical anesthetic  Location:  Thumb  Site:  R thumb CMC  Ultrasonic guidance for needle placement?: No    Needle size:  25 G  Approach:  Dorsal  Medications:  40 mg triamcinolone acetonide 40 mg/mL  Patient tolerance:  Patient tolerated the procedure well with no immediate complications

## 2024-03-25 ENCOUNTER — PATIENT MESSAGE (OUTPATIENT)
Dept: FAMILY MEDICINE | Facility: CLINIC | Age: 69
End: 2024-03-25
Payer: MEDICARE

## 2024-03-26 ENCOUNTER — TELEPHONE (OUTPATIENT)
Dept: FAMILY MEDICINE | Facility: CLINIC | Age: 69
End: 2024-03-26
Payer: MEDICARE

## 2024-03-26 DIAGNOSIS — M53.3 COCCYGODYNIA: ICD-10-CM

## 2024-03-26 RX ORDER — IBUPROFEN 600 MG/1
TABLET ORAL
Qty: 90 TABLET | Refills: 0 | Status: SHIPPED | OUTPATIENT
Start: 2024-03-26

## 2024-03-26 NOTE — TELEPHONE ENCOUNTER
Attempted to contact patient. Left a voice mail to call clinic back.Prescription was discontinue by Dr. Bryant Clark. Alternative therapy was meloxicam.

## 2024-03-26 NOTE — TELEPHONE ENCOUNTER
No care due was identified.  [employer] Embedded Care Due Messages. Reference number: [id number].   3/26/2024 9:01:59 AM CDT

## 2024-03-26 NOTE — TELEPHONE ENCOUNTER
----- Message from Hari Brunson sent at 3/26/2024  8:41 AM CDT -----  Regarding: self  Type:  Patient Returning Call    Who Called:self    Who Left Message for Patient: Cooper Hernandez MA    Does the patient know what this is regarding?:no    Would the patient rather a call back or a response via My [facility]?callback    Best Call Back Number:[phone]    Additional Information:

## 2024-03-26 NOTE — TELEPHONE ENCOUNTER
----- Message from Roshni Shipman sent at 3/26/2024  8:28 AM CDT -----  .Type: RX Refill Request    Who Called: Self     Have you contacted your pharmacy: No     Refill or New Rx: New Rx    RX Name and Strength:ibuprofen (ADVIL,MOTRIN) 600 MG tablet    Preferred Pharmacy with phone number: .  [id number] - [place], [place] - [address] AT [facility] & [facility] [address]  [address]  [facility] [address]  Phone: [phone] Fax: [phone]        Local or Mail Order: Local     Ordering Provider: Samaria    Would the patient rather a call back or a response via My [facility]? Call Back     Best Call Back Number: .[phone] (home)       Additional Information:

## 2024-03-26 NOTE — TELEPHONE ENCOUNTER
Spoke with patient. Patient verbalized understanding. Patient states she will call to schedule appointments.

## 2024-03-26 NOTE — TELEPHONE ENCOUNTER
Spoke with patient. Patient was notified that ibuprofen was discontinued by Dr. Clark when he put her on meloxicam.  Patient states that she only took the meloxicam to clear up the carpule tunnel. Patient states she is not taking it anymore. Patient states ibuprofen is used for her chronic back pain.

## 2024-05-20 ENCOUNTER — HOSPITAL ENCOUNTER (OUTPATIENT)
Dept: RADIOLOGY | Facility: CLINIC | Age: 69
Discharge: HOME | End: 2024-05-20
Attending: INTERNAL MEDICINE
Payer: MEDICARE

## 2024-05-20 DIAGNOSIS — Z12.31 ENCOUNTER FOR SCREENING MAMMOGRAM FOR BREAST CANCER: ICD-10-CM

## 2024-05-20 PROCEDURE — 77063 BREAST TOMOSYNTHESIS BI: CPT | Mod: TC,HCNC,PO

## 2024-05-20 PROCEDURE — 77067 SCR MAMMO BI INCL CAD: CPT | Mod: TC,HCNC,PO

## 2024-05-20 PROCEDURE — 77067 SCR MAMMO BI INCL CAD: CPT | Mod: 26,HCNC,, | Performed by: RADIOLOGY

## 2024-05-20 PROCEDURE — 77063 BREAST TOMOSYNTHESIS BI: CPT | Mod: 26,HCNC,, | Performed by: RADIOLOGY

## 2024-05-27 ENCOUNTER — PATIENT MESSAGE (OUTPATIENT)
Dept: FAMILY MEDICINE | Facility: CLINIC | Age: 69
End: 2024-05-27
Payer: MEDICARE

## 2024-05-28 ENCOUNTER — PATIENT MESSAGE (OUTPATIENT)
Dept: FAMILY MEDICINE | Facility: CLINIC | Age: 69
End: 2024-05-28
Payer: MEDICARE

## 2024-06-13 ENCOUNTER — HOSPITAL ENCOUNTER (OUTPATIENT)
Dept: RADIOLOGY | Facility: HOSPITAL | Age: 69
Discharge: HOME | End: 2024-06-13
Attending: INTERNAL MEDICINE
Payer: MEDICARE

## 2024-06-13 DIAGNOSIS — R92.8 ABNORMAL MAMMOGRAM: ICD-10-CM

## 2024-06-13 PROCEDURE — 76642 ULTRASOUND BREAST LIMITED: CPT | Mod: TC,LT

## 2024-06-13 PROCEDURE — 77065 DX MAMMO INCL CAD UNI: CPT | Mod: TC,LT

## 2024-06-13 PROCEDURE — 77065 DX MAMMO INCL CAD UNI: CPT | Mod: 26,LT,, | Performed by: RADIOLOGY

## 2024-06-13 PROCEDURE — 77061 BREAST TOMOSYNTHESIS UNI: CPT | Mod: 26,LT,, | Performed by: RADIOLOGY

## 2024-06-13 PROCEDURE — 77061 BREAST TOMOSYNTHESIS UNI: CPT | Mod: TC,LT

## 2024-06-13 PROCEDURE — 76642 ULTRASOUND BREAST LIMITED: CPT | Mod: 26,LT,, | Performed by: RADIOLOGY

## 2024-06-28 ENCOUNTER — LAB VISIT (OUTPATIENT)
Dept: LAB | Facility: OTHER | Age: 69
End: 2024-06-28
Attending: NURSE PRACTITIONER
Payer: MEDICARE

## 2024-06-28 ENCOUNTER — OFFICE VISIT (OUTPATIENT)
Dept: UROGYNECOLOGY | Facility: CLINIC | Age: 69
End: 2024-06-28
Payer: MEDICARE

## 2024-06-28 VITALS
HEIGHT: 64 IN | DIASTOLIC BLOOD PRESSURE: 78 MMHG | BODY MASS INDEX: 24.5 KG/M2 | SYSTOLIC BLOOD PRESSURE: 124 MMHG | HEART RATE: 73 BPM | WEIGHT: 143.5 LBS

## 2024-06-28 DIAGNOSIS — N95.1 MENOPAUSAL SYMPTOMS: ICD-10-CM

## 2024-06-28 DIAGNOSIS — R61 NIGHT SWEATS: ICD-10-CM

## 2024-06-28 DIAGNOSIS — Z12.4 ENCOUNTER FOR SCREENING FOR CERVICAL CANCER: ICD-10-CM

## 2024-06-28 DIAGNOSIS — Z01.419 WELL WOMAN EXAM: Primary | ICD-10-CM

## 2024-06-28 DIAGNOSIS — N89.5 VAGINAL STENOSIS: ICD-10-CM

## 2024-06-28 LAB — TSH SERPL DL<=0.005 MIU/L-ACNC: 1 UIU/ML (ref 0.4–4)

## 2024-06-28 PROCEDURE — 36415 COLL VENOUS BLD VENIPUNCTURE: CPT | Mod: HCNC | Performed by: NURSE PRACTITIONER

## 2024-06-28 PROCEDURE — 99999 PR PBB SHADOW E&M-EST. PATIENT-LVL III: CPT | Mod: PBBFAC,HCNC,, | Performed by: NURSE PRACTITIONER

## 2024-06-28 PROCEDURE — 3288F FALL RISK ASSESSMENT DOCD: CPT | Mod: HCNC,CPTII,S$GLB, | Performed by: NURSE PRACTITIONER

## 2024-06-28 PROCEDURE — 1159F MED LIST DOCD IN RCRD: CPT | Mod: HCNC,CPTII,S$GLB, | Performed by: NURSE PRACTITIONER

## 2024-06-28 PROCEDURE — 84443 ASSAY THYROID STIM HORMONE: CPT | Mod: HCNC | Performed by: NURSE PRACTITIONER

## 2024-06-28 PROCEDURE — 87624 HPV HI-RISK TYP POOLED RSLT: CPT | Mod: HCNC | Performed by: NURSE PRACTITIONER

## 2024-06-28 PROCEDURE — 88175 CYTOPATH C/V AUTO FLUID REDO: CPT | Mod: HCNC | Performed by: NURSE PRACTITIONER

## 2024-06-28 PROCEDURE — 3074F SYST BP LT 130 MM HG: CPT | Mod: HCNC,CPTII,S$GLB, | Performed by: NURSE PRACTITIONER

## 2024-06-28 PROCEDURE — 1126F AMNT PAIN NOTED NONE PRSNT: CPT | Mod: HCNC,CPTII,S$GLB, | Performed by: NURSE PRACTITIONER

## 2024-06-28 PROCEDURE — 1101F PT FALLS ASSESS-DOCD LE1/YR: CPT | Mod: HCNC,CPTII,S$GLB, | Performed by: NURSE PRACTITIONER

## 2024-06-28 PROCEDURE — G0101 CA SCREEN;PELVIC/BREAST EXAM: HCPCS | Mod: HCNC,S$GLB,, | Performed by: NURSE PRACTITIONER

## 2024-06-28 PROCEDURE — 1160F RVW MEDS BY RX/DR IN RCRD: CPT | Mod: HCNC,CPTII,S$GLB, | Performed by: NURSE PRACTITIONER

## 2024-06-28 PROCEDURE — 3078F DIAST BP <80 MM HG: CPT | Mod: HCNC,CPTII,S$GLB, | Performed by: NURSE PRACTITIONER

## 2024-06-28 RX ORDER — AMITRIPTYLINE HYDROCHLORIDE 10 MG/1
10 TABLET, FILM COATED ORAL NIGHTLY
Qty: 30 TABLET | Refills: 11 | Status: SHIPPED | OUTPATIENT
Start: 2024-06-28 | End: 2025-06-28

## 2024-06-28 NOTE — PATIENT INSTRUCTIONS
Well woman  --pap today  --takes 2-3 weeks for results    Vaginal atrophy/ stenosis  -- use vaginal estrogen cream twice weekly  --using reveree  --use therawand once weekly    Constipation  --continue fiber, stool softeners, and magnesium oxide 400 mg    Intermittent pelvic floor tension  --I have sent in the prescription to [facility] in [place], [place].  Their phone number is [phone].  Ask for the Appointedding department.    Menopausal symptoms  --trial amitriptyline 10 mg nightly    RTC 1 year for annual

## 2024-06-28 NOTE — PROGRESS NOTES
"06/28/2024    SUBJECTIVE:   69 y.o. female for annual exam.      OPERATIVE NOTE  08/13/2020  Title of Operation:  1)  Exam under anesthesia  2)  Hysteroscopy with polypectomy  3)  Posterior repair with perineorrhaphy     Indications for Surgery:  Mrs. Yani gill is a 57-year-old G3, P2-0-1-2 who reports a   suspected rectal injury after doing house work approximately three years   ago. She describes sensation of a rectal "tear" on excessive extension of   her back. Since that time, she has undergone evaluation per several   gastroenterology specialist. Colonoscopy 2006 is normal. A barium enema   2011 is suboptimal due to increased amount of stool in the bowel. She also  reports consultation with an orthopedist and MRI significant for   degenerative joint disease, but negative for neurologic injury. She   initially had discomfort in the left perianal area and sense of vaginal   bulge. Recently, she began to have issues with complete evacuation and   often splints perineally to help evacuate. She also notes use of MiraLax   can be helpful. Exam reveals a distal rectocele without obvious enterocele  or perineocele. There is tenderness to palpation perianally, especially at  the patient's left. There is also mild tenderness to palpation at the   internal levator ani complex, although no increased muscle tone. Rectal   exam reveals normal resting tone, but mildly decreased concentric squeeze.   Otherwise, there are no significant sphincter abnormalities. Perineal   sensation is grossly intact.        Past Medical History:   Diagnosis Date    Arthritis     Back pain     Chronic pelvic pain in female     left-sided    De Quervain's tenosynovitis     left    Headache(784.0)     Migraine maybe 1-2 times per year    PONV (postoperative nausea and vomiting)     Right carpal tunnel syndrome     Urinary incontinence     occ kira       Past Surgical History:   Procedure Laterality Date    ANTERIOR VAGINAL REPAIR N/A 8/13/2020    " Procedure: COLPORRHAPHY, ANTERIOR;  Surgeon: Salina Mukherjee MD;  Location: [facility] OR;  Service: OB/GYN;  Laterality: N/A;    COLONOSCOPY N/A 11/3/2020    Procedure: COLONOSCOPY;  Surgeon: Parish Pulido MD;  Location: [facility] ENDO (4TH FLR);  Service: Endoscopy;  Laterality: N/A;  colonoscopy added to EGD order    CYSTOSCOPY N/A 8/13/2020    Procedure: CYSTOSCOPY;  Surgeon: Salina Mukherjee MD;  Location: [facility] OR;  Service: OB/GYN;  Laterality: N/A;    EPIDURAL STEROID INJECTION N/A 6/12/2018    Procedure: INJECTION-STEROID-EPIDURAL;  Surgeon: Rianna Nina MD;  Location: [facility] PAIN MGT;  Service: Pain Management;  Laterality: N/A;  Coccygeal Nerve Block  92000  (0.5mg Niravam only)    *Pt wants to know if she has NO SEDATION at all, can pt drive herself home*    ESOPHAGOGASTRODUODENOSCOPY N/A 11/3/2020    Procedure: ESOPHAGOGASTRODUODENOSCOPY (EGD);  Surgeon: Parish Pulido MD;  Location: [facility] (4TH FLR);  Service: Endoscopy;  Laterality: N/A;  Schedule ASAP - call Dr. Pulido with schedule problems  10/31-[facility]-pt going out of town did not want earlier date-tb    HYSTEROSCOPY WITH DILATION AND CURETTAGE OF UTERUS N/A 8/13/2020    Procedure: HYSTEROSCOPY, WITH DILATION AND CURETTAGE OF UTERUS;  Surgeon: Salina Mukherjee MD;  Location: [facility];  Service: OB/GYN;  Laterality: N/A;    LAPAROSCOPIC CHOLECYSTECTOMY N/A 4/23/2021    Procedure: CHOLECYSTECTOMY, LAPAROSCOPIC;  Surgeon: Ivan Bryson MD;  Location: [facility] 2ND FLR;  Service: General;  Laterality: N/A;    OOPHORECTOMY      Left ovary       Family History   Problem Relation Name Age of Onset    Heart disease Father          s/p stenting    Coronary artery disease Father      Prostate cancer Father      Cancer Father          prostate     Hypoglycemic Sister Suzette     Breast cancer Daughter  35        dx in September 2018, lives in [place]    No Known Problems Sister Laine     Diabetes Maternal Uncle      Diabetes Cousin       Hypertension Neg Hx      Stroke Neg Hx      Colon cancer Neg Hx      Cervical cancer Neg Hx      Vaginal cancer Neg Hx      Endometrial cancer Neg Hx      Ovarian cancer Neg Hx         Social History     Socioeconomic History    Marital status:     Number of children: 2   Occupational History    Occupation:      Employer: [employer]   Tobacco Use    Smoking status: Never    Smokeless tobacco: Never   Substance and Sexual Activity    Alcohol use: Never    Drug use: Never    Sexual activity: Yes     Partners: Male     Social Determinants of Health     Financial Resource Strain: Low Risk  (7/15/2024)    Received from [facility]    Overall Financial Resource Strain (CARDIA)     Difficulty of Paying Living Expenses: Not very hard   Food Insecurity: No Food Insecurity (7/15/2024)    Received from [facility]    Hunger Vital Sign     Worried About Running Out of Food in the Last Year: Never true     Ran Out of Food in the Last Year: Never true   Transportation Needs: No Transportation Needs (7/15/2024)    Received from [facility]    PRAPARE - Transportation     Lack of Transportation (Medical): No     Lack of Transportation (Non-Medical): No   Physical Activity: Sufficiently Active (7/15/2024)    Received from [facility]    Exercise Vital Sign     Days of Exercise per Week: 5 days     Minutes of Exercise per Session: 30 min   Stress: No Stress Concern Present (7/15/2024)    Received from [facility]    [place] [employer] - Occupational Stress Questionnaire     Feeling of Stress : Not at all   Housing Stability: Unknown (7/15/2024)    Received from [facility]    Housing Stability Vital Sign     Unable to Pay for Housing in the Last Year: No     Homeless in  the Last Year: No       Current Outpatient Medications   Medication Sig Dispense Refill    ascorbic acid, vitamin C, (VITAMIN C) 1000 MG tablet Take 1,000 mg by mouth once daily.      butalbital-aspirin-caffeine -40 mg (FIORINAL) -40 mg Cap Take 1 capsule by mouth every 4 (four) hours as needed. 30 capsule 0    dextrin (FIBER POWDER ORAL) Take by mouth.      ibuprofen (ADVIL,MOTRIN) 600 MG tablet TAKE 1 TABLET(600 MG) BY MOUTH EVERY 12 HOURS AS NEEDED 90 tablet 0    MAGNESIUM ORAL Take by mouth.      vitamin D (VITAMIN D3) 1000 units Tab Take 1,000 Units by mouth once daily.      zinc sulfate 50 mg zinc (220 mg) Tab tablet Take 220 mg by mouth once daily.      amitriptyline (ELAVIL) 10 MG tablet Take 1 tablet (10 mg total) by mouth nightly. 30 tablet 11    estradioL (ESTRACE) 0.01 % (0.1 mg/gram) vaginal cream Place 1 g vaginally once daily. 42.5 g 11     No current facility-administered medications for this visit.       Review of patient's allergies indicates:   Allergen Reactions    Dermabond [tissue glues] Dermatitis    Adhesive Rash    Shellfish containing products Rash     Patient allergic to softshell crabs specifically    Sulfa (sulfonamide antibiotics) Rash       No LMP recorded (lmp unknown). Patient is postmenopausal.    Well Woman:  --pap: 2021, normal; HPV neg  --mammogram:2024 R breast mammogram normal  2024  US Breast Left Limited  There is no sonographic evidence of malignancy in the left breast.   BI-RADS Category:   Overall: 1 - Negative  --colonoscopy: 2020 Three 2 to 3 mm polyps in the ascending colon,                         removed with a jumbo cold forceps. Resected and                         retrieved.                         - Diverticulosis in the sigmoid colon.                         - Non-bleeding internal hemorrhoids.   --DEXA: , normal       OB History          3    Para   2    Term   2            AB   1    Living   2         SAB   1    IAB      "   Ectopic        Multiple        Live Births   2                   ROS:  Feeling well.   No dyspnea or chest pain on exertion.    No abdominal pain, change in bowel habits, black or bloody stools.  Intermittent constipation. Taking fiber, stool softeners, and magnesium.   +OLVIN with cough.  Denies urinary urgency or frequency  GYN ROS: no breast pain or new or enlarging lumps on self exam, no vaginal bleeding. Using vaginal estrogen cream twice weekly.  Completed pelvic floor PT.  Complains of hot flashes, sweating since January.   No neurological complaints.    OBJECTIVE:   The patient appears well, alert, oriented x 3, in no distress.  /78 (BP Location: Right arm, Patient Position: Sitting, BP Method: Medium (Automatic))   Pulse 73   Ht 5' 4" (1.626 m)   Wt 65.1 kg (143 lb 8.3 oz)   LMP  (LMP Unknown)   BMI 24.64 kg/m²   ENT normal.  Neck supple. No adenopathy or thyromegaly. TYSON.   Normal respiratory effort   Pulse with regular rate and rhythm.   Abdomen soft without tenderness, guarding, mass or organomegaly.   Extremities show no edema, normal peripheral pulses.   Neurological is normal, no focal findings.    BREAST EXAM: breasts appear normal, no suspicious masses, no skin or nipple changes or axillary nodes    PELVIC EXAM:   VULVA: normal appearing vulva with no masses, tenderness or lesions,   VAGINA: normal appearing vagina with normal color and discharge, no lesions, atrophic, stenosis noted-- depth 4 cm with fingerbreadth opening just before cervix  CERVIX: posterior cervix adhered to vaginal wall-- can visualize os;  cervix without discharge or lesions,   UTERUS: uterus is normal size, shape, consistency and nontender,   ADNEXA: no masses,   RECTAL: normal rectal, no masses    ASSESSMENT:   1. Well woman exam        2. Menopausal symptoms  amitriptyline (ELAVIL) 10 MG tablet      3. Night sweats  TSH      4. Encounter for screening for cervical cancer  CANCELED: HPV High Risk Genotypes, PCR "    CANCELED: Liquid-Based Pap Smear, Screening      5. Vaginal stenosis              PLAN:    Well woman  --pap today  --takes 2-3 weeks for results    Vaginal atrophy/ stenosis  -- use vaginal estrogen cream twice weekly  --using reveree  --use therawand once weekly    Constipation  --continue fiber, stool softeners, and magnesium oxide 400 mg    Intermittent pelvic floor tension  --I have sent in the prescription to [facility] in [place], [place].  Their phone number is [phone].  Ask for the compounding department.    Menopausal symptoms  --trial amitriptyline 10 mg nightly    RTC 1 year for annual        20 minutes were spent in face to face time with this patient  90 % of this time was spent in counseling and/or coordination of care    Julia GUY [facility]  Division of Female Pelvic Medicine and Reconstructive Surgery  Department of Obstetrics & Gynecology

## 2024-07-08 ENCOUNTER — PATIENT MESSAGE (OUTPATIENT)
Dept: UROGYNECOLOGY | Facility: CLINIC | Age: 69
End: 2024-07-08
Payer: MEDICARE

## 2024-07-24 ENCOUNTER — HOSPITAL ENCOUNTER (OUTPATIENT)
Dept: RADIOLOGY | Facility: CLINIC | Age: 69
Discharge: HOME | End: 2024-07-24
Attending: INTERNAL MEDICINE
Payer: MEDICARE

## 2024-07-24 DIAGNOSIS — Z78.0 MENOPAUSE: ICD-10-CM

## 2024-07-24 PROBLEM — M85.80 OSTEOPENIA AFTER MENOPAUSE: Status: ACTIVE | Noted: 2024-07-24

## 2024-07-24 PROCEDURE — 77080 DXA BONE DENSITY AXIAL: CPT | Mod: TC,HCNC,PO

## 2024-07-24 PROCEDURE — 77080 DXA BONE DENSITY AXIAL: CPT | Mod: 26,HCNC,, | Performed by: RADIOLOGY

## 2024-07-26 DIAGNOSIS — M53.3 COCCYGODYNIA: ICD-10-CM

## 2024-07-26 NOTE — TELEPHONE ENCOUNTER
No care due was identified.  Health [facility] Embedded Care Due Messages. Reference number: [id number].   7/26/2024 2:30:07 PM CDT

## 2024-07-27 ENCOUNTER — PATIENT MESSAGE (OUTPATIENT)
Dept: UROGYNECOLOGY | Facility: CLINIC | Age: 69
End: 2024-07-27
Payer: MEDICARE

## 2024-07-29 RX ORDER — IBUPROFEN 600 MG/1
TABLET ORAL
Qty: 90 TABLET | Refills: 0 | Status: SHIPPED | OUTPATIENT
Start: 2024-07-29

## 2024-09-17 DIAGNOSIS — M53.3 COCCYGODYNIA: ICD-10-CM

## 2024-09-18 NOTE — TELEPHONE ENCOUNTER
Care Due:                  Date            Visit Type   Department     Provider  --------------------------------------------------------------------------------                                EP -         [facility]                              PRIMARY      MED/ INTERNAL  Liana Anaya  Last Visit: 11-      CARE ([facility])   MED/ PEDS      Jerome KOO                              ANNUAL       [facility]                              CHECKUP/PHY  MED/ INTERNAL  Liana Anaya  Next Visit: 01-      S            MED/ PEDS      Jerome Mera                                                            Last  Test          Frequency    Reason                     Performed    Due Date  --------------------------------------------------------------------------------    Office Visit  12 months..  ibuprofen................  11- 11-    CBC.........  12 months..  ibuprofen................  11- 11-    Cr..........  12 months..  ibuprofen................  11- 11-    Health [facility] Embedded Care Due Messages. Reference number: [id number].   9/17/2024 7:44:14 PM CDT

## 2024-09-19 RX ORDER — IBUPROFEN 600 MG/1
TABLET ORAL
Qty: 90 TABLET | Refills: 0 | Status: SHIPPED | OUTPATIENT
Start: 2024-09-19

## 2024-09-19 NOTE — TELEPHONE ENCOUNTER
Spoke with patient. Patient states that she has moved to [place] but would like to still keep Dr. Mera for her pcp. Patient states that she did go to a local clinic regarding her back pain to see if anything has change but will continue to travel to [place] for her annual.

## 2025-01-09 ENCOUNTER — TELEPHONE (OUTPATIENT)
Dept: FAMILY MEDICINE | Facility: CLINIC | Age: 70
End: 2025-01-09
Payer: MEDICARE

## 2025-01-09 DIAGNOSIS — K21.9 GASTROESOPHAGEAL REFLUX DISEASE WITHOUT ESOPHAGITIS: Primary | ICD-10-CM

## 2025-01-09 DIAGNOSIS — E78.5 MILD HYPERLIPIDEMIA: ICD-10-CM

## 2025-01-09 NOTE — TELEPHONE ENCOUNTER
Spoke with patient. Patient states that she did find another pcp in [place]. Patient states that the distance was getting too long. Patient request appointment to be canceled.

## 2025-02-11 ENCOUNTER — OFFICE VISIT (OUTPATIENT)
Dept: ORTHOPEDICS | Facility: CLINIC | Age: 70
End: 2025-02-11
Payer: MEDICARE

## 2025-02-11 ENCOUNTER — HOSPITAL ENCOUNTER (OUTPATIENT)
Dept: RADIOLOGY | Facility: HOSPITAL | Age: 70
Discharge: HOME | End: 2025-02-11
Attending: FAMILY MEDICINE
Payer: MEDICARE

## 2025-02-11 VITALS — BODY MASS INDEX: 24.5 KG/M2 | HEIGHT: 64 IN | WEIGHT: 143.5 LBS

## 2025-02-11 DIAGNOSIS — M18.11 ARTHRITIS OF CARPOMETACARPAL (CMC) JOINT OF RIGHT THUMB: ICD-10-CM

## 2025-02-11 DIAGNOSIS — M18.11 ARTHRITIS OF CARPOMETACARPAL (CMC) JOINT OF RIGHT THUMB: Primary | ICD-10-CM

## 2025-02-11 DIAGNOSIS — G56.01 CARPAL TUNNEL SYNDROME OF RIGHT WRIST: Primary | ICD-10-CM

## 2025-02-11 DIAGNOSIS — M79.641 RIGHT HAND PAIN: ICD-10-CM

## 2025-02-11 PROCEDURE — 99214 OFFICE O/P EST MOD 30 MIN: CPT | Mod: 25,S$GLB,, | Performed by: FAMILY MEDICINE

## 2025-02-11 PROCEDURE — 20526 THER INJECTION CARP TUNNEL: CPT | Mod: RT,S$GLB,, | Performed by: FAMILY MEDICINE

## 2025-02-11 PROCEDURE — 73130 X-RAY EXAM OF HAND: CPT | Mod: 26,RT,, | Performed by: RADIOLOGY

## 2025-02-11 PROCEDURE — 1101F PT FALLS ASSESS-DOCD LE1/YR: CPT | Mod: CPTII,S$GLB,, | Performed by: FAMILY MEDICINE

## 2025-02-11 PROCEDURE — 3288F FALL RISK ASSESSMENT DOCD: CPT | Mod: CPTII,S$GLB,, | Performed by: FAMILY MEDICINE

## 2025-02-11 PROCEDURE — 99999 PR PBB SHADOW E&M-EST. PATIENT-LVL III: CPT | Mod: PBBFAC,,, | Performed by: FAMILY MEDICINE

## 2025-02-11 PROCEDURE — 3008F BODY MASS INDEX DOCD: CPT | Mod: CPTII,S$GLB,, | Performed by: FAMILY MEDICINE

## 2025-02-11 PROCEDURE — 1125F AMNT PAIN NOTED PAIN PRSNT: CPT | Mod: CPTII,S$GLB,, | Performed by: FAMILY MEDICINE

## 2025-02-11 PROCEDURE — 73130 X-RAY EXAM OF HAND: CPT | Mod: TC,PO,RT

## 2025-02-11 PROCEDURE — 1159F MED LIST DOCD IN RCRD: CPT | Mod: CPTII,S$GLB,, | Performed by: FAMILY MEDICINE

## 2025-02-11 RX ORDER — TRIAMCINOLONE ACETONIDE 40 MG/ML
40 INJECTION, SUSPENSION INTRA-ARTICULAR; INTRAMUSCULAR
Status: DISCONTINUED | OUTPATIENT
Start: 2025-02-11 | End: 2025-02-11 | Stop reason: HOSPADM

## 2025-02-11 RX ADMIN — TRIAMCINOLONE ACETONIDE 40 MG: 40 INJECTION, SUSPENSION INTRA-ARTICULAR; INTRAMUSCULAR at 11:02

## 2025-02-11 NOTE — PROGRESS NOTES
Subjective     Patient ID: Sheree Lomeli is a 69 y.o. female.    Chief Complaint: Pain of the Right Hand (Pt here w/ c/o R) hand/thumb pain. Pt states issue returned approx 3mos ago. Last injection 12/15/23-Kenalog . Denies any recent fall/injury. )    Patient returns today with complaints of right-sided hand and thumb pain.  We last saw her for this issue December 15, 2023.  Had a combination of CMC arthritis as well as carpal tunnel syndrome.  We injected her CMC joint then.  This significantly relieved her pain.  She still only has mild pain in the area of the CMC joint.  Most of her complaints today are of numbness in the hands and fingers particularly the index and middle finger.  This has been a problem now for several years but it seems to be getting worse.  Particularly over the last three months.  She has been wearing nighttime bracing and taking meloxicam but has not been noticing any improvement.  It is constantly having to shake her hand due to sensations and numbness.  Seems to be worse at night.    Pain  Pertinent negatives include no chest pain, chills, congestion, coughing, headaches, rash or sore throat.       Review of Systems   Constitutional: Negative for chills and decreased appetite.   HENT:  Negative for congestion and sore throat.    Eyes:  Negative for blurred vision.   Cardiovascular:  Negative for chest pain, dyspnea on exertion and palpitations.   Respiratory:  Negative for cough and shortness of breath.    Skin:  Negative for rash.   Neurological:  Negative for difficulty with concentration, disturbances in coordination and headaches.   Psychiatric/Behavioral:  Negative for altered mental status, depression, hallucinations, memory loss and suicidal ideas.           Objective     General    Nursing note and vitals reviewed.  Constitutional: She is oriented to person, place, and time. She appears well-developed and well-nourished.   HENT:   Nose: Nose normal.   Eyes: EOM are normal.  Pupils are equal, round, and reactive to light.   Neck: Neck supple.   Cardiovascular:  Normal rate.            Pulmonary/Chest: Effort normal.   Abdominal: Soft.   Neurological: She is alert and oriented to person, place, and time. She has normal reflexes.   Psychiatric: She has a normal mood and affect. Her behavior is normal. Judgment and thought content normal.             Right Hand/Wrist Exam     Inspection   Effusion: Wrist - absent   Deformity: Wrist - deformity     Pain   Wrist - The patient exhibits pain of the flexor/pronator group.    Range of Motion     Wrist   Extension:  normal   Flexion:  normal   Pronation:  normal   Supination:  normal     Tests   Phalens sign: positive  Tinel's sign (median nerve): positive  Carpal Tunnel Compression Test: positive  Cubital Tunnel Compression Test: negative    Atrophy   Thenar:  negative    Other     Neuorologic Exam    Median Distribution: abnormal  Ulnar Distribution: normal  Radial Distribution: normal      Left Hand/Wrist Exam   Left hand exam is normal.    Inspection   Effusion: Wrist - absent   Deformity: Wrist - absent     Range of Motion     Wrist   Extension:  normal   Flexion:  normal   Pronation:  normal   Supination:  normal     Tests   Phalens Sign: negative  Tinel's sign (median nerve): negative  Carpal Tunnel Compression Test: negative  Cubital Tunnel Compression Test: negative    Atrophy  Thenar:  Negative    Other     Sensory Exam  Median Distribution: normal  Ulnar Distribution: normal  Radial Distribution: normal          Muscle Strength   Right Upper Extremity   Wrist extension: 5/5   Wrist flexion: 5/5   : 5/5   Index Finger: 5/5  Middle Finger: 5/5  Ring Finger: 5/5  Little Finger: 5/5  Thumb - APB: 5/5  Thumb - FPL: 5/5  Pinch Mechanism: 5/5  Left Upper Extremity  Wrist extension: 5/5   Wrist flexion: 5/5   :  5/5   Index Finger: 5/5  Middle Finger: 5/5  Ring Finger: 5/5  Little Finger: 5/5  Thumb - APB: 5/5  Thumb - FPL:  5/5  Pinch Mechanism: 5/5    Vascular Exam       Capillary Refill  Right Hand: normal capillary refill  Left Hand: normal capillary refill        Physical Exam  Vitals and nursing note reviewed.   Constitutional:       Appearance: She is well-developed and well-nourished.   HENT:      Nose: Nose normal.   Eyes:      Extraocular Movements: EOM normal.      Pupils: Pupils are equal, round, and reactive to light.   Cardiovascular:      Rate and Rhythm: Normal rate.   Pulmonary:      Effort: Pulmonary effort is normal.   Abdominal:      Palpations: Abdomen is soft.   Musculoskeletal:      Right wrist: No deformity or effusion.      Left wrist: Normal. No deformity or effusion.      Right hand: Decreased sensation of the median distribution. Normal sensation of the ulnar distribution and radial distribution. Normal capillary refill.      Left hand: Decreased sensation. Normal sensation of the ulnar distribution, median distribution and radial distribution. Normal capillary refill.      Cervical back: Normal range of motion and neck supple.   Neurological:      Mental Status: She is alert and oriented to person, place, and time.      Deep Tendon Reflexes: Reflexes are normal and symmetric.   Psychiatric:         Mood and Affect: Mood and affect normal.         Behavior: Behavior normal.         Thought Content: Thought content normal.         Judgment: Judgment normal.      X-ray images ordered obtained interpreted by me.  They show mild CMC joint arthritis with some scattered degenerative changes throughout the hand and wrist.  No acute fractures present.  No evidence of any soft tissue injury.       Assessment and Plan     Encounter Diagnoses   Name Primary?    Carpal tunnel syndrome of right wrist Yes    Right hand pain          Sheree was seen today for pain.    Diagnoses and all orders for this visit:    Carpal tunnel syndrome of right wrist    Right hand pain    Discussed further treatment options with her today.   Recommend moving forward with a carpal tunnel injection.  If she is not get significant pain relief from that I recommend she follow up here as needed and we would consider an EMG at that point.    Carpal Tunnel: R carpal tunnel    Date/Time: 2/11/2025 11:40 AM    Performed by: Bryant Clark MD  Authorized by: Bryant Clark MD    Consent Done?:  Yes (Verbal)  Indications:  Pain  Site marked: the procedure site was marked    Timeout: prior to procedure the correct patient, procedure, and site was verified    Prep: patient was prepped and draped in usual sterile fashion      Local anesthesia used?: Yes    Local anesthetic:  Topical anesthetic  Location:  Wrist  Site:  R carpal tunnel  Ultrasonic Guidance for Needle Placement?: No    Needle size:  25 G  Approach:  Volar  Medications:  40 mg triamcinolone acetonide 40 mg/mL  Patient tolerance:  Patient tolerated the procedure well with no immediate complications

## 2025-07-13 ENCOUNTER — PATIENT MESSAGE (OUTPATIENT)
Dept: ORTHOPEDICS | Facility: CLINIC | Age: 70
End: 2025-07-13
Payer: MEDICARE

## (undated) DEVICE — GLOVE BIOGEL SKINSENSE PI 7.0

## (undated) DEVICE — TROCAR ENDOPATH XCEL 5X100MM

## (undated) DEVICE — JELLY SURGILUBE 5GR

## (undated) DEVICE — SUT VICRYL CTD 2-0 GI 27 SH

## (undated) DEVICE — SOL IRR NACL .9% 3000ML

## (undated) DEVICE — SET BASIN 48X48IN 6000ML RING

## (undated) DEVICE — Device

## (undated) DEVICE — SEE MEDLINE ITEM 157128

## (undated) DEVICE — SYR 10CC LUER LOCK

## (undated) DEVICE — CLIP HEMO-LOK ML

## (undated) DEVICE — SCISSOR 5MMX35CM DIRECT DRIVE

## (undated) DEVICE — SEE MEDLINE ITEM 157117

## (undated) DEVICE — GAUZE PACKING VAG XRAY 2X6

## (undated) DEVICE — DEVICE MYOSURE REACH

## (undated) DEVICE — KIT WING PAD POSITIONING

## (undated) DEVICE — DRAPE UNDER BUTTOCKS WITH POUCH

## (undated) DEVICE — OCCLUDER COLPO-PNEUMO STERILE

## (undated) DEVICE — IRRIGATOR ENDOSCOPY DISP.

## (undated) DEVICE — SOL 9P NACL IRR PIC IL

## (undated) DEVICE — SOL NS 1000CC

## (undated) DEVICE — TROCAR ENDOPATH XCEL 12MM 10CM

## (undated) DEVICE — ELECTRODE REM PLYHSV RETURN 9

## (undated) DEVICE — SEE MEDLINE ITEM 157110

## (undated) DEVICE — NDL HYPO REG 25G X 1 1/2

## (undated) DEVICE — SEAL LENS SCOPE MYOSURE

## (undated) DEVICE — CONTAINER SPECIMEN STRL 4OZ

## (undated) DEVICE — CANNULA ENDOPATH XCEL 5X100MM

## (undated) DEVICE — TRAY MINOR GEN SURG

## (undated) DEVICE — UNDERGLOVES BIOGEL PI SZ 7 LF

## (undated) DEVICE — SUT GUT PL. 4-0 27 FS-2

## (undated) DEVICE — DRAPE STERI INSTRUMENT 1018

## (undated) DEVICE — SUT 0 VICRYL / UR6 (J603)

## (undated) DEVICE — PAD PREP 50/CA

## (undated) DEVICE — SPONGE GAUZE 16PLY 4X4

## (undated) DEVICE — TUBING HF INSUFFLATION W/ FLTR

## (undated) DEVICE — BANDAGE ADHESIVE

## (undated) DEVICE — BLADE SURG STAINLESS STEEL #11

## (undated) DEVICE — BANDAGE KERLIX P/P 2.25IN STER

## (undated) DEVICE — SEE MEDLINE ITEM 152622

## (undated) DEVICE — SUT MCRYL PLUS 4-0 PS2 27IN

## (undated) DEVICE — DRESSING LEUKOPLAST FLEX 1X3IN

## (undated) DEVICE — GLOVE BIOGEL SKINSENSE PI 7.5

## (undated) DEVICE — PACK FLUENT DISPOSABLE

## (undated) DEVICE — SOL NACL IRR 3000ML

## (undated) DEVICE — SEE MEDLINE ITEM 146296

## (undated) DEVICE — SUT VICRYL PLUS 0 CT1 36IN

## (undated) DEVICE — BLADE SURG CARBON STEEL SZ11

## (undated) DEVICE — GAUZE PETROLATUM 1/2 X 72

## (undated) DEVICE — PACK LAPSCP/PELVSCPY III TIBRN

## (undated) DEVICE — ADHESIVE DERMABOND ADVANCED